# Patient Record
Sex: MALE | Race: BLACK OR AFRICAN AMERICAN | NOT HISPANIC OR LATINO | Employment: OTHER | URBAN - METROPOLITAN AREA
[De-identification: names, ages, dates, MRNs, and addresses within clinical notes are randomized per-mention and may not be internally consistent; named-entity substitution may affect disease eponyms.]

---

## 2023-03-22 LAB
ALANINE AMINOTRANSFERASE (SGPT) (U/L) IN SER/PLAS: 12 U/L (ref 10–52)
ALBUMIN (G/DL) IN SER/PLAS: 3.5 G/DL (ref 3.4–5)
ALKALINE PHOSPHATASE (U/L) IN SER/PLAS: 87 U/L (ref 33–136)
ASPARTATE AMINOTRANSFERASE (SGOT) (U/L) IN SER/PLAS: 27 U/L (ref 9–39)
BILIRUBIN DIRECT (MG/DL) IN SER/PLAS: 0.1 MG/DL (ref 0–0.3)
BILIRUBIN TOTAL (MG/DL) IN SER/PLAS: 0.4 MG/DL (ref 0–1.2)
HEPATITIS B VIRUS CORE AB (PRESENCE) IN SER/PLAS BY IMM: REACTIVE
PROTEIN TOTAL: 7.4 G/DL (ref 6.4–8.2)

## 2023-03-23 LAB
HCV PCR QUANT: NOT DETECTED IU/ML
HCV RNA, PCR LOG: NORMAL LOG10 IU/ML

## 2023-05-12 ENCOUNTER — APPOINTMENT (OUTPATIENT)
Dept: LAB | Facility: LAB | Age: 73
End: 2023-05-12
Payer: COMMERCIAL

## 2023-05-12 LAB
ALANINE AMINOTRANSFERASE (SGPT) (U/L) IN SER/PLAS: 14 U/L (ref 10–52)
ALBUMIN (G/DL) IN SER/PLAS: 3.6 G/DL (ref 3.4–5)
ALKALINE PHOSPHATASE (U/L) IN SER/PLAS: 84 U/L (ref 33–136)
ASPARTATE AMINOTRANSFERASE (SGOT) (U/L) IN SER/PLAS: 23 U/L (ref 9–39)
BILIRUBIN DIRECT (MG/DL) IN SER/PLAS: 0.1 MG/DL (ref 0–0.3)
BILIRUBIN TOTAL (MG/DL) IN SER/PLAS: 0.4 MG/DL (ref 0–1.2)
PROTEIN TOTAL: 7.5 G/DL (ref 6.4–8.2)

## 2023-05-14 LAB
HCV PCR QUANT: NOT DETECTED IU/ML
HCV RNA, PCR LOG: NORMAL LOG10 IU/ML

## 2023-07-28 ENCOUNTER — DOCUMENTATION (OUTPATIENT)
Dept: CARE COORDINATION | Facility: CLINIC | Age: 73
End: 2023-07-28
Payer: COMMERCIAL

## 2023-08-03 ENCOUNTER — PATIENT OUTREACH (OUTPATIENT)
Dept: CARE COORDINATION | Facility: CLINIC | Age: 73
End: 2023-08-03
Payer: COMMERCIAL

## 2023-08-15 ENCOUNTER — APPOINTMENT (OUTPATIENT)
Dept: LAB | Facility: LAB | Age: 73
End: 2023-08-15
Payer: COMMERCIAL

## 2023-08-15 LAB
ALANINE AMINOTRANSFERASE (SGPT) (U/L) IN SER/PLAS: 11 U/L (ref 10–52)
ALBUMIN (G/DL) IN SER/PLAS: 4.1 G/DL (ref 3.4–5)
ALKALINE PHOSPHATASE (U/L) IN SER/PLAS: 80 U/L (ref 33–136)
ALPHA-1 FETOPROTEIN (NG/ML) IN SER/PLAS: 4 NG/ML (ref 0–9)
ANION GAP IN SER/PLAS: 12 MMOL/L (ref 10–20)
ASPARTATE AMINOTRANSFERASE (SGOT) (U/L) IN SER/PLAS: 25 U/L (ref 9–39)
BILIRUBIN DIRECT (MG/DL) IN SER/PLAS: 0.2 MG/DL (ref 0–0.3)
BILIRUBIN TOTAL (MG/DL) IN SER/PLAS: 0.7 MG/DL (ref 0–1.2)
CALCIUM (MG/DL) IN SER/PLAS: 9.8 MG/DL (ref 8.6–10.6)
CARBON DIOXIDE, TOTAL (MMOL/L) IN SER/PLAS: 30 MMOL/L (ref 21–32)
CHLORIDE (MMOL/L) IN SER/PLAS: 97 MMOL/L (ref 98–107)
CREATININE (MG/DL) IN SER/PLAS: 2.02 MG/DL (ref 0.5–1.3)
ERYTHROCYTE DISTRIBUTION WIDTH (RATIO) BY AUTOMATED COUNT: 13.3 % (ref 11.5–14.5)
ERYTHROCYTE MEAN CORPUSCULAR HEMOGLOBIN CONCENTRATION (G/DL) BY AUTOMATED: 31.7 G/DL (ref 32–36)
ERYTHROCYTE MEAN CORPUSCULAR VOLUME (FL) BY AUTOMATED COUNT: 102 FL (ref 80–100)
ERYTHROCYTES (10*6/UL) IN BLOOD BY AUTOMATED COUNT: 3.73 X10E12/L (ref 4.5–5.9)
GFR MALE: 34 ML/MIN/1.73M2
GLUCOSE (MG/DL) IN SER/PLAS: 100 MG/DL (ref 74–99)
HEMATOCRIT (%) IN BLOOD BY AUTOMATED COUNT: 37.9 % (ref 41–52)
HEMOGLOBIN (G/DL) IN BLOOD: 12 G/DL (ref 13.5–17.5)
INR IN PPP BY COAGULATION ASSAY: 1 (ref 0.9–1.1)
LEUKOCYTES (10*3/UL) IN BLOOD BY AUTOMATED COUNT: 2.8 X10E9/L (ref 4.4–11.3)
NRBC (PER 100 WBCS) BY AUTOMATED COUNT: 0 /100 WBC (ref 0–0)
PLATELETS (10*3/UL) IN BLOOD AUTOMATED COUNT: 180 X10E9/L (ref 150–450)
POTASSIUM (MMOL/L) IN SER/PLAS: 4.4 MMOL/L (ref 3.5–5.3)
PROTEIN TOTAL: 8.2 G/DL (ref 6.4–8.2)
PROTHROMBIN TIME (PT) IN PPP BY COAGULATION ASSAY: 11.8 SEC (ref 9.8–12.8)
SODIUM (MMOL/L) IN SER/PLAS: 135 MMOL/L (ref 136–145)
UREA NITROGEN (MG/DL) IN SER/PLAS: 61 MG/DL (ref 6–23)

## 2023-08-16 LAB
HCV PCR QUANT: NOT DETECTED IU/ML
HCV RNA, PCR LOG: NORMAL LOG10 IU/ML

## 2023-09-22 PROBLEM — I10 HYPERTENSION: Status: ACTIVE | Noted: 2023-09-22

## 2023-09-22 PROBLEM — J44.9 COPD (CHRONIC OBSTRUCTIVE PULMONARY DISEASE) (MULTI): Status: ACTIVE | Noted: 2022-11-17

## 2023-09-22 PROBLEM — K59.09 CHRONIC CONSTIPATION: Status: ACTIVE | Noted: 2023-09-22

## 2023-09-22 PROBLEM — C32.9 LARYNGEAL CANCER (MULTI): Status: ACTIVE | Noted: 2023-09-22

## 2023-09-22 PROBLEM — K80.20 CHOLELITHIASIS: Status: ACTIVE | Noted: 2023-09-22

## 2023-09-22 PROBLEM — K94.23 PEG TUBE MALFUNCTION (MULTI): Status: ACTIVE | Noted: 2023-09-22

## 2023-09-22 PROBLEM — J90 PLEURAL EFFUSION, LEFT: Status: ACTIVE | Noted: 2023-09-22

## 2023-09-22 PROBLEM — T17.998A ASPIRATION OF LIQUID: Status: ACTIVE | Noted: 2023-09-22

## 2023-09-22 PROBLEM — E03.9 HYPOTHYROIDISM: Status: ACTIVE | Noted: 2023-09-22

## 2023-09-22 PROBLEM — K26.9 DUODENAL ULCER: Status: ACTIVE | Noted: 2023-09-22

## 2023-09-22 PROBLEM — K92.2 UPPER GI BLEED: Status: ACTIVE | Noted: 2023-09-22

## 2023-09-22 PROBLEM — L92.9: Status: ACTIVE | Noted: 2023-09-22

## 2023-09-22 PROBLEM — G89.29 CHRONIC LOW BACK PAIN: Status: ACTIVE | Noted: 2023-09-22

## 2023-09-22 PROBLEM — Z87.891 PERSONAL HISTORY OF NICOTINE DEPENDENCE: Status: ACTIVE | Noted: 2022-11-17

## 2023-09-22 PROBLEM — R13.12 DYSPHAGIA, OROPHARYNGEAL PHASE: Status: ACTIVE | Noted: 2023-09-22

## 2023-09-22 PROBLEM — R49.9 CHANGE IN VOICE: Status: ACTIVE | Noted: 2023-09-22

## 2023-09-22 PROBLEM — B19.20 HEPATITIS C VIRUS: Status: ACTIVE | Noted: 2023-09-22

## 2023-09-22 PROBLEM — J43.9 EMPHYSEMA, UNSPECIFIED (MULTI): Status: ACTIVE | Noted: 2023-09-22

## 2023-09-22 PROBLEM — R63.4 WEIGHT LOSS: Status: ACTIVE | Noted: 2023-09-22

## 2023-09-22 PROBLEM — K11.7 HYPERSALIVATION: Status: ACTIVE | Noted: 2023-09-22

## 2023-09-22 PROBLEM — I73.9 PAD (PERIPHERAL ARTERY DISEASE) (CMS-HCC): Status: ACTIVE | Noted: 2023-09-22

## 2023-09-22 PROBLEM — D64.9 ANEMIA: Status: ACTIVE | Noted: 2023-09-22

## 2023-09-22 PROBLEM — E83.52 HYPERCALCEMIA: Status: ACTIVE | Noted: 2023-09-22

## 2023-09-22 PROBLEM — M54.50 CHRONIC LOW BACK PAIN: Status: ACTIVE | Noted: 2023-09-22

## 2023-09-22 PROBLEM — K74.60 CIRRHOSIS OF LIVER (MULTI): Status: ACTIVE | Noted: 2023-09-22

## 2023-09-22 PROBLEM — N40.1 ENLARGED PROSTATE WITH LOWER URINARY TRACT SYMPTOMS (LUTS): Status: ACTIVE | Noted: 2023-09-22

## 2023-09-22 PROBLEM — R49.0 HOARSENESS OF VOICE: Status: ACTIVE | Noted: 2023-09-22

## 2023-09-22 PROBLEM — J38.7 LARYNGEAL MASS: Status: ACTIVE | Noted: 2023-09-22

## 2023-09-22 RX ORDER — ATORVASTATIN CALCIUM 10 MG/1
10 TABLET, FILM COATED ORAL NIGHTLY
COMMUNITY

## 2023-09-22 RX ORDER — ALBUTEROL SULFATE 0.83 MG/ML
3 SOLUTION RESPIRATORY (INHALATION) EVERY 6 HOURS PRN
COMMUNITY
Start: 2019-01-17

## 2023-09-22 RX ORDER — DOCUSATE SODIUM 50 MG/5ML
LIQUID ORAL
COMMUNITY
Start: 2019-10-29 | End: 2023-12-20 | Stop reason: WASHOUT

## 2023-09-22 RX ORDER — CEPHALEXIN 500 MG/1
CAPSULE ORAL
COMMUNITY
Start: 2023-06-08 | End: 2023-12-20 | Stop reason: ALTCHOICE

## 2023-09-22 RX ORDER — HYDROCHLOROTHIAZIDE 25 MG/1
25 TABLET ORAL DAILY
COMMUNITY

## 2023-09-22 RX ORDER — ESOMEPRAZOLE MAGNESIUM 40 MG/1
40 GRANULE, DELAYED RELEASE ORAL 2 TIMES DAILY
COMMUNITY
Start: 2023-08-02 | End: 2024-01-03 | Stop reason: SDUPTHER

## 2023-09-22 RX ORDER — TERAZOSIN 5 MG/1
5 CAPSULE ORAL NIGHTLY
COMMUNITY
End: 2024-04-25 | Stop reason: WASHOUT

## 2023-09-22 RX ORDER — DEXTROMETHORPHAN/PSEUDOEPHED 2.5-7.5/.8
40 DROPS ORAL 4 TIMES DAILY
Status: ON HOLD | COMMUNITY
Start: 2019-09-27 | End: 2023-12-23 | Stop reason: SDUPTHER

## 2023-09-22 RX ORDER — LEVOTHYROXINE SODIUM 50 UG/1
50 TABLET ORAL
COMMUNITY

## 2023-09-22 RX ORDER — CHLORHEXIDINE GLUCONATE ORAL RINSE 1.2 MG/ML
15 SOLUTION DENTAL
COMMUNITY
End: 2023-12-20 | Stop reason: ALTCHOICE

## 2023-09-22 RX ORDER — ONDANSETRON 4 MG/1
TABLET, ORALLY DISINTEGRATING ORAL
COMMUNITY
Start: 2023-07-30 | End: 2023-12-20 | Stop reason: WASHOUT

## 2023-09-22 RX ORDER — CHOLECALCIFEROL (VITAMIN D3) 25 MCG
1 TABLET ORAL DAILY
COMMUNITY
Start: 2019-01-02 | End: 2023-12-20 | Stop reason: ALTCHOICE

## 2023-09-22 RX ORDER — ATENOLOL 50 MG/1
50 TABLET ORAL DAILY
COMMUNITY

## 2023-09-22 RX ORDER — FLUTICASONE PROPIONATE 50 MCG
1 SPRAY, SUSPENSION (ML) NASAL DAILY PRN
COMMUNITY
Start: 2021-07-07

## 2023-09-22 RX ORDER — LOSARTAN POTASSIUM 50 MG/1
50 TABLET ORAL DAILY
COMMUNITY

## 2023-09-22 RX ORDER — ESOMEPRAZOLE MAGNESIUM 20 MG/1
20 GRANULE, DELAYED RELEASE ORAL DAILY
COMMUNITY
End: 2023-12-20 | Stop reason: WASHOUT

## 2023-09-22 RX ORDER — AMLODIPINE BESYLATE 5 MG/1
5 TABLET ORAL NIGHTLY
COMMUNITY
End: 2024-04-25 | Stop reason: WASHOUT

## 2023-09-22 RX ORDER — VELPATASVIR AND SOFOSBUVIR 100; 400 MG/1; MG/1
1 TABLET, FILM COATED ORAL DAILY
COMMUNITY
Start: 2023-03-22 | End: 2023-12-20 | Stop reason: ALTCHOICE

## 2023-09-22 RX ORDER — PROCHLORPERAZINE MALEATE 10 MG
10 TABLET ORAL DAILY PRN
COMMUNITY
End: 2023-12-20 | Stop reason: WASHOUT

## 2023-09-22 RX ORDER — ALBUTEROL SULFATE 90 UG/1
2 AEROSOL, METERED RESPIRATORY (INHALATION) EVERY 6 HOURS PRN
COMMUNITY
Start: 2023-01-09

## 2023-09-22 RX ORDER — SIMETHICONE 125 MG/1
125 CAPSULE, LIQUID FILLED ORAL 4 TIMES DAILY
COMMUNITY
Start: 2023-07-24 | End: 2023-12-20 | Stop reason: WASHOUT

## 2023-10-11 ENCOUNTER — HOSPITAL ENCOUNTER (EMERGENCY)
Facility: HOSPITAL | Age: 73
Discharge: HOME | End: 2023-10-11
Attending: EMERGENCY MEDICINE
Payer: COMMERCIAL

## 2023-10-11 ENCOUNTER — APPOINTMENT (OUTPATIENT)
Dept: RADIOLOGY | Facility: HOSPITAL | Age: 73
End: 2023-10-11
Payer: COMMERCIAL

## 2023-10-11 VITALS
BODY MASS INDEX: 21.22 KG/M2 | WEIGHT: 140 LBS | HEART RATE: 81 BPM | TEMPERATURE: 97 F | SYSTOLIC BLOOD PRESSURE: 168 MMHG | RESPIRATION RATE: 17 BRPM | HEIGHT: 68 IN | DIASTOLIC BLOOD PRESSURE: 75 MMHG | OXYGEN SATURATION: 98 %

## 2023-10-11 DIAGNOSIS — R10.9 ABDOMINAL PAIN, UNSPECIFIED ABDOMINAL LOCATION: Primary | ICD-10-CM

## 2023-10-11 LAB
ALBUMIN SERPL BCP-MCNC: 4 G/DL (ref 3.4–5)
ALP SERPL-CCNC: 85 U/L (ref 33–136)
ALT SERPL W P-5'-P-CCNC: 12 U/L (ref 10–52)
ANION GAP SERPL CALC-SCNC: 15 MMOL/L (ref 10–20)
AST SERPL W P-5'-P-CCNC: 25 U/L (ref 9–39)
BASOPHILS # BLD AUTO: 0.02 X10*3/UL (ref 0–0.1)
BASOPHILS NFR BLD AUTO: 0.6 %
BILIRUB DIRECT SERPL-MCNC: 0.2 MG/DL (ref 0–0.3)
BILIRUB SERPL-MCNC: 0.7 MG/DL (ref 0–1.2)
BUN SERPL-MCNC: 71 MG/DL (ref 6–23)
CALCIUM SERPL-MCNC: 10 MG/DL (ref 8.6–10.6)
CARDIAC TROPONIN I PNL SERPL HS: <3 NG/L (ref 0–53)
CHLORIDE SERPL-SCNC: 94 MMOL/L (ref 98–107)
CO2 SERPL-SCNC: 28 MMOL/L (ref 21–32)
CREAT SERPL-MCNC: 2.05 MG/DL (ref 0.5–1.3)
EOSINOPHIL # BLD AUTO: 0.27 X10*3/UL (ref 0–0.4)
EOSINOPHIL NFR BLD AUTO: 8.3 %
ERYTHROCYTE [DISTWIDTH] IN BLOOD BY AUTOMATED COUNT: 12.7 % (ref 11.5–14.5)
GFR SERPL CREATININE-BSD FRML MDRD: 34 ML/MIN/1.73M*2
GLUCOSE SERPL-MCNC: 104 MG/DL (ref 74–99)
HCT VFR BLD AUTO: 35.2 % (ref 41–52)
HGB BLD-MCNC: 12 G/DL (ref 13.5–17.5)
IMM GRANULOCYTES # BLD AUTO: 0.01 X10*3/UL (ref 0–0.5)
IMM GRANULOCYTES NFR BLD AUTO: 0.3 % (ref 0–0.9)
LIPASE SERPL-CCNC: 180 U/L (ref 9–82)
LYMPHOCYTES # BLD AUTO: 0.53 X10*3/UL (ref 0.8–3)
LYMPHOCYTES NFR BLD AUTO: 16.3 %
MAGNESIUM SERPL-MCNC: 2.52 MG/DL (ref 1.6–2.4)
MCH RBC QN AUTO: 31.7 PG (ref 26–34)
MCHC RBC AUTO-ENTMCNC: 34.1 G/DL (ref 32–36)
MCV RBC AUTO: 93 FL (ref 80–100)
MONOCYTES # BLD AUTO: 0.52 X10*3/UL (ref 0.05–0.8)
MONOCYTES NFR BLD AUTO: 16 %
NEUTROPHILS # BLD AUTO: 1.9 X10*3/UL (ref 1.6–5.5)
NEUTROPHILS NFR BLD AUTO: 58.5 %
NRBC BLD-RTO: 0 /100 WBCS (ref 0–0)
PLATELET # BLD AUTO: 166 X10*3/UL (ref 150–450)
PMV BLD AUTO: 10.6 FL (ref 7.5–11.5)
POTASSIUM SERPL-SCNC: 4.8 MMOL/L (ref 3.5–5.3)
PROT SERPL-MCNC: 8.7 G/DL (ref 6.4–8.2)
RBC # BLD AUTO: 3.78 X10*6/UL (ref 4.5–5.9)
SODIUM SERPL-SCNC: 132 MMOL/L (ref 136–145)
WBC # BLD AUTO: 3.3 X10*3/UL (ref 4.4–11.3)

## 2023-10-11 PROCEDURE — 74177 CT ABD & PELVIS W/CONTRAST: CPT | Performed by: RADIOLOGY

## 2023-10-11 PROCEDURE — 84484 ASSAY OF TROPONIN QUANT: CPT | Performed by: EMERGENCY MEDICINE

## 2023-10-11 PROCEDURE — 83690 ASSAY OF LIPASE: CPT | Performed by: EMERGENCY MEDICINE

## 2023-10-11 PROCEDURE — 2500000004 HC RX 250 GENERAL PHARMACY W/ HCPCS (ALT 636 FOR OP/ED): Performed by: EMERGENCY MEDICINE

## 2023-10-11 PROCEDURE — 80053 COMPREHEN METABOLIC PANEL: CPT | Performed by: EMERGENCY MEDICINE

## 2023-10-11 PROCEDURE — 2580000001 HC RX 258 IV SOLUTIONS: Performed by: EMERGENCY MEDICINE

## 2023-10-11 PROCEDURE — 99284 EMERGENCY DEPT VISIT MOD MDM: CPT | Mod: 25 | Performed by: EMERGENCY MEDICINE

## 2023-10-11 PROCEDURE — 2550000001 HC RX 255 CONTRASTS: Performed by: EMERGENCY MEDICINE

## 2023-10-11 PROCEDURE — 36415 COLL VENOUS BLD VENIPUNCTURE: CPT | Performed by: EMERGENCY MEDICINE

## 2023-10-11 PROCEDURE — G1004 CDSM NDSC: HCPCS

## 2023-10-11 PROCEDURE — 96361 HYDRATE IV INFUSION ADD-ON: CPT | Mod: XU | Performed by: EMERGENCY MEDICINE

## 2023-10-11 PROCEDURE — 83735 ASSAY OF MAGNESIUM: CPT | Performed by: EMERGENCY MEDICINE

## 2023-10-11 PROCEDURE — 85025 COMPLETE CBC W/AUTO DIFF WBC: CPT | Performed by: EMERGENCY MEDICINE

## 2023-10-11 PROCEDURE — 96374 THER/PROPH/DIAG INJ IV PUSH: CPT | Mod: XU | Performed by: EMERGENCY MEDICINE

## 2023-10-11 PROCEDURE — 99285 EMERGENCY DEPT VISIT HI MDM: CPT | Performed by: EMERGENCY MEDICINE

## 2023-10-11 RX ORDER — FAMOTIDINE 40 MG/5ML
10 POWDER, FOR SUSPENSION ORAL
Qty: 50 ML | Refills: 0 | Status: SHIPPED | OUTPATIENT
Start: 2023-10-11 | End: 2023-12-23 | Stop reason: HOSPADM

## 2023-10-11 RX ORDER — ONDANSETRON HYDROCHLORIDE 2 MG/ML
4 INJECTION, SOLUTION INTRAVENOUS ONCE
Status: COMPLETED | OUTPATIENT
Start: 2023-10-11 | End: 2023-10-11

## 2023-10-11 RX ORDER — FAMOTIDINE 40 MG/5ML
20 POWDER, FOR SUSPENSION ORAL DAILY
Qty: 50 ML | Refills: 0 | Status: SHIPPED | OUTPATIENT
Start: 2023-10-11 | End: 2023-10-11 | Stop reason: SDUPTHER

## 2023-10-11 RX ADMIN — ONDANSETRON 4 MG: 2 INJECTION INTRAMUSCULAR; INTRAVENOUS at 09:25

## 2023-10-11 RX ADMIN — SODIUM CHLORIDE, SODIUM LACTATE, POTASSIUM CHLORIDE, AND CALCIUM CHLORIDE 1000 ML: 600; 310; 30; 20 INJECTION, SOLUTION INTRAVENOUS at 11:59

## 2023-10-11 RX ADMIN — IOHEXOL 75 ML: 350 INJECTION, SOLUTION INTRAVENOUS at 13:58

## 2023-10-11 ASSESSMENT — COLUMBIA-SUICIDE SEVERITY RATING SCALE - C-SSRS
1. IN THE PAST MONTH, HAVE YOU WISHED YOU WERE DEAD OR WISHED YOU COULD GO TO SLEEP AND NOT WAKE UP?: NO
6. HAVE YOU EVER DONE ANYTHING, STARTED TO DO ANYTHING, OR PREPARED TO DO ANYTHING TO END YOUR LIFE?: NO
2. HAVE YOU ACTUALLY HAD ANY THOUGHTS OF KILLING YOURSELF?: NO

## 2023-10-11 ASSESSMENT — PAIN DESCRIPTION - PAIN TYPE: TYPE: ACUTE PAIN

## 2023-10-11 ASSESSMENT — PAIN - FUNCTIONAL ASSESSMENT: PAIN_FUNCTIONAL_ASSESSMENT: 0-10

## 2023-10-11 ASSESSMENT — PAIN DESCRIPTION - LOCATION: LOCATION: ABDOMEN

## 2023-10-11 ASSESSMENT — PAIN SCALES - GENERAL: PAINLEVEL_OUTOF10: 8

## 2023-10-11 NOTE — ED NOTES
ED Attending states that because troponin was negative, no need to complete ekg     Ruby Valadez RN  10/11/23 1600

## 2023-10-11 NOTE — DISCHARGE INSTRUCTIONS
Please follow-up with your primary care provider.  Return to the ED if you develop worsening pain, shortness of breath, are unable to tolerate your tube feeds, have intractable vomiting, vomiting blood/tarry material, have bloody stools, black stools, develop fevers, or if you have any new/worsening symptoms/concerns.

## 2023-10-23 ENCOUNTER — OFFICE VISIT (OUTPATIENT)
Dept: OTOLARYNGOLOGY | Facility: HOSPITAL | Age: 73
End: 2023-10-23
Payer: COMMERCIAL

## 2023-10-23 VITALS — WEIGHT: 139.4 LBS | TEMPERATURE: 98.1 F | BODY MASS INDEX: 21.2 KG/M2

## 2023-10-23 DIAGNOSIS — R49.9 CHANGE IN VOICE: ICD-10-CM

## 2023-10-23 DIAGNOSIS — R49.0 HOARSENESS OF VOICE: ICD-10-CM

## 2023-10-23 DIAGNOSIS — C32.9 LARYNGEAL CANCER (MULTI): Primary | ICD-10-CM

## 2023-10-23 DIAGNOSIS — E03.9 ACQUIRED HYPOTHYROIDISM: ICD-10-CM

## 2023-10-23 DIAGNOSIS — J38.7 LARYNGEAL MASS: ICD-10-CM

## 2023-10-23 PROCEDURE — 1036F TOBACCO NON-USER: CPT | Performed by: OTOLARYNGOLOGY

## 2023-10-23 PROCEDURE — 99214 OFFICE O/P EST MOD 30 MIN: CPT | Performed by: OTOLARYNGOLOGY

## 2023-10-23 PROCEDURE — 31575 DIAGNOSTIC LARYNGOSCOPY: CPT | Performed by: OTOLARYNGOLOGY

## 2023-10-23 PROCEDURE — 1160F RVW MEDS BY RX/DR IN RCRD: CPT | Performed by: OTOLARYNGOLOGY

## 2023-10-23 PROCEDURE — 1126F AMNT PAIN NOTED NONE PRSNT: CPT | Performed by: OTOLARYNGOLOGY

## 2023-10-23 PROCEDURE — 1159F MED LIST DOCD IN RCRD: CPT | Performed by: OTOLARYNGOLOGY

## 2023-10-23 ASSESSMENT — ENCOUNTER SYMPTOMS
LOSS OF SENSATION IN FEET: 0
OCCASIONAL FEELINGS OF UNSTEADINESS: 0
DEPRESSION: 0

## 2023-10-23 NOTE — PROGRESS NOTES
ASSESSMENT AND PLAN:   Mk Fleming is a 73 y.o. male with a history of T4 laryngeal cancer status post chemoradiation.  The patient reports that his weight is stable and he is using his feeding tube without any difficulties.  He did have a replacement of his feeding tube not too long ago.      Today's examination to include flexible laryngoscopy demonstrates absence of the right part of his epiglottis as well as significant secretions within the hypopharynx.  There are no concerning masses or lesions.  He has reduction of mobility and a breathy quality to his voice.    We discussed continued observation with a 6-month follow-up.      I would like to see the patient back in 6 months.       Reason For Consult  No chief complaint on file.       HISTORY OF PRESENT ILLNESS:  Mk Fleming is a 73 y.o. male presenting for a follow up visit with me for cancer surveillance.  The patient reports weight is stable.  No concerns regarding change in voice.  No hemoptysis.      Prior History:   Last seen by Dr. Barr on 07/28/2023 for Dysphagia status post treatment of laryngeal cancer     Past Medical History  He has a past medical history of Personal history of other diseases of the digestive system, Personal history of other diseases of the respiratory system, Personal history of other medical treatment, and Personal history of other specified conditions. Surgical History  He has a past surgical history that includes Other surgical history (11/19/2018); Other surgical history (11/19/2018); Other surgical history (11/19/2018); Other surgical history (10/20/2022); Other surgical history (10/20/2022); IR embolization lymph node (Bilateral, 7/3/2022); IR angiogram inferior epigastric (7/3/2022); and IR angiogram aorta abdomen (7/3/2022).   Social History  He reports that he has never smoked. He has never used smokeless tobacco. No history on file for alcohol use and drug use. Allergies  Patient has no known allergies.      Family History  Family History   Problem Relation Name Age of Onset    Pancreatic cancer Mother      Hypertension Sister      Hypertension Brother         Review of Systems  All 10 systems were reviewed and negative except for above.      Last Recorded Vitals  There were no vitals taken for this visit.    Physical Exam  ENT Physical Exam  Constitutional  Appearance: patient appears well-developed and well-nourished,  Head and Face  Appearance: head appears normal and face appears normal;  Ear  Auricles: right auricle normal; left auricle normal;  Nose  External Nose: nares patent bilaterally;  Oral Cavity/Oropharynx  Lips: normal;  Teeth: no dentures present;  Neck  Neck: radiation changes present; no scars present; neck asymmetric; no neck mass present;  Thyroid: no thyroid mass present;  Respiratory  Inspection: no retractions visible;  Cardiovascular  Inspection: no peripheral edema present;  Neurovestibular  Mental Status: alert and oriented;  Psychiatric: mood normal;  Cranial Nerves: cranial nerves intact;        Relevant Results       Patient Reported Outcome Measures         Radiology, Laboratory and Pathology  No results found.      Laryngoscopy    Date/Time: 10/23/2023 2:28 PM    Performed by: Amrik Sprague MD  Authorized by: Amrik Sprague MD    Consent:     Consent obtained:  Verbal    Consent given by:  Patient  Anesthesia (see MAR for exact dosages):     Anesthesia method:  Topical application  Procedure details:     Indications: assessment of airway and evaluation of larynx and immediate subglottis      Medication:  Afrin    Instrument: flexible fiberoptic laryngoscope      Scope location: bilateral nare    Post-procedure details:     Patient tolerance of procedure:  Tolerated well, no immediate complications  GRBAS: 1,2,1,2,2  Intelligibility is reduced Alan resonance is balanced, vocal loudness reduced breath support reduced  Subglottic edema present thick mucus present no granuloma,  ventricular obliteration is complete, erythema is complete, interarytenoid thickening is moderate, vocal fold edema is severe, diffuse laryngitis is severe  Gross arytenoids were limited abduction bilaterally, arytenoid height was normal, supraglottic tension is lateral,  Eovxp3d right epiglottis.     Time Spent  Prep time on day of patient encounter: 10 minutes  Time spent directly with patient, family or caregiver: 15 minutes  Additional Time Spent on Patient Care Activities: 5 minutes  Documentation Time: 10 minutes  Other Time Spent: 0 minutes  Total: 40 minutes

## 2023-12-13 ENCOUNTER — OFFICE VISIT (OUTPATIENT)
Dept: GASTROENTEROLOGY | Facility: HOSPITAL | Age: 73
End: 2023-12-13
Payer: COMMERCIAL

## 2023-12-13 VITALS
WEIGHT: 141 LBS | DIASTOLIC BLOOD PRESSURE: 67 MMHG | SYSTOLIC BLOOD PRESSURE: 153 MMHG | HEART RATE: 84 BPM | TEMPERATURE: 97.3 F | HEIGHT: 68 IN | BODY MASS INDEX: 21.37 KG/M2 | OXYGEN SATURATION: 98 %

## 2023-12-13 DIAGNOSIS — B19.20 HEPATITIS C VIRUS INFECTION WITHOUT HEPATIC COMA, UNSPECIFIED CHRONICITY: Primary | ICD-10-CM

## 2023-12-13 DIAGNOSIS — K82.9 GALLBLADDER ATTACK: ICD-10-CM

## 2023-12-13 DIAGNOSIS — K25.7 CHRONIC GASTRIC ULCER WITHOUT HEMORRHAGE AND WITHOUT PERFORATION: ICD-10-CM

## 2023-12-13 DIAGNOSIS — K74.00 LIVER FIBROSIS: ICD-10-CM

## 2023-12-13 PROCEDURE — 3078F DIAST BP <80 MM HG: CPT | Performed by: INTERNAL MEDICINE

## 2023-12-13 PROCEDURE — 1160F RVW MEDS BY RX/DR IN RCRD: CPT | Performed by: INTERNAL MEDICINE

## 2023-12-13 PROCEDURE — 1159F MED LIST DOCD IN RCRD: CPT | Performed by: INTERNAL MEDICINE

## 2023-12-13 PROCEDURE — 99214 OFFICE O/P EST MOD 30 MIN: CPT | Performed by: INTERNAL MEDICINE

## 2023-12-13 PROCEDURE — 1036F TOBACCO NON-USER: CPT | Performed by: INTERNAL MEDICINE

## 2023-12-13 PROCEDURE — 1125F AMNT PAIN NOTED PAIN PRSNT: CPT | Performed by: INTERNAL MEDICINE

## 2023-12-13 PROCEDURE — 3077F SYST BP >= 140 MM HG: CPT | Performed by: INTERNAL MEDICINE

## 2023-12-13 SDOH — ECONOMIC STABILITY: FOOD INSECURITY: WITHIN THE PAST 12 MONTHS, THE FOOD YOU BOUGHT JUST DIDN'T LAST AND YOU DIDN'T HAVE MONEY TO GET MORE.: NEVER TRUE

## 2023-12-13 SDOH — ECONOMIC STABILITY: FOOD INSECURITY: WITHIN THE PAST 12 MONTHS, YOU WORRIED THAT YOUR FOOD WOULD RUN OUT BEFORE YOU GOT MONEY TO BUY MORE.: NEVER TRUE

## 2023-12-13 ASSESSMENT — ENCOUNTER SYMPTOMS
DEPRESSION: 0
OCCASIONAL FEELINGS OF UNSTEADINESS: 0
LOSS OF SENSATION IN FEET: 0

## 2023-12-13 ASSESSMENT — PAIN SCALES - GENERAL: PAINLEVEL: 7

## 2023-12-13 ASSESSMENT — LIFESTYLE VARIABLES
SKIP TO QUESTIONS 9-10: 1
HOW MANY STANDARD DRINKS CONTAINING ALCOHOL DO YOU HAVE ON A TYPICAL DAY: PATIENT DOES NOT DRINK
HOW OFTEN DO YOU HAVE SIX OR MORE DRINKS ON ONE OCCASION: NEVER
HOW OFTEN DO YOU HAVE A DRINK CONTAINING ALCOHOL: NEVER
AUDIT-C TOTAL SCORE: 0

## 2023-12-13 ASSESSMENT — PATIENT HEALTH QUESTIONNAIRE - PHQ9
SUM OF ALL RESPONSES TO PHQ9 QUESTIONS 1 AND 2: 0
1. LITTLE INTEREST OR PLEASURE IN DOING THINGS: NOT AT ALL
1. LITTLE INTEREST OR PLEASURE IN DOING THINGS: NOT AT ALL
2. FEELING DOWN, DEPRESSED OR HOPELESS: NOT AT ALL
2. FEELING DOWN, DEPRESSED OR HOPELESS: NOT AT ALL
SUM OF ALL RESPONSES TO PHQ9 QUESTIONS 1 & 2: 0

## 2023-12-13 ASSESSMENT — COLUMBIA-SUICIDE SEVERITY RATING SCALE - C-SSRS
6. HAVE YOU EVER DONE ANYTHING, STARTED TO DO ANYTHING, OR PREPARED TO DO ANYTHING TO END YOUR LIFE?: NO
1. IN THE PAST MONTH, HAVE YOU WISHED YOU WERE DEAD OR WISHED YOU COULD GO TO SLEEP AND NOT WAKE UP?: NO
2. HAVE YOU ACTUALLY HAD ANY THOUGHTS OF KILLING YOURSELF?: NO

## 2023-12-13 NOTE — PROGRESS NOTES
"Hepatology Clinic Follow up Note    Reason For Visit: HCV follow up    History Of Present Illness  Mk Fleming is a 73 y.o. male with Hypertension, oropharyngeal Ca s/p surgery and XRT and HCV GT 1a, failed Ribavirin / interferon regimen in the past, complicated by advanced hepatic fibrosis, s/p Epclusa (finished course in May, 2023) presents today for follow up.    Last seen by Dr. Gallardo in May, 2023. Had been doing well. No side effects from meds. Labs 12 weeks post treatment Not detected (aug, 2023). Repeat labs from October with albumin 4.0, Tbili 0.7, alkphos 85, ALT 12, AST 25. Na 132, BUN/Cr 71/2.05. Plts 166. No AFP or INR. RUQ from August without any mass lesion. Fibroscan with F3 disease (10.4 kPa).     Today: feeling lousy. Has abdomenal pain since last year when he he was hospitalized with Gl bleeding, thought to be 2/2 gastric ulcers. Of note, he had an esophageal stricutre which was only able to be traversed with ultrathin scope. H pylori stool testing negative in 2023. Has been to the hospital 3x for this pain since then. All of feeds via PEG, nothing by mouth. Feeds 4x times per day. Pain is not worse with feeds. +nuasea every day. No vomiting. No blood in stools. No black stools. No LE edema. No confusion.      Physical Exam     Last Recorded Vitals  Blood pressure 153/67, pulse 84, temperature 36.3 °C (97.3 °F), height 1.727 m (5' 8\"), weight 64 kg (141 lb), SpO2 98 %.  Gen:  NAD  HEENT:  No scleral icterus, moist mucous membranes  Lungs:  No increased WOB, CTAB  CVS:  RRR  Abd:  Soft, non tender, nl BS, PEG site well appearing, abd distended but no dullness, no fluid shift   Ext:  No edema, full ROM  Skin:  WWP  Neuro:  No asterixis, AOx3    Relevant Results    Current Outpatient Medications:     albuterol 2.5 mg /3 mL (0.083 %) nebulizer solution, Inhale 3 mL every 6 hours if needed for wheezing. 4-6 hours as needed, Disp: , Rfl:     albuterol 90 mcg/actuation inhaler, Inhale 2 puffs every 6 " hours if needed for shortness of breath., Disp: , Rfl:     amLODIPine (Norvasc) 5 mg tablet, Take 1 tablet (5 mg) by mouth once daily at bedtime., Disp: , Rfl:     atenolol (Tenormin) 50 mg tablet, Take 1 tablet (50 mg) by mouth once daily., Disp: , Rfl:     atorvastatin (Lipitor) 10 mg tablet, Take 1 tablet (10 mg) by mouth once daily at bedtime., Disp: , Rfl:     chlorhexidine (Peridex) 0.12 % solution, Use 15 mL in the mouth or throat. PLACE 15 MILLILITERS SWISH IN MOUTH FOR 30 SECONDS THEN SPIT OUT, Disp: , Rfl:     docusate sodium (Colace) 50 mg/5 mL oral liquid, TAKE AS DIRECTED., Disp: , Rfl:     esomeprazole (NexIUM) 40 mg packet, 40 mg once daily., Disp: , Rfl:     fluticasone (Flonase) 50 mcg/actuation nasal spray, Administer 1 spray into each nostril once daily as needed., Disp: , Rfl:     Gas Relief Extra Strength 125 mg capsule, Take 1 capsule (125 mg) by mouth 4 times a day., Disp: , Rfl:     hydroCHLOROthiazide (HYDRODiuril) 25 mg tablet, Take 1 tablet (25 mg) by mouth once daily., Disp: , Rfl:     levothyroxine (Synthroid, Levoxyl) 50 mcg tablet, Take 1 tablet (50 mcg) by mouth once daily in the morning. Take before meals., Disp: , Rfl:     losartan (Cozaar) 50 mg tablet, Take 1 tablet (50 mg) by mouth once daily., Disp: , Rfl:     ondansetron ODT (Zofran-ODT) 4 mg disintegrating tablet, , Disp: , Rfl:     sofosbuvir-velpatasvir (Epclusa) 400-100 mg tablet, Take 1 tablet by mouth once daily., Disp: , Rfl:     terazosin (Hytrin) 5 mg capsule, Take 1 capsule (5 mg) by mouth once daily at bedtime., Disp: , Rfl:     cephalexin (Keflex) 500 mg capsule, , Disp: , Rfl:     cholecalciferol (Vitamin D-3) 25 MCG (1000 UT) tablet, Take 1 tablet (25 mcg) by mouth once daily., Disp: , Rfl:     esomeprazole (NexIUM) 20 mg packet, Take 20 mg by mouth once daily., Disp: , Rfl:     famotidine (Pepcid) 40 mg/5 mL (8 mg/mL) suspension, Take 1.25 mL (10 mg) by mouth every other day., Disp: 50 mL, Rfl: 0     prochlorperazine (Compazine) 10 mg tablet, 1 tablet (10 mg) once daily as needed for nausea., Disp: , Rfl:     simethicone (Mylicon) 40 mg/0.6 mL drops, Take 0.6 mL (40 mg) by mouth 4 times a day., Disp: , Rfl:      Lab Results   Component Value Date    WBC 3.3 (L) 10/11/2023    HGB 12.0 (L) 10/11/2023    HCT 35.2 (L) 10/11/2023    MCV 93 10/11/2023     10/11/2023     Lab Results   Component Value Date    GLUCOSE 104 (H) 10/11/2023    CALCIUM 10.0 10/11/2023     (L) 10/11/2023    K 4.8 10/11/2023    CO2 28 10/11/2023    CL 94 (L) 10/11/2023    BUN 71 (H) 10/11/2023    CREATININE 2.05 (H) 10/11/2023     Lab Results   Component Value Date    ALT 12 10/11/2023    AST 25 10/11/2023    ALKPHOS 85 10/11/2023    BILITOT 0.7 10/11/2023       LIVER LABS:   Lab Results   Component Value Date    HAVTO REACTIVE (A) 12/14/2022    HEPBSAB 24.6 01/25/2023    HEPBCAB REACTIVE (A) 03/22/2023    HEPBSAG NONREACTIVE 12/14/2022    INR 1.0 08/15/2023    FERRITIN 512 (H) 12/14/2022    IRON 105 12/14/2022    IRONSAT 35 12/14/2022      Lab Results   Component Value Date    HEPBSAG NONREACTIVE 12/14/2022    HEPBSAB 24.6 01/25/2023    HEPBCAB REACTIVE (A) 03/22/2023      Imaging:  === 08/22/23 ===    US ABDOMEN LIMITED LIVER    - Impression -  1. Cirrhotic appearance of the liver without sonographic evidence of  a mass lesion.  2. Right kidney simple cyst.  3. Cholelithiasis without acute cholecystitis.    I personally reviewed the images/study and I agree with the findings  as stated by radiology resident Dotty Eastman MD. This study was  interpreted at [place], [place].    === 10/11/23 ===    CT ABDOMEN PELVIS W IV CONTRAST    - Impression -  1. Marked gastric wall thickening compatible with gastritis not  changed from 07/30/2023 CT. PEG tube is noted with balloon in  appropriate position.  2. Cholelithiasis and gallbladder thickening without evidence of  acute cholecystitis, not  changed from 07/30/2023 CT. Finding may be  further evaluated with ultrasound if clinically indicated.  3. Cirrhotic morphology of the liver not changed from 07/30/2023 CT  without focal lesion or mass.  4. Scattered large bowel diverticulosis without evidence of acute  diverticulitis.    I personally reviewed the images/study and I agree with the findings  as stated. This study was interpreted at [facility], [place], [place].    Signed by: Sunil Mayorga 10/11/2023 3:00 PM  Dictation workstation:   [id number]    Procedures:  EGD:   7/1/2022:  Impression:            - Benign-appearing severe esophageal stenosis was                          found at the cricopharyngeous, most likely radiation                          induced. This was traversed with effort with the                          ultrathin scope.                         - The esophagus was otherwise unremarkable.                         - 2 cm hiatal hernia.                         - Intact gastrostomy with a patent G-tube present                          characterized by healthy appearing mucosa and no PEG                          related ulceration was found                         - The stomach was otherwise normal.                         - Three non-obstructing non-bleeding duodenal ulcers                          were found in the duodenal bulb and along with                          duodenal sweep. The largest ulcer along the sweep had                          a suspected flat pigmented spot (Julio Class IIc)                          but difficult to further characterize with the                          ultrathin scope. The other two ulcers appeared clean                          based. No active bleeding or hematin seen in the                          duodeum.                         - Normal second portion of the duodenum.                         - No specimens collected.    ASSESSMENT/PLAN:    Mk CHAIREZ  Lonnie is a 73 y.o. male with Hypertension, oropharyngeal Ca s/p surgery and XRT and HCV GT 1a, failed Ribavirin / interferon regimen in the past, complicated by advanced hepatic fibrosis, s/p Epclusa (finished course in May, 2023) presents today for follow up. Patient has no evidence of viremia on labs 12 weeks after Epclusa and thus has achieved SVR. Given F3 fibrosis, will plan to monitor every 6 months with labs and US. In terms of chronic abdominal pain, imaging from Oct with evidence of gallstone disease. Also has known gastric ulcers seen on EGD from 2022, however was very difficult to view due to esophageal stricture, only able to traverse with ultrathin scope. Will start with upper GI series through PEG to assess ulcers.     #HCV s/p SVR  - MELD labs + AFP every 6 months (due in Feb, ordered today)  - RUQ US every 6 months (due in April, ordered today)    #chronic abd pain  #cholelithiasis  #hx of gastric ulcers (H Pylori negative)  - HIDA scan   - upper GI series via PEG tube  - if above is negative, can consider repeat upper endoscopy, will need to dilate esophageal stricture    Follow up with me in 6 months.     The patient indicates understanding of these issues and agrees with the plan.    Discussed with Attending Dr. Gallardo.     Marely Balbuena MD

## 2023-12-13 NOTE — PATIENT INSTRUCTIONS
Thank you for seeing us in liver clinic today.     Good news is that you have completely cured your hepatitis C. I have ordered you for blood work to be done in February. Repeat ultrasound of your liver April.     For your stomach pain, we have ordered you for 2 tests: one called an upper GI series to look at your ulcers and the other called a HIDA scan to look at your galbladder.     We will see you in 6 months here.

## 2023-12-19 ENCOUNTER — HOSPITAL ENCOUNTER (OUTPATIENT)
Facility: HOSPITAL | Age: 73
Setting detail: OBSERVATION
Discharge: HOME | DRG: 444 | End: 2023-12-23
Attending: EMERGENCY MEDICINE | Admitting: STUDENT IN AN ORGANIZED HEALTH CARE EDUCATION/TRAINING PROGRAM
Payer: COMMERCIAL

## 2023-12-19 DIAGNOSIS — K81.1 CHRONIC CHOLECYSTITIS: ICD-10-CM

## 2023-12-19 DIAGNOSIS — Z93.1 STATUS POST INSERTION OF PERCUTANEOUS ENDOSCOPIC GASTROSTOMY (PEG) TUBE (MULTI): ICD-10-CM

## 2023-12-19 DIAGNOSIS — R10.84 ABDOMINAL PAIN, GENERALIZED: ICD-10-CM

## 2023-12-19 DIAGNOSIS — K80.20 CHOLELITHIASIS WITHOUT CHOLECYSTITIS: Primary | ICD-10-CM

## 2023-12-19 DIAGNOSIS — K59.03 DRUG-INDUCED CONSTIPATION: ICD-10-CM

## 2023-12-19 LAB
BASOPHILS # BLD AUTO: 0.03 X10*3/UL (ref 0–0.1)
BASOPHILS NFR BLD AUTO: 0.6 %
EOSINOPHIL # BLD AUTO: 0.28 X10*3/UL (ref 0–0.4)
EOSINOPHIL NFR BLD AUTO: 6 %
ERYTHROCYTE [DISTWIDTH] IN BLOOD BY AUTOMATED COUNT: 12.5 % (ref 11.5–14.5)
HCT VFR BLD AUTO: 31.4 % (ref 41–52)
HGB BLD-MCNC: 11.1 G/DL (ref 13.5–17.5)
IMM GRANULOCYTES # BLD AUTO: 0.01 X10*3/UL (ref 0–0.5)
IMM GRANULOCYTES NFR BLD AUTO: 0.2 % (ref 0–0.9)
LYMPHOCYTES # BLD AUTO: 0.69 X10*3/UL (ref 0.8–3)
LYMPHOCYTES NFR BLD AUTO: 14.8 %
MCH RBC QN AUTO: 32.1 PG (ref 26–34)
MCHC RBC AUTO-ENTMCNC: 35.4 G/DL (ref 32–36)
MCV RBC AUTO: 91 FL (ref 80–100)
MONOCYTES # BLD AUTO: 0.64 X10*3/UL (ref 0.05–0.8)
MONOCYTES NFR BLD AUTO: 13.7 %
NEUTROPHILS # BLD AUTO: 3.02 X10*3/UL (ref 1.6–5.5)
NEUTROPHILS NFR BLD AUTO: 64.7 %
NRBC BLD-RTO: 0 /100 WBCS (ref 0–0)
PLATELET # BLD AUTO: 153 X10*3/UL (ref 150–450)
RBC # BLD AUTO: 3.46 X10*6/UL (ref 4.5–5.9)
WBC # BLD AUTO: 4.7 X10*3/UL (ref 4.4–11.3)

## 2023-12-19 PROCEDURE — 99285 EMERGENCY DEPT VISIT HI MDM: CPT | Performed by: EMERGENCY MEDICINE

## 2023-12-19 PROCEDURE — 96375 TX/PRO/DX INJ NEW DRUG ADDON: CPT

## 2023-12-19 PROCEDURE — 83735 ASSAY OF MAGNESIUM: CPT | Performed by: STUDENT IN AN ORGANIZED HEALTH CARE EDUCATION/TRAINING PROGRAM

## 2023-12-19 PROCEDURE — 84100 ASSAY OF PHOSPHORUS: CPT | Performed by: STUDENT IN AN ORGANIZED HEALTH CARE EDUCATION/TRAINING PROGRAM

## 2023-12-19 PROCEDURE — 2500000001 HC RX 250 WO HCPCS SELF ADMINISTERED DRUGS (ALT 637 FOR MEDICARE OP): Performed by: STUDENT IN AN ORGANIZED HEALTH CARE EDUCATION/TRAINING PROGRAM

## 2023-12-19 PROCEDURE — 36415 COLL VENOUS BLD VENIPUNCTURE: CPT | Performed by: STUDENT IN AN ORGANIZED HEALTH CARE EDUCATION/TRAINING PROGRAM

## 2023-12-19 PROCEDURE — 85025 COMPLETE CBC W/AUTO DIFF WBC: CPT | Performed by: STUDENT IN AN ORGANIZED HEALTH CARE EDUCATION/TRAINING PROGRAM

## 2023-12-19 PROCEDURE — 96374 THER/PROPH/DIAG INJ IV PUSH: CPT

## 2023-12-19 PROCEDURE — 80053 COMPREHEN METABOLIC PANEL: CPT | Performed by: STUDENT IN AN ORGANIZED HEALTH CARE EDUCATION/TRAINING PROGRAM

## 2023-12-19 PROCEDURE — 83690 ASSAY OF LIPASE: CPT | Performed by: STUDENT IN AN ORGANIZED HEALTH CARE EDUCATION/TRAINING PROGRAM

## 2023-12-19 RX ORDER — OXYCODONE AND ACETAMINOPHEN 5; 325 MG/1; MG/1
1 TABLET ORAL ONCE
Status: COMPLETED | OUTPATIENT
Start: 2023-12-19 | End: 2023-12-19

## 2023-12-19 RX ADMIN — OXYCODONE HYDROCHLORIDE AND ACETAMINOPHEN 1 TABLET: 5; 325 TABLET ORAL at 23:00

## 2023-12-19 ASSESSMENT — COLUMBIA-SUICIDE SEVERITY RATING SCALE - C-SSRS
2. HAVE YOU ACTUALLY HAD ANY THOUGHTS OF KILLING YOURSELF?: NO
6. HAVE YOU EVER DONE ANYTHING, STARTED TO DO ANYTHING, OR PREPARED TO DO ANYTHING TO END YOUR LIFE?: NO
1. IN THE PAST MONTH, HAVE YOU WISHED YOU WERE DEAD OR WISHED YOU COULD GO TO SLEEP AND NOT WAKE UP?: NO

## 2023-12-20 ENCOUNTER — APPOINTMENT (OUTPATIENT)
Dept: RADIOLOGY | Facility: HOSPITAL | Age: 73
DRG: 444 | End: 2023-12-20
Payer: COMMERCIAL

## 2023-12-20 PROBLEM — K80.20 CHOLELITHIASIS WITHOUT CHOLECYSTITIS: Status: ACTIVE | Noted: 2023-12-20

## 2023-12-20 LAB
ALBUMIN SERPL BCP-MCNC: 4 G/DL (ref 3.4–5)
ALP SERPL-CCNC: 78 U/L (ref 33–136)
ALT SERPL W P-5'-P-CCNC: 6 U/L (ref 10–52)
ANION GAP SERPL CALC-SCNC: 15 MMOL/L (ref 10–20)
APPEARANCE UR: CLEAR
AST SERPL W P-5'-P-CCNC: 22 U/L (ref 9–39)
BILIRUB SERPL-MCNC: 0.5 MG/DL (ref 0–1.2)
BILIRUB UR STRIP.AUTO-MCNC: NEGATIVE MG/DL
BUN SERPL-MCNC: 84 MG/DL (ref 6–23)
CALCIUM SERPL-MCNC: 9.9 MG/DL (ref 8.6–10.6)
CHLORIDE SERPL-SCNC: 98 MMOL/L (ref 98–107)
CO2 SERPL-SCNC: 25 MMOL/L (ref 21–32)
COLOR UR: YELLOW
CREAT SERPL-MCNC: 2.08 MG/DL (ref 0.5–1.3)
GFR SERPL CREATININE-BSD FRML MDRD: 33 ML/MIN/1.73M*2
GLUCOSE SERPL-MCNC: 114 MG/DL (ref 74–99)
GLUCOSE UR STRIP.AUTO-MCNC: NEGATIVE MG/DL
HOLD SPECIMEN: NORMAL
KETONES UR STRIP.AUTO-MCNC: NEGATIVE MG/DL
LEUKOCYTE ESTERASE UR QL STRIP.AUTO: NEGATIVE
LIPASE SERPL-CCNC: 190 U/L (ref 9–82)
MAGNESIUM SERPL-MCNC: 2.47 MG/DL (ref 1.6–2.4)
NITRITE UR QL STRIP.AUTO: NEGATIVE
PH UR STRIP.AUTO: 7 [PH]
PHOSPHATE SERPL-MCNC: 3.1 MG/DL (ref 2.5–4.9)
POTASSIUM SERPL-SCNC: 4.1 MMOL/L (ref 3.5–5.3)
PROT SERPL-MCNC: 8.6 G/DL (ref 6.4–8.2)
PROT UR STRIP.AUTO-MCNC: NEGATIVE MG/DL
RBC # UR STRIP.AUTO: NEGATIVE /UL
SODIUM SERPL-SCNC: 134 MMOL/L (ref 136–145)
SP GR UR STRIP.AUTO: 1.01
UROBILINOGEN UR STRIP.AUTO-MCNC: <2 MG/DL

## 2023-12-20 PROCEDURE — C9113 INJ PANTOPRAZOLE SODIUM, VIA: HCPCS | Performed by: NURSE PRACTITIONER

## 2023-12-20 PROCEDURE — 96374 THER/PROPH/DIAG INJ IV PUSH: CPT

## 2023-12-20 PROCEDURE — 96375 TX/PRO/DX INJ NEW DRUG ADDON: CPT

## 2023-12-20 PROCEDURE — 76705 ECHO EXAM OF ABDOMEN: CPT | Performed by: RADIOLOGY

## 2023-12-20 PROCEDURE — G0378 HOSPITAL OBSERVATION PER HR: HCPCS

## 2023-12-20 PROCEDURE — 96376 TX/PRO/DX INJ SAME DRUG ADON: CPT

## 2023-12-20 PROCEDURE — 2500000004 HC RX 250 GENERAL PHARMACY W/ HCPCS (ALT 636 FOR OP/ED): Performed by: NURSE PRACTITIONER

## 2023-12-20 PROCEDURE — 1210000001 HC SEMI-PRIVATE ROOM DAILY

## 2023-12-20 PROCEDURE — 2500000001 HC RX 250 WO HCPCS SELF ADMINISTERED DRUGS (ALT 637 FOR MEDICARE OP): Performed by: NURSE PRACTITIONER

## 2023-12-20 PROCEDURE — 99223 1ST HOSP IP/OBS HIGH 75: CPT | Performed by: NURSE PRACTITIONER

## 2023-12-20 PROCEDURE — 2500000004 HC RX 250 GENERAL PHARMACY W/ HCPCS (ALT 636 FOR OP/ED): Performed by: STUDENT IN AN ORGANIZED HEALTH CARE EDUCATION/TRAINING PROGRAM

## 2023-12-20 PROCEDURE — 81003 URINALYSIS AUTO W/O SCOPE: CPT | Performed by: STUDENT IN AN ORGANIZED HEALTH CARE EDUCATION/TRAINING PROGRAM

## 2023-12-20 PROCEDURE — 76705 ECHO EXAM OF ABDOMEN: CPT

## 2023-12-20 RX ORDER — LEVOTHYROXINE SODIUM 50 UG/1
50 TABLET ORAL
Status: DISCONTINUED | OUTPATIENT
Start: 2023-12-20 | End: 2023-12-23 | Stop reason: HOSPADM

## 2023-12-20 RX ORDER — AMLODIPINE BESYLATE 5 MG/1
5 TABLET ORAL NIGHTLY
Status: DISCONTINUED | OUTPATIENT
Start: 2023-12-20 | End: 2023-12-23 | Stop reason: HOSPADM

## 2023-12-20 RX ORDER — SUCRALFATE 1 G/10ML
1 SUSPENSION ORAL 4 TIMES DAILY
COMMUNITY
End: 2024-04-25 | Stop reason: WASHOUT

## 2023-12-20 RX ORDER — ACETAMINOPHEN 500 MG
10 TABLET ORAL DAILY PRN
Status: DISCONTINUED | OUTPATIENT
Start: 2023-12-20 | End: 2023-12-23 | Stop reason: HOSPADM

## 2023-12-20 RX ORDER — HYDROMORPHONE HYDROCHLORIDE 1 MG/ML
0.2 INJECTION, SOLUTION INTRAMUSCULAR; INTRAVENOUS; SUBCUTANEOUS EVERY 4 HOURS PRN
Status: DISCONTINUED | OUTPATIENT
Start: 2023-12-20 | End: 2023-12-21

## 2023-12-20 RX ORDER — ATORVASTATIN CALCIUM 10 MG/1
10 TABLET, FILM COATED ORAL NIGHTLY
Status: DISCONTINUED | OUTPATIENT
Start: 2023-12-20 | End: 2023-12-23 | Stop reason: HOSPADM

## 2023-12-20 RX ORDER — ALBUTEROL SULFATE 0.83 MG/ML
2.5 SOLUTION RESPIRATORY (INHALATION) EVERY 6 HOURS PRN
Status: DISCONTINUED | OUTPATIENT
Start: 2023-12-20 | End: 2023-12-23 | Stop reason: HOSPADM

## 2023-12-20 RX ORDER — PANTOPRAZOLE SODIUM 40 MG/10ML
40 INJECTION, POWDER, LYOPHILIZED, FOR SOLUTION INTRAVENOUS DAILY
Status: DISCONTINUED | OUTPATIENT
Start: 2023-12-20 | End: 2023-12-23 | Stop reason: HOSPADM

## 2023-12-20 RX ORDER — HYDROMORPHONE HYDROCHLORIDE 1 MG/ML
0.2 INJECTION, SOLUTION INTRAMUSCULAR; INTRAVENOUS; SUBCUTANEOUS ONCE
Status: COMPLETED | OUTPATIENT
Start: 2023-12-20 | End: 2023-12-20

## 2023-12-20 RX ORDER — KETOROLAC TROMETHAMINE 15 MG/ML
15 INJECTION, SOLUTION INTRAMUSCULAR; INTRAVENOUS ONCE
Status: COMPLETED | OUTPATIENT
Start: 2023-12-20 | End: 2023-12-20

## 2023-12-20 RX ORDER — ATENOLOL 50 MG/1
50 TABLET ORAL DAILY
Status: DISCONTINUED | OUTPATIENT
Start: 2023-12-20 | End: 2023-12-23 | Stop reason: HOSPADM

## 2023-12-20 RX ORDER — POLYETHYLENE GLYCOL 3350 17 G/17G
17 POWDER, FOR SOLUTION ORAL DAILY PRN
Status: DISCONTINUED | OUTPATIENT
Start: 2023-12-20 | End: 2023-12-23 | Stop reason: HOSPADM

## 2023-12-20 RX ORDER — OXYCODONE HYDROCHLORIDE 5 MG/1
5 TABLET ORAL EVERY 6 HOURS PRN
Status: DISCONTINUED | OUTPATIENT
Start: 2023-12-20 | End: 2023-12-21

## 2023-12-20 RX ORDER — TERAZOSIN 5 MG/1
5 CAPSULE ORAL NIGHTLY
Status: DISCONTINUED | OUTPATIENT
Start: 2023-12-20 | End: 2023-12-23 | Stop reason: HOSPADM

## 2023-12-20 RX ORDER — LOSARTAN POTASSIUM 50 MG/1
50 TABLET ORAL DAILY
Status: DISCONTINUED | OUTPATIENT
Start: 2023-12-20 | End: 2023-12-23 | Stop reason: HOSPADM

## 2023-12-20 RX ORDER — HYDROCHLOROTHIAZIDE 25 MG/1
25 TABLET ORAL DAILY
Status: DISCONTINUED | OUTPATIENT
Start: 2023-12-20 | End: 2023-12-23 | Stop reason: HOSPADM

## 2023-12-20 RX ADMIN — AMLODIPINE BESYLATE 5 MG: 5 TABLET ORAL at 20:39

## 2023-12-20 RX ADMIN — KETOROLAC TROMETHAMINE 15 MG: 15 INJECTION, SOLUTION INTRAMUSCULAR; INTRAVENOUS at 01:09

## 2023-12-20 RX ADMIN — HYDROMORPHONE HYDROCHLORIDE 0.2 MG: 1 INJECTION, SOLUTION INTRAMUSCULAR; INTRAVENOUS; SUBCUTANEOUS at 13:46

## 2023-12-20 RX ADMIN — ATORVASTATIN CALCIUM 10 MG: 20 TABLET, FILM COATED ORAL at 20:39

## 2023-12-20 RX ADMIN — LOSARTAN POTASSIUM 50 MG: 50 TABLET, FILM COATED ORAL at 08:02

## 2023-12-20 RX ADMIN — PANTOPRAZOLE SODIUM 40 MG: 40 INJECTION, POWDER, FOR SOLUTION INTRAVENOUS at 08:01

## 2023-12-20 RX ADMIN — HYDROCHLOROTHIAZIDE 25 MG: 25 TABLET ORAL at 08:00

## 2023-12-20 RX ADMIN — HYDROMORPHONE HYDROCHLORIDE 0.2 MG: 1 INJECTION, SOLUTION INTRAMUSCULAR; INTRAVENOUS; SUBCUTANEOUS at 01:43

## 2023-12-20 RX ADMIN — HYDROMORPHONE HYDROCHLORIDE 0.2 MG: 1 INJECTION, SOLUTION INTRAMUSCULAR; INTRAVENOUS; SUBCUTANEOUS at 20:36

## 2023-12-20 RX ADMIN — HYDROMORPHONE HYDROCHLORIDE 0.2 MG: 1 INJECTION, SOLUTION INTRAMUSCULAR; INTRAVENOUS; SUBCUTANEOUS at 07:41

## 2023-12-20 RX ADMIN — LEVOTHYROXINE SODIUM 50 MCG: 50 TABLET ORAL at 07:41

## 2023-12-20 RX ADMIN — ATENOLOL 50 MG: 50 TABLET ORAL at 08:02

## 2023-12-20 ASSESSMENT — ENCOUNTER SYMPTOMS
NAUSEA: 0
CONSTIPATION: 0
FLANK PAIN: 0
PALPITATIONS: 0
FEVER: 0
ABDOMINAL PAIN: 1
FREQUENCY: 0
VOMITING: 0
DYSURIA: 0
SHORTNESS OF BREATH: 0
DIARRHEA: 0
HEMATURIA: 0
CHILLS: 0

## 2023-12-20 ASSESSMENT — PAIN SCALES - GENERAL
PAINLEVEL_OUTOF10: 8
PAINLEVEL_OUTOF10: 8
PAINLEVEL_OUTOF10: 0 - NO PAIN
PAINLEVEL_OUTOF10: 10 - WORST POSSIBLE PAIN

## 2023-12-20 ASSESSMENT — LIFESTYLE VARIABLES
EVER FELT BAD OR GUILTY ABOUT YOUR DRINKING: NO
HAVE YOU EVER FELT YOU SHOULD CUT DOWN ON YOUR DRINKING: NO
HAVE PEOPLE ANNOYED YOU BY CRITICIZING YOUR DRINKING: NO
REASON UNABLE TO ASSESS: NO
EVER HAD A DRINK FIRST THING IN THE MORNING TO STEADY YOUR NERVES TO GET RID OF A HANGOVER: NO

## 2023-12-20 ASSESSMENT — PAIN - FUNCTIONAL ASSESSMENT
PAIN_FUNCTIONAL_ASSESSMENT: 0-10

## 2023-12-20 ASSESSMENT — PAIN DESCRIPTION - LOCATION: LOCATION: ABDOMEN

## 2023-12-20 NOTE — ED NOTES
Pt called RN to bedside frustrated that he feels like no care is being done. RN explained POC to patient - dc once UA is final. Pts pain appears to be chronic and he is closely following with GI outpatient and has imaging scheduled for the morning. The ED MDs feel that an admission is not needed. Pt upset because he feels like his pain isnt being controlled in the ED (see mar for details). Pt insisting on speaking with MD. Resident called to bedside. Pt states that he lives alone and feels as though his pain is preventing him from completing his ADLs. Resident agreed for admission for SW and possible SNF placement.      Alicia Meadows RN  12/20/23 4527

## 2023-12-20 NOTE — H&P
History Of Present Illness  Mk Fleming is a 73 y.o. male with a past medical history of HTN, CKD3 (baseline creatinine 1-8.2.0), COPD, hypothyroidism, oropharyngeal Ca s/p surgery and XRT and HCV GT 1a, HCV (undetectable s/p Epclusa May 2023) complicated by advanced hepatic fibrosis, GIB suspected to be 2/2 ulcers, and severe esophageal stricture s/p G-tube who presented to the ED with acute on chronic abdominal pain. Notably, the patient is followed by GI and was seen in their office 12/13. and is scheduled for upper GI series 12/20/2023 and HIDA scan. He reports the pain as diffuse, primarily in the RUQ unchanged from prior. He denies any associated nausea or vomiting, changes in bowel movements, or intolerance to tube feedings. On exam in ED03H, he reports feeling somewhat improved since arrival. He does note that intermittently, his RUQ has been aggravated by tube feeding. He denies smoking, EtOH use, or illicit substance use. He feeds with boost four times a day, 1 can.  He denies any fever, chills, chest pain, shortness of breath, or urinary symptoms.      Past Medical History  Past Medical History:   Diagnosis Date    Personal history of other diseases of the digestive system     History of esophageal reflux    Personal history of other diseases of the respiratory system     History of bronchitis    Personal history of other medical treatment     History of blood transfusion    Personal history of other specified conditions     History of chest pain       Surgical History  Past Surgical History:   Procedure Laterality Date    IR ANGIOGRAM AORTA ABDOMEN  7/3/2022    IR ANGIOGRAM AORTA ABDOMEN 7/3/2022 [facility]    IR ANGIOGRAM INFERIOR EPIGASTRIC  7/3/2022    IR ANGIOGRAM INFERIOR EPIGASTRIC 7/3/2022 [facility]    IR EMBOLIZATION LYMPH NODE Bilateral 7/3/2022    IR EMBOLIZATION LYMPH NODE 7/3/2022 [facility]    OTHER SURGICAL HISTORY  11/19/2018    Hernia repair    OTHER  SURGICAL HISTORY  11/19/2018    Tooth extraction    OTHER SURGICAL HISTORY  11/19/2018    Pulmonary decortication    OTHER SURGICAL HISTORY  10/20/2022    Esophagogastroduodenoscopy    OTHER SURGICAL HISTORY  10/20/2022    Percutaneous endoscopic gastrostomy tube insertion        Social History  He reports that he has never smoked. He has never been exposed to tobacco smoke. He has never used smokeless tobacco. He reports that he does not currently use alcohol. He reports that he does not use drugs.    Family History  Family History   Problem Relation Name Age of Onset    Pancreatic cancer Mother      Hypertension Sister      Hypertension Brother          Allergies  Patient has no known allergies.    Review of Systems   Constitutional:  Negative for chills and fever.   Respiratory:  Negative for shortness of breath.    Cardiovascular:  Negative for chest pain and palpitations.   Gastrointestinal:  Positive for abdominal pain. Negative for constipation, diarrhea, nausea and vomiting.   Genitourinary:  Negative for dysuria, flank pain, frequency, hematuria and urgency.   All other systems reviewed and are negative.       Physical Exam  Vitals reviewed.   Constitutional:       Appearance: He is underweight.   HENT:      Head: Normocephalic and atraumatic.   Cardiovascular:      Rate and Rhythm: Normal rate and regular rhythm.      Heart sounds: Normal heart sounds.   Pulmonary:      Effort: Pulmonary effort is normal.      Breath sounds: Normal air entry.   Abdominal:      General: Bowel sounds are normal.      Palpations: Abdomen is soft.      Tenderness: There is generalized abdominal tenderness and tenderness in the right upper quadrant.      Comments: G tube without drainage   Musculoskeletal:         General: No deformity.   Skin:     General: Skin is warm and dry.   Neurological:      General: No focal deficit present.      Mental Status: He is alert and oriented to person, place, and time.   Psychiatric:          Mood and Affect: Mood normal.         Behavior: Behavior normal.          Last Recorded Vitals  Blood pressure 175/77, pulse 79, temperature 36.3 °C (97.4 °F), resp. rate 18, SpO2 99 %.    Relevant Results      Lab Results   Component Value Date    WBC 4.7 12/19/2023    HGB 11.1 (L) 12/19/2023    HCT 31.4 (L) 12/19/2023    MCV 91 12/19/2023     12/19/2023     Lab Results   Component Value Date    GLUCOSE 114 (H) 12/19/2023    CALCIUM 9.9 12/19/2023     (L) 12/19/2023    K 4.1 12/19/2023    CO2 25 12/19/2023    CL 98 12/19/2023    BUN 84 (H) 12/19/2023    CREATININE 2.08 (H) 12/19/2023     US abdomen limited liver  Narrative: Interpreted By:  Roxanna Tijerina and Jiang Sirui   STUDY:  US ABDOMEN LIMITED LIVER;  12/20/2023 3:09 am      INDICATION:  Signs/Symptoms:RUQ pain, history of HCV s/p treatment, chronic pain  gradually worsening.      COMPARISON:  CT AP 10/11/2023  Liver ultrasound 08/22/2023      ACCESSION NUMBER(S):  [id number]      ORDERING CLINICIAN:  MARIA D LEE      TECHNIQUE:  Grayscale and color Doppler sonographic imaging of the right upper  quadrant.      FINDINGS:  LIVER: The liver measures 13.3 cm. Nodular contour consistent with  cirrhosis. There is a 0.3 x 0.3 x 0.2 cm hyperechoic focus of the  left hepatic dome compatible with a granuloma.      BILE DUCTS: No intrahepatic or extrahepatic bile duct dilatation.  Extrahepatic bile duct = 0.3 cm      GALLBLADDER: Multiple gallstones are noted. The gallbladder wall is  not thickened. There is small amount of pericholecystic fluid. The  sonographic Taylor sign is negative.      PANCREAS: The visualized portions of the pancreas appear within  normal limits.      RIGHT KIDNEY: Normal size, no hydronephrosis. There is a 1.6 x 1.5 x  1.5 cm simple renal cyst.      PERITONEUM: No upper abdominal ascites.      Impression: 1. Cholelithiasis with mild pericholecystic fluid. No gallbladder  wall thickening. Sonographic Taylor sign is negative.  Pericholecystic  fluid could be reactive to the liver pathology. Acute cholecystitis  is considered less likely, however, if there is strong clinical  concern HIDA may be considered.  2. Cirrhotic morphology of the liver similar to the prior examination  dated 08/22/2023.      I personally reviewed the image(s) / study and I agree with the  findings as stated by Aide Mott MD. This study was interpreted at  [facility], [place], [place].      MACRO:  None.      Signed by: Roxanna Tijerina 12/20/2023 3:58 AM  Dictation workstation:   [id number]    ED Medication Administration from 12/19/2023 2215 to 12/20/2023 0456         Date/Time Order Dose Route Action Action by     12/19/2023 2300 EST oxyCODONE-acetaminophen (Percocet) 5-325 mg per tablet 1 tablet 1 tablet oral Given DARYN Meadows     12/20/2023 0109 EST ketorolac (Toradol) injection 15 mg 15 mg intravenous Given DARYN Meadows     12/20/2023 0143 EST HYDROmorphone (Dilaudid) injection 0.2 mg 0.2 mg intravenous Given DARYN Meadows               Assessment/Plan   Principal Problem:    Cholelithiasis without cholecystitis      #Acute on chronic abdominal pain  #Cholelithiasis  -Lipase 190  -CT demonstrates chlelithiasis with mild pericholecystic fluid  -Consult GI in AM, input appreciated  -Oxycodone, Dilaudid for pain control  -Diet as tolerated    #Hypertensive urgency  -No evidence of end organ damange  -Suspect 2/2 uncontrolled pain  -Resume home medications    #CKD  -Creatinine appears stable  -Renal dosing where indicated  -Avoid nephrotoxic agents where possible    #COPD  -No acute issues  -Albuterol PRN    #GERD  #History of GIB  -Will hold DVT chemoppx for now, SCDs  -Protonix 40mg IV daily    #Hypothyroidism  -Continue Synthroid    #Hepatic fibrosis  #History of HCV  #Cirrhosis  -LFTs unremarkable    #Esophageal stricture  #s/p G-tube  -Continue tube feedings QID         Abilio Murray, APRN-CNP

## 2023-12-20 NOTE — PROGRESS NOTES
Pharmacy Medication History Review    Mk Fleming is a 73 y.o. male admitted for Cholelithiasis without cholecystitis. Pharmacy reviewed the patient's [id number] medications and allergies for accuracy.    The list below reflects the updated PTA list. Comments regarding how patient may be taking medications differently can be found in the Admit Orders Activity  Prior to Admission Medications   Prescriptions Last Dose Informant Patient Reported? Taking?   albuterol 2.5 mg /3 mL (0.083 %) nebulizer solution Unknown at PRN Self Yes PRN   Sig: Inhale 3 mL every 6 hours if needed for wheezing. 4-6 hours as needed   albuterol 90 mcg/actuation inhaler Unknown at PRN Self Yes PRN   Sig: Inhale 2 puffs every 6 hours if needed for shortness of breath.   amLODIPine (Norvasc) 5 mg tablet 2023 Self Yes Yes   Si tablet (5 mg) by g-tube route once daily at bedtime.   atenolol (Tenormin) 50 mg tablet 2023 Self Yes Yes   Si tablet (50 mg) by g-tube route once daily.   atorvastatin (Lipitor) 10 mg tablet 2023 Self Yes Yes   Si tablet (10 mg) by g-tube route once daily at bedtime.   esomeprazole (NexIUM) 40 mg packet 2023 Self Yes Yes   Si mg 2 times a day. Mix and take per PEG tube twice daily for gastritis   famotidine (Pepcid) 40 mg/5 mL (8 mg/mL) suspension   No No   Sig: Take 1.25 mL (10 mg) by mouth every other day.   fluticasone (Flonase) 50 mcg/actuation nasal spray  Self Yes PRN   Sig: Administer 1 spray into each nostril once daily as needed.   hydroCHLOROthiazide (HYDRODiuril) 25 mg tablet 2023 Self Yes Yes   Si tablet (25 mg) by g-tube route once daily.   levothyroxine (Synthroid, Levoxyl) 50 mcg tablet 2023 Self Yes Yes   Si tablet (50 mcg) by g-tube route once daily in the morning. Take before meals.   losartan (Cozaar) 50 mg tablet 2023 Self Yes Yes   Si tablet (50 mg) by g-tube route once daily.   simethicone (Mylicon) 40 mg/0.6 mL drops   "Self Yes Yes   Si.6 mL (40 mg) by g-tube route 4 times a day.   sucralfate (Carafate) 100 mg/mL suspension 2023 Self Yes Yes   Sig: 10 mL (1 g) by g-tube route 4 times a day.   terazosin (Hytrin) 5 mg capsule 2023 Self Yes Yes   Si capsule (5 mg) by g-tube route once daily at bedtime.      Facility-Administered Medications: None        The list below reflects the updated allergy list. Please review each documented allergy for additional clarification and justification.  Allergies  Reviewed by Gay Enriquez PharmD on 2023   No Known Allergies         Patient declines M2B at discharge. Patient is being admitted for placement to SNF.    Sources used to complete the med history include   OARRS, Surescripts fill history, Care everywhere  Patient interview  Patient reports running out of the Simethicone drops  Patient has albuterol inhaler and nebulizer, however has not had to use recently    Below are additional concerns with the patient's PTA list.  None      Gay Enriquez, Cristhian, [facility] PGY1 Pharmacy Resident   Meds Ambulatory and Retail Services  Please reach out via [employer] Secure Chat for questions, or if no response call [phone] or [employer] \"MedRec\"    "

## 2023-12-20 NOTE — ED PROVIDER NOTES
CC: Abdominal Pain     HPI:  73-year-old male with history of hypertension, oropharyngeal cancer s/p surgery and XRT, hepatitis C infection s/p failed ribavirin/interferon regimen c/b advanced hepatic fibrosis, s/p Epclusa (with undetectable viral load), severe esophageal stricture and gastric ulcers, chronically G-tube dependent, presents to the emergency department with concern for worsening chronic abdominal pain today.    Patient has had generalized abdominal pain since hospitalization last year with GI bleed thought to be due to gastric ulcers.  Of note had severe esophageal stricture which could only be traversed with ultrathin scope.  Negative H. pylori testing, all of feeds via PEG, nothing by mouth.  Has been hospitalized 3 times for abdominal pain since then.    Describes diffuse pain, primarily in the right upper quadrant, unchanged from prior, without associated nausea or vomiting.  No changes in bowel movements, constipation, or diarrhea.  Has been tolerating feeds without difficulty, most recently at 4 PM today.    Patient states his pain was worse today, prompting presentation to the ED.    Of note, was seen by GI 6 days ago, is scheduled for upper GI series and HIDA scan tomorrow.    Records Reviewed:  Recent available ED and inpatient notes reviewed in EMR.    PMHx/PSHx:  Per HPI.   - has a past medical history of Personal history of other diseases of the digestive system, Personal history of other diseases of the respiratory system, Personal history of other medical treatment, and Personal history of other specified conditions.  - has a past surgical history that includes Other surgical history (11/19/2018); Other surgical history (11/19/2018); Other surgical history (11/19/2018); Other surgical history (10/20/2022); Other surgical history (10/20/2022); IR embolization lymph node (Bilateral, 7/3/2022); IR angiogram inferior epigastric (7/3/2022); and IR angiogram aorta abdomen (7/3/2022).  - has  Hypertension; Hypothyroidism; Laryngeal cancer (CMS/HCC); Personal history of nicotine dependence; Abnormal granulation tissue of abdomen; Aspiration of liquid; Change in voice; Cholelithiasis; Chronic low back pain; Chronic constipation; Dysphagia, oropharyngeal phase; COPD (chronic obstructive pulmonary disease) (CMS/HCC); Emphysema, unspecified (CMS/HCC); Enlarged prostate with lower urinary tract symptoms (LUTS); Hepatitis C virus; Hoarseness of voice; Hypercalcemia; Hypersalivation; Laryngeal mass; PAD (peripheral artery disease) (CMS/HCC); PEG tube malfunction (CMS/HCC); Pleural effusion, left; Weight loss; Anemia; BMI 23.0-23.9, adult; Cirrhosis of liver (CMS/HCC); Duodenal ulcer; and Upper GI bleed on their problem list.    Medications:  Reviewed in EMR. See EMR for complete list of medications and doses.    Allergies:  Patient has no known allergies.    Social History:  - Tobacco:  reports that he has never smoked. He has never been exposed to tobacco smoke. He has never used smokeless tobacco.   - Alcohol:  reports that he does not currently use alcohol.   - Illicit Drugs:  reports no history of drug use.     ROS:  Per HPI.       ???????????????????????????????????????????????????????????????  Triage Vitals:  T 36.3 °C (97.4 °F)  HR 88  BP (!) 197/92  RR 16  O2 99 %      Physical Exam  Vitals and nursing note reviewed.   Constitutional:       Appearance: Normal appearance.   HENT:      Head: Normocephalic and atraumatic.      Mouth/Throat:      Mouth: Mucous membranes are moist.   Eyes:      Conjunctiva/sclera: Conjunctivae normal.   Cardiovascular:      Rate and Rhythm: Normal rate and regular rhythm.      Heart sounds: Normal heart sounds.   Pulmonary:      Effort: Pulmonary effort is normal.      Breath sounds: Normal breath sounds. No wheezing or rales.   Abdominal:      Palpations: Abdomen is soft.      Tenderness: There is abdominal tenderness (diffuse, most prominent in RUQ, without guarding or  rebound).      Comments: Palpable hepatomegaly.   Musculoskeletal:      Right lower leg: No edema.      Left lower leg: No edema.   Skin:     General: Skin is warm and dry.   Neurological:      General: No focal deficit present.      Mental Status: He is alert.   Psychiatric:         Mood and Affect: Mood normal.         Behavior: Behavior normal.       ???????????????????????????????????????????????????????????????  Assessment and Plan:  73-year-old male presents to the emergency department with concern for ongoing chronic abdominal pain.  Patient states his current pain is no different than his pain in the past, is not worse.  Has no fevers or other infectious symptoms, is tolerating G-tube feeds without difficulty, still passing gas and having bowel movements.  As such I have very low suspicion for obstruction or other acute intra-abdominal process.  Does have some tenderness on exam which she says is chronic, has been documented in the past.  Is scheduled for outpatient upper GI series and HIDA scan tomorrow.    Will plan to treat pain here with Percocet through G-tube, obtain basic labs in 6 including electrolytes to assess for dehydration, along with a lipase.    ED Course:  Lab work largely unremarkable.  Patient continues to have significant pain despite treatment.  Right upper quadrant ultrasound was performed which demonstrates cholelithiasis and some pericholecystic fluid but no gallbladder wall thickening or other evidence for cholecystitis.    Patient reports is having significant difficulties at home with ADLs due to pain, states has been nonambulatory over the last few weeks because of it.  Is concerned that he can no longer take care of himself at home as no one is able to check on him, thinks he may need SNF placement.    As such, I discussed with admissions coordinator, will be admitted to medicine for further management of chronic abdominal pain and PT/OT and possible placement.    Of note,  patient would benefit from having his upper GI series and HIDA scan while in hospital.    Social Determinants Limiting Care:  None identified    Disposition:  Admit to medicine    --  Becka Hu MD  Emergency Medicine, PGY-3      Procedures ? Smart[employer] last updated 12/19/2023 10:45 PM        Becka Hu MD  Resident  12/20/23 0514

## 2023-12-21 ENCOUNTER — APPOINTMENT (OUTPATIENT)
Dept: RADIOLOGY | Facility: HOSPITAL | Age: 73
DRG: 444 | End: 2023-12-21
Payer: COMMERCIAL

## 2023-12-21 PROCEDURE — 96361 HYDRATE IV INFUSION ADD-ON: CPT

## 2023-12-21 PROCEDURE — 2500000001 HC RX 250 WO HCPCS SELF ADMINISTERED DRUGS (ALT 637 FOR MEDICARE OP): Performed by: STUDENT IN AN ORGANIZED HEALTH CARE EDUCATION/TRAINING PROGRAM

## 2023-12-21 PROCEDURE — 2500000004 HC RX 250 GENERAL PHARMACY W/ HCPCS (ALT 636 FOR OP/ED): Performed by: STUDENT IN AN ORGANIZED HEALTH CARE EDUCATION/TRAINING PROGRAM

## 2023-12-21 PROCEDURE — 74248 X-RAY SM INT F-THRU STD: CPT | Performed by: RADIOLOGY

## 2023-12-21 PROCEDURE — 74248 X-RAY SM INT F-THRU STD: CPT

## 2023-12-21 PROCEDURE — G0378 HOSPITAL OBSERVATION PER HR: HCPCS

## 2023-12-21 PROCEDURE — 2500000001 HC RX 250 WO HCPCS SELF ADMINISTERED DRUGS (ALT 637 FOR MEDICARE OP): Performed by: NURSE PRACTITIONER

## 2023-12-21 PROCEDURE — 2550000001 HC RX 255 CONTRASTS: Performed by: STUDENT IN AN ORGANIZED HEALTH CARE EDUCATION/TRAINING PROGRAM

## 2023-12-21 PROCEDURE — 1100000001 HC PRIVATE ROOM DAILY

## 2023-12-21 PROCEDURE — 96376 TX/PRO/DX INJ SAME DRUG ADON: CPT

## 2023-12-21 PROCEDURE — 99233 SBSQ HOSP IP/OBS HIGH 50: CPT | Performed by: STUDENT IN AN ORGANIZED HEALTH CARE EDUCATION/TRAINING PROGRAM

## 2023-12-21 PROCEDURE — 78227 HEPATOBIL SYST IMAGE W/DRUG: CPT

## 2023-12-21 PROCEDURE — C9113 INJ PANTOPRAZOLE SODIUM, VIA: HCPCS | Performed by: NURSE PRACTITIONER

## 2023-12-21 PROCEDURE — A9537 TC99M MEBROFENIN: HCPCS | Performed by: STUDENT IN AN ORGANIZED HEALTH CARE EDUCATION/TRAINING PROGRAM

## 2023-12-21 PROCEDURE — 74240 X-RAY XM UPR GI TRC 1CNTRST: CPT | Performed by: RADIOLOGY

## 2023-12-21 PROCEDURE — 99222 1ST HOSP IP/OBS MODERATE 55: CPT | Performed by: STUDENT IN AN ORGANIZED HEALTH CARE EDUCATION/TRAINING PROGRAM

## 2023-12-21 PROCEDURE — 96375 TX/PRO/DX INJ NEW DRUG ADDON: CPT

## 2023-12-21 PROCEDURE — 3430000001 HC RX 343 DIAGNOSTIC RADIOPHARMACEUTICALS: Performed by: STUDENT IN AN ORGANIZED HEALTH CARE EDUCATION/TRAINING PROGRAM

## 2023-12-21 PROCEDURE — 2500000004 HC RX 250 GENERAL PHARMACY W/ HCPCS (ALT 636 FOR OP/ED): Performed by: NURSE PRACTITIONER

## 2023-12-21 PROCEDURE — 78227 HEPATOBIL SYST IMAGE W/DRUG: CPT | Performed by: STUDENT IN AN ORGANIZED HEALTH CARE EDUCATION/TRAINING PROGRAM

## 2023-12-21 RX ORDER — KIT FOR THE PREPARATION OF TECHNETIUM TC 99M MEBROFENIN 45 MG/10ML
5.46 INJECTION, POWDER, LYOPHILIZED, FOR SOLUTION INTRAVENOUS
Status: COMPLETED | OUTPATIENT
Start: 2023-12-21 | End: 2023-12-21

## 2023-12-21 RX ORDER — OXYCODONE HYDROCHLORIDE 5 MG/1
5 TABLET ORAL EVERY 6 HOURS PRN
Status: DISCONTINUED | OUTPATIENT
Start: 2023-12-21 | End: 2023-12-23 | Stop reason: HOSPADM

## 2023-12-21 RX ORDER — HYDROMORPHONE HYDROCHLORIDE 1 MG/ML
0.2 INJECTION, SOLUTION INTRAMUSCULAR; INTRAVENOUS; SUBCUTANEOUS EVERY 4 HOURS PRN
Status: DISCONTINUED | OUTPATIENT
Start: 2023-12-21 | End: 2023-12-23 | Stop reason: HOSPADM

## 2023-12-21 RX ORDER — NALOXONE HYDROCHLORIDE 0.4 MG/ML
0.4 INJECTION, SOLUTION INTRAMUSCULAR; INTRAVENOUS; SUBCUTANEOUS
Status: DISCONTINUED | OUTPATIENT
Start: 2023-12-21 | End: 2023-12-23 | Stop reason: HOSPADM

## 2023-12-21 RX ORDER — ONDANSETRON HYDROCHLORIDE 2 MG/ML
4 INJECTION, SOLUTION INTRAVENOUS EVERY 4 HOURS PRN
Status: DISCONTINUED | OUTPATIENT
Start: 2023-12-21 | End: 2023-12-23 | Stop reason: HOSPADM

## 2023-12-21 RX ORDER — OXYCODONE HYDROCHLORIDE 5 MG/1
5 TABLET ORAL EVERY 6 HOURS PRN
Status: DISCONTINUED | OUTPATIENT
Start: 2023-12-21 | End: 2023-12-21

## 2023-12-21 RX ADMIN — ATENOLOL 50 MG: 50 TABLET ORAL at 08:51

## 2023-12-21 RX ADMIN — PANTOPRAZOLE SODIUM 40 MG: 40 INJECTION, POWDER, FOR SOLUTION INTRAVENOUS at 08:51

## 2023-12-21 RX ADMIN — KIT FOR THE PREPARATION OF TECHNETIUM TC 99M MEBROFENIN 5.46 MILLICURIE: 45 INJECTION, POWDER, LYOPHILIZED, FOR SOLUTION INTRAVENOUS at 12:15

## 2023-12-21 RX ADMIN — OXYCODONE HYDROCHLORIDE 5 MG: 5 TABLET ORAL at 15:24

## 2023-12-21 RX ADMIN — DIATRIZOATE MEGLUMINE AND DIATRIZOATE SODIUM 300 ML: 660; 100 LIQUID ORAL; RECTAL at 11:00

## 2023-12-21 RX ADMIN — ONDANSETRON 4 MG: 2 INJECTION INTRAMUSCULAR; INTRAVENOUS at 11:20

## 2023-12-21 RX ADMIN — HYDROCHLOROTHIAZIDE 25 MG: 25 TABLET ORAL at 08:51

## 2023-12-21 RX ADMIN — SODIUM CHLORIDE 1000 ML: 9 INJECTION, SOLUTION INTRAVENOUS at 21:48

## 2023-12-21 RX ADMIN — OXYCODONE HYDROCHLORIDE 5 MG: 5 TABLET ORAL at 21:22

## 2023-12-21 RX ADMIN — LOSARTAN POTASSIUM 50 MG: 50 TABLET, FILM COATED ORAL at 08:51

## 2023-12-21 SDOH — SOCIAL STABILITY: SOCIAL INSECURITY
WITHIN THE LAST YEAR, HAVE TO BEEN RAPED OR FORCED TO HAVE ANY KIND OF SEXUAL ACTIVITY BY YOUR PARTNER OR EX-PARTNER?: NO

## 2023-12-21 SDOH — ECONOMIC STABILITY: FOOD INSECURITY: WITHIN THE PAST 12 MONTHS, THE FOOD YOU BOUGHT JUST DIDN'T LAST AND YOU DIDN'T HAVE MONEY TO GET MORE.: NEVER TRUE

## 2023-12-21 SDOH — SOCIAL STABILITY: SOCIAL INSECURITY
WITHIN THE LAST YEAR, HAVE YOU BEEN KICKED, HIT, SLAPPED, OR OTHERWISE PHYSICALLY HURT BY YOUR PARTNER OR EX-PARTNER?: NO

## 2023-12-21 SDOH — SOCIAL STABILITY: SOCIAL INSECURITY: WITHIN THE LAST YEAR, HAVE YOU BEEN AFRAID OF YOUR PARTNER OR EX-PARTNER?: NO

## 2023-12-21 SDOH — SOCIAL STABILITY: SOCIAL NETWORK: HOW OFTEN DO YOU GET TOGETHER WITH FRIENDS OR RELATIVES?: PATIENT DECLINED

## 2023-12-21 SDOH — ECONOMIC STABILITY: INCOME INSECURITY: IN THE LAST 12 MONTHS, WAS THERE A TIME WHEN YOU WERE NOT ABLE TO PAY THE MORTGAGE OR RENT ON TIME?: NO

## 2023-12-21 SDOH — ECONOMIC STABILITY: TRANSPORTATION INSECURITY
IN THE PAST 12 MONTHS, HAS LACK OF TRANSPORTATION KEPT YOU FROM MEETINGS, WORK, OR FROM GETTING THINGS NEEDED FOR DAILY LIVING?: NO

## 2023-12-21 SDOH — SOCIAL STABILITY: SOCIAL NETWORK: HOW OFTEN DO YOU ATTENT MEETINGS OF THE CLUB OR ORGANIZATION YOU BELONG TO?: PATIENT DECLINED

## 2023-12-21 SDOH — ECONOMIC STABILITY: FOOD INSECURITY: WITHIN THE PAST 12 MONTHS, YOU WORRIED THAT YOUR FOOD WOULD RUN OUT BEFORE YOU GOT MONEY TO BUY MORE.: NEVER TRUE

## 2023-12-21 SDOH — ECONOMIC STABILITY: INCOME INSECURITY: IN THE PAST 12 MONTHS, HAS THE ELECTRIC, GAS, OIL, OR WATER COMPANY THREATENED TO SHUT OFF SERVICE IN YOUR HOME?: NO

## 2023-12-21 SDOH — SOCIAL STABILITY: SOCIAL NETWORK: IN A TYPICAL WEEK, HOW MANY TIMES DO YOU TALK ON THE PHONE WITH FAMILY, FRIENDS, OR NEIGHBORS?: PATIENT DECLINED

## 2023-12-21 SDOH — ECONOMIC STABILITY: HOUSING INSECURITY
IN THE LAST 12 MONTHS, WAS THERE A TIME WHEN YOU DID NOT HAVE A STEADY PLACE TO SLEEP OR SLEPT IN A SHELTER (INCLUDING NOW)?: NO

## 2023-12-21 SDOH — HEALTH STABILITY: PHYSICAL HEALTH: ON AVERAGE, HOW MANY MINUTES DO YOU ENGAGE IN EXERCISE AT THIS LEVEL?: PATIENT DECLINED

## 2023-12-21 SDOH — HEALTH STABILITY: MENTAL HEALTH: HOW OFTEN DO YOU HAVE 6 OR MORE DRINKS ON ONE OCCASION?: NEVER

## 2023-12-21 SDOH — SOCIAL STABILITY: SOCIAL NETWORK: ARE YOU MARRIED, WIDOWED, DIVORCED, SEPARATED, NEVER MARRIED, OR LIVING WITH A PARTNER?: PATIENT DECLINED

## 2023-12-21 SDOH — SOCIAL STABILITY: SOCIAL INSECURITY: WITHIN THE LAST YEAR, HAVE YOU BEEN HUMILIATED OR EMOTIONALLY ABUSED IN OTHER WAYS BY YOUR PARTNER OR EX-PARTNER?: NO

## 2023-12-21 SDOH — HEALTH STABILITY: MENTAL HEALTH: HOW MANY STANDARD DRINKS CONTAINING ALCOHOL DO YOU HAVE ON A TYPICAL DAY?: PATIENT DOES NOT DRINK

## 2023-12-21 SDOH — ECONOMIC STABILITY: INCOME INSECURITY: HOW HARD IS IT FOR YOU TO PAY FOR THE VERY BASICS LIKE FOOD, HOUSING, MEDICAL CARE, AND HEATING?: NOT HARD AT ALL

## 2023-12-21 SDOH — HEALTH STABILITY: MENTAL HEALTH
STRESS IS WHEN SOMEONE FEELS TENSE, NERVOUS, ANXIOUS, OR CAN'T SLEEP AT NIGHT BECAUSE THEIR MIND IS TROUBLED. HOW STRESSED ARE YOU?: NOT AT ALL

## 2023-12-21 SDOH — HEALTH STABILITY: MENTAL HEALTH: HOW OFTEN DO YOU HAVE A DRINK CONTAINING ALCOHOL?: NEVER

## 2023-12-21 SDOH — ECONOMIC STABILITY: HOUSING INSECURITY: IN THE LAST 12 MONTHS, HOW MANY PLACES HAVE YOU LIVED?: 1

## 2023-12-21 SDOH — HEALTH STABILITY: PHYSICAL HEALTH
ON AVERAGE, HOW MANY DAYS PER WEEK DO YOU ENGAGE IN MODERATE TO STRENUOUS EXERCISE (LIKE A BRISK WALK)?: PATIENT DECLINED

## 2023-12-21 SDOH — SOCIAL STABILITY: SOCIAL INSECURITY: ABUSE: ADULT

## 2023-12-21 SDOH — SOCIAL STABILITY: SOCIAL NETWORK: HOW OFTEN DO YOU ATTEND CHURCH OR RELIGIOUS SERVICES?: PATIENT DECLINED

## 2023-12-21 SDOH — SOCIAL STABILITY: SOCIAL NETWORK
DO YOU BELONG TO ANY CLUBS OR ORGANIZATIONS SUCH AS CHURCH GROUPS UNIONS, FRATERNAL OR ATHLETIC GROUPS, OR SCHOOL GROUPS?: PATIENT DECLINED

## 2023-12-21 SDOH — SOCIAL STABILITY: SOCIAL INSECURITY: WERE YOU ABLE TO COMPLETE ALL THE BEHAVIORAL HEALTH SCREENINGS?: YES

## 2023-12-21 SDOH — ECONOMIC STABILITY: TRANSPORTATION INSECURITY
IN THE PAST 12 MONTHS, HAS THE LACK OF TRANSPORTATION KEPT YOU FROM MEDICAL APPOINTMENTS OR FROM GETTING MEDICATIONS?: NO

## 2023-12-21 SDOH — SOCIAL STABILITY: SOCIAL INSECURITY: HAVE YOU HAD THOUGHTS OF HARMING ANYONE ELSE?: NO

## 2023-12-21 ASSESSMENT — PAIN SCALES - GENERAL
PAINLEVEL_OUTOF10: 5 - MODERATE PAIN
PAINLEVEL_OUTOF10: 8
PAINLEVEL_OUTOF10: 9

## 2023-12-21 ASSESSMENT — COGNITIVE AND FUNCTIONAL STATUS - GENERAL
DAILY ACTIVITIY SCORE: 24
PATIENT BASELINE BEDBOUND: NO
MOBILITY SCORE: 22
CLIMB 3 TO 5 STEPS WITH RAILING: A LITTLE
MOBILITY SCORE: 22
WALKING IN HOSPITAL ROOM: A LITTLE
CLIMB 3 TO 5 STEPS WITH RAILING: A LITTLE
DAILY ACTIVITIY SCORE: 24
WALKING IN HOSPITAL ROOM: A LITTLE

## 2023-12-21 ASSESSMENT — PAIN DESCRIPTION - LOCATION
LOCATION: ABDOMEN
LOCATION: ABDOMEN

## 2023-12-21 ASSESSMENT — ACTIVITIES OF DAILY LIVING (ADL)
HEARING - RIGHT EAR: FUNCTIONAL
TOILETING: INDEPENDENT
ADEQUATE_TO_COMPLETE_ADL: YES
HEARING - LEFT EAR: FUNCTIONAL
PATIENT'S MEMORY ADEQUATE TO SAFELY COMPLETE DAILY ACTIVITIES?: YES
FEEDING YOURSELF: INDEPENDENT
GROOMING: INDEPENDENT
DRESSING YOURSELF: INDEPENDENT
JUDGMENT_ADEQUATE_SAFELY_COMPLETE_DAILY_ACTIVITIES: YES
WALKS IN HOME: INDEPENDENT
BATHING: INDEPENDENT

## 2023-12-21 ASSESSMENT — PATIENT HEALTH QUESTIONNAIRE - PHQ9
2. FEELING DOWN, DEPRESSED OR HOPELESS: NOT AT ALL
SUM OF ALL RESPONSES TO PHQ9 QUESTIONS 1 & 2: 0
1. LITTLE INTEREST OR PLEASURE IN DOING THINGS: NOT AT ALL

## 2023-12-21 ASSESSMENT — LIFESTYLE VARIABLES
SKIP TO QUESTIONS 9-10: 1
SKIP TO QUESTIONS 9-10: 1
HOW OFTEN DO YOU HAVE 6 OR MORE DRINKS ON ONE OCCASION: NEVER
AUDIT-C TOTAL SCORE: 0
AUDIT-C TOTAL SCORE: 0
HOW MANY STANDARD DRINKS CONTAINING ALCOHOL DO YOU HAVE ON A TYPICAL DAY: PATIENT DOES NOT DRINK
AUDIT-C TOTAL SCORE: 0
HOW OFTEN DO YOU HAVE A DRINK CONTAINING ALCOHOL: NEVER

## 2023-12-21 ASSESSMENT — PAIN - FUNCTIONAL ASSESSMENT
PAIN_FUNCTIONAL_ASSESSMENT: 0-10

## 2023-12-21 ASSESSMENT — PAIN DESCRIPTION - ORIENTATION: ORIENTATION: RIGHT

## 2023-12-21 NOTE — PROGRESS NOTES
I met with Mk at the bedside regarding discharge planning and home going needs. Patient lives at home alone and is independent with ADL's without assistive devices. Patient will need a lyft ride home I don't anticipate that he will have any other discharge needs. Patient not medically cleared for discharge. I will continue to follow with a safe discharge plan.

## 2023-12-21 NOTE — CARE PLAN
The patient's goals for the shift include      The clinical goals for the shift include control pain    Over the shift, the patient did not make progress toward the following goals. Barriers to progression include . Recommendations to address these barriers include .

## 2023-12-21 NOTE — CARE PLAN
Problem: Nutrition  Goal: Less than 5 days NPO/clear liquids  Outcome: Progressing  Goal: Oral intake greater than 50%  Outcome: Progressing  Goal: Oral intake greater 75%  Outcome: Progressing  Goal: Consume prescribed supplement  Outcome: Progressing  Goal: Adequate PO fluid intake  Outcome: Progressing  Goal: Nutrition support goals are met within 48 hrs  Outcome: Progressing  Goal: Nutrition support is meeting 75% of nutrient needs  Outcome: Progressing  Goal: Tube feed tolerance  Outcome: Progressing  Goal: BG  mg/dL  Outcome: Progressing  Goal: Lab values WNL  Outcome: Progressing  Goal: Electrolytes WNL  Outcome: Progressing  Goal: Promote healing  Outcome: Progressing  Goal: Maintain stable weight  Outcome: Progressing  Goal: Reduce weight from edema/fluid  Outcome: Progressing  Goal: Gradual weight gain  Outcome: Progressing  Goal: Improve ostomy output  Outcome: Progressing     Problem: Pain  Goal: My pain/discomfort is manageable  Outcome: Progressing     Problem: Safety  Goal: Patient will be injury free during hospitalization  Outcome: Met  Goal: I will remain free of falls  Outcome: Met     Problem: Daily Care  Goal: Daily care needs are met  Outcome: Met     Problem: Psychosocial Needs  Goal: Demonstrates ability to cope with hospitalization/illness  Outcome: Met  Goal: Collaborate with me, my family, and caregiver to identify my specific goals  Outcome: Met     Problem: Discharge Barriers  Goal: My discharge needs are met  Outcome: Progressing   The patient's goals for the shift include      The clinical goals for the shift include Mk will remain neurologically intact throughout the shift.

## 2023-12-21 NOTE — PROGRESS NOTES
Assessment/Plan    .    Mk Fleming is a 73 y.o. male with a past medical history of HTN, CKD3 (baseline creatinine 1-8.2.0), COPD, hypothyroidism, oropharyngeal Ca s/p surgery and XRT and HCV GT 1a, HCV (undetectable s/p Epclusa May 2023) complicated by advanced hepatic fibrosis, GIB suspected to be 2/2 ulcers, and severe esophageal stricture s/p G-tube who presented to the ED with acute on chronic abdominal pain     Abdominal pain likely secondary to chronic cholecystitis  Esophageal stricture  History of GI bleed, history of ulcers  - At this time symptoms are more concerning for his gallbladder, he is still tolerating PEG tube feed.  He appears comfortable in no acute distress.  --CT demonstrates chlelithiasis with mild pericholecystic fluid   -Ordered pending outpatient tests, HIDA and small bowel follow-through.  Patient also has history of esophageal stricture and ulcerations.  He is concerned about his ulcerations and how they will be followed up.  He states he was post to follow-up about the ulcerations as well on the 27th.  - the small bowel bowel follow-through did not show abnormalities.  The HIDA scan was concerning for obstruction or cholecystitis.  GI was consulted and they recommended consulting ACS.  ACS consulted and pending recommendations.  -Will keep n.p.o. until ACS recommendations.  At this time this is chronic likely, patient is he medically stable, symptoms are manageable.  Will hold antibiotics until further discussion and recommendations from ACS.  -Patient will need to follow-up outpatient discuss management of her stricture and ulcerations.  -Continue multimodal analgesia  -Continue PPI      #Hypertensive urgency  -No evidence of end organ damange  -Suspect 2/2 uncontrolled pain  -Resume home medications     #CKD  -Creatinine appears stable  -Renal dosing where indicated  -Avoid nephrotoxic agents where possible     #COPD  -No acute issues  -Albuterol PRN     #GERD  #History of  "GIB  -Will hold DVT chemoppx for now, SCDs  -Protonix 40mg IV daily     #Hypothyroidism  -Continue Synthroid     #Hepatic fibrosis  #History of HCV  #Cirrhosis  -LFTs stable  -Follow-up on pathology     #Esophageal stricture  #s/p G-tube  -Continue tube feedings QID with Ensure Plus or VHC.  Will obtain nutrition evaluation after ACS recommendation.             Scheduled outpatient appointments in system:   Future Appointments   Date Time Provider Department Center   12/27/2023  1:00 PM [facility] 10 [facility]   12/29/2023  1:00 PM [facility] 3 [facility]   1/17/2024  3:45 PM Eula Benavidez MD [id number]   4/22/2024  1:00 PM Amrik Sprague MD [id number]     ---------------------------------------------------------------------------------------------------  Subjective   Patient heavily concerned about his pain control overnight.  I had a discussion last night with them about the HIDA scan and need to hold for short period time.  We started it with a stop date and time, after which patient was still concerned about getting his pain medicine but is amendable.  Overall appears comfortable still.  After the procedure stating he needs more pain medicine.  Denies nausea or vomiting.  We discussed the findings on the studies.  Waiting ACS recommendations.     ---------------------------------------------------------------------------------------------------  Objective   Last Recorded Vitals  Blood pressure 125/74, pulse 98, temperature 36.5 °C (97.7 °F), resp. rate 16, height 1.727 m (5' 8\"), weight 64 kg (141 lb), SpO2 96 %.  Intake/Output last 3 Shifts:  I/O last 3 completed shifts:  In: - (0 mL/kg)   Out: 400 (6.3 mL/kg) [Urine:400 (0.2 mL/kg/hr)]  Weight: 64 kg     Physical Exam  Vitals and nursing note reviewed.   Constitutional:       General: He is not in acute distress.     Appearance: He is ill-appearing. He is not toxic-appearing.      Comments: Underweight, hoarse voice " quality   HENT:      Head: Normocephalic and atraumatic.      Mouth/Throat:      Mouth: Mucous membranes are moist.   Eyes:      General: No scleral icterus.     Extraocular Movements: Extraocular movements intact.      Conjunctiva/sclera: Conjunctivae normal.   Cardiovascular:      Rate and Rhythm: Normal rate and regular rhythm.      Heart sounds: S1 normal and S2 normal. No murmur heard.  Pulmonary:      Effort: Pulmonary effort is normal. No respiratory distress.      Breath sounds: No wheezing, rhonchi or rales.   Abdominal:      General: Bowel sounds are normal. There is no distension.      Palpations: Abdomen is soft.      Tenderness: There is abdominal tenderness. There is no guarding or rebound.      Comments: PEG in place   Musculoskeletal:         General: No swelling or deformity.      Cervical back: Neck supple.   Skin:     General: Skin is warm and dry.      Findings: No rash.   Neurological:      General: No focal deficit present.      Mental Status: He is alert. Mental status is at baseline.   Psychiatric:         Mood and Affect: Mood normal.         Relevant Results  Lab Results   Component Value Date    WBC 4.7 12/19/2023    HGB 11.1 (L) 12/19/2023    HCT 31.4 (L) 12/19/2023    MCV 91 12/19/2023     12/19/2023      Lab Results   Component Value Date    GLUCOSE 114 (H) 12/19/2023    CALCIUM 9.9 12/19/2023     (L) 12/19/2023    K 4.1 12/19/2023    CO2 25 12/19/2023    CL 98 12/19/2023    BUN 84 (H) 12/19/2023    CREATININE 2.08 (H) 12/19/2023     Scheduled medications  amLODIPine, 5 mg, g-tube, Nightly  atenolol, 50 mg, g-tube, Daily  atorvastatin, 10 mg, g-tube, Nightly  hydroCHLOROthiazide, 25 mg, g-tube, Daily  levothyroxine, 50 mcg, g-tube, Daily before breakfast  losartan, 50 mg, g-tube, Daily  pantoprazole, 40 mg, intravenous, Daily  terazosin, 5 mg, g-tube, Nightly      Continuous medications     PRN medications  PRN medications: albuterol, HYDROmorphone, melatonin, ondansetron,  oxyCODONE, polyethylene glycol    Raymond Rubio MD

## 2023-12-21 NOTE — CONSULTS
[facility]  ACUTE CARE SURGERY - CONSULT    Patient Name: Mk Fleming  MRN: [id number]  Admit Date: 1219  : 1950  AGE: 73 y.o.   GENDER: male  ==============================================================================  TODAY'S ASSESSMENT AND PLAN OF CARE:  Mk Fleming is a 74 yo male with a complex PMH admitted for abdominal pain and found to have cholelithiasis with mild pericholecystic fluid, no gallbladder wall thickening.     Pt in no acute distress, complaining of RUQ abdominal pain today, endorses mild nausea but denies vomiting.      Recommendations  - No plans for acute surgical intervention given lack of LFT derangements, absence of leukocytosis, and ability to tolerate tube feeds.   - Will need medical optimization with hepatology given setting of cirrhosis and can follow up with outpatient general surgery for further discussion for elective cholecystectomy given high risk of complications  -  ACS will sign off at this time      Pt seen and discussed with Dr. Hurst.    ==============================================================================  CHIEF COMPLAINT/REASON FOR CONSULT:  Mk Fleming is a 74 yo male with a complex PMH including  CKD3 (baseline creatinine ~2.0), COPD, hypothyroidism, oropharyngeal Ca s/p surgery and XRT and HCV (undetectable s/p Epclusa May 2023) complicated by advanced hepatic fibrosis (MELD 16), GIB suspected to be 2/2 ulcers, and severe esophageal stricture s/p PEG-tube. Pt admitted for abdominal pain which he states he has been having for over a year now but acutely worsened over the past few weeks. Endorses some nausea, but denies any vomiting or issues with tube feeds. RUQ US with cholelithiasis with mild pericholecystic fluid, no gallbladder wall thickening. HIDA Nonvisualization of the gallbladder by 2 hours post radiotracer injection, patent CBD. ACS consulted for c/f acute cholecystitis in the setting of  cholelithiasis.       PAST MEDICAL HISTORY:   PMH: as noted above  Past Medical History:   Diagnosis Date    Personal history of other diseases of the digestive system     History of esophageal reflux    Personal history of other diseases of the respiratory system     History of bronchitis    Personal history of other medical treatment     History of blood transfusion    Personal history of other specified conditions     History of chest pain       PSH:   Past Surgical History:   Procedure Laterality Date    IR ANGIOGRAM AORTA ABDOMEN  7/3/2022    IR ANGIOGRAM AORTA ABDOMEN 7/3/2022 [facility]    IR ANGIOGRAM INFERIOR EPIGASTRIC  7/3/2022    IR ANGIOGRAM INFERIOR EPIGASTRIC 7/3/2022 [facility] CL[facility]ICAL [facility]    IR EMBOLIZATION LYMPH NODE Bilateral 7/3/2022    IR EMBOLIZATION LYMPH NODE 7/3/2022 [facility] CLINICAL [facility]    OTHER SURGICAL HISTORY  11/19/2018    Hernia repair    OTHER SURGICAL HISTORY  11/19/2018    Tooth extraction    OTHER SURGICAL HISTORY  11/19/2018    Pulmonary decortication    OTHER SURGICAL HISTORY  10/20/2022    Esophagogastroduodenoscopy    OTHER SURGICAL HISTORY  10/20/2022    Percutaneous endoscopic gastrostomy tube insertion     FH:   Family History   Problem Relation Name Age of Onset    Pancreatic cancer Mother      Hypertension Sister      Hypertension Brother       SOCIAL HISTORY:    Smoking: Former smoker, quit at age 39   Social History     Tobacco Use   Smoking Status Never    Passive exposure: Never   Smokeless Tobacco Never       Alcohol: Former drinker, no alcohol in 40 years   Social History     Substance and Sexual Activity   Alcohol Use Not Currently       Drug use: Former IV drug user, no drug use since age 39    MEDICATIONS:   Prior to Admission medications    Medication Sig Start Date End Date Taking? Authorizing Provider   albuterol 2.5 mg /3 mL (0.083 %) nebulizer solution Inhale 3 mL every 6 hours if needed for wheezing. 4-6 hours as needed 1/17/19   Historical Provider,  MD   albuterol 90 mcg/actuation inhaler Inhale 2 puffs every 6 hours if needed for shortness of breath. 1/9/23   Historical Provider, MD   amLODIPine (Norvasc) 5 mg tablet 1 tablet (5 mg) by g-tube route once daily at bedtime.    Historical Provider, MD   atenolol (Tenormin) 50 mg tablet 1 tablet (50 mg) by g-tube route once daily.    Historical Provider, MD   atorvastatin (Lipitor) 10 mg tablet 1 tablet (10 mg) by g-tube route once daily at bedtime.    Historical Provider, MD   esomeprazole (NexIUM) 40 mg packet 40 mg 2 times a day. Mix and take per PEG tube twice daily for gastritis 8/2/23   Historical Provider, MD   famotidine (Pepcid) 40 mg/5 mL (8 mg/mL) suspension Take 1.25 mL (10 mg) by mouth every other day. 10/11/23 11/10/23  Tony Ibrahim MD   fluticasone (Flonase) 50 mcg/actuation nasal spray Administer 1 spray into each nostril once daily as needed. 7/7/21   Historical Provider, MD   hydroCHLOROthiazide (HYDRODiuril) 25 mg tablet 1 tablet (25 mg) by g-tube route once daily.    Historical Provider, MD   levothyroxine (Synthroid, Levoxyl) 50 mcg tablet 1 tablet (50 mcg) by g-tube route once daily in the morning. Take before meals.    Historical Provider, MD   losartan (Cozaar) 50 mg tablet 1 tablet (50 mg) by g-tube route once daily.    Historical Provider, MD   simethicone (Mylicon) 40 mg/0.6 mL drops 0.6 mL (40 mg) by g-tube route 4 times a day. 9/27/19   Historical Provider, MD   sucralfate (Carafate) 100 mg/mL suspension 10 mL (1 g) by g-tube route 4 times a day.    Historical Provider, MD   terazosin (Hytrin) 5 mg capsule 1 capsule (5 mg) by g-tube route once daily at bedtime.    Historical Provider, MD   cephalexin (Keflex) 500 mg capsule  6/8/23 12/20/23  Historical Provider, MD   chlorhexidine (Peridex) 0.12 % solution Use 15 mL in the mouth or throat. PLACE 15 MILLILITERS SWISH IN MOUTH FOR 30 SECONDS THEN SPIT OUT  12/20/23  Historical Provider, MD   cholecalciferol (Vitamin D-3) 25 MCG (1000 UT)  tablet Take 1 tablet (25 mcg) by mouth once daily. 1/2/19 12/20/23  Historical Provider, MD   docusate sodium (Colace) 50 mg/5 mL oral liquid TAKE AS DIRECTED. 10/29/19 12/20/23  Historical Provider, MD   esomeprazole (NexIUM) 20 mg packet Take 20 mg by mouth once daily.  12/20/23  Historical Provider, MD   Gas Relief Extra Strength 125 mg capsule Take 1 capsule (125 mg) by mouth 4 times a day. 7/24/23 12/20/23  Historical Provider, MD   ondansetron ODT (Zofran-ODT) 4 mg disintegrating tablet  7/30/23 12/20/23  Historical Provider, MD   prochlorperazine (Compazine) 10 mg tablet 1 tablet (10 mg) once daily as needed for nausea.  12/20/23  Historical Provider, MD   sofosbuvir-velpatasvir (Epclusa) 400-100 mg tablet Take 1 tablet by mouth once daily. 3/22/23 12/20/23  Historical Provider, MD     ALLERGIES:   No Known Allergies      PHYSICAL EXAM:  Physical Exam  Constitutional:       General: He is not in acute distress.     Appearance: Normal appearance. He is not ill-appearing.   Cardiovascular:      Rate and Rhythm: Normal rate.   Pulmonary:      Effort: Pulmonary effort is normal.   Abdominal:      General: There is no distension.      Palpations: Abdomen is soft.      Tenderness: There is abdominal tenderness (Tender to palpation of RUQ). There is no guarding.      Comments: PEG tube in place, no drainage or discharge noted   Skin:     General: Skin is warm and dry.   Neurological:      General: No focal deficit present.      Mental Status: He is alert.   Psychiatric:         Mood and Affect: Mood normal.         Behavior: Behavior normal.       LABS & IMAGING SUMMARY:   CBC and Auto Differential        Component Value Flag Ref Range Units Status    WBC 4.7      4.4 - 11.3 x10*3/uL Final    nRBC 0.0      0.0 - 0.0 /100 WBCs Final    RBC 3.46      4.50 - 5.90 x10*6/uL Final    Hemoglobin 11.1      13.5 - 17.5 g/dL Final    Hematocrit 31.4      41.0 - 52.0 % Final    MCV 91      80 - 100 fL Final    MCH 32.1       26.0 - 34.0 pg Final    MCHC 35.4      32.0 - 36.0 g/dL Final    RDW 12.5      11.5 - 14.5 % Final    Platelets 153      150 - 450 x10*3/uL Final    Neutrophils % 64.7      40.0 - 80.0 % Final    Immature Granulocytes %, Automated 0.2      0.0 - 0.9 % Final    Comment:    Immature Granulocyte Count (IG) includes promyelocytes, myelocytes and metamyelocytes but does not include bands. Percent differential counts (%) should be interpreted in the context of the absolute cell counts (cells/UL).    Lymphocytes % 14.8      13.0 - 44.0 % Final    Monocytes % 13.7      2.0 - 10.0 % Final    Eosinophils % 6.0      0.0 - 6.0 % Final    Basophils % 0.6      0.0 - 2.0 % Final    Neutrophils Absolute 3.02      1.60 - 5.50 x10*3/uL Final    Comment:    Percent differential counts (%) should be interpreted in the context of the absolute cell counts (cells/uL).    Immature Granulocytes Absolute, Automated 0.01      0.00 - 0.50 x10*3/uL Final    Lymphocytes Absolute 0.69      0.80 - 3.00 x10*3/uL Final    Monocytes Absolute 0.64      0.05 - 0.80 x10*3/uL Final    Eosinophils Absolute 0.28      0.00 - 0.40 x10*3/uL Final    Basophils Absolute 0.03      0.00 - 0.10 x10*3/uL Final                  Comprehensive metabolic panel        Component Value Flag Ref Range Units Status    Glucose 114      74 - 99 mg/dL Final    Sodium 134      136 - 145 mmol/L Final    Potassium 4.1      3.5 - 5.3 mmol/L Final    Chloride 98      98 - 107 mmol/L Final    Bicarbonate 25      21 - 32 mmol/L Final    Anion Gap 15      10 - 20 mmol/L Final    Urea Nitrogen 84      6 - 23 mg/dL Final    Creatinine 2.08      0.50 - 1.30 mg/dL Final    eGFR 33      >60 mL/min/1.73m*2 Final    Comment:    Calculations of estimated GFR are performed using the 2021 CKD-EPI Study Refit equation without the race variable for the IDMS-Traceable creatinine methods.  https://jasn.asnjournals.org/content/early/2021/09/22/ASN.6093888969    Calcium 9.9      8.6 - 10.6 mg/dL  Final    Albumin 4.0      3.4 - 5.0 g/dL Final    Alkaline Phosphatase 78      33 - 136 U/L Final    Total Protein 8.6      6.4 - 8.2 g/dL Final    AST 22      9 - 39 U/L Final    Bilirubin, Total 0.5      0.0 - 1.2 mg/dL Final    ALT 6      10 - 52 U/L Final    Comment:    Patients treated with Sulfasalazine may generate falsely decreased results for ALT.                  Lipase        Component Value Flag Ref Range Units Status    Lipase 190      9 - 82 U/L Final                  Magnesium        Component Value Flag Ref Range Units Status    Magnesium 2.47      1.60 - 2.40 mg/dL Final                  Phosphorus        Component Value Flag Ref Range Units Status    Phosphorus 3.1      2.5 - 4.9 mg/dL Final    Comment:    The performance characteristics of phosphorus testing in heparinized plasma have been validated by the individual  laboratory site where testing is performed. Testing on heparinized plasma is not approved by the FDA; however, such approval is not necessary.                  US abdomen limited liver          Interpreted By:  Roxanna Tijerina and Jiang Sirui   STUDY:  US ABDOMEN LIMITED LIVER;  12/20/2023 3:09 am      INDICATION:  Signs/Symptoms:RUQ pain, history of HCV s/p treatment, chronic pain  gradually worsening.      COMPARISON:  CT AP 10/11/2023  Liver ultrasound 08/22/2023      ACCESSION NUMBER(S):  [id number]      ORDERING CLINICIAN:  MARIA D LEE      TECHNIQUE:  Grayscale and color Doppler sonographic imaging of the right upper  quadrant.      FINDINGS:  LIVER: The liver measures 13.3 cm. Nodular contour consistent with  cirrhosis. There is a 0.3 x 0.3 x 0.2 cm hyperechoic focus of the  left hepatic dome compatible with a granuloma.      BILE DUCTS: No intrahepatic or extrahepatic bile duct dilatation.  Extrahepatic bile duct = 0.3 cm      GALLBLADDER: Multiple gallstones are noted. The gallbladder wall is  not thickened. There is small amount of pericholecystic fluid.  The  sonographic Taylor sign is negative.      PANCREAS: The visualized portions of the pancreas appear within  normal limits.      RIGHT KIDNEY: Normal size, no hydronephrosis. There is a 1.6 x 1.5 x  1.5 cm simple renal cyst.      PERITONEUM: No upper abdominal ascites.      IMPRESSION:  1. Cholelithiasis with mild pericholecystic fluid. No gallbladder  wall thickening. Sonographic Taylor sign is negative. Pericholecystic  fluid could be reactive to the liver pathology. Acute cholecystitis  is considered less likely, however, if there is strong clinical  concern HIDA may be considered.  2. Cirrhotic morphology of the liver similar to the prior examination  dated 08/22/2023.      I personally reviewed the image(s) / study and I agree with the  findings as stated by Aide Mott MD. This study was interpreted at  [place], [place].      MACRO:  None.      Signed by: Roxanna Tijerina 12/20/2023 3:58 AM  Dictation workstation:   [id number]     Linked Images                              Urinalysis with Reflex Culture and Microscopic        Component Value Flag Ref Range Units Status    Color, Urine Yellow      Straw, Yellow  Final    Appearance, Urine Clear      Clear  Final    Specific Gravity, Urine 1.014      1.005 - 1.035  Final    pH, Urine 7.0      5.0, 5.5, 6.0, 6.5, 7.0, 7.5, 8.0  Final    Protein, Urine NEGATIVE      NEGATIVE mg/dL Final    Glucose, Urine NEGATIVE      NEGATIVE mg/dL Final    Blood, Urine NEGATIVE      NEGATIVE  Final    Ketones, Urine NEGATIVE      NEGATIVE mg/dL Final    Bilirubin, Urine NEGATIVE      NEGATIVE  Final    Urobilinogen, Urine <2.0      <2.0 mg/dL Final    Nitrite, Urine NEGATIVE      NEGATIVE  Final    Leukocyte Esterase, Urine NEGATIVE      NEGATIVE  Final                  Extra Urine Gray Tube        Component    Extra Tube    Hold for add-ons.      Comment:    Auto resulted.                  FL upper GI single contrast w small bowel follow through           Interpreted By:  Norberto Malave,  and Jyothi Roche   STUDY:  FL UPPER GI SINGLE CONTRAST W SMALL BOWEL FOLLOW THROUGH;  12/21/2023  10:56 am      INDICATION:  Signs/Symptoms:Abd pain, n/v, following with GI, scheduled op for  today but now admitted for these sx.      COMPARISON:  None.      ACCESSION NUMBER(S):  [id number]      ORDERING CLINICIAN:  JOSE CRUZ LUIS      TECHNIQUE:  An initial radiograph of the abdomen and pelvis was obtained.  This  was followed by  injection through the PEG tube approximately   300  mL of contrast  Gastrografin. Multiple dynamic and static  fluoroscopic images of the esophagus, stomach and duodenum were  obtained. Delayed static images of the abdomen in the posteroanterior  projection were obtained at 15, 30, and 45 minute intervals until  contrast was observed to reach the cecum. Total fluoroscopy time was  0.2 minutes.      FINDINGS:  Initial  image demonstrates  a nonspecific nonobstructive bowel  gas pattern. Radiopaque coiling material over the right hemiabdomen  and PEG tube overlying the gastric body.      There is a trace amount of gastroesophageal reflux following contrast  administration into the stomach.      The stomach was well visualized and unremarkable in appearance. The  Gastrografin passed readily through the pylorus into a normal  duodenal bulb and C-loop. The duodenal-jejunal junction appears  normally positioned and grossly unremarkable.      The small bowel is of normal caliber with no mucosal thickening  appreciated.  A normal feathery appearance is observed to the  jejunum.  The normal smooth appearance of the ileum is observed.  There is no evidence for annular constricting lesions, large  intraluminal mass lesion, or mucosal ulceration.  Small bowel loops  are observed to separate normally without evidence of mass clumping.      The terminal ileum and cecum are well visualized and within normal  limits. Transit time of contrast  to the cecum was identified by  45  minutes.      IMPRESSION:  1.  Unremarkable small bowel follow-through.  2. Contrast observed to travel the small bowel and reach the cecum  within 45 minutes.      I personally reviewed the image(s)/study and resident interpretation.  I agree with the findings as stated by resident Melchor Roe.  Data analyzed and images interpreted at [place], [place].      MACRO:  None      Signed by: Norberto Malave 12/21/2023 12:53 PM  Dictation workstation:   [id number]         NM hepatobiliary w cholecystokinin          Interpreted By:  Benji Rodriguez and Osman Sena   STUDY:  NM HEPATOBILIARY W CHOLECYSTOKININ;  12/21/2023 2:07 pm      INDICATION:  Signs/Symptoms: 73-year-old male with Abd pain, nausea and vomiting.      COMPARISON:  CT of abdomen and pelvis on 10/11/2023      ACCESSION NUMBER(S):  [id number]      ORDERING CLINICIAN:  JOSE CRUZ LUIS      TECHNIQUE:  DIVISION OF NUCLEAR MEDICINE  HEPATOBILIARY SCAN (HIDA), QUANTITATIVE      The patient received an intravenous dose of  5.5 mCi of Tc-99m  mebrofenin (Choletec).  Sequential dynamic images of the upper  abdomen were then acquired over the next 60 minutes. Additional  static images were obtained of the upper anterior and lateral abdomen  for additional 60 minutes.      FINDINGS:  There is prompt accumulation of activity within the liver and normal  subsequent excretion via the biliary ductal system into the small  bowel. Gallbladder was not visualized by 2 hours post radiotracer  injection.      IMPRESSION:  1. Nonvisualization of the gallbladder by 2 hours post radiotracer  injection, findings suggestive of cystic duct obstruction and acute  cholecystitis.  2. Patency of common bile duct.      I personally reviewed the images/study and I agree with the findings  as stated.  This study was interpreted at [facility], [place], [place].       MACRO:  Critical Finding:  See findings. Dr. Raymond Rubio MD was  informed about result of the study on 12/21/2023 at 2:22 pm by  Mone Brar.  (**-OCF-**) Instructions:      Signed by: Benji Rodriguez 12/21/2023 2:37 PM  Dictation workstation:   [id number]     Linked Images                                    I have reviewed all laboratory and imaging results ordered/pertinent for this encounter.     Zahcary Obrien MD  Family Medicine - PGY 1

## 2023-12-21 NOTE — CONSULTS
Gastroenterology Consult Service INITIAL CONSULT Note  Department of Gastroenterology & Hepatology  [facility]    [facility]  December 21, 2023   Patient: Mk Fleming    Medical Record: [id number]    Reason for Consult: chronic abd pain/ nausea   Requesting Service: Hospital medicine    Subjective     Mk Fleming is a 73 y.o. male with HTN, oropharyngeal ca s/p surgery and XRT, s/p PEG, HCV GT 1a failed ribavirin/ interfron, s/p eclusa (completed May, 2023) with SVR, advanced liver fibrosis currently admitted for chronic abd pain and nausea.  Gastroenterology is consulted for the same.     Pt was recently seen in hepatology clinic on 12/13. Had been doing well from hepatology standpoint. However, pt was complaining of abd pain since prior year when he was hospitalized for GIB. At the time, had GIB, EGD with esophageal striture which was only traversable with ultrathin scope, found to have dudoenal ulcers (H pylori stool Ag negative); pt had persistnat bleeding and thus underwent IR guided GDA embolization. He states that he has feeds via PEG and does not find that his pain is related to his 4x per day bolus feeds. Pain is primarily RUQ, however occasionally will be RLQ. Does not typically radiate. Has chronic, daily nausea. No emesis. NO bloody stools, no melena. Pt was ordered for upper GI series and HIDA scan.     Upon arrival to ED, was HDS. Labs without leukocytosis. LFTs wnls. RUQ US with cholelithiasis with mild pericholecystic fluid, no gallbladder wall thickening. Upper GI series without anatomical abnormality, no large ulceration appreciated. HIDA scan with cystic duct obstruction and acute cholecystitis.    12 point ROS otherwise negative, unless indicated above.     Past Medical History:  Past Medical History:   Diagnosis Date    Personal history of other diseases of the digestive system     History of esophageal reflux    Personal history of other  diseases of the respiratory system     History of bronchitis    Personal history of other medical treatment     History of blood transfusion    Personal history of other specified conditions     History of chest pain       Home Medications  Medications Prior to Admission   Medication Sig Dispense Refill Last Dose    albuterol 2.5 mg /3 mL (0.083 %) nebulizer solution Inhale 3 mL every 6 hours if needed for wheezing. 4-6 hours as needed   Unknown at PRN    albuterol 90 mcg/actuation inhaler Inhale 2 puffs every 6 hours if needed for shortness of breath.   Unknown at PRN    amLODIPine (Norvasc) 5 mg tablet 1 tablet (5 mg) by g-tube route once daily at bedtime.   12/19/2023    atenolol (Tenormin) 50 mg tablet 1 tablet (50 mg) by g-tube route once daily.   12/19/2023    atorvastatin (Lipitor) 10 mg tablet 1 tablet (10 mg) by g-tube route once daily at bedtime.   12/19/2023    esomeprazole (NexIUM) 40 mg packet 40 mg 2 times a day. Mix and take per PEG tube twice daily for gastritis       famotidine (Pepcid) 40 mg/5 mL (8 mg/mL) suspension Take 1.25 mL (10 mg) by mouth every other day. 50 mL 0     fluticasone (Flonase) 50 mcg/actuation nasal spray Administer 1 spray into each nostril once daily as needed.       hydroCHLOROthiazide (HYDRODiuril) 25 mg tablet 1 tablet (25 mg) by g-tube route once daily.   12/19/2023    levothyroxine (Synthroid, Levoxyl) 50 mcg tablet 1 tablet (50 mcg) by g-tube route once daily in the morning. Take before meals.   12/19/2023    losartan (Cozaar) 50 mg tablet 1 tablet (50 mg) by g-tube route once daily.   12/19/2023    simethicone (Mylicon) 40 mg/0.6 mL drops 0.6 mL (40 mg) by g-tube route 4 times a day.       sucralfate (Carafate) 100 mg/mL suspension 10 mL (1 g) by g-tube route 4 times a day.   12/19/2023    terazosin (Hytrin) 5 mg capsule 1 capsule (5 mg) by g-tube route once daily at bedtime.   12/19/2023       Surgical History:  Past Surgical History:   Procedure Laterality Date    IR  ANGIOGRAM AORTA ABDOMEN  7/3/2022    IR ANGIOGRAM AORTA ABDOMEN 7/3/2022 [facility] CLINICAL LEGACY    IR ANGIOGRAM INFERIOR EPIGASTRIC  7/3/2022    IR ANGIOGRAM INFERIOR EPIGASTRIC 7/3/2022 [facility] CLINICAL LEGACY    IR EMBOLIZATION LYMPH NODE Bilateral 7/3/2022    IR EMBOLIZATION LYMPH NODE 7/3/2022 [facility] CLINICAL LEGACY    OTHER SURGICAL HISTORY  11/19/2018    Hernia repair    OTHER SURGICAL HISTORY  11/19/2018    Tooth extraction    OTHER SURGICAL HISTORY  11/19/2018    Pulmonary decortication    OTHER SURGICAL HISTORY  10/20/2022    Esophagogastroduodenoscopy    OTHER SURGICAL HISTORY  10/20/2022    Percutaneous endoscopic gastrostomy tube insertion       Allergies:  No Known Allergies    Social History:  No smoking, no EtOH use, no IVDU.     Family History:  No family history of GI or liver disease.     Objective     Vitals:    Vitals:    12/20/23 2040 12/21/23 0049 12/21/23 0530 12/21/23 0729   BP: 169/80 150/67 146/71 121/66   BP Location:   Left arm    Patient Position:       Pulse: 88 99 79 92   Resp: 16 16 16 16   Temp:   36.4 °C (97.5 °F) 36.7 °C (98.1 °F)   TempSrc:   Temporal    SpO2: 96% 95% 95% 97%   Weight:       Height:             Intake/Output Summary (Last 24 hours) at 12/21/2023 1009  Last data filed at 12/21/2023 0534  Gross per 24 hour   Intake --   Output 400 ml   Net -400 ml       Physical Exam  Gen:  NAD  HEENT:  No scleral icterus, moist mucous membranes  Lungs:  No increased WOB, CTAB  CVS:  RRR  Abd:  Soft, non tender, nl BS, PEG site well appearing, abd distended but no dullness, no fluid shift  Ext:  No edema, full ROM  Skin:  WWP  Neuro:  No asterixis, AOx3    Labs:   Lab Results   Component Value Date    WBC 4.7 12/19/2023    HGB 11.1 (L) 12/19/2023    HCT 31.4 (L) 12/19/2023    MCV 91 12/19/2023     12/19/2023     Lab Results   Component Value Date    GLUCOSE 114 (H) 12/19/2023    CALCIUM 9.9 12/19/2023     (L) 12/19/2023    K 4.1 12/19/2023    CO2 25 12/19/2023    CL 98  12/19/2023    BUN 84 (H) 12/19/2023    CREATININE 2.08 (H) 12/19/2023     Lab Results   Component Value Date    ALT 6 (L) 12/19/2023    AST 22 12/19/2023    ALKPHOS 78 12/19/2023    BILITOT 0.5 12/19/2023     Lab Results   Component Value Date    INR 1.0 08/15/2023    INR 1.3 (H) 07/03/2022    INR 1.1 07/01/2022    PROTIME 11.8 08/15/2023    PROTIME 14.7 (H) 07/03/2022    PROTIME 12.2 07/01/2022     Imaging:   === 12/21/23 ===  1. Nonvisualization of the gallbladder by 2 hours post radiotracer  injection, findings suggestive of cystic duct obstruction and acute  cholecystitis.  2. Patency of common bile duct.    === 12/21/23 ===  FL upper GI single contrast w small bowel follow through  1.  Unremarkable small bowel follow-through.  2. Contrast observed to travel the small bowel and reach the cecum  within 45 minutes.    === 12/19/23 ===    US ABDOMEN LIMITED LIVER    - Impression -  1. Cholelithiasis with mild pericholecystic fluid. No gallbladder  wall thickening. Sonographic Taylor sign is negative. Pericholecystic  fluid could be reactive to the liver pathology. Acute cholecystitis  is considered less likely, however, if there is strong clinical  concern HIDA may be considered.  2. Cirrhotic morphology of the liver similar to the prior examination  dated 08/22/2023.    === 10/11/23 ===    CT ABDOMEN PELVIS W IV CONTRAST    - Impression -  1. Marked gastric wall thickening compatible with gastritis not  changed from 07/30/2023 CT. PEG tube is noted with balloon in  appropriate position.  2. Cholelithiasis and gallbladder thickening without evidence of  acute cholecystitis, not changed from 07/30/2023 CT. Finding may be  further evaluated with ultrasound if clinically indicated.  3. Cirrhotic morphology of the liver not changed from 07/30/2023 CT  without focal lesion or mass.  4. Scattered large bowel diverticulosis without evidence of acute  diverticulitis.    GI procedures:  EGD 7/1/2022:  Indications:            Acute post hemorrhagic anemia, Hematemesis, Recent                          gastrointestinal bleeding, Concern for bleeding from                          PEG tube.    Impression:            - Benign-appearing severe esophageal stenosis was                          found at the cricopharyngeous, most likely radiation                          induced. This was traversed with effort with the                          ultrathin scope.                         - The esophagus was otherwise unremarkable.                         - 2 cm hiatal hernia.                         - Intact gastrostomy with a patent G-tube present                          characterized by healthy appearing mucosa and no PEG                          related ulceration was found                         - The stomach was otherwise normal.                         - Three non-obstructing non-bleeding duodenal ulcers                          were found in the duodenal bulb and along with                          duodenal sweep. The largest ulcer along the sweep had                          a suspected flat pigmented spot (Julio Class IIc)                          but difficult to further characterize with the                          ultrathin scope. The other two ulcers appeared clean                          based. No active bleeding or hematin seen in the                          duodeum.                         - Normal second portion of the duodenum.                         - No specimens collected.    Assessment & Plan   Mk Fleming is a 73 y.o. male with HTN, oropharyngeal ca s/p surgery and XRT, s/p PEG, HCV GT 1a failed ribavirin/ interfron, s/p epclusa (completed May, 2023) with SVR, advanced liver fibrosis currently admitted for chronic abd pain and nausea.  Gastroenterology is consulted for the same.       HIDA scan with evidence of acute cholecystitis. Pt has now been seen in the ED 3x, and now is admitted on the 4th time with symptoms.  Recommend surgical involvement for cholecystectomy.     #acute darinel:  - appreciate surgery involvement; of note pt does not have cirrhosis  - if no plans for surgery while inpatient, please ensure that patient has close follow up with surgery upon discharge for surgical planning     Patient seen and discussed with attending, Dr. Tang.     Marely Balbuena, GI fellow    Thank you for the consultation.  The consulting team will sign off now.  Please do not hesitate to contact us again on by paging the consultation team again between the weekday hours of 7 AM - 5 PM.  If there is an urgent concern during the weekend, after-hours, or holidays; then please page the on-call GI fellow at [phone]. Thank you.      SIGNATURE: Marely Balbuena MD PATIENT NAME: Mk Fleming   DATE: December 21, 2023 MRN: [id number]

## 2023-12-22 PROCEDURE — 2500000001 HC RX 250 WO HCPCS SELF ADMINISTERED DRUGS (ALT 637 FOR MEDICARE OP): Performed by: STUDENT IN AN ORGANIZED HEALTH CARE EDUCATION/TRAINING PROGRAM

## 2023-12-22 PROCEDURE — 96376 TX/PRO/DX INJ SAME DRUG ADON: CPT

## 2023-12-22 PROCEDURE — G0378 HOSPITAL OBSERVATION PER HR: HCPCS

## 2023-12-22 PROCEDURE — 2500000001 HC RX 250 WO HCPCS SELF ADMINISTERED DRUGS (ALT 637 FOR MEDICARE OP): Performed by: NURSE PRACTITIONER

## 2023-12-22 PROCEDURE — 1100000001 HC PRIVATE ROOM DAILY

## 2023-12-22 PROCEDURE — 2500000004 HC RX 250 GENERAL PHARMACY W/ HCPCS (ALT 636 FOR OP/ED): Performed by: NURSE PRACTITIONER

## 2023-12-22 PROCEDURE — 99233 SBSQ HOSP IP/OBS HIGH 50: CPT | Performed by: STUDENT IN AN ORGANIZED HEALTH CARE EDUCATION/TRAINING PROGRAM

## 2023-12-22 PROCEDURE — C9113 INJ PANTOPRAZOLE SODIUM, VIA: HCPCS | Performed by: NURSE PRACTITIONER

## 2023-12-22 RX ADMIN — LEVOTHYROXINE SODIUM 50 MCG: 50 TABLET ORAL at 06:20

## 2023-12-22 RX ADMIN — PANTOPRAZOLE SODIUM 40 MG: 40 INJECTION, POWDER, FOR SOLUTION INTRAVENOUS at 08:19

## 2023-12-22 RX ADMIN — OXYCODONE HYDROCHLORIDE 5 MG: 5 TABLET ORAL at 20:42

## 2023-12-22 RX ADMIN — LOSARTAN POTASSIUM 50 MG: 50 TABLET, FILM COATED ORAL at 08:19

## 2023-12-22 RX ADMIN — OXYCODONE HYDROCHLORIDE 5 MG: 5 TABLET ORAL at 12:54

## 2023-12-22 RX ADMIN — HYDROCHLOROTHIAZIDE 25 MG: 25 TABLET ORAL at 08:19

## 2023-12-22 RX ADMIN — ATORVASTATIN CALCIUM 10 MG: 20 TABLET, FILM COATED ORAL at 20:43

## 2023-12-22 RX ADMIN — ATENOLOL 50 MG: 50 TABLET ORAL at 08:19

## 2023-12-22 ASSESSMENT — PAIN SCALES - WONG BAKER: WONGBAKER_NUMERICALRESPONSE: HURTS EVEN MORE

## 2023-12-22 ASSESSMENT — COGNITIVE AND FUNCTIONAL STATUS - GENERAL
DAILY ACTIVITIY SCORE: 24
MOBILITY SCORE: 22
CLIMB 3 TO 5 STEPS WITH RAILING: A LITTLE
WALKING IN HOSPITAL ROOM: A LITTLE

## 2023-12-22 ASSESSMENT — PAIN - FUNCTIONAL ASSESSMENT: PAIN_FUNCTIONAL_ASSESSMENT: 0-10

## 2023-12-22 ASSESSMENT — PAIN DESCRIPTION - LOCATION: LOCATION: ABDOMEN

## 2023-12-22 ASSESSMENT — PAIN SCALES - GENERAL
PAINLEVEL_OUTOF10: 8
PAINLEVEL_OUTOF10: 7
PAINLEVEL_OUTOF10: 4

## 2023-12-22 NOTE — CONSULTS
Nutrition Initial Assessment:   Nutrition Assessment    Reason for Assessment: Provider consult order    Patient is a 73 y.o. male presenting with: abdominal pain    Pt with PMH of HTN, CKD3, COPD, hypothyroidism, oropharyngeal Ca s/p surgery, GIB suspected to be 2/2 ulcers and severe esophageal stricture s/p G tube.   Pt is PEG tube dependent.     Past Medical History:   Diagnosis Date    Personal history of other diseases of the digestive system     History of esophageal reflux    Personal history of other diseases of the respiratory system     History of bronchitis    Personal history of other medical treatment     History of blood transfusion    Personal history of other specified conditions     History of chest pain     Past Surgical History:   Procedure Laterality Date    IR ANGIOGRAM AORTA ABDOMEN  7/3/2022    IR ANGIOGRAM AORTA ABDOMEN 7/3/2022 [facility] CLINICAL LEGACY    IR ANGIOGRAM INFERIOR EPIGASTRIC  7/3/2022    IR ANGIOGRAM INFERIOR EPIGASTRIC 7/3/2022 [facility] CLINICAL LEGACY    IR EMBOLIZATION LYMPH NODE Bilateral 7/3/2022    IR EMBOLIZATION LYMPH NODE 7/3/2022 [facility] CLINICAL LEGACY    OTHER SURGICAL HISTORY  11/19/2018    Hernia repair    OTHER SURGICAL HISTORY  11/19/2018    Tooth extraction    OTHER SURGICAL HISTORY  11/19/2018    Pulmonary decortication    OTHER SURGICAL HISTORY  10/20/2022    Esophagogastroduodenoscopy    OTHER SURGICAL HISTORY  10/20/2022    Percutaneous endoscopic gastrostomy tube insertion           Nutrition History:  Energy Intake: Good > 75 % (Boost VHC QID = 2120kcal,88g PRO;)  Food and Nutrient History: Met with patient this morning. Pt reports that he has been feeding through his PEG QID one boost VHC. Per pt report the bottle is different (suspect the boost  PTA was not the Boost VHC but a regular boost). Pt was doing 60ml water flushes before and after the boost and providing supplemental water as needed. Pt denies nausea, vomiting, diarrhea, and constipation.  Food  "Allergies/Intolerances:  None  GI Symptoms: Abdominal pain  Oral Problems:  no teeth; nothing by mouth       Anthropometrics:  Height: 172.7 cm (5' 8\")   Weight: 64 kg (141 lb)   BMI (Calculated): 21.44  IBW/kg (Dietitian Calculated): 70 kg  Percent of IBW: 91 %       Weight History:   Wt Readings from Last 9 Encounters:   12/20/23 64 kg (141 lb)   12/13/23 64 kg (141 lb)   10/23/23 63.2 kg (139 lb 6.4 oz)   10/11/23 63.5 kg (140 lb)   07/28/23 62.3 kg (137 lb 6.4 oz)   05/24/23 64.4 kg (142 lb)   04/24/23 65 kg (143 lb 5 oz)   01/25/23 63.5 kg (140 lb)   12/15/22 66.2 kg (146 lb) (3% wt loss x ~ 1 year)       Weight Change %:  Weight History / % Weight Change: Pt reports that he is usually 140# (63.6kg); and has lost weight over the last week; per chart review pt has been around between 64-66kg  Significant Weight Loss: No    Nutrition Focused Physical Exam Findings:  defer: pt request  Edema:  Edema: none  Physical Findings:  Skin: Negative    Nutrition Significant Labs:  CBC Trend:   Results from last 7 days   Lab Units 12/19/23  2256   WBC AUTO x10*3/uL 4.7   RBC AUTO x10*6/uL 3.46*   HEMOGLOBIN g/dL 11.1*   HEMATOCRIT % 31.4*   MCV fL 91   PLATELETS AUTO x10*3/uL 153    , BMP Trend:   Results from last 7 days   Lab Units 12/19/23  2256   GLUCOSE mg/dL 114*   CALCIUM mg/dL 9.9   SODIUM mmol/L 134*   POTASSIUM mmol/L 4.1   CO2 mmol/L 25   CHLORIDE mmol/L 98   BUN mg/dL 84*   CREATININE mg/dL 2.08*   , Liver Function Trend:   Results from last 7 days   Lab Units 12/19/23  2256   ALK PHOS U/L 78   AST U/L 22   ALT U/L 6*   BILIRUBIN TOTAL mg/dL 0.5    , Renal Lab Trend:   Results from last 7 days   Lab Units 12/19/23  2256   POTASSIUM mmol/L 4.1   PHOSPHORUS mg/dL 3.1   SODIUM mmol/L 134*   MAGNESIUM mg/dL 2.47*   EGFR mL/min/1.73m*2 33*   BUN mg/dL 84*   CREATININE mg/dL 2.08*          Nutrition Specific Medications:  Scheduled medications  amLODIPine, 5 mg, g-tube, Nightly  atenolol, 50 mg, g-tube, " Daily  atorvastatin, 10 mg, g-tube, Nightly  hydroCHLOROthiazide, 25 mg, g-tube, Daily  levothyroxine, 50 mcg, g-tube, Daily before breakfast  losartan, 50 mg, g-tube, Daily  pantoprazole, 40 mg, intravenous, Daily  terazosin, 5 mg, g-tube, Nightly      PRN medications  PRN medications: albuterol, HYDROmorphone, melatonin, naloxone, ondansetron, oxyCODONE, polyethylene glycol      I/O:   Last BM Date: 12/22/23;          Dietary Orders (From admission, onward)       Start     Ordered    12/21/23 1820  Oral nutritional supplements  Until discontinued        Comments: 2 can TiD via peg   Question Answer Comment   Deliver with All meals    Select supplement: Boost VHC        12/21/23 1820 12/20/23 1941  Oral nutritional supplements  Until discontinued        Comments: Please admin 2 cans via peg for dinner now on 12/20    Pt will then be npo after midnight   Question Answer Comment   Deliver with Dinner    Select supplement: Boost VHC        12/20/23 1943                     Estimated Needs:   Total Energy Estimated Needs (kCal):  (8825-4827)  Method for Estimating Needs: 28-35 x ABW  Total Protein Estimated Needs (g):  (83+)  Method for Estimating Needs: ABW x 1.3+  Total Fluid Estimated Needs (mL):  (per team)           Nutrition Diagnosis        Nutrition Diagnosis  Patient has Nutrition Diagnosis: Yes  Diagnosis Status (1): New  Nutrition Diagnosis 1: Swallowing difficulity  Related to (1): severe esophageal stricutre  As Evidenced by (1): PEG tube dependent       Nutrition Interventions/Recommendations         Nutrition Prescription:  Recommend checking vitamin D and supplementing as needed   Would recommend continuing Boost VHC QID with water flushes as needed per team  Provides: 2120 kcal, 88g PRO  Please make sure Boost is not provided 1 hour before and after Synthroid.   Please check daily weights             Nutrition Monitoring and Evaluation   Food/Nutrient Related History Monitoring  Monitoring and  Evaluation Plan: Enteral and parenteral nutrition intake    Body Composition/Growth/Weight History  Monitoring and Evaluation Plan: Weight    Biochemical Data, Medical Tests and Procedures  Monitoring and Evaluation Plan: Vitamin profile, Glucose/endocrine profile, Electrolyte/renal panel            Time Spent/Follow-up Reminder:   Time Spent (min): 90 minutes  Last Date of Nutrition Visit: 12/22/23  Nutrition Follow-Up Needed?: Dietitian to reassess per policy  Follow up Comment: TF via PEG

## 2023-12-22 NOTE — CARE PLAN
The patient's goals for the shift include      The clinical goals for the shift include PATIENT WILL REMAIN FREE FROM ABDOMINAL UPSET, NAUSEA, AND VOMITING DURING SHIFT

## 2023-12-22 NOTE — PROGRESS NOTES
Assessment/Plan    .    Mk Fleming is a 73 y.o. male with a past medical history of HTN, CKD3 (baseline creatinine 1-8.2.0), COPD, hypothyroidism, oropharyngeal Ca s/p surgery and XRT and HCV GT 1a, HCV (undetectable s/p Epclusa May 2023) complicated by advanced hepatic fibrosis, GIB suspected to be 2/2 ulcers, and severe esophageal stricture s/p G-tube who presented to the ED with acute on chronic abdominal pain     Abdominal pain likely secondary to chronic cholecystitis  Esophageal stricture  History of GI bleed, history of ulcers  - At this time symptoms are more concerning for his gallbladder, he is still tolerating PEG tube feed.  He appears comfortable in no acute distress.  --CT demonstrates chlelithiasis with mild pericholecystic fluid   -Ordered pending outpatient tests, HIDA and small bowel follow-through.  Patient also has history of esophageal stricture and ulcerations.  He is concerned about his ulcerations and how they will be followed up.  He states he was post to follow-up about the ulcerations as well on the 27th.  - the small bowel bowel follow-through did not show abnormalities.  The HIDA scan was concerning for obstruction or cholecystitis.  GI was consulted and they recommended consulting ACS.  ACS consulted and pending recommendations.  -Will keep n.p.o. until ACS recommendations.  At this time this is chronic likely, patient is he medically stable, symptoms are manageable.  Will hold antibiotics until further discussion and recommendations from ACS.  -Patient will need to follow-up outpatient discuss management of her stricture and ulcerations.  -Continue multimodal analgesia  -Continue PPI  -After input from GI and ACS, no intervention at this time, no utility of antibiotics.  Will touch base with service to discuss symptom management and meantime while he awaits optimization for outpatient elective intervention.  Will discuss if he would benefit from transplant surgery or hepatology  "eval here.      #Hypertensive urgency  -No evidence of end organ damange  -Suspect 2/2 uncontrolled pain  -Resume home medications   -Resolved    #CKD3, at baseline  -Creatinine appears stable  -Renal dosing where indicated  -Avoid nephrotoxic agents where possible     #COPD  -No acute issues  -Albuterol PRN     #GERD  #History of GIB  -Will hold DVT chemoppx for now, SCDs  -Protonix 40mg IV daily     #Hypothyroidism  -Continue Synthroid     #Hepatic fibrosis  #History of HCV  #Cirrhosis  -LFTs stable  -Follow-up on pathology     #Esophageal stricture  #s/p G-tube  -Continue tube feedings QID with Ensure Plus or VHC.  Will obtain nutrition evaluation after ACS recommendation.  -Patient usually uses 60 cc free water flush before and after feeds and then uses water as needed.  Nutrition is evaluating and will update orders after implant.         Scheduled outpatient appointments in system:   Future Appointments   Date Time Provider Department Center   12/27/2023  1:00 PM [facility] 10 [facility]   12/29/2023  1:00 PM [facility] NM 3 [facility]   1/17/2024  3:45 PM Eula Benavidez MD [id number]   4/22/2024  1:00 PM Amrik Sprague MD [id number]     ---------------------------------------------------------------------------------------------------  Subjective   Pain is better controlled, no acute intervention per surgical services.  He is still having some pain but collet tolerating diet, will see how he tolerates throughout the day today.Will discuss on need for optimization and input on which setting.  ---------------------------------------------------------------------------------------------------  Objective   Last Recorded Vitals  Blood pressure 150/70, pulse 86, temperature 37.2 °C (99 °F), resp. rate 16, height 1.727 m (5' 8\"), weight 64 kg (141 lb), SpO2 96 %.  Intake/Output last 3 Shifts:  I/O last 3 completed shifts:  In: 1210.2 (18.9 mL/kg) [P.O.:210; IV Piggyback:1000.2]  Out: " 600 (9.4 mL/kg) [Urine:600 (0.3 mL/kg/hr)]  Weight: 64 kg     Physical Exam  Vitals and nursing note reviewed.   Constitutional:       General: He is not in acute distress.     Appearance: He is ill-appearing. He is not toxic-appearing.      Comments: Underweight, hoarse voice quality   HENT:      Head: Normocephalic and atraumatic.      Mouth/Throat:      Mouth: Mucous membranes are moist.   Eyes:      General: No scleral icterus.     Extraocular Movements: Extraocular movements intact.      Conjunctiva/sclera: Conjunctivae normal.   Cardiovascular:      Rate and Rhythm: Normal rate and regular rhythm.      Heart sounds: S1 normal and S2 normal. No murmur heard.  Pulmonary:      Effort: Pulmonary effort is normal. No respiratory distress.      Breath sounds: No wheezing, rhonchi or rales.   Abdominal:      General: Bowel sounds are normal. There is no distension.      Palpations: Abdomen is soft.      Tenderness: There is abdominal tenderness. There is no guarding or rebound.      Comments: PEG in place   Musculoskeletal:         General: No swelling or deformity.      Cervical back: Neck supple.   Skin:     General: Skin is warm and dry.      Findings: No rash.   Neurological:      General: No focal deficit present.      Mental Status: He is alert. Mental status is at baseline.   Psychiatric:         Mood and Affect: Mood normal.         Relevant Results  Lab Results   Component Value Date    WBC 4.7 12/19/2023    HGB 11.1 (L) 12/19/2023    HCT 31.4 (L) 12/19/2023    MCV 91 12/19/2023     12/19/2023      Lab Results   Component Value Date    GLUCOSE 114 (H) 12/19/2023    CALCIUM 9.9 12/19/2023     (L) 12/19/2023    K 4.1 12/19/2023    CO2 25 12/19/2023    CL 98 12/19/2023    BUN 84 (H) 12/19/2023    CREATININE 2.08 (H) 12/19/2023     Scheduled medications  amLODIPine, 5 mg, g-tube, Nightly  atenolol, 50 mg, g-tube, Daily  atorvastatin, 10 mg, g-tube, Nightly  hydroCHLOROthiazide, 25 mg, g-tube,  Daily  levothyroxine, 50 mcg, g-tube, Daily before breakfast  losartan, 50 mg, g-tube, Daily  pantoprazole, 40 mg, intravenous, Daily  terazosin, 5 mg, g-tube, Nightly      Continuous medications     PRN medications  PRN medications: albuterol, HYDROmorphone, melatonin, naloxone, ondansetron, oxyCODONE, polyethylene glycol    Raymond Rubio MD

## 2023-12-23 ENCOUNTER — PHARMACY VISIT (OUTPATIENT)
Dept: PHARMACY | Facility: CLINIC | Age: 73
End: 2023-12-23
Payer: COMMERCIAL

## 2023-12-23 VITALS
DIASTOLIC BLOOD PRESSURE: 74 MMHG | BODY MASS INDEX: 21.37 KG/M2 | OXYGEN SATURATION: 97 % | HEIGHT: 68 IN | HEART RATE: 83 BPM | TEMPERATURE: 96.6 F | RESPIRATION RATE: 16 BRPM | SYSTOLIC BLOOD PRESSURE: 135 MMHG | WEIGHT: 141 LBS

## 2023-12-23 DIAGNOSIS — B19.20 UNSPECIFIED VIRAL HEPATITIS C WITHOUT HEPATIC COMA: ICD-10-CM

## 2023-12-23 PROBLEM — K81.1 CHRONIC CHOLECYSTITIS: Status: ACTIVE | Noted: 2023-12-23

## 2023-12-23 PROBLEM — K80.20 CHOLELITHIASIS WITHOUT CHOLECYSTITIS: Status: RESOLVED | Noted: 2023-12-20 | Resolved: 2023-12-23

## 2023-12-23 PROCEDURE — 99239 HOSP IP/OBS DSCHRG MGMT >30: CPT | Performed by: STUDENT IN AN ORGANIZED HEALTH CARE EDUCATION/TRAINING PROGRAM

## 2023-12-23 PROCEDURE — 94760 N-INVAS EAR/PLS OXIMETRY 1: CPT

## 2023-12-23 PROCEDURE — 2500000001 HC RX 250 WO HCPCS SELF ADMINISTERED DRUGS (ALT 637 FOR MEDICARE OP): Performed by: STUDENT IN AN ORGANIZED HEALTH CARE EDUCATION/TRAINING PROGRAM

## 2023-12-23 PROCEDURE — RXMED WILLOW AMBULATORY MEDICATION CHARGE

## 2023-12-23 PROCEDURE — 2500000004 HC RX 250 GENERAL PHARMACY W/ HCPCS (ALT 636 FOR OP/ED): Performed by: NURSE PRACTITIONER

## 2023-12-23 PROCEDURE — G0378 HOSPITAL OBSERVATION PER HR: HCPCS

## 2023-12-23 PROCEDURE — 2500000001 HC RX 250 WO HCPCS SELF ADMINISTERED DRUGS (ALT 637 FOR MEDICARE OP): Performed by: NURSE PRACTITIONER

## 2023-12-23 PROCEDURE — 96376 TX/PRO/DX INJ SAME DRUG ADON: CPT

## 2023-12-23 PROCEDURE — C9113 INJ PANTOPRAZOLE SODIUM, VIA: HCPCS | Performed by: NURSE PRACTITIONER

## 2023-12-23 RX ORDER — NALOXONE HYDROCHLORIDE 4 MG/.1ML
4 SPRAY NASAL AS NEEDED
Qty: 2 EACH | Refills: 0 | Status: SHIPPED | OUTPATIENT
Start: 2023-12-23

## 2023-12-23 RX ORDER — AMOXICILLIN 250 MG
2 CAPSULE ORAL NIGHTLY
Qty: 60 TABLET | Refills: 0 | Status: SHIPPED | OUTPATIENT
Start: 2023-12-23 | End: 2024-01-22

## 2023-12-23 RX ORDER — OXYCODONE HYDROCHLORIDE 5 MG/1
10 TABLET ORAL EVERY 6 HOURS PRN
Qty: 30 TABLET | Refills: 0 | Status: SHIPPED | OUTPATIENT
Start: 2023-12-23 | End: 2024-01-02

## 2023-12-23 RX ORDER — ONDANSETRON 4 MG/1
4 TABLET, FILM COATED ORAL EVERY 8 HOURS PRN
Qty: 90 TABLET | Refills: 0 | Status: SHIPPED | OUTPATIENT
Start: 2023-12-23 | End: 2024-01-22

## 2023-12-23 RX ORDER — DEXTROMETHORPHAN/PSEUDOEPHED 2.5-7.5/.8
40 DROPS ORAL 4 TIMES DAILY
Qty: 30 ML | Refills: 0 | Status: SHIPPED | OUTPATIENT
Start: 2023-12-23 | End: 2024-01-22

## 2023-12-23 RX ADMIN — PANTOPRAZOLE SODIUM 40 MG: 40 INJECTION, POWDER, FOR SOLUTION INTRAVENOUS at 09:20

## 2023-12-23 RX ADMIN — ATENOLOL 50 MG: 50 TABLET ORAL at 09:20

## 2023-12-23 RX ADMIN — OXYCODONE HYDROCHLORIDE 5 MG: 5 TABLET ORAL at 09:24

## 2023-12-23 RX ADMIN — HYDROCHLOROTHIAZIDE 25 MG: 25 TABLET ORAL at 09:20

## 2023-12-23 RX ADMIN — LEVOTHYROXINE SODIUM 50 MCG: 50 TABLET ORAL at 06:07

## 2023-12-23 RX ADMIN — LOSARTAN POTASSIUM 50 MG: 50 TABLET, FILM COATED ORAL at 09:20

## 2023-12-23 ASSESSMENT — PAIN DESCRIPTION - LOCATION: LOCATION: ABDOMEN

## 2023-12-23 ASSESSMENT — PAIN - FUNCTIONAL ASSESSMENT: PAIN_FUNCTIONAL_ASSESSMENT: 0-10

## 2023-12-23 ASSESSMENT — PAIN SCALES - GENERAL: PAINLEVEL_OUTOF10: 7

## 2023-12-23 NOTE — DISCHARGE SUMMARY
Date of Admission: 12/19/2023    Date of Discharge: 12/23/2023    Discharge Diagnosis  Chronic cholecystitis  Moderate malnutrition  Ulcers in the GI tract  Esophageal stricture    Issues Requiring Follow-Up  Symptom control in outpatient setting and medical optimization for upcoming elective cholecystectomy.  Patient will benefit from nutrition and therapy prior to surgery as well as follow-up with GI to optimize liver health, has hepatic fibrosis but not cirrhosis.  Still will be elevated risk.  If patient is not getting adequate control of symptoms, he should be considered if benefits are greater than risk to proceed with more urgent cholecystectomy.  Patient will need to follow-up with ACS to confirm/schedule his surgery.  Patient will need to verify scheduling of his GI and ACS appointments.  Patient will need to call himself to make his primary care appointment.  Discussed care needs and patient verbalized understanding.    Discharge Meds     Your medication list        START taking these medications        Instructions Last Dose Given Next Dose Due   naloxone 4 mg/0.1 mL nasal spray  Commonly known as: Narcan      Administer 1 spray (4 mg) into affected nostril(s) if needed for opioid reversal or respiratory depression. May repeat every 2-3 minutes if needed, alternating nostrils, until medical assistance becomes available.       ondansetron 4 mg tablet  Commonly known as: Zofran      Take 1 tablet (4 mg) by mouth every 8 hours if needed for nausea or vomiting.       oxyCODONE 5 mg immediate release tablet  Commonly known as: Roxicodone      Take 2 tablets (10 mg) by mouth every 6 hours if needed for severe pain (7 - 10) for up to 10 days. OR take 1 tab (5mg) by mouth every 6 hours if needed for moderate pain (4-6) for up to 10 days       sennosides-docusate sodium 8.6-50 mg tablet  Commonly known as: Tayler-Colace      Take 2 tablets by gastronomy tube route once daily at bedtime.              CONTINUE taking  these medications        Instructions Last Dose Given Next Dose Due   albuterol 2.5 mg /3 mL (0.083 %) nebulizer solution           albuterol 90 mcg/actuation inhaler           amLODIPine 5 mg tablet  Commonly known as: Norvasc           atenolol 50 mg tablet  Commonly known as: Tenormin           atorvastatin 10 mg tablet  Commonly known as: Lipitor           esomeprazole 40 mg packet  Commonly known as: NexIUM           fluticasone 50 mcg/actuation nasal spray  Commonly known as: Flonase           hydroCHLOROthiazide 25 mg tablet  Commonly known as: HYDRODiuril           levothyroxine 50 mcg tablet  Commonly known as: Synthroid, Levoxyl           losartan 50 mg tablet  Commonly known as: Cozaar           simethicone 40 mg/0.6 mL drops  Commonly known as: Mylicon      0.6 mL (40 mg) by g-tube route 4 times a day.       sucralfate 100 mg/mL suspension  Commonly known as: Carafate           terazosin 5 mg capsule  Commonly known as: Hytrin                  STOP taking these medications      famotidine 40 mg/5 mL (8 mg/mL) suspension  Commonly known as: Pepcid                  Where to Get Your Medications        These medications were sent to [facility]  [address], [address], [place] [address]      Hours: 8AM to 6PM Mon-Fri, 8AM to 4PM Sat, 9AM to 1PM Sun Phone: [phone]   naloxone 4 mg/0.1 mL nasal spray  ondansetron 4 mg tablet  oxyCODONE 5 mg immediate release tablet  sennosides-docusate sodium 8.6-50 mg tablet  simethicone 40 mg/0.6 mL drops         Test Results Pending At Discharge  Pending Labs       No current pending labs.            Hospital Course  Mk Fleming is a 73 y.o. male with a past medical history of HTN, CKD3 (baseline creatinine 1-8.2.0), COPD, hypothyroidism, oropharyngeal Ca s/p surgery and XRT and HCV GT 1a, HCV (undetectable s/p Epclusa May 2023) complicated by advanced hepatic fibrosis, GIB suspected to be 2/2 ulcers, and severe esophageal stricture s/p  G-tube who presented to the ED with acute on chronic abdominal pain.  He was actively being worked up by GI with concern for gallbladder pathology pending HIDA and small bowel bowel follow-through.  He was to have further appointment regarding his ulcers in the 27th for evaluation.  Patient had small bowel follow-through with good visualization of the stomach with no abnormalities.  Per GI, significant stricture and will unlikely be able to fit the scope to evaluate, will need to follow-up outpatient for further management.  HIDA concerning for cholecystitis, ACS evaluated and deemed chronic.  Low utility for any antibiotics per GI, per ACS as patient is tolerating tube feeds, not having significant nausea and pain is controlled, would recommend further optimization as outpatient and follow-up to schedule elective cholecystectomy.  At this time plan for supportive measures, referral to primary, GI and ACS to follow-up and plan care.  Will need to be optimized for nutrition and patient has hepatic fibrosis, ACS recommending optimization with respect to his fibrosis given he will be at elevated risk.  Given holiday and prolonged.  Upcoming, will need to provide longer  (but short) of analgesics, after which we will need further evaluation of his pain control by outpatient providers.  Provided a prescription for nausea.  Prescribed bowel regimen while patient is on opioids.  Medication education provided.  OARRS reviewed, patient does not have any prescriptions.  Patient is followed with a home health aide, discussed with patient and care coordination, requested PT OT nutrition to be added to his services, and patient will follow-up with primary care if any issues getting the services, otherwise primary care can be ordered.  Additionally requested hospital at home, to address any barriers to his care and follow-up plan as he is elevated risk to be readmitted.  Patient informed and acknowledges need to call his primary  care at [facility] right after the holiday to establish follow-up visit for post hospitalization transition of care.   Discharge plan of care discussed, education and counseling provided involving active problem care plan, warning signs,  risks/benefits of new medications or medication changes, follow-up care and testing.  All questions answered patient advised to follow-up with her primary care within 1 week of discharge or the next available for posthospitalization transition of care.  Patient will need to follow-up and verify scheduling of his gastroenterology and acute care surgery appointments.  [facility] home received consult and will contact him after discharge.  Home health care orders were updated to include nutrition and PT OT and patient will follow-up with primary care if any issues getting services who can resubmit, which is acceptable given current structure of his care discussion with care coordination.  There was verbalized understanding and agreement with discharge care plan.    Pertinent Physical Exam At Time of Discharge  On the day of discharge, the patient reported feeling well and pain was controlled. Vitals and labs were stable. On exam: Yumiko, no acute distress, interactive, no increased work of breathing, regular rate and rhythm, abdomen soft/nontender, PEG tube in place without surrounding erythema or drainage no edema.    Outpatient Follow-Up  Future Appointments   Date Time Provider Department Center   12/27/2023  1:00 PM [facility]O 10 [facility]   12/29/2023  1:00 PM [facility] NM 3 [facility]   1/17/2024  3:45 PM Eula Benavidez MD [id number]   4/22/2024  1:00 PM Amrik Sprague MD [id number]       Pt instructed to take all medications as prescribed.  Keep all follow-up appointments.  Contact their primary care physician with any questions or concerns that arise.  Come to the emergency department with worsening of your symptoms or any other  medical emergency. Instructed that any outpatient tests that are ordered for outpatient follow up are meant to expedite outpatient work up and management, and that the results are not followed or managed by the inpatient ordering team and MUST be followed up with the outpatient primary care or outpatient specialist.  Verbal understanding and agreement obtained to order tests for outpatient follow up purposes.       Time spent >30 minutes on discharge management.    Raymond Rubio MD

## 2023-12-23 NOTE — HOSPITAL COURSE
Mk Fleming is a 73 y.o. male with a past medical history of HTN, CKD3 (baseline creatinine 1-8.2.0), COPD, hypothyroidism, oropharyngeal Ca s/p surgery and XRT and HCV GT 1a, HCV (undetectable s/p Epclusa May 2023) complicated by advanced hepatic fibrosis, GIB suspected to be 2/2 ulcers, and severe esophageal stricture s/p G-tube who presented to the ED with acute on chronic abdominal pain.  He was actively being worked up by GI with concern for gallbladder pathology pending HIDA and small bowel bowel follow-through.  He was to have further appointment regarding his ulcers in the 27th for evaluation.  Patient had small bowel follow-through with good visualization of the stomach with no abnormalities.  Per GI, significant stricture and will unlikely be able to fit the scope to evaluate, will need to follow-up outpatient for further management.  HIDA concerning for cholecystitis, ACS evaluated and deemed chronic.  Low utility for any antibiotics per GI, per ACS as patient is tolerating tube feeds, not having significant nausea and pain is controlled, would recommend further optimization as outpatient and follow-up to schedule elective cholecystectomy.  At this time plan for supportive measures, referral to primary, GI and ACS to follow-up and plan care.  Will need to be optimized for nutrition and patient has hepatic fibrosis, ACS recommending optimization with respect to his fibrosis given he will be at elevated risk.  Given holiday and prolonged.  Upcoming, will need to provide longer  (but short) of analgesics, after which we will need further evaluation of his pain control by outpatient providers.  Provided a prescription for nausea.  Prescribed bowel regimen while patient is on opioids.  Medication education provided.  OARRS reviewed, patient does not have any prescriptions.  Patient is followed with a home health aide, discussed with patient and care coordination, requested PT OT nutrition to be added to  his services, and patient will follow-up with primary care if any issues getting the services, otherwise primary care can be ordered.  Additionally requested hospital at home, to address any barriers to his care and follow-up plan as he is elevated risk to be readmitted.  Patient informed and acknowledges need to call his primary care at [facility] right after the holiday to establish follow-up visit for post hospitalization transition of care.

## 2023-12-23 NOTE — PROGRESS NOTES
"Mk Fleming is a 73 y.o. male on day 3 of admission presenting with Cholelithiasis without cholecystitis.  Patient scheduled for discharge home today with [facility] for PT/OT.  Patient will require a Lyft home.  Home care orders to be submitted once completed.        Physical Exam    Last Recorded Vitals  Blood pressure 135/74, pulse 83, temperature 35.9 °C (96.6 °F), temperature source Temporal, resp. rate 16, height 1.727 m (5' 8\"), weight 64 kg (141 lb), SpO2 97 %.  Intake/Output last 3 Shifts:  I/O last 3 completed shifts:  In: 1000.2 (15.6 mL/kg) [IV Piggyback:1000.2]  Out: 200 (3.1 mL/kg) [Urine:200 (0.1 mL/kg/hr)]  Weight: 64 kg         Assessment/Plan   Principal Problem:    Cholelithiasis without cholecystitis              Stacia Garcia RN      "

## 2023-12-23 NOTE — CARE PLAN
The patient's goals for the shift include      The clinical goals for the shift include patient will have managed pain during shify

## 2023-12-23 NOTE — DISCHARGE INSTRUCTIONS
You were admitted for worsening of your chronic abdominal pain and associated nausea and vomiting.  You have been worked up by gastroenterology as outpatient and currently was concern for gallbladder abnormalities.  You had imaging that showed inflammation of the gallbladder, and after evaluation by gastroenterology and acute care surgery is determined that you have chronic cholecystitis, which is chronic inflammation of the gallbladder.  It is recommended that you continue symptomatic management and follow-up outpatient to optimize your nutrition, liver, and other medical problems with your primary care and gastroenterology and follow-up with acute care surgery to plan an outpatient elective cholecystectomy (removal of your gallbladder).    If you symptoms worsen, unable to tolerate your tube feeds, pain is uncontrolled, you start having nausea or other concerning symptoms, please return for evaluation to see if sooner intervention is needed.    Follow-up with primary care within 7 to 10 days or next available for post-hospitalization follow-up and transition of care.  Follow up is needed after discharge to review the hospital recommendations and make further recommendations for your transition of care.      Please take all medications as prescribed and keep all follow-up appointments.  Please contact your primary care physician with any questions or concerns that arise.  You may contact your outpatient specialists office for any questions regarding specifics relating to their recommendations. Please monitor your symptoms and come to the emergency department with worsening of your symptoms, severe chest pain, shortness of breath, or any other medical emergency or concerns for your health.    You were prescribed opioids for pain control, these medications should be used only as needed for persistent pain not controlled by other medications/means and should be targeted to optimize your function.  It is important to  know that the goal of opioids is not to take away pain, but make pain more tolerable for your functional recovery.  Please note that opioids are associated with several side effects including but not limited to confusion and altered mental status, increased risk of falls, constipation and stool impaction, nausea and vomiting, dizziness, and manage can depress your heart and respiratory function and in overdose lead to death.  Please monitor yourself for side effects, and if you experience any side effects please stop the medication and talk to your outpatient care provider or seek medical attention. You should always discuss any side effects experience with your outpatient providers or seek medical attention for concerning side effects. Please also note that the serious adverse effects of opioids (change in cognition, respiratory depression and cardiovascular cardiovascular depression, fall risk, dizziness) may be compounded by other sedating medications, alcohol, drugs, muscle relaxants, sleeping pills among others and can lead to comorbidities and death.   If you have any concerns, please stop the medication and speak to a medical provider or seek medical attention.  While you are on opioids, please do not drive, operate machinery, or undertake any high risk activity where a transient change in your awareness could lead to significant injury or death of your self or others.

## 2023-12-23 NOTE — CARE PLAN
Problem: Nutrition  Goal: Less than 5 days NPO/clear liquids  Outcome: Met  Goal: Oral intake greater than 50%  Outcome: Met  Goal: Oral intake greater 75%  Outcome: Met  Goal: Consume prescribed supplement  Outcome: Met  Goal: Adequate PO fluid intake  Outcome: Met  Goal: Nutrition support goals are met within 48 hrs  Outcome: Met  Goal: Nutrition support is meeting 75% of nutrient needs  Outcome: Met  Goal: Tube feed tolerance  Outcome: Met  Goal: Lab values WNL  Outcome: Met  Goal: Electrolytes WNL  Outcome: Met  Goal: Promote healing  Outcome: Met  Goal: Maintain stable weight  Outcome: Met  Goal: Reduce weight from edema/fluid  Outcome: Met  Goal: Gradual weight gain  Outcome: Met  Goal: Improve ostomy output  Outcome: Met     Problem: Pain  Goal: My pain/discomfort is manageable  Outcome: Met     Problem: Discharge Barriers  Goal: My discharge needs are met  Outcome: Met   The patient's goals for the shift include      The clinical goals for the shift include Mk will remain neurologically intact throughout the shift.

## 2023-12-24 NOTE — NURSING NOTE
Mk was d.c. He was educated on d.c. papers and meds. He was given meds to beds including the oxycodone. He also was given a copy of the d.c. papers with the last med times. Per Dr. Rubio he does not need the nuclear medicine or KUB appointments and can cancel those since he obtained both here. He is able to take his nexium.    Zaira García RN

## 2023-12-26 ENCOUNTER — PATIENT OUTREACH (OUTPATIENT)
Dept: HOME HEALTH SERVICES | Age: 73
End: 2023-12-26
Payer: COMMERCIAL

## 2023-12-27 ENCOUNTER — PATIENT OUTREACH (OUTPATIENT)
Dept: HOME HEALTH SERVICES | Age: 73
End: 2023-12-27

## 2023-12-27 ENCOUNTER — APPOINTMENT (OUTPATIENT)
Dept: RADIOLOGY | Facility: HOSPITAL | Age: 73
End: 2023-12-27
Payer: COMMERCIAL

## 2023-12-28 ENCOUNTER — PATIENT OUTREACH (OUTPATIENT)
Dept: HOME HEALTH SERVICES | Age: 73
End: 2023-12-28
Payer: COMMERCIAL

## 2023-12-28 NOTE — PROGRESS NOTES
Daily Call Note: Weekly call complete, w this RN, [place] [facility] D, Dr Alcaraz.  Pt reports he still has mild abdominal pain, states pain medication is helping some what.  Did request refill of Oxycodone, advise pt to follow up w PCP/ GI.  Pt verbalized understanding.  Pt states he is tolerating tube feeds well.   Verified upcoming MD appts.  All questions/ concerns answered.      Pt Education:   Barriers:   Topics for Daily Review:   Pt demonstrates clear understanding: Yes    Daily Weight:  There were no vitals filed for this visit.   Last 3 Weights:  Wt Readings from Last 7 Encounters:   12/20/23 64 kg (141 lb)   12/13/23 64 kg (141 lb)   10/23/23 63.2 kg (139 lb 6.4 oz)   10/11/23 63.5 kg (140 lb)   07/28/23 62.3 kg (137 lb 6.4 oz)   05/24/23 64.4 kg (142 lb)   04/24/23 65 kg (143 lb 5 oz)       Masimo Device: No   Masimo Clinical Impression:     Virtual Visits--Scheduled (Most Recent Date at Top)  Follow up Appointments  Recent Visits  No visits were found meeting these conditions.  Showing recent visits within past 30 days and meeting all other requirements  Future Appointments  No visits were found meeting these conditions.  Showing future appointments within next 90 days and meeting all other requirements       Frequency of RN Calls & Virtual Visits per Team Agreement: [facility] Frequency: Daily    Medication issues Addressed (what was done):   Follow up appointments scheduled by [facility] Staff:   Referrals made by [facility] staff:       Daily Call Note:     Pt Education:   Barriers:   Topics for Daily Review:   Pt demonstrates clear understanding: No    Daily Weight:  There were no vitals filed for this visit.   Last 3 Weights:  Wt Readings from Last 7 Encounters:   12/20/23 64 kg (141 lb)   12/13/23 64 kg (141 lb)   10/23/23 63.2 kg (139 lb 6.4 oz)   10/11/23 63.5 kg (140 lb)   07/28/23 62.3 kg (137 lb 6.4 oz)   05/24/23 64.4 kg (142 lb)   04/24/23 65 kg (143 lb 5 oz)       Masimo Device: No   Masimo Clinical  Impression:     Virtual Visits--Scheduled (Most Recent Date at Top)  Follow up Appointments  Recent Visits  No visits were found meeting these conditions.  Showing recent visits within past 30 days and meeting all other requirements  Future Appointments  No visits were found meeting these conditions.  Showing future appointments within next 90 days and meeting all other requirements       Frequency of RN Calls & Virtual Visits per Team Agreement: [facility] Frequency: Daily    Medication issues Addressed (what was done):     Follow up appointments scheduled by [facility] Staff:   Referrals made by [facility] staff:       [facility] Care Transitions Assessment    Patient's Address:   [address] [place] [address] [address]  [facility] [address]  **  If this is not the address patient will receive services - alert team and address in EMR**       Patient Contacts:  Extended Emergency Contact Information  Primary Emergency Contact: Luci Lagunas  Home Phone: [phone]  Relation: Sibling                                Patient's Preferred Phone: [phone]  Patient's E-mail: No e-mail address on record

## 2023-12-28 NOTE — PROGRESS NOTES
Daily call completed patient is doing ok still having some pain tolerating his TF asking for more pain medication explained that he would have to reach out to his PCP he states his new PCP is Desiree Ceballos will try to reach out to the office per patient request states he has an appointment with her next Tuesday no other medication needs [facility] scheduled for 11/3/24 @ 1630 questions and concerns addressed     Patient's Address:   [address] [place] [address] [address]  [place] [id number]  **  If this is not the address patient will receive services - alert team and address in EMR**       Patient Contacts:  Extended Emergency Contact Information  Primary Emergency Contact: Luci Lagunas  Home Phone: [phone]  Relation: Sibling                                Patient's Preferred Phone: [phone]  Patient's E-mail: No e-mail address on record

## 2023-12-29 ENCOUNTER — PATIENT OUTREACH (OUTPATIENT)
Dept: CARE COORDINATION | Facility: CLINIC | Age: 73
End: 2023-12-29
Payer: COMMERCIAL

## 2023-12-29 ENCOUNTER — PATIENT OUTREACH (OUTPATIENT)
Dept: CARE COORDINATION | Age: 73
End: 2023-12-29
Payer: COMMERCIAL

## 2023-12-29 NOTE — PROGRESS NOTES
Patient currently enrolled with [facility] program. CM to follow once complete.     CHERRI GoyalN, RN

## 2023-12-29 NOTE — PROGRESS NOTES
Daily Call Note:   Daily call complete. Pt states he is doing fine today. Pain is managed and denies any swelling or sob. Informed pt he will receive call from [facility] today when prescription refill is ready for Oxy, obtained thru PCP office. He states his sister can  for him. Information given to patient for transportation services thru [facility] insurance: [facility] [employer] [phone]. Pt to schedule as needed.     Pt Education: Med refill done and transportation information given to patient.   Barriers: na  Topics for Daily Review: na  Pt demonstrates clear understanding: Yes    Daily Weight:  There were no vitals filed for this visit.   Last 3 Weights:  Wt Readings from Last 7 Encounters:   12/20/23 64 kg (141 lb)   12/13/23 64 kg (141 lb)   10/23/23 63.2 kg (139 lb 6.4 oz)   10/11/23 63.5 kg (140 lb)   07/28/23 62.3 kg (137 lb 6.4 oz)   05/24/23 64.4 kg (142 lb)   04/24/23 65 kg (143 lb 5 oz)       Masimo Device: No   Masimo Clinical Impression: na    Virtual Visits--Scheduled (Most Recent Date at Top)  Follow up Appointments  Recent Visits  No visits were found meeting these conditions.  Showing recent visits within past 30 days and meeting all other requirements  Future Appointments  Date Type Provider Dept   01/03/24 Appointment [facility]   Showing future appointments within next 90 days and meeting all other requirements       Frequency of RN Calls & Virtual Visits per Team Agreement: [facility] Frequency: Daily    Medication issues Addressed (what was done): oxy refill complete.     Follow up appointments scheduled by [facility] Staff: na  Referrals made by [facility] staff: na      [facility] Care Transitions Assessment    Patient's Address:   [address]  [facility] [address]  **  If this is not the address patient will receive services - alert team and address in EMR**       Patient Contacts:  Extended Emergency Contact Information  Primary Emergency Contact:  Luci Lagunas  Home Phone: [phone]  Relation: Sibling                                Patient's Preferred Phone: [phone]  Patient's E-mail: No e-mail address on record

## 2023-12-30 ENCOUNTER — PATIENT OUTREACH (OUTPATIENT)
Dept: HOME HEALTH SERVICES | Age: 73
End: 2023-12-30
Payer: COMMERCIAL

## 2023-12-30 NOTE — PROGRESS NOTES
Daily Call Note:  Daily call complete.  Pt reports he is feeling well.  Reports pain is tolerable. Tolerating tube feeds well.  Verified upcoming [facility] appt.  All questions/ concerns answered.      Pt Education:   Barriers:   Topics for Daily Review:   Pt demonstrates clear understanding: Yes    Daily Weight:  There were no vitals filed for this visit.   Last 3 Weights:  Wt Readings from Last 7 Encounters:   12/20/23 64 kg (141 lb)   12/13/23 64 kg (141 lb)   10/23/23 63.2 kg (139 lb 6.4 oz)   10/11/23 63.5 kg (140 lb)   07/28/23 62.3 kg (137 lb 6.4 oz)   05/24/23 64.4 kg (142 lb)   04/24/23 65 kg (143 lb 5 oz)       Masimo Device: No   Masimo Clinical Impression:     Virtual Visits--Scheduled (Most Recent Date at Top)  Follow up Appointments  Recent Visits  No visits were found meeting these conditions.  Showing recent visits within past 30 days and meeting all other requirements  Future Appointments  No visits were found meeting these conditions.  Showing future appointments within next 90 days and meeting all other requirements       Frequency of RN Calls & Virtual Visits per Team Agreement:     Medication issues Addressed (what was done):     Follow up appointments scheduled by [facility] Staff:   Referrals made by [facility] staff:       [facility] Care Transitions Assessment    Patient's Address:   [address] [place] [address] [address]  [place] [id number]  **  If this is not the address patient will receive services - alert team and address in EMR**       Patient Contacts:  Extended Emergency Contact Information  Primary Emergency Contact: Luci Lagunas  Home Phone: [phone]  Relation: Sibling                                Patient's Preferred Phone: [phone]  Patient's E-mail: No e-mail address on record

## 2024-01-02 ENCOUNTER — PATIENT OUTREACH (OUTPATIENT)
Dept: HOME HEALTH SERVICES | Age: 74
End: 2024-01-02
Payer: COMMERCIAL

## 2024-01-03 RX ORDER — ESOMEPRAZOLE MAGNESIUM 20 MG/1
20 GRANULE, DELAYED RELEASE ORAL
Qty: 90 EACH | Refills: 3 | Status: SHIPPED | OUTPATIENT
Start: 2024-01-03 | End: 2024-12-28

## 2024-01-03 NOTE — PROGRESS NOTES
Daily Call Note: Daily call complete. Patient reports doing ok, tolerating tube feedings well. States pain is tolerable at #4, taking pain med, oxycodone, about every 6 hours. Reviewed upcoming appointments and next [facility] 1/3/4/ at 1630, verbalized understanding.    Pt Education:   Barriers: none  Topics for Daily Review: pain  Pt demonstrates clear understanding: Yes    Daily Weight:  There were no vitals filed for this visit.   Last 3 Weights:  Wt Readings from Last 7 Encounters:   12/20/23 64 kg (141 lb)   12/13/23 64 kg (141 lb)   10/23/23 63.2 kg (139 lb 6.4 oz)   10/11/23 63.5 kg (140 lb)   07/28/23 62.3 kg (137 lb 6.4 oz)   05/24/23 64.4 kg (142 lb)   04/24/23 65 kg (143 lb 5 oz)       Masimo Device: No   Masimo Clinical Impression: N/A    Virtual Visits--Scheduled (Most Recent Date at Top)  Follow up Appointments  Recent Visits  No visits were found meeting these conditions.  Showing recent visits within past 30 days and meeting all other requirements  Future Appointments  No visits were found meeting these conditions.  Showing future appointments within next 90 days and meeting all other requirements       Frequency of RN Calls & Virtual Visits per Team Agreement: Healthy at Home Frequency: Daily    Medication issues Addressed (what was done): none    Follow up appointments scheduled by [facility] Staff: none  Referrals made by [facility] staff: none      [facility] Care Transitions Assessment    Patient's Address:   [address] [place] [address] [address]  [place] [address]  **  If this is not the address patient will receive services - alert team and address in EMR**       Patient Contacts:  Extended Emergency Contact Information  Primary Emergency Contact: Luci Lagunas  Home Phone: [phone]  Relation: Sibling                                Patient's Preferred Phone: [phone]  Patient's E-mail: No e-mail address on record

## 2024-01-03 NOTE — DOCUMENTATION CLARIFICATION NOTE
"    PATIENT:               MK FLEMING  ACCT #:                  [id number]  MRN:                       [id number]  :                       1950  ADMIT DATE:       2023 10:15 PM  DISCH DATE:        2023 3:39 PM  RESPONDING PROVIDER #:        [id number]          PROVIDER RESPONSE TEXT:    Clinical significance for GI bleed related to ulcers for this admission    CDI QUERY TEXT:    [facility]_Etiology Linkage        Instruction:    Based on your assessment of the patient and the clinical information, please provide the requested documentation by clicking on the appropriate radio button and enter any additional information if prompted.    Question: Please clarify if diagnosis of GI bleed related to ulcers is clinical significance for this admission:    When answering this query, please exercise your independent professional judgment. The fact that a question is being asked, does not imply that any particular answer is desired or expected.    The patient's clinical indicators include:  Clinical Information:  H/P 23 by Abilio COLE    \"Mk Fleming is a 73 y.o. male with a past medical history of HTN, CKD3 -baseline creatinine 1-8.2.0, COPD, hypothyroidism, oropharyngeal Ca s/p surgery and XRT and HCV GT 1a, HCV undetectable s/p Epclusa May 2023 complicated by advanced hepatic fibrosis, GIB suspected to be 2/2 ulcers, and severe esophageal stricture s/p G-tube who presented to the ED with acute on chronic abdominal pain.\"    Clinical Indicators: H/P 23 by Abilio COLE    \"GIB suspected to be 2/2 ulcers, and severe esophageal stricture s/p G-tube who presented to the ED with acute on chronic abdominal pain.\"    Treatment:  Protonix 40mg IV daily, GI Consult, upper GI series    Risk Factors: Ulcers  Options provided:  -- Clinical significance for GI bleed related to ulcers for this admission  -- No clinical significance for GI bleed related to ulcers for this admission  -- " Other - I will add my own diagnosis  -- Refer to Clinical Documentation Reviewer    Query created by: Desiree Christian on 1/2/2024 9:33 AM      Electronically signed by:  ABRAHAM COLE 1/2/2024 8:38 PM

## 2024-01-03 NOTE — DOCUMENTATION CLARIFICATION NOTE
"    PATIENT:               ADILIA BHANDARI  ACCT #:                  [id number]  MRN:                       [id number]  :                       1950  ADMIT DATE:       2023 10:15 PM  DISCH DATE:        2023 3:39 PM  RESPONDING PROVIDER #:        [id number]          PROVIDER RESPONSE TEXT:    Mild Protein Calorie Malnutrition    CDI QUERY TEXT:    [facility]_Nutrition Diagnosis        Instruction:    Based on your assessment of the patient and the clinical information, please provide the requested documentation by clicking on the appropriate radio button and enter any additional information if prompted.    Question: Please further clarify this patient nutritional status as    When answering this query, please exercise your independent professional judgment. The fact that a question is being asked, does not imply that any particular answer is desired or expected.    The patient's clinical indicators include:  Clinical Information:  Patient is a 73 y.o. male presenting with: abdominal pain    Pt with PMH of HTN, CKD3, COPD, hypothyroidism, oropharyngeal Ca s/p surgery, GIB suspected to be 2/2 ulcers and severe esophageal stricture s/p G tube. Pt is PEG tube dependent.    Clinical Indicators:    H/P 23 by Abilio Murray APRN- CNP:    Discharge Summary 23 by Dr. Randal Rubio:    Discharge Diagnosis:  \"Chronic cholecystitis  Moderate malnutrition  Ulcers in the GI tract  Esophageal stricture\"    Constitutional:  \"Appearance: He is underweight.\"    Consult RDN 23 by Meliza Hernandez RDN, LD:    \"Pt reports that he is usually 140 lbs. -63.6kg; and has lost weight over the last week; per chart review pt has been around between 64-66kg Significant Weight Loss: No\"      Treatment:  tube feedings QID, Consult for RDN    Risk Factors: Esophageal stricture, Ulcers in the GI tract, Chronic cholecystitis, PEG tube dependent  Options provided:  -- Mild Protein Calorie Malnutrition  -- Moderate Protein Calorie " Malnutrition  -- Severe Protein Calorie Malnutrition  -- Protein Calorie Malnutrition, Please specify severity  -- Other - I will add my own diagnosis  -- Refer to Clinical Documentation Reviewer    Query created by: Desiree Christian on 1/2/2024 9:33 AM      Electronically signed by:  ABRAHAM SILVA-CNP 1/2/2024 8:38 PM

## 2024-01-04 ENCOUNTER — PATIENT OUTREACH (OUTPATIENT)
Dept: HOME HEALTH SERVICES | Age: 74
End: 2024-01-04
Payer: COMMERCIAL

## 2024-01-04 VITALS — WEIGHT: 141 LBS | BODY MASS INDEX: 21.44 KG/M2

## 2024-01-04 NOTE — PROGRESS NOTES
Daily Call Note: Daily call completed. Patient denies pain and SOB. States tube feeding are going ok, but states that he needs feeding tube changed. He does have an appointment on 1/18 with Dr. CAREY Louise (GI), but he states that [facility] should change it. He doesn't have doctor's name but will get it to us tomorrow so we can try to schedule an appointment. He did see his PCP, Desiree Daugherty on 1/2/24 and states no changes. Reviewed all upcoming appointments and scheduled next [facility] on 1/6/24 at 1930, verbalized understanding. No other questions or concerns at this time.    Pt Education:   Barriers: none  Topics for Daily Review:   Pt demonstrates clear understanding: Yes    Daily Weight:  There were no vitals filed for this visit.   Last 3 Weights:  Wt Readings from Last 7 Encounters:   12/20/23 64 kg (141 lb)   12/13/23 64 kg (141 lb)   10/23/23 63.2 kg (139 lb 6.4 oz)   10/11/23 63.5 kg (140 lb)   07/28/23 62.3 kg (137 lb 6.4 oz)   05/24/23 64.4 kg (142 lb)   04/24/23 65 kg (143 lb 5 oz)       Masimo Device: No   Masimo Clinical Impression: N/A    Virtual Visits--Scheduled (Most Recent Date at Top)  Follow up Appointments  Recent Visits  No visits were found meeting these conditions.  Showing recent visits within past 30 days and meeting all other requirements  Future Appointments  No visits were found meeting these conditions.  Showing future appointments within next 90 days and meeting all other requirements       Frequency of RN Calls & Virtual Visits per Team Agreement: Healthy at Home Frequency: daily    Medication issues Addressed (what was done):     Follow up appointments scheduled by [facility] Staff: none  Referrals made by [facility] staff: none      Acute Hospital At Home Care Transitions Assessment    Patient's Address:   [address]  [facility] [address]  **  If this is not the address patient will receive services - alert team and address in EMR**       Patient Contacts:  Extended Emergency  Contact Information  Primary Emergency Contact: Luci Lagunas  Home Phone: [phone]  Relation: Sibling                                Patient's Preferred Phone: [phone]  Patient's E-mail: No e-mail address on record

## 2024-01-05 ENCOUNTER — PATIENT OUTREACH (OUTPATIENT)
Dept: HOME HEALTH SERVICES | Age: 74
End: 2024-01-05
Payer: COMMERCIAL

## 2024-01-06 ENCOUNTER — PATIENT OUTREACH (OUTPATIENT)
Dept: HOME HEALTH SERVICES | Age: 74
End: 2024-01-06

## 2024-01-06 NOTE — PROGRESS NOTES
Daily Call Note: Daily call complete. Patient states doing ok. Tolerating tube feedings well. He gave name of doctor at [facility] that he needs appointment with, Dr. Carmona. Notified of next [facility] call 1/6/24 at 1930, verbalized understanding. No other questions or concerns at this time.    Pt Education:   Barriers: none  Topics for Daily Review:   Pt demonstrates clear understanding: Yes    Daily Weight:  There were no vitals filed for this visit.   Last 3 Weights:  Wt Readings from Last 7 Encounters:   01/04/24 64 kg (141 lb)   12/20/23 64 kg (141 lb)   12/13/23 64 kg (141 lb)   10/23/23 63.2 kg (139 lb 6.4 oz)   10/11/23 63.5 kg (140 lb)   07/28/23 62.3 kg (137 lb 6.4 oz)   05/24/23 64.4 kg (142 lb)       Masimo Device: No   Masimo Clinical Impression: N/A    Virtual Visits--Scheduled (Most Recent Date at Top)  Follow up Appointments  Recent Visits  No visits were found meeting these conditions.  Showing recent visits within past 30 days and meeting all other requirements  Future Appointments  No visits were found meeting these conditions.  Showing future appointments within next 90 days and meeting all other requirements       Frequency of RN Calls & Virtual Visits per Team Agreement: Healthy at Home Frequency: Daily    Medication issues Addressed (what was done): none    Follow up appointments scheduled by [facility] Staff: none  Referrals made by [facility] staff: none      Acute Hospital At Home Care Transitions Assessment    Patient's Address:   [address] [place] [address] [address]  [place] [id number]  **  If this is not the address patient will receive services - alert team and address in EMR**       Patient Contacts:  Extended Emergency Contact Information  Primary Emergency Contact: BeboLuci  Home Phone: [phone]  Relation: Sibling                                Patient's Preferred Phone: [phone]  Patient's E-mail: No e-mail address on record

## 2024-01-07 NOTE — PROGRESS NOTES
[facility] Weekly Appointment: Attendees: Dr. Alcaraz, Jersey Hopkins (Pharmacist), Argenis Jimenez RN, Patient.  Verified patients full name and date of birth.  Patient alert and oriented and engaged in his care/pleasant affect.  During the physician assessment patient noted:   Feeling pretty good.  He is experiencing no pain at this time, he administered Oxycodone at 1600 for a pain rating of 5/10.  He denied symptoms of any kind.  He denied needing any prescription refills.   No questions for pharmacist.   He noted he feels he needs the feeding tube changed and Dr. Mahamed Barr (Otolaryngology) is the provider at [facility].  RN assessment and actions:   PT/OT/Nutritional Services referrals were ordered per Dr. Alcaraz/patient was asking for these services through  [facility] Care [facility].  Gastroenterology appointment is scheduled on 2024.  PCP Amandeep Velasquez and Desiree Hogue are not affiliated with [facility], will advise day shift to follow up to ensure that follow up visit post-discharge is or will be scheduled.  Noted his weight today was 141 pounds/63.9 kg.  RN educated patient regarding non-pharmacological pain and stress management: to support his pharmacological pain management:  Assessed what he practices to relax and relieve stress: walking around the house, and distraction: reading books and watching TV.  Educated patient in the practice of mindful breathin-2-6 and he noted understanding.   Patient noted preference to daily calls after 1200.  His weekly [facility] appointment is scheduled on 2024 at 1830.  He had no further concerns or questions.   Next Daily Call by RN: 2024    Patient's Address:   [address] [place] [address] [address]  [facility] [address]  **  If this is not the address patient will receive services - alert team and address in EMR**       Patient Contacts:  Extended Emergency Contact Information  Primary Emergency Contact: Luci Lagunas  Home Phone:  [phone]  Relation: Sibling                                Patient's Preferred Phone: [phone]  Patient's E-mail: No e-mail address on record

## 2024-01-08 ENCOUNTER — PATIENT OUTREACH (OUTPATIENT)
Dept: HOME HEALTH SERVICES | Age: 74
End: 2024-01-08
Payer: COMMERCIAL

## 2024-01-08 DIAGNOSIS — K59.00 CONSTIPATION, UNSPECIFIED CONSTIPATION TYPE: Primary | ICD-10-CM

## 2024-01-08 RX ORDER — POLYETHYLENE GLYCOL 3350 17 G/17G
17 POWDER, FOR SOLUTION ORAL DAILY
Status: DISCONTINUED | OUTPATIENT
Start: 2024-01-08 | End: 2024-01-13

## 2024-01-08 NOTE — PROGRESS NOTES
Daily Call Note:   Daily call complete. Pt states his pain is controlled but he is constipated. He had BM today, first time in 3 days and it was hard. Discussed options and provider to be notified. Discussed upcoming appointments and verified Dr Nia arce with patient. Verified next [facility].   Provider ordered miralax, pt made aware and states sister will .  Pt Education: Meds for constipation  Barriers: na  Topics for Daily Review: pain  Pt demonstrates clear understanding: Yes    Daily Weight:  There were no vitals filed for this visit.   Last 3 Weights:  Wt Readings from Last 7 Encounters:   01/04/24 64 kg (141 lb)   12/20/23 64 kg (141 lb)   12/13/23 64 kg (141 lb)   10/23/23 63.2 kg (139 lb 6.4 oz)   10/11/23 63.5 kg (140 lb)   07/28/23 62.3 kg (137 lb 6.4 oz)   05/24/23 64.4 kg (142 lb)       Masimo Device: No   Masimo Clinical Impression: na    Virtual Visits--Scheduled (Most Recent Date at Top)  Follow up Appointments  Recent Visits  No visits were found meeting these conditions.  Showing recent visits within past 30 days and meeting all other requirements  Future Appointments  No visits were found meeting these conditions.  Showing future appointments within next 90 days and meeting all other requirements       Frequency of RN Calls & Virtual Visits per Team Agreement: Healthy at Home Frequency: Daily    Medication issues Addressed (what was done): hard stools, discussed options    Follow up appointments scheduled by [facility] Staff: na  Referrals made by [facility] staff: na      Acute Hospital At Home Care Transitions Assessment    Patient's Address:   [address] [place] [address] [address] [place] [address]  **  If this is not the address patient will receive services - alert team and address in EMR**       Patient Contacts:  Extended Emergency Contact Information  Primary Emergency Contact: Tea Lagunase  Home Phone: [phone]  Relation: Sibling                                Patient's Preferred Phone:  [phone]  Patient's E-mail: No e-mail address on record

## 2024-01-08 NOTE — PROGRESS NOTES
Patient complaining of constipation.  Taking pericolace nightly.  Will add miralax as needed.    01/08/24 at 4:07 PM - Vahid Correia MD

## 2024-01-09 ENCOUNTER — PATIENT OUTREACH (OUTPATIENT)
Dept: HOME HEALTH SERVICES | Age: 74
End: 2024-01-09
Payer: COMMERCIAL

## 2024-01-09 NOTE — PROGRESS NOTES
[facility]ute [facility] Care Transitions Assessment  Daily Call Note:     Daily call complete. Patient reports purchased miralax OTC with positive results, Reports no pain. Reviewed upcoming appointments including GI. Patient knows to call to schedule transport 3 days prior to appointment. No concerns at this time    Pt Education: bowel movements  Barriers: none  Topics for Daily Review: abd pain  Pt demonstrates clear understanding: Yes    Daily Weight:  There were no vitals filed for this visit.   Last 3 Weights:  Wt Readings from Last 7 Encounters:   01/04/24 64 kg (141 lb)   12/20/23 64 kg (141 lb)   12/13/23 64 kg (141 lb)   10/23/23 63.2 kg (139 lb 6.4 oz)   10/11/23 63.5 kg (140 lb)   07/28/23 62.3 kg (137 lb 6.4 oz)   05/24/23 64.4 kg (142 lb)       Masimo Device: No   Masimo Clinical Impression: none    Virtual Visits--Scheduled (Most Recent Date at Top)  Follow up Appointments  Recent Visits  No visits were found meeting these conditions.  Showing recent visits within past 30 days and meeting all other requirements  Future Appointments  No visits were found meeting these conditions.  Showing future appointments within next 90 days and meeting all other requirements       Frequency of RN Calls & Virtual Visits per Team Agreement: Healthy at Home Frequency: Daily    Medication issues Addressed (what was done):     Follow up appointments scheduled by [facility] Staff:   Referrals made by [facility] staff:         Patient's Address:   [address] [place] [address] [address]  [facility] [address]  **  If this is not the address patient will receive services - alert team and address in EMR**       Patient Contacts:  Extended Emergency Contact Information  Primary Emergency Contact: Luci Lagunas  Home Phone: [phone]  Relation: Sibling                                Patient's Preferred Phone: [phone]  Patient's E-mail: No e-mail address on record

## 2024-01-10 ENCOUNTER — PATIENT OUTREACH (OUTPATIENT)
Dept: HOME HEALTH SERVICES | Age: 74
End: 2024-01-10
Payer: COMMERCIAL

## 2024-01-10 NOTE — PROGRESS NOTES
[facility]ute [facility] Care Transitions Assessment  Daily call completed. Patient states feeling pretty good today. States continues to take Miralax and states BM's today and yesterday that were normal in size and color. Patient denies abdominal pain, N/V/D. Upcoming [facility] appointment reviewed with patient.   Patient's Address:   [address]  [facility] [address]  **  If this is not the address patient will receive services - alert team and address in EMR**       Patient Contacts:  Extended Emergency Contact Information  Primary Emergency Contact: Luci Lagunas  Home Phone: [phone]  Relation: Sibling                                Patient's Preferred Phone: [phone]  Patient's E-mail: No e-mail address on record

## 2024-01-11 ENCOUNTER — PATIENT OUTREACH (OUTPATIENT)
Dept: HOME HEALTH SERVICES | Age: 74
End: 2024-01-11
Payer: COMMERCIAL

## 2024-01-11 NOTE — PROGRESS NOTES
Daily Call Note:   Mr. Fleming still is having loose stool.  Denies abdominal pain or any other discomfort.  Mr. Fleming has no concerns or needs today.    Pt Education:   Barriers:   Topics for Daily Review:   Pt demonstrates clear understanding:     Daily Weight:  There were no vitals filed for this visit.   Last 3 Weights:  Wt Readings from Last 7 Encounters:   01/04/24 64 kg (141 lb)   12/20/23 64 kg (141 lb)   12/13/23 64 kg (141 lb)   10/23/23 63.2 kg (139 lb 6.4 oz)   10/11/23 63.5 kg (140 lb)   07/28/23 62.3 kg (137 lb 6.4 oz)   05/24/23 64.4 kg (142 lb)       Masimo Device:    Masimo Clinical Impression:     Virtual Visits--Scheduled (Most Recent Date at Top)  Follow up Appointments  Recent Visits  No visits were found meeting these conditions.  Showing recent visits within past 30 days and meeting all other requirements  Future Appointments  No visits were found meeting these conditions.  Showing future appointments within next 90 days and meeting all other requirements       Frequency of RN Calls & Virtual Visits per Team Agreement:     Medication issues Addressed (what was done):     Follow up appointments scheduled by [facility] Staff:   Referrals made by [facility] staff:       Acute Hospital At Home Care Transitions Assessment    Patient's Address:   [address] [place] [address] [address] [place] [address]  **  If this is not the address patient will receive services - alert team and address in EMR**       Patient Contacts:  Extended Emergency Contact Information  Primary Emergency Contact: Luci Lagunas  Home Phone: [phone]  Relation: Sibling                                Patient's Preferred Phone: [phone]  Patient's E-mail: No e-mail address on record

## 2024-01-12 ENCOUNTER — PATIENT OUTREACH (OUTPATIENT)
Dept: HOME HEALTH SERVICES | Age: 74
End: 2024-01-12
Payer: COMMERCIAL

## 2024-01-12 NOTE — PROGRESS NOTES
Daily Call Note:   Daily call complete. Pt states he is doing good and has no pain, mitul he took his pills. States last BM was yesterday. Denies any questions or concerns, verbalizes understanding of next [facility] tomorrow.   Pt Education: na  Barriers: na  Topics for Daily Review: pain, bm's  Pt demonstrates clear understanding: Yes    Daily Weight:  There were no vitals filed for this visit.   Last 3 Weights:  Wt Readings from Last 7 Encounters:   01/04/24 64 kg (141 lb)   12/20/23 64 kg (141 lb)   12/13/23 64 kg (141 lb)   10/23/23 63.2 kg (139 lb 6.4 oz)   10/11/23 63.5 kg (140 lb)   07/28/23 62.3 kg (137 lb 6.4 oz)   05/24/23 64.4 kg (142 lb)       Masimo Device: No   Masimo Clinical Impression: na    Virtual Visits--Scheduled (Most Recent Date at Top)  Follow up Appointments  Recent Visits  No visits were found meeting these conditions.  Showing recent visits within past 30 days and meeting all other requirements  Future Appointments  No visits were found meeting these conditions.  Showing future appointments within next 90 days and meeting all other requirements       Frequency of RN Calls & Virtual Visits per Team Agreement: Healthy at Home Frequency: Daily    Medication issues Addressed (what was done): na    Follow up appointments scheduled by [facility] Staff: na  Referrals made by [facility] staff: na      [facility] Hospital At Home Care Transitions Assessment    Patient's Address:   [address] [place] [address] [address]  [place] [id number]  **  If this is not the address patient will receive services - alert team and address in EMR**       Patient Contacts:  Extended Emergency Contact Information  Primary Emergency Contact: Luci Lagunas  Home Phone: [phone]  Relation: Sibling                                Patient's Preferred Phone: [phone]  Patient's E-mail: No e-mail address on record

## 2024-01-13 ENCOUNTER — PATIENT OUTREACH (OUTPATIENT)
Dept: HOME HEALTH SERVICES | Age: 74
End: 2024-01-13

## 2024-01-13 NOTE — PROGRESS NOTES
Daily Call Note: Weekly [facility] completed with Ana COLE, [place], PharmD and this RN. Patient states he does not have any pain, he took pain med and feels pretty good. States he takes pain med every 6 hours, denies nausea or constipation, taking Miralax as needed. Denies any problems with tube feedings. Reviewed upcoming appointments with Dr. Louise and Dr. Barr and next [facility] scheduled for 1/19/24 at 1300, which graduation from [facility] at Home program was discussed,  verbalized understanding. No other questions or concerns at this time.    Pt Education:   Barriers: none  Topics for Daily Review: GI status  Pt demonstrates clear understanding: Yes    Daily Weight:  There were no vitals filed for this visit.   Last 3 Weights:  Wt Readings from Last 7 Encounters:   01/04/24 64 kg (141 lb)   12/20/23 64 kg (141 lb)   12/13/23 64 kg (141 lb)   10/23/23 63.2 kg (139 lb 6.4 oz)   10/11/23 63.5 kg (140 lb)   07/28/23 62.3 kg (137 lb 6.4 oz)   05/24/23 64.4 kg (142 lb)       Masimo Device: No   Masimo Clinical Impression: N/A    Virtual Visits--Scheduled (Most Recent Date at Top)  Follow up Appointments  Recent Visits  No visits were found meeting these conditions.  Showing recent visits within past 30 days and meeting all other requirements  Future Appointments  No visits were found meeting these conditions.  Showing future appointments within next 90 days and meeting all other requirements       Frequency of RN Calls & Virtual Visits per Team Agreement: [facility] Frequency: M/W/F    Medication issues Addressed (what was done): none    Follow up appointments scheduled by [facility] Staff: none  Referrals made by [facility] staff: none      [facility] Transitions Assessment    Patient's Address:   [address]  [place] [address]  **  If this is not the address patient will receive services - alert team and address in EMR**       Patient Contacts:  Extended Emergency Contact  Information  Primary Emergency Contact: BeboLuci  Home Phone: [phone]  Relation: Sibling                                Patient's Preferred Phone: [phone]  Patient's E-mail: No e-mail address on record

## 2024-01-15 ENCOUNTER — CLINICAL SUPPORT (OUTPATIENT)
Dept: HOME HEALTH SERVICES | Facility: HOME HEALTH | Age: 74
End: 2024-01-15
Payer: COMMERCIAL

## 2024-01-15 ENCOUNTER — HOME HEALTH ADMISSION (OUTPATIENT)
Dept: HOME HEALTH SERVICES | Facility: HOME HEALTH | Age: 74
End: 2024-01-15
Payer: COMMERCIAL

## 2024-01-15 ENCOUNTER — PATIENT OUTREACH (OUTPATIENT)
Dept: HOME HEALTH SERVICES | Age: 74
End: 2024-01-15
Payer: COMMERCIAL

## 2024-01-15 ENCOUNTER — DOCUMENTATION (OUTPATIENT)
Dept: HOME HEALTH SERVICES | Facility: HOME HEALTH | Age: 74
End: 2024-01-15
Payer: COMMERCIAL

## 2024-01-15 DIAGNOSIS — K81.1 CHRONIC CHOLECYSTITIS: Primary | ICD-10-CM

## 2024-01-15 NOTE — PROGRESS NOTES
Daily Call Note: Bi-weekly call complete. Patient denies pain. States tolerating tube feeding well. Denies nausea and constipation. Reviewed upcoming appointments and reminded of appointment with Dr. Barr on 2/6/24. Notified of next [facility] on 1/19/24 at 1300, verbalized understanding. Notified that Home Care PT/OT and nutrition services will be contacting him later this week. No other questions or concerns at this time.     Pt Education:   Barriers: none  Topics for Daily Review: pain, tube feeding  Pt demonstrates clear understanding: Yes    Daily Weight:  There were no vitals filed for this visit.   Last 3 Weights:  Wt Readings from Last 7 Encounters:   01/04/24 64 kg (141 lb)   12/20/23 64 kg (141 lb)   12/13/23 64 kg (141 lb)   10/23/23 63.2 kg (139 lb 6.4 oz)   10/11/23 63.5 kg (140 lb)   07/28/23 62.3 kg (137 lb 6.4 oz)   05/24/23 64.4 kg (142 lb)       Masimo Device: No   Masimo Clinical Impression: N/A    Virtual Visits--Scheduled (Most Recent Date at Top)  Follow up Appointments  Recent Visits  No visits were found meeting these conditions.  Showing recent visits within past 30 days and meeting all other requirements  Future Appointments  No visits were found meeting these conditions.  Showing future appointments within next 90 days and meeting all other requirements       Frequency of RN Calls & Virtual Visits per Team Agreement: [facility] Frequency: M/W/F    Medication issues Addressed (what was done): none    Follow up appointments scheduled by [facility] Staff: none  Referrals made by [facility] staff: none

## 2024-01-15 NOTE — HH CARE COORDINATION
Home Care received a Referral for Physical Therapy, Occupational Therapy, Home Health Aide, and Registered Dietician. We have processed the referral for a Start of Care on 1/19/2024.     If you have any questions or concerns, please feel free to contact us at [phone]. Follow the prompts, enter your five digit zip code, and you will be directed to your care team on [facility] 3.

## 2024-01-15 NOTE — TELEPHONE ENCOUNTER
[facility] received your referral for this pt. Due to current staffing constraints, we are anticipating a delayed Start-of-Care of 1/18/2024. If the patient requires care sooner than that, we recommend referring to another agency. We will proceed with the delayed Start-of-Care unless you indicate otherwise. Please feel free to reach out to our office with any questions or concerns.  Thank you, [facility]

## 2024-01-17 ENCOUNTER — PATIENT OUTREACH (OUTPATIENT)
Dept: HOME HEALTH SERVICES | Age: 74
End: 2024-01-17

## 2024-01-17 NOTE — PROGRESS NOTES
Daily Call Note: Call complete. Patient reports doing well. Denies pain. Reports tolerating tube feedings well. Denies nausea, vomiting and diarrhea. Reviewed upcoming appointments and next [facility] on 1/19/24 @ 1300 and probable graduation from the Healthy at Home program, verbalized understanding. No other questions or concerns at this time.    Pt Education:   Barriers: none  Topics for Daily Review:   Pt demonstrates clear understanding: Yes    Daily Weight:  There were no vitals filed for this visit.   Last 3 Weights:  Wt Readings from Last 7 Encounters:   01/04/24 64 kg (141 lb)   12/20/23 64 kg (141 lb)   12/13/23 64 kg (141 lb)   10/23/23 63.2 kg (139 lb 6.4 oz)   10/11/23 63.5 kg (140 lb)   07/28/23 62.3 kg (137 lb 6.4 oz)   05/24/23 64.4 kg (142 lb)       Masimo Device: No   Masimo Clinical Impression: N/A    Virtual Visits--Scheduled (Most Recent Date at Top)  Follow up Appointments  Recent Visits  No visits were found meeting these conditions.  Showing recent visits within past 30 days and meeting all other requirements  Future Appointments  No visits were found meeting these conditions.  Showing future appointments within next 90 days and meeting all other requirements       Frequency of RN Calls & Virtual Visits per Team Agreement: [facility] Frequency: M/W/F    Medication issues Addressed (what was done): none    Follow up appointments scheduled by [facility] Staff: none  Referrals made by [facility] staff: none      [facility] Care Transitions Assessment    Patient's Address:   [id number] [place] [address] [address]  [place] [address]  **  If this is not the address patient will receive services - alert team and address in EMR**       Patient Contacts:  Extended Emergency Contact Information  Primary Emergency Contact: BeboLuci  Home Phone: [phone]  Relation: Sibling                                Patient's Preferred Phone: [phone]  Patient's E-mail: No e-mail address on record

## 2024-01-18 ENCOUNTER — OFFICE VISIT (OUTPATIENT)
Dept: GASTROENTEROLOGY | Facility: HOSPITAL | Age: 74
End: 2024-01-18
Payer: COMMERCIAL

## 2024-01-18 VITALS
WEIGHT: 137 LBS | SYSTOLIC BLOOD PRESSURE: 161 MMHG | HEART RATE: 87 BPM | DIASTOLIC BLOOD PRESSURE: 68 MMHG | RESPIRATION RATE: 18 BRPM | TEMPERATURE: 96.5 F | OXYGEN SATURATION: 97 % | HEIGHT: 68 IN | BODY MASS INDEX: 20.76 KG/M2

## 2024-01-18 DIAGNOSIS — K81.1 CHRONIC CHOLECYSTITIS: ICD-10-CM

## 2024-01-18 PROCEDURE — 99214 OFFICE O/P EST MOD 30 MIN: CPT | Performed by: STUDENT IN AN ORGANIZED HEALTH CARE EDUCATION/TRAINING PROGRAM

## 2024-01-18 PROCEDURE — 3078F DIAST BP <80 MM HG: CPT | Performed by: STUDENT IN AN ORGANIZED HEALTH CARE EDUCATION/TRAINING PROGRAM

## 2024-01-18 PROCEDURE — 3077F SYST BP >= 140 MM HG: CPT | Performed by: STUDENT IN AN ORGANIZED HEALTH CARE EDUCATION/TRAINING PROGRAM

## 2024-01-18 PROCEDURE — 1159F MED LIST DOCD IN RCRD: CPT | Performed by: STUDENT IN AN ORGANIZED HEALTH CARE EDUCATION/TRAINING PROGRAM

## 2024-01-18 PROCEDURE — 1160F RVW MEDS BY RX/DR IN RCRD: CPT | Performed by: STUDENT IN AN ORGANIZED HEALTH CARE EDUCATION/TRAINING PROGRAM

## 2024-01-18 PROCEDURE — 1111F DSCHRG MED/CURRENT MED MERGE: CPT | Performed by: STUDENT IN AN ORGANIZED HEALTH CARE EDUCATION/TRAINING PROGRAM

## 2024-01-18 PROCEDURE — 1126F AMNT PAIN NOTED NONE PRSNT: CPT | Performed by: STUDENT IN AN ORGANIZED HEALTH CARE EDUCATION/TRAINING PROGRAM

## 2024-01-18 PROCEDURE — 1036F TOBACCO NON-USER: CPT | Performed by: STUDENT IN AN ORGANIZED HEALTH CARE EDUCATION/TRAINING PROGRAM

## 2024-01-18 RX ORDER — ACETAMINOPHEN 500 MG
1000 TABLET ORAL EVERY 8 HOURS PRN
Qty: 30 TABLET | Refills: 1 | Status: SHIPPED | OUTPATIENT
Start: 2024-01-18 | End: 2024-01-28

## 2024-01-18 ASSESSMENT — PAIN SCALES - GENERAL: PAINLEVEL: 0-NO PAIN

## 2024-01-18 ASSESSMENT — ENCOUNTER SYMPTOMS
LOSS OF SENSATION IN FEET: 0
OCCASIONAL FEELINGS OF UNSTEADINESS: 0
DEPRESSION: 0

## 2024-01-18 ASSESSMENT — PATIENT HEALTH QUESTIONNAIRE - PHQ9
SUM OF ALL RESPONSES TO PHQ9 QUESTIONS 1 AND 2: 0
1. LITTLE INTEREST OR PLEASURE IN DOING THINGS: NOT AT ALL
2. FEELING DOWN, DEPRESSED OR HOPELESS: NOT AT ALL

## 2024-01-18 NOTE — PATIENT INSTRUCTIONS
#Abdominal Pain   1) Will get you scheduled with surgery next available   2) Will get you scheduled with hepatology in 6 months   3) Take tylenol, 1000mg every 8 hours as needed for abdominal pain

## 2024-01-19 ENCOUNTER — PATIENT OUTREACH (OUTPATIENT)
Dept: HOME HEALTH SERVICES | Age: 74
End: 2024-01-19

## 2024-01-19 ENCOUNTER — PATIENT OUTREACH (OUTPATIENT)
Dept: CARE COORDINATION | Facility: CLINIC | Age: 74
End: 2024-01-19

## 2024-01-19 ENCOUNTER — HOME CARE VISIT (OUTPATIENT)
Dept: HOME HEALTH SERVICES | Facility: HOME HEALTH | Age: 74
End: 2024-01-19
Payer: COMMERCIAL

## 2024-01-19 PROCEDURE — G0151 HHCP-SERV OF PT,EA 15 MIN: HCPCS

## 2024-01-19 PROCEDURE — 0023 HH SOC

## 2024-01-19 NOTE — PROGRESS NOTES
Outpatient GI Progress Note     Name: Mk Fleming  : 1950  MRN: [id number]  Date: 2023    History of Present Illness:  Patient is a 72 yo M with a PMH significant for HTN, CKD3, COPD, hypothyroidism, oropharyngeal carcinoma s/p surgery and XRT, HCV infection (undetectable s/p Epclusa) that is complicated by hepatic fibrosis, upper GI bleeding secondary to ulcers, and severe esophageal stricture s/p G tube who presents to the  GI clinic for post-hospital admission follow up.  Patient was admitted to hospital 2023-2023 for abdominal pain.  While admitted he underwent HIDA scan to assess for gallbladder pathology which was suggestive of cystic duct obstruction and acute cholecystitis that was later deem chronic cholecystitis by general surgery.  During his admission patient remained stable with adequate pain control with oral tolerance.  The patient was then discharged for further outpatient nutrition optimization prior to elective surgery given elevated surgical risk from history of fibrosis.      Since discharge, patient states continued abdominal pain adequately relieve with pain and nausea regimen - Odansetron 4mg q 8 hours PRN, Oxycodone 10mg q 6 hours PRN. He continues to tolerate tube feeds and denies nausea, emesis, fevers, chills, night sweats, headache, chest pain, SOB, or bowel movement changes.      Review of Systems:  As per HPI.     Past Medical History:  Past Medical History:   Diagnosis Date    Personal history of other diseases of the digestive system     History of esophageal reflux    Personal history of other diseases of the respiratory system     History of bronchitis    Personal history of other medical treatment     History of blood transfusion    Personal history of other specified conditions     History of chest pain       Past Surgical History:  Past Surgical History:   Procedure Laterality Date    IR ANGIOGRAM AORTA ABDOMEN  7/3/2022    IR ANGIOGRAM AORTA ABDOMEN  7/3/2022 [facility]ICAL [facility]    IR ANGIOGRAM INFERIOR EPIGASTRIC  7/3/2022    IR ANGIOGRAM INFERIOR EPIGASTRIC 7/3/2022 [facility] CLINICAL LEGACY    IR EMBOLIZATION LYMPH NODE Bilateral 7/3/2022    IR EMBOLIZATION LYMPH NODE 7/3/2022 [facility] CLINICAL LEGACY    OTHER SURGICAL HISTORY  11/19/2018    Hernia repair    OTHER SURGICAL HISTORY  11/19/2018    Tooth extraction    OTHER SURGICAL HISTORY  11/19/2018    Pulmonary decortication    OTHER SURGICAL HISTORY  10/20/2022    Esophagogastroduodenoscopy    OTHER SURGICAL HISTORY  10/20/2022    Percutaneous endoscopic gastrostomy tube insertion       Family History:  Family History   Problem Relation Name Age of Onset    Pancreatic cancer Mother      Hypertension Sister      Hypertension Brother          Medications:  Current Outpatient Medications   Medication Instructions    acetaminophen (TYLENOL) 1,000 mg, oral, Every 8 hours PRN    albuterol 2.5 mg /3 mL (0.083 %) nebulizer solution 3 mL, inhalation, Every 6 hours PRN, 4-6 hours as needed    albuterol 90 mcg/actuation inhaler 2 puffs, inhalation, Every 6 hours PRN    amLODIPine (NORVASC) 5 mg, g-tube, Nightly    atenolol (TENORMIN) 50 mg, g-tube, Daily    atorvastatin (LIPITOR) 10 mg, g-tube, Nightly    esomeprazole (NEXIUM) 20 mg, oral, Daily before breakfast    fluticasone (Flonase) 50 mcg/actuation nasal spray 1 spray, Each Nostril, Daily PRN    hydroCHLOROthiazide (HYDRODIURIL) 25 mg, g-tube, Daily    Infants Simethicone 40 mg, g-tube, 4 times daily    levothyroxine (SYNTHROID, LEVOXYL) 50 mcg, g-tube, Daily before breakfast    losartan (COZAAR) 50 mg, g-tube, Daily    naloxone (NARCAN) 4 mg, nasal, As needed, May repeat every 2-3 minutes if needed, alternating nostrils, until medical assistance becomes available.    ondansetron (ZOFRAN) 4 mg, oral, Every 8 hours PRN    polyethylene glycol (MIRALAX) 17 g, oral, Daily PRN    sennosides-docusate sodium (Tyaler-Colace) 8.6-50 mg tablet Take 2 tablets by gastronomy tube route  once daily at bedtime.    sucralfate (CARAFATE) 1 g, g-tube, 4 times daily    terazosin (HYTRIN) 5 mg, g-tube, Nightly        Allergies:  No Known Allergies      Physical Exam:  Physical Exam  Constitutional:       General: He is not in acute distress.     Appearance: Normal appearance. He is normal weight.      Comments: frail     HENT:      Head: Normocephalic and atraumatic.      Right Ear: External ear normal.      Left Ear: External ear normal.      Nose: Nose normal. No congestion or rhinorrhea.      Mouth/Throat:      Mouth: Mucous membranes are moist.      Pharynx: Oropharynx is clear.   Eyes:      General: No scleral icterus.     Extraocular Movements: Extraocular movements intact.      Conjunctiva/sclera: Conjunctivae normal.      Pupils: Pupils are equal, round, and reactive to light.   Cardiovascular:      Rate and Rhythm: Normal rate and regular rhythm.      Pulses: Normal pulses.      Heart sounds: Normal heart sounds.   Pulmonary:      Effort: Pulmonary effort is normal. No respiratory distress.      Breath sounds: Normal breath sounds. No stridor. No wheezing, rhonchi or rales.   Chest:      Chest wall: No tenderness.   Abdominal:      General: Abdomen is flat. Bowel sounds are normal.      Palpations: Abdomen is soft.      Tenderness: There is generalized abdominal tenderness and tenderness in the right upper quadrant. There is no guarding or rebound. Positive signs include Taylor's sign.      Comments: G tube without surrounding erythema, tenderness, or exudate.     Musculoskeletal:         General: Normal range of motion.      Cervical back: Normal range of motion and neck supple. No rigidity or tenderness.   Lymphadenopathy:      Cervical: No cervical adenopathy.   Skin:     General: Skin is warm and dry.   Neurological:      General: No focal deficit present.      Mental Status: He is alert and oriented to person, place, and time.   Psychiatric:         Mood and Affect: Mood normal.          Behavior: Behavior normal.         Thought Content: Thought content normal.       Vitals:  Vitals:    01/18/24 1459   BP: 161/68   Pulse: 87   Resp:    Temp:    SpO2:       Labs:  Lab Results   Component Value Date    WBC 4.7 12/19/2023    HGB 11.1 (L) 12/19/2023    HCT 31.4 (L) 12/19/2023    MCV 91 12/19/2023     12/19/2023     Lab Results   Component Value Date    GLUCOSE 114 (H) 12/19/2023    CALCIUM 9.9 12/19/2023     (L) 12/19/2023    K 4.1 12/19/2023    CO2 25 12/19/2023    CL 98 12/19/2023    BUN 84 (H) 12/19/2023    CREATININE 2.08 (H) 12/19/2023     Lab Results   Component Value Date    ALT 6 (L) 12/19/2023    AST 22 12/19/2023    ALKPHOS 78 12/19/2023    BILITOT 0.5 12/19/2023     Imaging:  Narrative & Impression   Interpreted By:  Benji Rodriguez and Osman Sena   STUDY:  NM HEPATOBILIARY W CHOLECYSTOKININ;  12/21/2023 2:07 pm      INDICATION:  Signs/Symptoms: 73-year-old male with Abd pain, nausea and vomiting.      COMPARISON:  CT of abdomen and pelvis on 10/11/2023      ACCESSION NUMBER(S):  [id number]      ORDERING CLINICIAN:  JOSE CRUZ LUIS      TECHNIQUE:  DIVISION OF NUCLEAR MEDICINE  HEPATOBILIARY SCAN (HIDA), QUANTITATIVE      The patient received an intravenous dose of  5.5 mCi of Tc-99m  mebrofenin (Choletec).  Sequential dynamic images of the upper  abdomen were then acquired over the next 60 minutes. Additional  static images were obtained of the upper anterior and lateral abdomen  for additional 60 minutes.      FINDINGS:  There is prompt accumulation of activity within the liver and normal  subsequent excretion via the biliary ductal system into the small  bowel. Gallbladder was not visualized by 2 hours post radiotracer  injection.      IMPRESSION:  1. Nonvisualization of the gallbladder by 2 hours post radiotracer  injection, findings suggestive of cystic duct obstruction and acute  cholecystitis.  2. Patency of common bile duct.      I personally reviewed the images/study  and I agree with the findings  as stated.  This study was interpreted at [facility], [place], [place].      MACRO:  Critical Finding:  See findings. Dr. Jose Cruz Rubio MD was  informed about result of the study on 12/21/2023 at 2:22 pm by  Mone Brar.  (**-OCF-**) Instructions:      Signed by: Benji Rodriguez 12/21/2023 2:37 PM  Dictation workstation:   [id number]       Narrative & Impression   Interpreted By:  Norberto Malave,  and Jyothi Roche   STUDY:  FL UPPER GI SINGLE CONTRAST W SMALL BOWEL FOLLOW THROUGH;  12/21/2023  10:56 am      INDICATION:  Signs/Symptoms:Abd pain, n/v, following with GI, scheduled op for  today but now admitted for these sx.      COMPARISON:  None.      ACCESSION NUMBER(S):  [id number]      ORDERING CLINICIAN:  JOSE CRUZ RUBIO      TECHNIQUE:  An initial radiograph of the abdomen and pelvis was obtained.  This  was followed by  injection through the PEG tube approximately   300  mL of contrast  Gastrografin. Multiple dynamic and static  fluoroscopic images of the esophagus, stomach and duodenum were  obtained. Delayed static images of the abdomen in the posteroanterior  projection were obtained at 15, 30, and 45 minute intervals until  contrast was observed to reach the cecum. Total fluoroscopy time was  0.2 minutes.      FINDINGS:  Initial  image demonstrates  a nonspecific nonobstructive bowel  gas pattern. Radiopaque coiling material over the right hemiabdomen  and PEG tube overlying the gastric body.      There is a trace amount of gastroesophageal reflux following contrast  administration into the stomach.      The stomach was well visualized and unremarkable in appearance. The  Gastrografin passed readily through the pylorus into a normal  duodenal bulb and C-loop. The duodenal-jejunal junction appears  normally positioned and grossly unremarkable.      The small bowel is of normal caliber with no mucosal  thickening  appreciated.  A normal feathery appearance is observed to the  jejunum.  The normal smooth appearance of the ileum is observed.  There is no evidence for annular constricting lesions, large  intraluminal mass lesion, or mucosal ulceration.  Small bowel loops  are observed to separate normally without evidence of mass clumping.      The terminal ileum and cecum are well visualized and within normal  limits. Transit time of contrast to the cecum was identified by  45  minutes.      IMPRESSION:  1.  Unremarkable small bowel follow-through.  2. Contrast observed to travel the small bowel and reach the cecum  within 45 minutes.      I personally reviewed the image(s)/study and resident interpretation.  I agree with the findings as stated by resident Melchor Roe.  Data analyzed and images interpreted at [facility], [place], [place].      MACRO:  None      Signed by: Norberto Malave 12/21/2023 12:53 PM  Dictation workstation:   [id number]       Narrative & Impression   Interpreted By:  Roxanna Tijerina and Jiang Sirui   STUDY:  US ABDOMEN LIMITED LIVER;  12/20/2023 3:09 am      INDICATION:  Signs/Symptoms:RUQ pain, history of HCV s/p treatment, chronic pain  gradually worsening.      COMPARISON:  CT AP 10/11/2023  Liver ultrasound 08/22/2023      ACCESSION NUMBER(S):  [id number]      ORDERING CLINICIAN:  MARIA D LEE      TECHNIQUE:  Grayscale and color Doppler sonographic imaging of the right upper  quadrant.      FINDINGS:  LIVER: The liver measures 13.3 cm. Nodular contour consistent with  cirrhosis. There is a 0.3 x 0.3 x 0.2 cm hyperechoic focus of the  left hepatic dome compatible with a granuloma.      BILE DUCTS: No intrahepatic or extrahepatic bile duct dilatation.  Extrahepatic bile duct = 0.3 cm      GALLBLADDER: Multiple gallstones are noted. The gallbladder wall is  not thickened. There is small amount of pericholecystic fluid. The  sonographic Taylor  sign is negative.      PANCREAS: The visualized portions of the pancreas appear within  normal limits.      RIGHT KIDNEY: Normal size, no hydronephrosis. There is a 1.6 x 1.5 x  1.5 cm simple renal cyst.      PERITONEUM: No upper abdominal ascites.      IMPRESSION:  1. Cholelithiasis with mild pericholecystic fluid. No gallbladder  wall thickening. Sonographic Taylor sign is negative. Pericholecystic  fluid could be reactive to the liver pathology. Acute cholecystitis  is considered less likely, however, if there is strong clinical  concern HIDA may be considered.  2. Cirrhotic morphology of the liver similar to the prior examination  dated 08/22/2023.      I personally reviewed the image(s) / study and I agree with the  findings as stated by Aide Mott MD. This study was interpreted at  [facility], [place], [place].      MACRO:  None.      Signed by: Roxanna Tijerina 12/20/2023 3:58 AM  Dictation workstation:   [id number]     Assessment and Plan:  #Abdominal Pain   #Chronic Cholecystitis   #Hepatic Fibrosis   > Patient with chronic abdominal pain acutely worsened requiring inpatient admission   > Patient has a prior history of GIB bleeding secondary to duodenal ulcers  > HIDA scan revealing cystic duct obstruction  with ultrasound showing multiple gallstones with small amount of perciholecystic fluid  > During inpatient admission, pain tolerated with oral pain management and patient continued to tolerate tube feeds, therefore patient discharged with outpatient nutritional and fibrosis optimization prior to elective cholecystectomy  - patient to establish care with outpatient general surgery   - patient states adequate pain management with regimen of oxycodone 10mg q6 hours PRN and Tylenol 1000mg q 8 hours PRN   - patient to continue Zofran as needed for nausea   - patient to follow up with primary GI-Hepatology     Case discussed with attending Wili Wisdom MD, PhD    Jhonatan Louise MD,  PhD  Gastroenterology Fellow, PGY5

## 2024-01-19 NOTE — PROGRESS NOTES
Weekly [facility] call completed with Dr Ivey from pharmacy patient states he is doing well he saw GI yesterday they are going to get him on the schedule for surgery since he is improving patient is still taking oxycodone for pain  no new medications from appointment yesterday patient has home care set up and nutrition who are supposed to be out to see him today patient is unable to check his bp so a machine will be sent out to him discussed graduating from the program but the patient feels like he would like one more week scheduled next [facility] 1/26/24 @ 1430 no medication needs questions and concerns addressed     Patient's Address:   [address] [place] [address] [address]  [facility] [address]  **  If this is not the address patient will receive services - alert team and address in EMR**       Patient Contacts:  Extended Emergency Contact Information  Primary Emergency Contact: Luci Lagunas  Home Phone: [phone]  Relation: Sibling                                Patient's Preferred Phone: [phone]  Patient's E-mail: No e-mail address on record

## 2024-01-20 VITALS
TEMPERATURE: 98.6 F | SYSTOLIC BLOOD PRESSURE: 112 MMHG | HEART RATE: 74 BPM | WEIGHT: 137 LBS | DIASTOLIC BLOOD PRESSURE: 60 MMHG | BODY MASS INDEX: 20.76 KG/M2 | HEIGHT: 68 IN | OXYGEN SATURATION: 98 % | RESPIRATION RATE: 18 BRPM

## 2024-01-20 SDOH — HEALTH STABILITY: PHYSICAL HEALTH: EXERCISE TYPE: INITIATED

## 2024-01-20 ASSESSMENT — PAIN SCALES - PAIN ASSESSMENT IN ADVANCED DEMENTIA (PAINAD)
NEGVOCALIZATION: 0 - NONE.
CONSOLABILITY: 0 - NO NEED TO CONSOLE.
BODYLANGUAGE: 0
FACIALEXPRESSION: 0 - SMILING OR INEXPRESSIVE.
TOTALSCORE: 0
BODYLANGUAGE: 0 - RELAXED.
CONSOLABILITY: 0
FACIALEXPRESSION: 0
NEGVOCALIZATION: 0
BREATHING: 0

## 2024-01-20 ASSESSMENT — ENCOUNTER SYMPTOMS
SUBJECTIVE PAIN PROGRESSION: RESOLVED
DEPRESSION: 0
DENIES PAIN: 1
PERSON REPORTING PAIN: PATIENT
LOSS OF SENSATION IN FEET: 0
OCCASIONAL FEELINGS OF UNSTEADINESS: 0
FATIGUES EASILY: 1

## 2024-01-20 ASSESSMENT — ACTIVITIES OF DAILY LIVING (ADL)
AMBULATION ASSISTANCE ON FLAT SURFACES: 1
OASIS_M1830: 01
ENTERING_EXITING_HOME: SUPERVISION

## 2024-01-22 ENCOUNTER — PATIENT OUTREACH (OUTPATIENT)
Dept: HOME HEALTH SERVICES | Age: 74
End: 2024-01-22
Payer: COMMERCIAL

## 2024-01-22 VITALS — DIASTOLIC BLOOD PRESSURE: 80 MMHG | HEART RATE: 80 BPM | SYSTOLIC BLOOD PRESSURE: 118 MMHG

## 2024-01-22 NOTE — PROGRESS NOTES
[facility] Care Transitions Assessment    Patient's Address:   [address]  [place] [id number]  **  If this is not the address patient will receive services - alert team and address in EMR**       Patient Contacts:  Extended Emergency Contact Information  Primary Emergency Contact: Luci Lagunas  Home Phone: [phone]  Relation: Sibling                                Patient's Preferred Phone: [phone]  Patient's E-mail: No e-mail address on record

## 2024-01-22 NOTE — PROGRESS NOTES
"Daily Call Note:     Pt. stated he was feeling good and has a follow up tomorrow with his general surgeon. He voiced no concerns or complaints today. BP and HR were done yesterday as stated below. Pt. had gotten a new BP cuff sent to him yesterday but need a family member to help him operate. Pt is still taking Oxycodone for pain. Pain level before the medication he stated was \"2\" and after \"0\". Reviewed pt's appts. and reminded pt. of [facility] appt. 1/26 @1430. Verbalized understanding, no questions.    Pt Education:   Barriers:   Topics for Daily Review: Follow up appts. And pain medication. Also BP, HR.  Pt demonstrates clear understanding: Yes    Daily Weight:  There were no vitals filed for this visit.   Last 3 Weights:  Wt Readings from Last 7 Encounters:   01/19/24 62.1 kg (137 lb)   01/18/24 62.1 kg (137 lb)   01/04/24 64 kg (141 lb)   12/20/23 64 kg (141 lb)   12/13/23 64 kg (141 lb)   10/23/23 63.2 kg (139 lb 6.4 oz)   10/11/23 63.5 kg (140 lb)     /80   Pulse 80       Masimo Device: No   Masimo Clinical Impression: N/A    Virtual Visits--Scheduled (Most Recent Date at Top)  Follow up Appointments  Recent Visits  No visits were found meeting these conditions.  Showing recent visits within past 30 days and meeting all other requirements  Future Appointments  No visits were found meeting these conditions.  Showing future appointments within next 90 days and meeting all other requirements       Frequency of RN Calls & Virtual Visits per Team Agreement: Healthy at Home Frequency: Bi-Weekly    Medication issues Addressed (what was done): Oxycodone 1 tab by mouth every 6 hrs. PRN severe pain.    Follow up appointments scheduled by [facility] Staff: 01/26/2024  Referrals made by [facility] staff: PT, OT, Nutrition.        "

## 2024-01-23 ENCOUNTER — APPOINTMENT (OUTPATIENT)
Dept: SURGERY | Facility: CLINIC | Age: 74
End: 2024-01-23
Payer: COMMERCIAL

## 2024-01-24 ENCOUNTER — HOME CARE VISIT (OUTPATIENT)
Dept: HOME HEALTH SERVICES | Facility: HOME HEALTH | Age: 74
End: 2024-01-24
Payer: COMMERCIAL

## 2024-01-24 ENCOUNTER — PATIENT OUTREACH (OUTPATIENT)
Dept: HOME HEALTH SERVICES | Age: 74
End: 2024-01-24
Payer: COMMERCIAL

## 2024-01-24 VITALS
RESPIRATION RATE: 16 BRPM | OXYGEN SATURATION: 97 % | DIASTOLIC BLOOD PRESSURE: 65 MMHG | SYSTOLIC BLOOD PRESSURE: 131 MMHG | HEART RATE: 77 BPM | TEMPERATURE: 98.6 F

## 2024-01-24 VITALS — BODY MASS INDEX: 21.29 KG/M2 | WEIGHT: 140 LBS

## 2024-01-24 PROCEDURE — G0151 HHCP-SERV OF PT,EA 15 MIN: HCPCS

## 2024-01-24 PROCEDURE — G0270 MNT SUBS TX FOR CHANGE DX: HCPCS

## 2024-01-24 SDOH — HEALTH STABILITY: PHYSICAL HEALTH: EXERCISE TYPE: INITIATED HOME WALKING PROGRAM IN LONG APARTMENT HALLWAYS

## 2024-01-24 SDOH — HEALTH STABILITY: MENTAL HEALTH: NUTRITION HISTORY: PT HAS BEEN ON VHC TF 237MLX 4 OVER LAST 5 YEARS

## 2024-01-24 ASSESSMENT — ENCOUNTER SYMPTOMS
PERSON REPORTING PAIN: PATIENT
DENIES PAIN: 1
OCCASIONAL FEELINGS OF UNSTEADINESS: 0

## 2024-01-24 ASSESSMENT — ACTIVITIES OF DAILY LIVING (ADL): AMBULATION ASSISTANCE ON FLAT SURFACES: 1

## 2024-01-24 NOTE — PROGRESS NOTES
"Daily Call Note:   Spoke with pt. today and her stated he was feeling \"good.\" Pt saw his PCP yesterday and still remains on the OxyCodone pain medication. He stated his pain this morning was a level \"6\" before taking and after he stated it was \"0'. Pt stated his Home Health nurse will be at his home later today to check his BP with new cuff, and he also had a visit at his home from his dietician. No other questions or concerns today, follow up 1/12 [facility] @1830 changed today from 1430, and confirmed with a phone call back to the pt. by the [facility] RN.    Topics for Daily Review: Follow up [facility] appt. changed from 1430 to 1830 1/26.  Pt demonstrates clear understanding: Yes    Daily Weight:  Wt 63.5 kg (140 lb)   BMI 21.29 kg/m²     Vitals:    01/24/24 1236   Weight: 63.5 kg (140 lb)      Last 3 Weights:  Wt Readings from Last 7 Encounters:   01/24/24 63.5 kg (140 lb)   01/19/24 62.1 kg (137 lb)   01/18/24 62.1 kg (137 lb)   01/04/24 64 kg (141 lb)   12/20/23 64 kg (141 lb)   12/13/23 64 kg (141 lb)   10/23/23 63.2 kg (139 lb 6.4 oz)       Masimo Device: No   Masimo Clinical Impression: N/A    Virtual Visits--Scheduled (Most Recent Date at Top)  Follow up Appointments  Recent Visits  No visits were found meeting these conditions.  Showing recent visits within past 30 days and meeting all other requirements  Future Appointments  No visits were found meeting these conditions.  Showing future appointments within next 90 days and meeting all other requirements       Frequency of RN Calls & Virtual Visits per Team Agreement: Healthy at Home Frequency: Bi-Weekly    Medication issues addressed (what was done): OxyCodone discussed.    Follow up appointments scheduled by [facility] Staff: 1/26/2024 2 1830 [facility] appt.          "

## 2024-01-26 ENCOUNTER — APPOINTMENT (OUTPATIENT)
Dept: CARE COORDINATION | Age: 74
End: 2024-01-26
Payer: COMMERCIAL

## 2024-01-26 ENCOUNTER — PATIENT OUTREACH (OUTPATIENT)
Dept: HOME HEALTH SERVICES | Age: 74
End: 2024-01-26

## 2024-01-26 NOTE — PROGRESS NOTES
Weekly [facility] call completed with Dr Freitas and [place] from pharmacy patient is still having some pain he will reach out to his PCP for medication on Monday he has enough for the weekend He is going to reach out to have a new surgery referral placed patient has good follow up scheduled no medication needs questions and concerns addressed patient will graduate today encouraged to call if he has any needs     Patient's Address:   [id number] [place] [address] [address]  [facility] [address]  **  If this is not the address patient will receive services - alert team and address in EMR**       Patient Contacts:  Extended Emergency Contact Information  Primary Emergency Contact: Luci Lagunas  Home Phone: [phone]  Relation: Sibling                                Patient's Preferred Phone: [phone]  Patient's E-mail: No e-mail address on record

## 2024-01-28 NOTE — PROGRESS NOTES
I saw and evaluated the patient. I personally obtained the key and critical portions of the history and physical exam or was physically present for key and critical portions performed by the trainee. I agree with the trainee's medical decision making as described in the trainee's note.    MD Wili Richards MD PhD

## 2024-01-29 ENCOUNTER — PATIENT OUTREACH (OUTPATIENT)
Dept: CARE COORDINATION | Facility: CLINIC | Age: 74
End: 2024-01-29
Payer: COMMERCIAL

## 2024-01-29 ENCOUNTER — HOME CARE VISIT (OUTPATIENT)
Dept: HOME HEALTH SERVICES | Facility: HOME HEALTH | Age: 74
End: 2024-01-29
Payer: COMMERCIAL

## 2024-01-29 PROCEDURE — G0152 HHCP-SERV OF OT,EA 15 MIN: HCPCS

## 2024-01-29 ASSESSMENT — ACTIVITIES OF DAILY LIVING (ADL)
DRESSING_LB_CURRENT_FUNCTION: INDEPENDENT
FEEDING ASSESSED: 1
DRESSING_UB_CURRENT_FUNCTION: INDEPENDENT
FEEDING: INDEPENDENT
BATHING_CURRENT_FUNCTION: INDEPENDENT
BATHING ASSESSED: 1

## 2024-01-29 ASSESSMENT — ENCOUNTER SYMPTOMS
PAIN LOCATION - PAIN QUALITY: CRAMPING
PAIN SEVERITY GOAL: 0/10
SUBJECTIVE PAIN PROGRESSION: WAXING AND WANING
PAIN LOCATION - PAIN SEVERITY: 0/10
PERSON REPORTING PAIN: PATIENT
PAIN: 1
HIGHEST PAIN SEVERITY IN PAST 24 HOURS: 5/10
PAIN LOCATION: ABDOMEN
LOWEST PAIN SEVERITY IN PAST 24 HOURS: 0/10

## 2024-01-29 NOTE — PROGRESS NOTES
Patient is currently involved with the healthy at home program which patient has an appointment today.  CM will continue to review case until program ends.     CHERRI GoyalN, RN

## 2024-01-30 DIAGNOSIS — K80.10 CALCULUS OF GALLBLADDER WITH CHRONIC CHOLECYSTITIS WITHOUT OBSTRUCTION: Primary | ICD-10-CM

## 2024-01-30 NOTE — PROGRESS NOTES
Discussed findings of HIDA with patient.    Will refer to transplant surgery to consider option for cholecystectomy.

## 2024-02-02 ENCOUNTER — HOME CARE VISIT (OUTPATIENT)
Dept: HOME HEALTH SERVICES | Facility: HOME HEALTH | Age: 74
End: 2024-02-02
Payer: COMMERCIAL

## 2024-02-02 PROCEDURE — G0151 HHCP-SERV OF PT,EA 15 MIN: HCPCS

## 2024-02-02 SDOH — HEALTH STABILITY: PHYSICAL HEALTH: EXERCISE TYPE: GENERAL STRENGTHENING

## 2024-02-02 ASSESSMENT — ENCOUNTER SYMPTOMS
LOSS OF SENSATION IN FEET: 0
OCCASIONAL FEELINGS OF UNSTEADINESS: 0
DEPRESSION: 0
PERSON REPORTING PAIN: PATIENT
DENIES PAIN: 1

## 2024-02-02 ASSESSMENT — ACTIVITIES OF DAILY LIVING (ADL)
HOME_HEALTH_OASIS: 00
OASIS_M1830: 00

## 2024-02-05 NOTE — PROGRESS NOTES
Provider Impressions     Dysphagia status post treatment of laryngeal cancer. The patient is peg dependent. The tube was changed today. A prescription will be sent to the provider to have the connectors available to the patient.    Impacted cerumen of the left ear which was addressed with small instruments.     I will see him on a when necessary basis.      Chief Complaint     Follow-up regarding some issues with the PEG tube      History of Present Illness    This patient was treated with a combination chemotherapy and radiation therapy for the management of a T4 laryngeal cancer. He does have some issues with dysphagia. He has been peg dependent. He has been having some issues with his PEG tube which prompted this appointment.    He also has issues with cerumen impaction.    Physical Exam    Examination of the PEG site and the tube shows the tube to be very deteriorated. After verbal consent the old tube was removed and was replaced with a preloaded replacement gastrostomy tube. This was well-tolerated.    The ear examination shows some impacted cerumen on the left side which is fairly significant.  This was addressed with a variety of small instruments and suction.

## 2024-02-06 ENCOUNTER — OFFICE VISIT (OUTPATIENT)
Dept: OTOLARYNGOLOGY | Facility: HOSPITAL | Age: 74
End: 2024-02-06
Payer: COMMERCIAL

## 2024-02-06 VITALS — WEIGHT: 137.1 LBS | BODY MASS INDEX: 20.78 KG/M2 | HEIGHT: 68 IN

## 2024-02-06 DIAGNOSIS — H61.22 IMPACTED CERUMEN OF LEFT EAR: ICD-10-CM

## 2024-02-06 DIAGNOSIS — R13.12 DYSPHAGIA, OROPHARYNGEAL PHASE: Primary | ICD-10-CM

## 2024-02-06 ASSESSMENT — PATIENT HEALTH QUESTIONNAIRE - PHQ9
SUM OF ALL RESPONSES TO PHQ9 QUESTIONS 1 AND 2: 0
2. FEELING DOWN, DEPRESSED OR HOPELESS: NOT AT ALL
1. LITTLE INTEREST OR PLEASURE IN DOING THINGS: NOT AT ALL

## 2024-02-07 ENCOUNTER — OFFICE VISIT (OUTPATIENT)
Dept: SURGERY | Facility: CLINIC | Age: 74
End: 2024-02-07
Payer: COMMERCIAL

## 2024-02-07 VITALS
WEIGHT: 137 LBS | HEIGHT: 68 IN | DIASTOLIC BLOOD PRESSURE: 72 MMHG | SYSTOLIC BLOOD PRESSURE: 169 MMHG | BODY MASS INDEX: 20.76 KG/M2 | HEART RATE: 89 BPM | RESPIRATION RATE: 16 BRPM

## 2024-02-07 DIAGNOSIS — K80.20 CALCULUS OF GALLBLADDER WITHOUT CHOLECYSTITIS WITHOUT OBSTRUCTION: Primary | ICD-10-CM

## 2024-02-07 DIAGNOSIS — K81.1 CHRONIC CHOLECYSTITIS: ICD-10-CM

## 2024-02-07 PROCEDURE — 1159F MED LIST DOCD IN RCRD: CPT | Performed by: TRANSPLANT SURGERY

## 2024-02-07 PROCEDURE — 99205 OFFICE O/P NEW HI 60 MIN: CPT | Performed by: TRANSPLANT SURGERY

## 2024-02-07 PROCEDURE — 1126F AMNT PAIN NOTED NONE PRSNT: CPT | Performed by: TRANSPLANT SURGERY

## 2024-02-07 PROCEDURE — 1036F TOBACCO NON-USER: CPT | Performed by: TRANSPLANT SURGERY

## 2024-02-07 PROCEDURE — 1160F RVW MEDS BY RX/DR IN RCRD: CPT | Performed by: TRANSPLANT SURGERY

## 2024-02-07 PROCEDURE — 3077F SYST BP >= 140 MM HG: CPT | Performed by: TRANSPLANT SURGERY

## 2024-02-07 PROCEDURE — 3078F DIAST BP <80 MM HG: CPT | Performed by: TRANSPLANT SURGERY

## 2024-02-07 NOTE — PROGRESS NOTES
"Subjective   Patient ID: Mk Fleming is a 73 y.o. male presenting for consult for cholecystitis. 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     Recent admission 12/20-12/23. He was diagnosed with cholecystitis, ultrasound with gallstones and HIDA positive. He was not sure if he was given antibiotics but was told he was not a surgical candidate and \"would die\" if he had surgery.    Currently, he is without pain or symptoms    He follows with Dr. Gallardo for cirrhosis, his HCV was treated with SVR. There is not reported decompensation. He denies GI bleeding, jaundice, HE    Review of Systems   Constitutional:  Negative for chills and fever.   HENT: Negative.  Negative for congestion.    Eyes: Negative.    Respiratory:  Negative for cough and shortness of breath.    Cardiovascular:  Negative for chest pain.   Gastrointestinal:  Negative for abdominal distention, abdominal pain, constipation and diarrhea.   Endocrine: Negative for cold intolerance and heat intolerance.   Genitourinary:  Negative for dysuria, frequency, hematuria and urgency.   Musculoskeletal:  Negative for arthralgias.   Skin:  Negative for color change.   Allergic/Immunologic: Negative for environmental allergies.   Neurological:  Negative for dizziness, weakness and light-headedness.   Hematological:  Negative for adenopathy.   Psychiatric/Behavioral:  Negative for agitation, confusion and hallucinations.        Objective   Vitals:    02/07/24 1354   BP: 169/72   Pulse: 89   Resp: 16       Physical Exam  Constitutional:       Appearance: Normal appearance.   HENT:      Head: Normocephalic and atraumatic.      Nose: Nose normal.   Eyes:      Pupils: Pupils are equal, round, and reactive to light.   Cardiovascular:      Rate and Rhythm: Normal rate.   Pulmonary:      Effort: Pulmonary effort is normal. No respiratory distress.   Abdominal:      General: There is no distension.      " Palpations: Abdomen is soft. There is no mass.      Comments: G tube in place  (-)Taylor's sign   Musculoskeletal:         General: No swelling. Normal range of motion.      Cervical back: Normal range of motion.   Skin:     General: Skin is warm and dry.   Neurological:      General: No focal deficit present.      Mental Status: He is alert and oriented to person, place, and time.   Psychiatric:         Mood and Affect: Mood normal.         Behavior: Behavior normal.       Lab Results   Component Value Date    WBC 4.7 12/19/2023    HGB 11.1 (L) 12/19/2023    HCT 31.4 (L) 12/19/2023    MCV 91 12/19/2023     12/19/2023     Lab Results   Component Value Date    GLUCOSE 114 (H) 12/19/2023    CALCIUM 9.9 12/19/2023     (L) 12/19/2023    K 4.1 12/19/2023    CO2 25 12/19/2023    CL 98 12/19/2023    BUN 84 (H) 12/19/2023    CREATININE 2.08 (H) 12/19/2023     Lab Results   Component Value Date    ALT 6 (L) 12/19/2023    AST 22 12/19/2023    ALKPHOS 78 12/19/2023    BILITOT 0.5 12/19/2023     Ultrasound 12/20/2023  IMPRESSION:  1. Cholelithiasis with mild pericholecystic fluid. No gallbladder  wall thickening. Sonographic Taylor sign is negative. Pericholecystic  fluid could be reactive to the liver pathology. Acute cholecystitis  is considered less likely, however, if there is strong clinical  concern HIDA may be considered.  2. Cirrhotic morphology of the liver similar to the prior examination  dated 08/22/2023.    HIDA 12/21/2023  IMPRESSION:  1. Nonvisualization of the gallbladder by 2 hours post radiotracer  injection, findings suggestive of cystic duct obstruction and acute  cholecystitis.  2. Patency of common bile duct.        Assessment/Plan     Cirrhosis or advanced fibrosis due to HCV. HCV was treated by Dr. Gallardo and he should have SVR. No reported varices on EGD based on note. No decompensation. Normal platelet count and coagulation screen. Intact liver synthetic function.  I think patient should be  "safe to proceed with cholecystectomy from liver standpoint. This was discussed in detail. Patient was hesitant due to prior conversation with other physicians and \"do not want to die from surgery\".     I will plan to have him see Dr. Gallardo (which he already has appointment) for April and further discuss benefits and risks in the setting of his liver diseases. In the mean time, I don't think there is a need to rush as he is currently symptom free from gallstones. Will plan to touch base again with patient after he sees Dr. Gallardo and plan for surgery around this time.    Of note, patient with Laryngeal Ca s/p surgery and XRT. PEG dependent      "

## 2024-02-09 ASSESSMENT — ENCOUNTER SYMPTOMS
FREQUENCY: 0
CHILLS: 0
DYSURIA: 0
ARTHRALGIAS: 0
HALLUCINATIONS: 0
ABDOMINAL PAIN: 0
AGITATION: 0
SHORTNESS OF BREATH: 0
FEVER: 0
COUGH: 0
ABDOMINAL DISTENTION: 0
DIZZINESS: 0
CONSTIPATION: 0
HEMATURIA: 0
WEAKNESS: 0
DIARRHEA: 0
COLOR CHANGE: 0
CONFUSION: 0
LIGHT-HEADEDNESS: 0
EYES NEGATIVE: 1
ADENOPATHY: 0

## 2024-04-16 NOTE — PATIENT INSTRUCTIONS
Dear  Lonnie,    It was a pleasure seeing you in clinic. I am sorry that you are in pain.    We discussed and documented your risk of getting your gallbladder surgically removed. Please see the surgeon (Dr. Salas) in clinic for this.    Patients with advanced fibrosis or cirrhosis are at increased risk for liver cancer.  Successful treatment of liver cancer depends on early detection.  You should have regular interval screening for liver cancer every 6 months.  A blood test called Alpha-fetoprotein (AFP) and ultrasound of the liver is an important part of liver cancer screening to detect tumors.  You should also expect to have a follow up appointment with your liver doctor every 6 months.     [phone]    (Please see below for department name when scheduling)    Please have the following done before your next appointment:    [x]   LABS Due : Today and Oct   [x]   IMAGING Type : Ultrasound Due : every 6 Month  (Department Radiology)  [x]   FOLLOW UP APPOINTMENT with Dr. Gallardo Due : Oct  (Department Gastro)    *Please have tests done before your next appointment    If you have any questions or need assistance, please don't hesitate to contact us.   Dr. Gallardo: Office [phone] Fax: [phone]  Hepatology Nurse Coordinator: Aleshia SPANGLER [phone]

## 2024-04-16 NOTE — PROGRESS NOTES
PCP: Amandeep Velasquez MD   Referred:     Mk Fleming is a 73 y.o. male with PMH of HCV s/p SVR c/b compensated cirrhosis, GIB 2/2 PUD, HTN, COPD, CKD 3, hypothyroidism, and oropharyngeal carcinoma s/p surgery and XRT s/p PEG tube placement, presenting with abdominal pain.    History Of Present Illness:  Of note, he was last seen in our clinic in 5/2023. At that time plan was for repeat EGD in 2 years for EV screening. Plan was for RTC in 6 months w/ MELD labs and RUQ US at that time.    He says he is having diffuse abdominal pain, radiating to his back and chest. He says he has been having this pain since 12/2023. He say a surgeon (Dr. Salas) to be assessed for a cholecystectomy. He was told that he will need to see his hepatologist first.    + nausea, weight loss  - fever, chills    Takes Miralax to be able to have a bowel movement.    12-point ROS was reviewed and is otherwise negative.    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 This was traversed with effort with the                          ultrathin scope.                         - The esophagus was otherwise unremarkable.                         - 2 cm hiatal hernia.                         - Intact gastrostomy with a patent G-tube present                          characterized by healthy appearing mucosa and no PEG                          related ulceration was found                         - The stomach was otherwise normal.                         - Three non-obstructing non-bleeding duodenal ulcers                          were found in the duodenal bulb and along with                          duodenal sweep. The largest ulcer along the sweep had                          a suspected flat pigmented spot (Julio Class IIc)                          but difficult to further  characterize with the                          ultrathin scope. The other two ulcers appeared clean                          based. No active bleeding or hematin seen in the                          duodeum.                         - Normal second portion of the duodenum.                         - No specimens collected.    Pertinent GI Imagin2023 RUQ US:  IMPRESSION:  1. Cholelithiasis with mild pericholecystic fluid. No gallbladder  wall thickening. Sonographic Taylor sign is negative. Pericholecystic  fluid could be reactive to the liver pathology. Acute cholecystitis  is considered less likely, however, if there is strong clinical  concern HIDA may be considered.  2. Cirrhotic morphology of the liver similar to the prior examination  dated 2023.    Social History:  -Lives by himself.  -EtOH: denied  -Recreational drugs: denied  -Tobacco: denied    Past Medical History:  He has a past medical history of Personal history of other diseases of the digestive system, Personal history of other diseases of the respiratory system, Personal history of other medical treatment, and Personal history of other specified conditions.    Home Medications:  Current Outpatient Medications   Medication Instructions    albuterol 2.5 mg /3 mL (0.083 %) nebulizer solution 3 mL, inhalation, Every 6 hours PRN, 4-6 hours as needed    albuterol 90 mcg/actuation inhaler 2 puffs, inhalation, Every 6 hours PRN    amLODIPine (NORVASC) 5 mg, g-tube, Nightly    atenolol (TENORMIN) 50 mg, g-tube, Daily    atorvastatin (LIPITOR) 10 mg, g-tube, Nightly    esomeprazole (NEXIUM) 20 mg, oral, Daily before breakfast    fluticasone (Flonase) 50 mcg/actuation nasal spray 1 spray, Each Nostril, Daily PRN    hydroCHLOROthiazide (HYDRODIURIL) 25 mg, g-tube, Daily    levothyroxine (SYNTHROID, LEVOXYL) 50 mcg, g-tube, Daily before breakfast    losartan (COZAAR) 50 mg, g-tube, Daily    naloxone (NARCAN) 4 mg, nasal, As needed, May repeat every 2-3  "minutes if needed, alternating nostrils, until medical assistance becomes available.    polyethylene glycol (MIRALAX) 17 g, g-tube, Daily PRN    sucralfate (CARAFATE) 1 g, g-tube, 4 times daily    terazosin (HYTRIN) 5 mg, g-tube, Nightly        Surgical History:  He has a past surgical history that includes Other surgical history (11/19/2018); Other surgical history (11/19/2018); Other surgical history (11/19/2018); Other surgical history (10/20/2022); Other surgical history (10/20/2022); IR embolization lymph node (Bilateral, 7/3/2022); IR angiogram inferior epigastric (7/3/2022); and IR angiogram aorta abdomen (7/3/2022).     Social History:  He reports that he quit smoking about 30 years ago. His smoking use included cigarettes. He has never been exposed to tobacco smoke. He has never used smokeless tobacco. He reports that he does not currently use alcohol. He reports that he does not use drugs.    Family History:  Family History   Problem Relation Name Age of Onset    Pancreatic cancer Mother      Hypertension Sister      Hypertension Brother       Allergies:  Patient has no known allergies.    OBJECTIVES:  Vital Signs:  Blood pressure 122/77, pulse 81, temperature 36.1 °C (96.9 °F), height 1.702 m (5' 7\"), weight 60.8 kg (134 lb), SpO2 95%.    Physical Exam:   General: Patient is in NAD.  Neuro: A and O x 3, CN 1-12 grossly intact, no asterixis.  HEENT: PERRLA, MMM, sclera anicteric.  CV: RRR, no m/r/g.  Pulm: CTA BL, no crackles, or wheezes.  Abd: Soft, diffuse tenderness to palpation, rebound and guarding present, no hepatosplenomegaly. PEG tube in place w/o redness or discharge.  Ext: Warm and well-perfused.  Psych: Appropriate mood and behavior.    Labs:  12/2023 LFTs wnl; CBC unremarkable  8/2023 HCV RNA undetectable, INR wnl    Assessment/Plan   Mk Fleming is a 73 y.o. male with PMH of HCV s/p SVR c/b compensated cirrhosis, GIB 2/2 PUD, HTN, COPD, CKD 3, hypothyroidism, and oropharyngeal carcinoma " s/p surgery and XRT s/p PEG tube placement, presenting with abdominal pain.    #compensated cirrhosis  - US of the liver and AFP q6months (he will be due in 2 months)  - MELD labs (he will be due in 2 months)  - EV screening - he will be due for EGD in 5/2025  - RTC in 6 months    #abdominal pain - possibly in the setting cholelithiasis  #pre-op risk assessment  Predicted Postoperative Outcomes by the VOCAL-Cristhian Score for laparoscopic abdominal surgery:     30-day mortality: 0.1%      90-day mortality: 0.3%      180-day mortality: 1.0%      90-day decompensation: 3.1%    We will acquire a lipase.     We discussed with the patients the risks and benefits from undergoing cholecystectomy. All of his questions were answered.    Luli Patterson MD

## 2024-04-17 ENCOUNTER — OFFICE VISIT (OUTPATIENT)
Dept: GASTROENTEROLOGY | Facility: HOSPITAL | Age: 74
End: 2024-04-17
Payer: COMMERCIAL

## 2024-04-17 ENCOUNTER — PREP FOR PROCEDURE (OUTPATIENT)
Dept: TRANSPLANT | Facility: HOSPITAL | Age: 74
End: 2024-04-17
Payer: COMMERCIAL

## 2024-04-17 VITALS
HEIGHT: 67 IN | TEMPERATURE: 96.9 F | OXYGEN SATURATION: 95 % | WEIGHT: 134 LBS | SYSTOLIC BLOOD PRESSURE: 122 MMHG | DIASTOLIC BLOOD PRESSURE: 77 MMHG | HEART RATE: 81 BPM | BODY MASS INDEX: 21.03 KG/M2

## 2024-04-17 DIAGNOSIS — K74.69 OTHER CIRRHOSIS OF LIVER (MULTI): Primary | ICD-10-CM

## 2024-04-17 DIAGNOSIS — K74.5 BILIARY CIRRHOSIS (MULTI): ICD-10-CM

## 2024-04-17 DIAGNOSIS — R10.84 GENERALIZED ABDOMINAL PAIN: ICD-10-CM

## 2024-04-17 DIAGNOSIS — K80.20 CALCULUS OF GALLBLADDER WITHOUT CHOLECYSTITIS WITHOUT OBSTRUCTION: ICD-10-CM

## 2024-04-17 DIAGNOSIS — K80.20 GALLSTONES: Primary | ICD-10-CM

## 2024-04-17 PROCEDURE — 99214 OFFICE O/P EST MOD 30 MIN: CPT | Performed by: INTERNAL MEDICINE

## 2024-04-17 PROCEDURE — 1125F AMNT PAIN NOTED PAIN PRSNT: CPT | Performed by: INTERNAL MEDICINE

## 2024-04-17 PROCEDURE — 1036F TOBACCO NON-USER: CPT | Performed by: INTERNAL MEDICINE

## 2024-04-17 PROCEDURE — 1160F RVW MEDS BY RX/DR IN RCRD: CPT | Performed by: INTERNAL MEDICINE

## 2024-04-17 PROCEDURE — 3074F SYST BP LT 130 MM HG: CPT | Performed by: INTERNAL MEDICINE

## 2024-04-17 PROCEDURE — 1159F MED LIST DOCD IN RCRD: CPT | Performed by: INTERNAL MEDICINE

## 2024-04-17 PROCEDURE — 3078F DIAST BP <80 MM HG: CPT | Performed by: INTERNAL MEDICINE

## 2024-04-17 ASSESSMENT — PAIN SCALES - GENERAL: PAINLEVEL: 10-WORST PAIN EVER

## 2024-04-18 ENCOUNTER — PREP FOR PROCEDURE (OUTPATIENT)
Dept: TRANSPLANT | Facility: HOSPITAL | Age: 74
End: 2024-04-18
Payer: COMMERCIAL

## 2024-04-18 DIAGNOSIS — K74.5 BILIARY CIRRHOSIS (MULTI): Primary | ICD-10-CM

## 2024-04-18 PROBLEM — K80.20 GALLSTONES: Status: ACTIVE | Noted: 2024-04-17

## 2024-04-22 ENCOUNTER — OFFICE VISIT (OUTPATIENT)
Dept: OTOLARYNGOLOGY | Facility: HOSPITAL | Age: 74
End: 2024-04-22
Payer: COMMERCIAL

## 2024-04-22 VITALS — BODY MASS INDEX: 21.51 KG/M2 | HEIGHT: 67 IN | TEMPERATURE: 97.9 F | WEIGHT: 137.03 LBS

## 2024-04-22 DIAGNOSIS — J38.4 LARYNX EDEMA: ICD-10-CM

## 2024-04-22 DIAGNOSIS — Z08 ENCOUNTER FOR FOLLOW-UP SURVEILLANCE OF LARYNGEAL CANCER: ICD-10-CM

## 2024-04-22 DIAGNOSIS — J38.01 PARESIS OF RIGHT VOCAL FOLD: ICD-10-CM

## 2024-04-22 DIAGNOSIS — J38.01 PARESIS OF LEFT VOCAL CORD: ICD-10-CM

## 2024-04-22 DIAGNOSIS — Z85.21 HX OF LARYNGEAL CANCER: Primary | ICD-10-CM

## 2024-04-22 DIAGNOSIS — Z85.21 ENCOUNTER FOR FOLLOW-UP SURVEILLANCE OF LARYNGEAL CANCER: ICD-10-CM

## 2024-04-22 DIAGNOSIS — R49.0 VOICE HOARSENESS: ICD-10-CM

## 2024-04-22 DIAGNOSIS — C32.9 LARYNGEAL CANCER (MULTI): ICD-10-CM

## 2024-04-22 DIAGNOSIS — R09.A2 GLOBUS PHARYNGEUS: ICD-10-CM

## 2024-04-22 DIAGNOSIS — R49.0 MUSCLE TENSION DYSPHONIA: ICD-10-CM

## 2024-04-22 DIAGNOSIS — R49.9 CHANGE IN VOICE: ICD-10-CM

## 2024-04-22 PROCEDURE — 31579 LARYNGOSCOPY TELESCOPIC: CPT | Performed by: OTOLARYNGOLOGY

## 2024-04-22 PROCEDURE — 99214 OFFICE O/P EST MOD 30 MIN: CPT | Performed by: OTOLARYNGOLOGY

## 2024-04-22 PROCEDURE — 1159F MED LIST DOCD IN RCRD: CPT | Performed by: OTOLARYNGOLOGY

## 2024-04-22 PROCEDURE — 1160F RVW MEDS BY RX/DR IN RCRD: CPT | Performed by: OTOLARYNGOLOGY

## 2024-04-22 RX ORDER — OXYCODONE HYDROCHLORIDE 5 MG/1
TABLET ORAL
Status: ON HOLD | COMMUNITY
Start: 2024-04-18 | End: 2024-05-04

## 2024-04-22 ASSESSMENT — PATIENT HEALTH QUESTIONNAIRE - PHQ9
1. LITTLE INTEREST OR PLEASURE IN DOING THINGS: NOT AT ALL
2. FEELING DOWN, DEPRESSED OR HOPELESS: NOT AT ALL
SUM OF ALL RESPONSES TO PHQ9 QUESTIONS 1 & 2: 0

## 2024-04-22 NOTE — PROGRESS NOTES
ASSESSMENT AND PLAN:   Mk Fleming is a 73 y.o. male with a history of XRT in 2019 for a T4 laryngeal cancer. He has had sig dysphagia and is PEG dependent. His weight is stable at 130 bs.       Today's examination to include flexible laryngoscopy demonstrates no masses or lesions in the airway. He has mucosal changes in the anterior glottic inlet that are stable with a partial epiglottectomy on the right     We discussed findings. He is MARICARMEN.     I would like to see the patient back in  6 months. We will obtain TSH and CXR. HE will be undergoing a procedure with Dr. Reza Salas and is scheduled for preop testing.        Reason For Consult  Chief Complaint   Patient presents with    Follow-up        HISTORY OF PRESENT ILLNESS:  Mk Fleming is a 73 y.o. male presenting for a follow up visit with me for S/p XRT with poor Larynx function and complete PEG dependency.  Follows with PL for PEG care.   + hypothyroidism. 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 Initially swallowed better with this and therapy but worsened over time.  Cancer surveillance has been MARICARMEN x 5 yrs.      The patient reports that he is doing well. No new pain.   Weight is stable.  Voice is serviceable.       Prior History:   Last seen on 10/23/2023 with a history of T4 laryngeal cancer status post chemoradiation.  The patient reports that his weight is stable and he is using his feeding tube without any difficulties.  He did have a replacement of his feeding tube not too long ago.       Today's examination to include flexible laryngoscopy demonstrates absence of the right part of his epiglottis as well as significant secretions within the hypopharynx.  There are no concerning masses or lesions.  He has reduction of mobility and a breathy quality to his voice.     We discussed continued observation with a 6-month follow-up.        Past Medical History  He has a past medical history of  "Cholecystitis, Cholelithiasis, Cirrhosis (Multi), CKD (chronic kidney disease), COPD (chronic obstructive pulmonary disease) (Multi), Dysphagia, GERD (gastroesophageal reflux disease), HCV (hepatitis C virus), History of blood transfusion, Hypertension, Hypothyroidism, Laryngeal cancer (Multi), Personal history of other diseases of the respiratory system, and Personal history of other specified conditions. Surgical History  He has a past surgical history that includes Other surgical history (11/19/2018); Other surgical history (11/19/2018); Other surgical history (10/20/2022); Other surgical history (10/20/2022); IR embolization lymph node (Bilateral, 07/03/2022); IR angiogram inferior epigastric (07/03/2022); IR angiogram aorta abdomen (07/03/2022); and Hernia repair.   Social History  He reports that he quit smoking about 30 years ago. His smoking use included cigarettes. He has never been exposed to tobacco smoke. He has never used smokeless tobacco. He reports that he does not currently use alcohol. He reports that he does not use drugs. Allergies  Patient has no known allergies.     Family History  Family History   Problem Relation Name Age of Onset    Pancreatic cancer Mother      Hypertension Sister      Hypertension Brother         Review of Systems  All 10 systems were reviewed and negative except for above.      Last Recorded Vitals  Temperature 36.6 °C (97.9 °F), height 1.702 m (5' 7\"), weight 62.2 kg (137 lb 0.5 oz).    Physical Exam  ENT Physical Exam  Constitutional  Appearance: patient appears well-developed and well-nourished,  Head and Face  Appearance: head appears normal and face appears normal;  Ear  Auricles: right auricle normal; left auricle normal;  Nose  External Nose: nares patent bilaterally;  Oral Cavity/Oropharynx  Lips: normal;  Neck  Neck: neck normal; neck palpation normal;  Respiratory  Inspection: no retractions visible;  Cardiovascular  Inspection: no peripheral edema " present;  Neurovestibular  Mental Status: alert and oriented;  Psychiatric: mood normal;  Cranial Nerves: cranial nerves intact;          Procedures     Flexible Laryngoscopy w/ Videostroboscopy    VOICE AND SPEECH CHARACTERISTICS:  Normal spoken speech, (+) moderate dysphonia, (+) moderate roughness, (+) mild breathiness, (+) moderate asthenia, (+) moderate strain.    Intelligibility: reduced.   Resonance: balanced.   Vocal Loudness: reduced.   Breath Support: decreased.    PROCEDURE:    Indications: difficulty swallowing  Procedure Note    Post-operative Diagnosis: same    Anesthesia: Lidocaine 2% and Familia-Synephrine 1/2%    Endoscopy Type:  Flexible Laryngoscopy    Procedure Details:    The patient was placed in the sitting position.  After topical anesthesia and decongestion, the 4 mm laryngoscope was passed.  The nasal cavities, nasopharynx, oropharynx, hypopharynx, and larynx were all examined.  Vocal cords were examined during respiration and phonation.  The following findings were noted:    Condition:  Stable.  Patient tolerated procedure well.    Complications:  None  Patient is seated in the exam chair. After adequate topical anesthesia, I advance the flexible endoscope. The examination included evaluation of the weston, vallecula, base of tongue, pyriforms, post-cricoid area, larynx and immediate subglottis.  Findings : assessment of the nasopharynx, base of tongue/vallecula, pyriform sinuses, post-cricoid area and pharyngeal walls was without lesion or mass, pharyngeal wall contraction is normal and symmetric, and no pooling of secretions  diffuse laryngitis: 2 (moderate)  Gross Arytenoid Movement: limited abduction bilateral.  Arytenoid Height: normal.   Supraglottic Tension: lateral.  Additional Findings: No masses or lesions in the airway. Patent airway down to the alejandro.       Time Spent  Prep time on day of patient encounter: 10 minutes  Time spent directly with patient, family or caregiver: 15  minutes  Additional Time Spent on Patient Care Activities/Discussion with SLP re care plan: 5 minutes  Documentation Time: 10 minutes  Other Time Spent: 0 minutes  Total: 40 minutes       Scribe Attestation  By signing my name below, I, Celi Jovel , [employer] attest that this documentation has been prepared under the direction and in the presence of Amrik Sprague MD.

## 2024-04-25 ENCOUNTER — PRE-ADMISSION TESTING (OUTPATIENT)
Dept: PREADMISSION TESTING | Facility: HOSPITAL | Age: 74
End: 2024-04-25
Payer: COMMERCIAL

## 2024-04-25 ENCOUNTER — HOSPITAL ENCOUNTER (OUTPATIENT)
Dept: CARDIOLOGY | Facility: HOSPITAL | Age: 74
Discharge: HOME | End: 2024-04-25
Payer: COMMERCIAL

## 2024-04-25 ENCOUNTER — HOSPITAL ENCOUNTER (OUTPATIENT)
Dept: RADIOLOGY | Facility: HOSPITAL | Age: 74
Discharge: HOME | End: 2024-04-25
Payer: COMMERCIAL

## 2024-04-25 VITALS
HEART RATE: 79 BPM | HEIGHT: 66 IN | DIASTOLIC BLOOD PRESSURE: 70 MMHG | OXYGEN SATURATION: 97 % | TEMPERATURE: 96.7 F | BODY MASS INDEX: 21.98 KG/M2 | SYSTOLIC BLOOD PRESSURE: 164 MMHG | WEIGHT: 136.8 LBS

## 2024-04-25 DIAGNOSIS — K80.20 GALLSTONES: ICD-10-CM

## 2024-04-25 DIAGNOSIS — E03.9 HYPOTHYROIDISM, UNSPECIFIED TYPE: ICD-10-CM

## 2024-04-25 DIAGNOSIS — Z01.818 PREOPERATIVE EXAMINATION: Primary | ICD-10-CM

## 2024-04-25 DIAGNOSIS — C32.9 LARYNGEAL CANCER (MULTI): ICD-10-CM

## 2024-04-25 DIAGNOSIS — K74.5 BILIARY CIRRHOSIS (MULTI): ICD-10-CM

## 2024-04-25 LAB
ABO GROUP (TYPE) IN BLOOD: NORMAL
ALBUMIN SERPL BCP-MCNC: 3.9 G/DL (ref 3.4–5)
ALP SERPL-CCNC: 88 U/L (ref 33–136)
ALT SERPL W P-5'-P-CCNC: 11 U/L (ref 10–52)
ANION GAP SERPL CALC-SCNC: 16 MMOL/L (ref 10–20)
ANTIBODY SCREEN: NORMAL
APTT PPP: 36 SECONDS (ref 27–38)
AST SERPL W P-5'-P-CCNC: 23 U/L (ref 9–39)
BILIRUB SERPL-MCNC: 0.7 MG/DL (ref 0–1.2)
BUN SERPL-MCNC: 80 MG/DL (ref 6–23)
CALCIUM SERPL-MCNC: 9.8 MG/DL (ref 8.6–10.6)
CHLORIDE SERPL-SCNC: 93 MMOL/L (ref 98–107)
CO2 SERPL-SCNC: 30 MMOL/L (ref 21–32)
CREAT SERPL-MCNC: 2.37 MG/DL (ref 0.5–1.3)
EGFRCR SERPLBLD CKD-EPI 2021: 28 ML/MIN/1.73M*2
ERYTHROCYTE [DISTWIDTH] IN BLOOD BY AUTOMATED COUNT: 13.1 % (ref 11.5–14.5)
GLUCOSE SERPL-MCNC: 120 MG/DL (ref 74–99)
HCT VFR BLD AUTO: 31.8 % (ref 41–52)
HGB BLD-MCNC: 10.5 G/DL (ref 13.5–17.5)
INR PPP: 1.1 (ref 0.9–1.1)
MCH RBC QN AUTO: 31.1 PG (ref 26–34)
MCHC RBC AUTO-ENTMCNC: 33 G/DL (ref 32–36)
MCV RBC AUTO: 94 FL (ref 80–100)
NRBC BLD-RTO: 0 /100 WBCS (ref 0–0)
PLATELET # BLD AUTO: 189 X10*3/UL (ref 150–450)
POTASSIUM SERPL-SCNC: 4.8 MMOL/L (ref 3.5–5.3)
PROT SERPL-MCNC: 8.4 G/DL (ref 6.4–8.2)
PROTHROMBIN TIME: 12.4 SECONDS (ref 9.8–12.8)
RBC # BLD AUTO: 3.38 X10*6/UL (ref 4.5–5.9)
RH FACTOR (ANTIGEN D): NORMAL
SODIUM SERPL-SCNC: 134 MMOL/L (ref 136–145)
T4 FREE SERPL-MCNC: 1.25 NG/DL (ref 0.78–1.48)
TSH SERPL-ACNC: 4.84 MIU/L (ref 0.44–3.98)
WBC # BLD AUTO: 3.7 X10*3/UL (ref 4.4–11.3)

## 2024-04-25 PROCEDURE — 85027 COMPLETE CBC AUTOMATED: CPT

## 2024-04-25 PROCEDURE — 71046 X-RAY EXAM CHEST 2 VIEWS: CPT | Performed by: RADIOLOGY

## 2024-04-25 PROCEDURE — 93010 ELECTROCARDIOGRAM REPORT: CPT | Performed by: INTERNAL MEDICINE

## 2024-04-25 PROCEDURE — 84439 ASSAY OF FREE THYROXINE: CPT

## 2024-04-25 PROCEDURE — 85610 PROTHROMBIN TIME: CPT

## 2024-04-25 PROCEDURE — 86901 BLOOD TYPING SEROLOGIC RH(D): CPT

## 2024-04-25 PROCEDURE — 36415 COLL VENOUS BLD VENIPUNCTURE: CPT

## 2024-04-25 PROCEDURE — 71046 X-RAY EXAM CHEST 2 VIEWS: CPT

## 2024-04-25 PROCEDURE — 84443 ASSAY THYROID STIM HORMONE: CPT

## 2024-04-25 PROCEDURE — 99205 OFFICE O/P NEW HI 60 MIN: CPT | Performed by: NURSE PRACTITIONER

## 2024-04-25 PROCEDURE — 93005 ELECTROCARDIOGRAM TRACING: CPT

## 2024-04-25 PROCEDURE — 84075 ASSAY ALKALINE PHOSPHATASE: CPT

## 2024-04-25 ASSESSMENT — ENCOUNTER SYMPTOMS
GASTROINTESTINAL NEGATIVE: 1
MUSCULOSKELETAL NEGATIVE: 1
NEUROLOGICAL NEGATIVE: 1
RESPIRATORY NEGATIVE: 1
ENDOCRINE NEGATIVE: 1
CARDIOVASCULAR NEGATIVE: 1
TROUBLE SWALLOWING: 1
EYES NEGATIVE: 1
CONSTITUTIONAL NEGATIVE: 1
NECK NEGATIVE: 1

## 2024-04-25 ASSESSMENT — DUKE ACTIVITY SCORE INDEX (DASI)
CAN YOU HAVE SEXUAL RELATIONS: YES
CAN YOU WALK INDOORS, SUCH AS AROUND YOUR HOUSE: YES
CAN YOU DO LIGHT WORK AROUND THE HOUSE LIKE DUSTING OR WASHING DISHES: YES
TOTAL_SCORE: 37.45
CAN YOU WALK A BLOCK OR TWO ON LEVEL GROUND: NO
CAN YOU RUN A SHORT DISTANCE: YES
CAN YOU DO YARD WORK LIKE RAKING LEAVES, WEEDING OR PUSHING A MOWER: NO
CAN YOU CLIMB A FLIGHT OF STAIRS OR WALK UP A HILL: YES
CAN YOU PARTICIPATE IN STRENOUS SPORTS LIKE SWIMMING, SINGLES TENNIS, FOOTBALL, BASKETBALL, OR SKIING: NO
CAN YOU PARTICIPATE IN MODERATE RECREATIONAL ACTIVITIES LIKE GOLF, BOWLING, DANCING, DOUBLES TENNIS OR THROWING A BASEBALL OR FOOTBALL: NO
DASI METS SCORE: 7.3
CAN YOU DO HEAVY WORK AROUND THE HOUSE LIKE SCRUBBING FLOORS OR LIFTING AND MOVING HEAVY FURNITURE: YES
CAN YOU DO MODERATE WORK AROUND THE HOUSE LIKE VACUUMING, SWEEPING FLOORS OR CARRYING GROCERIES: YES
CAN YOU TAKE CARE OF YOURSELF (EAT, DRESS, BATHE, OR USE TOILET): YES

## 2024-04-25 ASSESSMENT — LIFESTYLE VARIABLES: SMOKING_STATUS: NONSMOKER

## 2024-04-25 NOTE — H&P (VIEW-ONLY)
CPM/PAT Evaluation       Name: Mk Fleming (Mk Fleming)  /Age: 1950/73 y.o.     Visit Type:   In-Person       Chief Complaint: Cholelithiasis scheduled for surgery    HPI: Patient is 73-year-old male scheduled for laparoscopic possible open cholecystectomy on May 1, 2024 for treatment of cholelithiasis and biliary cirrhosis.  The patient is referred by Dr. Reza Salas preoperative evaluation of anemia, cholelithiasis, cirrhosis, CKD stage III, COPD that is well-controlled with no recent exacerbations, GERD, hepatitis C status post retroviral therapy, hypertension, hyperlipidemia, hypothyroidism, laryngeal cancer status post chemo and radiation with significant dysphagia and PEG dependency, PAD.    Past Medical History:   Diagnosis Date    Anemia     Cholecystitis     Cholelithiasis     Cirrhosis (Multi)     CKD (chronic kidney disease)     stage 3    COPD (chronic obstructive pulmonary disease) (Multi)     Dysphagia     peg dependent    GERD (gastroesophageal reflux disease)     HCV (hepatitis C virus)     s/p SVR    Hoarseness of voice     Hyperlipidemia     Hypertension     Hypothyroidism     Laryngeal cancer (Multi)     s/p chemo and radiation therapy    PAD (peripheral artery disease) (CMS-HCC)     Personal history of other diseases of the respiratory system     History of bronchitis    Personal history of other specified conditions     History of chest pain    Pulmonary emphysema (Multi)        Past Surgical History:   Procedure Laterality Date    ESOPHAGOGASTRODUODENOSCOPY      HERNIA REPAIR      IR ANGIOGRAM AORTA ABDOMEN  2022    IR ANGIOGRAM AORTA ABDOMEN 7/3/2022 [facility]    IR ANGIOGRAM INFERIOR EPIGASTRIC  2022    IR ANGIOGRAM INFERIOR EPIGASTRIC 7/3/2022 [facility]    IR EMBOLIZATION LYMPH NODE Bilateral 2022    IR EMBOLIZATION LYMPH NODE 7/3/2022 [facility]    OTHER SURGICAL HISTORY  2018    Tooth extraction    OTHER SURGICAL HISTORY   11/19/2018    Pulmonary decortication    OTHER SURGICAL HISTORY  10/20/2022    Percutaneous endoscopic gastrostomy tube insertion       Patient  reports that he is not currently sexually active.    Family History   Problem Relation Name Age of Onset    Pancreatic cancer Mother      Hypertension Sister      Hypertension Brother         No Known Allergies    Prior to Admission medications    Medication Sig Start Date End Date Taking? Authorizing Provider   atenolol (Tenormin) 50 mg tablet 1 tablet (50 mg) by g-tube route once daily.   Yes Historical Provider, MD   atorvastatin (Lipitor) 10 mg tablet 1 tablet (10 mg) by g-tube route once daily at bedtime.   Yes Historical Provider, MD   esomeprazole (NexIUM) 20 mg packet Take 20 mg by mouth once daily in the morning. Take before meals.  Patient taking differently: 20 mg by g-tube route once daily in the morning. Take before meals. 1/3/24 12/28/24 Yes Jaime Flores MD   hydroCHLOROthiazide (HYDRODiuril) 25 mg tablet 1 tablet (25 mg) by g-tube route once daily.   Yes Historical Provider, MD   levothyroxine (Synthroid, Levoxyl) 50 mcg tablet 1 tablet (50 mcg) by g-tube route once daily in the morning. Take before meals.   Yes Historical Provider, MD   losartan (Cozaar) 50 mg tablet 1 tablet (50 mg) by g-tube route once daily.   Yes Historical Provider, MD   albuterol 2.5 mg /3 mL (0.083 %) nebulizer solution Inhale 3 mL every 6 hours if needed for wheezing. 4-6 hours as needed 1/17/19   Historical Provider, MD   albuterol 90 mcg/actuation inhaler Inhale 2 puffs every 6 hours if needed for shortness of breath. 1/9/23   Historical Provider, MD   fluticasone (Flonase) 50 mcg/actuation nasal spray Administer 1 spray into each nostril once daily as needed. 7/7/21   Historical Provider, MD   naloxone (Narcan) 4 mg/0.1 mL nasal spray Administer 1 spray (4 mg) into affected nostril(s) if needed for opioid reversal or respiratory depression. May repeat every 2-3 minutes if  needed, alternating nostrils, until medical assistance becomes available. 12/23/23   Raymond Rubio MD   oxyCODONE (Roxicodone) 5 mg immediate release tablet PLEASE SEE ATTACHED FOR DETAILED DIRECTIONS 4/18/24   Historical Provider, MD   polyethylene glycol (Miralax) 17 gram/dose powder 17 g by g-tube route once daily as needed (constipation).    Historical Provider, MD   amLODIPine (Norvasc) 5 mg tablet 1 tablet (5 mg) by g-tube route once daily at bedtime.  4/25/24  Historical Provider, MD   sucralfate (Carafate) 100 mg/mL suspension 10 mL (1 g) by g-tube route 4 times a day.  4/25/24  Historical Provider, MD   terazosin (Hytrin) 5 mg capsule 1 capsule (5 mg) by g-tube route once daily at bedtime.  4/25/24  Historical Provider, MD BELL ROS:   Constitutional:   neg    Neuro/Psych:   neg    Eyes:   neg     use of corrective lenses (reading)  Ears:   Nose:   neg    Mouth:   neg    Throat:    dysphagia  Neck:    Significant scarring of the neck from radiation therapy.  neg    Cardio:   neg    Respiratory:   neg    Endocrine:   neg    GI:   neg    :   neg    Musculoskeletal:   neg    Hematologic:   neg    Skin:  neg        Physical Exam  Vitals reviewed.   Constitutional:       Appearance: Normal appearance.      Comments: mustache   HENT:      Head: Normocephalic.      Nose: Nose normal.      Mouth/Throat:      Mouth: Mucous membranes are moist.   Eyes:      Conjunctiva/sclera: Conjunctivae normal.   Neck:      Vascular: No carotid bruit.      Comments: Significant scarring of the neck from radiation therapy.  Cardiovascular:      Rate and Rhythm: Normal rate and regular rhythm.      Pulses: Normal pulses.      Heart sounds: Normal heart sounds.   Pulmonary:      Effort: Pulmonary effort is normal.      Breath sounds: Normal breath sounds.   Abdominal:      Palpations: Abdomen is soft.      Tenderness: There is no abdominal tenderness.       Musculoskeletal:         General: Normal range of motion.       Cervical back: Normal range of motion.      Right lower leg: No edema.      Left lower leg: No edema.   Lymphadenopathy:      Cervical: No cervical adenopathy.   Skin:     General: Skin is warm and dry.      Capillary Refill: Capillary refill takes less than 2 seconds.   Neurological:      General: No focal deficit present.      Mental Status: He is alert and oriented to person, place, and time.   Psychiatric:         Mood and Affect: Mood normal.         Behavior: Behavior normal. Behavior is cooperative.         Thought Content: Thought content normal.         Judgment: Judgment normal.          PAT AIRWAY:   Airway:     Mallampati::  II    Neck ROM::  Limited   edentulous      Visit Vitals  /70   Pulse 79   Temp 35.9 °C (96.7 °F)       DASI Risk Score      Flowsheet Row Most Recent Value   DASI SCORE 37.45   METS Score (Will be calculated only when all the questions are answered) 7.3          Caprini DVT Assessment      Flowsheet Row Most Recent Value   DVT Score 10   Current Status COPD, Major surgery planned, including arthroscopic and laproscopic (1-2 hours)   History Prior major surgery, COPD, Previous malignancy   Age 60-75 years   BMI 30 or less          Modified Frailty Index      Flowsheet Row Most Recent Value   Modified Frailty Index Calculator .1818          CHADS2 Stroke Risk  Current as of 16 minutes ago        N/A 3 to 100%: High Risk   2 to < 3%: Medium Risk   0 to < 2%: Low Risk     Last Change: N/A          This score determines the patient's risk of having a stroke if the patient has atrial fibrillation.        This score is not applicable to this patient. Components are not calculated.          Revised Cardiac Risk Index      Flowsheet Row Most Recent Value   Revised Cardiac Risk Calculator 1          Apfel Simplified Score      Flowsheet Row Most Recent Value   Apfel Simplified Score Calculator 2          Risk Analysis Index Results This Encounter    No data found in the last 1  encounters.       Stop Bang Score      Flowsheet Row Most Recent Value   Do you snore loudly? 0   Do you often feel tired or fatigued after your sleep? 0   Has anyone ever observed you stop breathing in your sleep? 0   Do you have or are you being treated for high blood pressure? 1   Recent BMI (Calculated) 21.5   Is BMI greater than 35 kg/m2? 0=No   Age older than 50 years old? 1=Yes   Is your neck circumference greater than 17 inches (Male) or 16 inches (Female)? 0   Gender - Male 1=Yes   STOP-BANG Total Score 3            PAD.    Assessment and Plan:   Neuro:  No neurologic diagnoses, however, the patient is at an increased risk for post operative delirium secondary to age >/= 65.  Preoperative brain exercise educational handout provided to patient.    The patient is at an increased risk for perioperative stroke secondary to chronic renal failure , increased age, HTN, HLD, and general anesthesia.     HEENT/Airway:  laryngeal cancer in 2019 status post chemo and radiation with significant dysphagia 2/2 esophageal stricture- PEG dependency. Following with Dr. Amrik Sprague, last visit 04/22/24.    Cardiovascular: Hypertension managed on losartan (hold 24 hours), hydrochlorothiazide (continue) and atenolol (continue).  Hyperlipidemia managed on atorvastatin (continue).  EKG today in office normal sinus rhythm.  No additional preoperative testing is currently indicated.    METS are 7.3    RCRI  1 which is 6% 30 day risk of MACE (risk for cardiac death, nonfatal myocardial infarction, and nonfactal cardiac arrest    TIKA score which indicates a 0.6% risk of intraoperative or 30-day postoperative MACE      Pulmonary:  COPD that is well-controlled with no recent exacerbations. Patient states he only needs to use his rescue inhaler and nebulizer a few times a month.  Preoperative deep breathing educational handout provided to patient.    ARISCAT:    18  points which is a low (1.6%) risk of in-hospital post-op pulmonary  "complications     PRODIGY:  20  points which is a high risk of post op opioid induced respiratory depression episodes    STOP BANG:  3   points which is a intermediate risk for moderate to severe PRINCESS. Patient to discuss risk factors with pcp post op.     Renal: CKD stage III-IV - baseline creatinine around 2. Stable disease with electrolytes acceptable for surgery. Patient does not follow with nephrology and seemed overwhelmed at the idea of another specialist when discussed referral.  Discussed with Dr. Vo and I agree most likely no changes will be put in place prior to needed cholecystectomy.  Asked patient to discuss referral with PCP post operatively when he is less overwhelmed.     The patient is at increased risk of perioperative renal complications secondary to age>/= 56, male sex, HTN, intraperitoneal surgery, and renal insuff (preop creat >1.2 mg/dl). Preventative measures include preoperative BP control and hydration.    Endocrine: Hypothyroidism managed on levothyroxine.  Patient asymptomatic and TSH collected in office today was 4.84 with Free T4 1.25 (WNL)- asymptomatic.    Hematologic:   anemia- chronic and stable at 10.5/31.8.  T/S obtained in CPM.  Preoperative DVT educational handout provided to patient.    Caprini Score:  10  points which is a highest risk of perioperative VTE    Gastrointestinal:   cholelithiasis scheduled for surgery.  GIB bleeding secondary to duodenal ulcers and GERD managed on esomeprazole (continue).   Cirrhosis following with GI and hepatology.  Last visit with Dr. Thomason Post 05/24/23 and then with Dr. Luli Patterson on 04/17/24 see impression below.   \"compensated cirrhosis  - US of the liver and AFP q6months (he will be due in 2 months)  - MELD labs (he will be due in 2 months)  - EV screening - he will be due for EGD in 5/2025  - RTC in 6 months    #abdominal pain - possibly in the setting cholelithiasis  #pre-op risk assessment  Predicted Postoperative Outcomes " "by the VOCAL-[place] Score for laparoscopic abdominal surgery:     30-day mortality: 0.1%      90-day mortality: 0.3%      180-day mortality: 1.0%      90-day decompensation: 3.1%    We will acquire a lipase.     We discussed with the patients the risks and benefits from undergoing cholecystectomy. All of his questions were answered.     Luli Patterson MD\"    EAT-10 score of   40 - self-perceived oropharyngeal dysphagia scale (0-40). Patient is PEG dependent- see HEENT section.    Apfel: 2 points 39%% risk for post operative N/V    Infectious disease:  Hepatitis C status post retroviral therapy (Epclusa) now undetectable.      Musculoskeletal:  negative or type diagnoses        Labs ordered  Results for orders placed or performed in visit on 04/25/24 (from the past 96 hour(s))   Type And Screen   Result Value Ref Range    ABO TYPE A     Rh TYPE POS     ANTIBODY SCREEN NEG    CBC   Result Value Ref Range    WBC 3.7 (L) 4.4 - 11.3 x10*3/uL    nRBC 0.0 0.0 - 0.0 /100 WBCs    RBC 3.38 (L) 4.50 - 5.90 x10*6/uL    Hemoglobin 10.5 (L) 13.5 - 17.5 g/dL    Hematocrit 31.8 (L) 41.0 - 52.0 %    MCV 94 80 - 100 fL    MCH 31.1 26.0 - 34.0 pg    MCHC 33.0 32.0 - 36.0 g/dL    RDW 13.1 11.5 - 14.5 %    Platelets 189 150 - 450 x10*3/uL   Comprehensive Metabolic Panel   Result Value Ref Range    Glucose 120 (H) 74 - 99 mg/dL    Sodium 134 (L) 136 - 145 mmol/L    Potassium 4.8 3.5 - 5.3 mmol/L    Chloride 93 (L) 98 - 107 mmol/L    Bicarbonate 30 21 - 32 mmol/L    Anion Gap 16 10 - 20 mmol/L    Urea Nitrogen 80 (H) 6 - 23 mg/dL    Creatinine 2.37 (H) 0.50 - 1.30 mg/dL    eGFR 28 (L) >60 mL/min/1.73m*2    Calcium 9.8 8.6 - 10.6 mg/dL    Albumin 3.9 3.4 - 5.0 g/dL    Alkaline Phosphatase 88 33 - 136 U/L    Total Protein 8.4 (H) 6.4 - 8.2 g/dL    AST 23 9 - 39 U/L    Bilirubin, Total 0.7 0.0 - 1.2 mg/dL    ALT 11 10 - 52 U/L   Coagulation Screen   Result Value Ref Range    Protime 12.4 9.8 - 12.8 seconds    INR 1.1 0.9 - 1.1    aPTT 36 27 " - 38 seconds   TSH with reflex to Free T4 if abnormal   Result Value Ref Range    Thyroid Stimulating Hormone 4.84 (H) 0.44 - 3.98 mIU/L   Thyroxine, Free   Result Value Ref Range    Thyroxine, Free 1.25 0.78 - 1.48 ng/dL     === 04/25/24 ===    XR CHEST 2 VIEWS    - Impression -  1. Hyperinflated lungs as can be seen in COPD/emphysema.  2. No definite focal filtrate, sizeable pleural effusion, edema or  pneumothorax.        Signed by: Leia Fonseca 4/25/2024 11:27 AM  Dictation workstation:   [id number]

## 2024-04-25 NOTE — PREPROCEDURE INSTRUCTIONS
Thank you for visiting [facility] ([facility]) today for your pre-procedure evaluation, you were seen by     Samantha Meeson, MSN, NP-C  Adult-Gerontology Nurse Practitioner II  Department of Anesthesiology and Perioperative Medicine  Main phone [phone]  Direct phone [phone]  Fax [phone]     This summary includes instructions and information to aid you during your perioperative period.  Please read carefully. If you have any questions about your visit today, please call the number listed above.  If you become ill or have any changes to your health before your surgery, please contact your primary care provider and alert your surgeon.    Preparing for your Surgery       Exercises  Preoperative Deep Breathing Exercises  Why it is important to do deep breathing exercises before my surgery?  Deep breathing exercises strengthen your breathing muscles.  This helps you to recover after your surgery and decreases the chance of breathing complications.  How are the deep breathing exercises done?  Sit straight with your back supported.  Breathe in deeply and slowly through your nose. Your lower rib cage should expand and your abdomen may move forward.  Hold that breath for 3 to 5 seconds.  Breathe out through pursed lips, slowly and completely.  Rest and repeat 10 times every hour while awake.  Rest longer if you become dizzy or lightheaded.       Incentive Spirometer   You were provided with an incentive spirometer in [facility], please follow the below instructions.   You were not provided an incentive spirometer in [facility], please disregard the incentive spirometer instructions  What is an incentive spirometer?  An incentive spirometer is a device used before and after surgery to “exercise” your lungs.  It helps you to take deeper breaths to expand your lungs.  Below is an example of a basic incentive spirometer.  The device you receive may differ slightly but they all function the  same.    Why do I need to use an incentive spirometer?  Using your incentive spirometer prepares your lungs for surgery and helps prevent lung problems after surgery.  How do I use my incentive spirometer?  When you're using your incentive spirometer, make sure to breathe through your mouth. If you breathe through your nose, the incentive spirometer won't work properly. You can hold your nose if you have trouble.  If you feel dizzy at any time, stop and rest. Try again at a later time.  Follow the steps below:  Set up your incentive spirometer, expand the flexible tubing and connect to the outlet.  Sit upright in a chair or bed. Hold the incentive spirometer at eye level.   Put the mouthpiece in your mouth and close your lips tightly around it. Slowly breathe out (exhale) completely.  Breathe in (inhale) slowly through your mouth as deeply as you can. As you take a breath, you will see the piston rise inside the large column. While the piston rises, the indicator should move upwards. It should stay in between the 2 arrows (see Figure).  Try to get the piston as high as you can, while keeping the indicator between the arrows.   If the indicator doesn't stay between the arrows, you're breathing either too fast or too slow.  When you get it as high as you can, hold your breath for 10 seconds, or as long as possible. While you're holding your breath, the piston will slowly fall to the base of the spirometer.  Once the piston reaches the bottom of the spirometer, breathe out slowly through your mouth. Rest for a few seconds.  Repeat 10 times. Try to get the piston to the same level with each breath.  Repeat every hour while awake  You can carefully clean the outside of the mouthpiece with an alcohol wipe or soap and water.      Preoperative Brain Exercises    What are brain exercises?  A brain exercise is any activity that engages your thinking (cognitive) skills.    What types of activities are considered brain  exercises?  Jigsaw puzzles, crossword puzzles, word jumble, memory games, word search, and many more.  Many can be found free online or on your phone via a mobile jessica.    Why should I do brain exercises before my surgery?  More recent research has shown brain exercise before surgery can lower the risk of postoperative delirium (confusion) which can be especially important for older adults.  Patients who did brain exercises for 5 to 10 hours the days before surgery, cut their risk of postoperative delirium in half up to 1 week after surgery.    Sit-to-Stand Exercise    What is the sit-to-stand exercise?  The sit-to-stand exercise strengthens the muscles of your lower body and muscles in the center of your body (core muscles for stability) helping to maintain and improve your strength and mobility.  How do I do the sit-to-stand exercise?  The goal is to do this exercise without using your arms or hands.  If this is too difficult, use your arms and hands or a chair with armrests to help slowly push yourself to the standing position and lower yourself back to the sitting position. As the movement becomes easier use your arms and hands less.    Steps to the sit-to-stand exercise  Sit up tall in a sturdy chair, knees bent, feet flat on the floor shoulder-width apart.  Shift your hips/pelvis forward in the chair to correctly position yourself for the next movement.  Lean forward at your hips.  Stand up straight putting equal weight on both feet.  Check to be sure you are properly aligned with the chair, in a slow controlled movement sit back down.  Repeat this exercise 10-15 times.  If needed you can do it fewer times until your strength improves.  Rest for 1 minute.  Do another 10-15 sit-to-stand exercises.  Try to do this in the morning and evening.        Instructions    Preoperative Fasting Guidelines    Why must I stop eating and drinking near surgery time?  With sedation, food or liquid in your stomach can enter your  lungs causing serious complications  Food can increase nausea and vomiting  When do I need to stop eating and drinking before my surgery?      Do not eat any food or drink any liquids after midnight the night before your surgery/procedure.  You may have sips of water to take medications.            Simple things you can do to help prevent blood clots     Blood clots are blockages that can form in the body's veins. When a blood clot forms in your deep veins, it may be called a deep vein thrombosis, or DVT for short. Blood clots can happen in any part of the body where blood flows, but they are most common in the arms and legs. If a piece of a blood clot breaks free and travels to the lungs, it is called a pulmonary embolus (PE). A PE can be a very serious problem.         Being in the hospital or having surgery can raise your chances of getting a blood clot because you may not be well enough to move around as much as you normally do.         Ways you can help prevent blood clots in the hospital       Wearing SCDs  SCDs stands for Sequential Compression Devices.   SCDs are special sleeves that wrap around your legs. They attach to a pump that fills them with air to gently squeeze your legs every few minutes.  This helps return the blood in your legs to your heart.   SCDs should only be taken off when walking or bathing. SCDs may not be comfortable, but they can help save your life.              Pump SCD leg sleeves  Wearing compression stockings - if your doctor orders them. These special snug-fitting stockings gently squeeze your legs to help blood flow.       Walking. Walking helps move the blood in your legs.   If your doctor says it is ok, try walking the halls at least   5 times a day. Ask us to help you get up, so you don't fall.      Taking any blood-thinning medicines your doctor orders.              Ways you can help prevent blood clots at home         Wearing compression stockings - if your doctor orders  them.   Walking - to help move the blood in your legs.    Taking any blood-thinning medicines your doctor orders.      Signs of a blood clot or PE    Tell your doctor or nurse right away if you have any of the problems listed below.         If you are at home, seek medical care right away. Call 911 for chest pain or problems breathing.            Signs of a blood clot (DVT) - such as pain, swelling, redness, or warmth in your arm or legs.  Signs of a pulmonary embolism (PE) - such as chest pain or feeling short of breath      Tobacco and Alcohol;  Do not drink alcohol or smoke within 24 hours of surgery.  It is best to quit smoking for as long as possible before any surgery or procedure.        The Week before Surgery        Seven days before Surgery  Check your [facility] medication instructions  Do the exercises provided to you by [facility]   Arrange for a responsible, adult licensed  to take you home after surgery and stay with you for 24 hours.  You will not be permitted to drive yourself home if you have received any anesthetic/sedation  Six days before surgery  Check your [facility] medication instructions  Do the exercises provided to you by [facility]   Start using Chlorhexidene (CHG) body wash if prescribed  Five days before surgery  Check your [facility] medication instructions  Do the exercises provided to you by [facility]   Continue to use CHG body wash if prescribed  Three days before surgery  Check your [facility] medication instructions  Do the exercises provided to you by [facility]   Continue to use CHG body wash if prescribed  Two days before surgery  Check your [facility] medication instructions  Do the exercises provided to you by [facility]   Continue to use CHG body wash if prescribed    The Day before Surgery       Check your [facility] medication and all other [facility] instructions including when to stop eating and drinking  You will be called with your arrival time for surgery in the late afternoon.  If you do not receive a call please reach out to your surgeon's  office.  Do not smoke or drink 24 hours before surgery  Prepare items to bring with you to the hospital  Shower with your chlorhexidine wash if prescribed  Brush your teeth and use your chlorhexidine dental rinse if prescribed    The Day of Surgery       Check your [facility] medication instructions  Ensure you follow the instructions for when to stop eating and drinking  Shower, if prescribed use CHG.  Do not apply any lotions, creams, moisturizers, perfume or deodorant  Brush your teeth and use your CHG dental rinse if prescribed  Wear loose comfortable clothing  Avoid make-up  Remove  jewelry and piercings, consider professional piercing removal with a plastic spacer if needed  Bring photo ID and Insurance card  Bring an accurate medication list that includes medication dose, frequency and allergies  Bring a copy of your advanced directives (will, health care power of )  Bring any devices and controllers as well as medical devices you have been provided with for surgery (CPAP, slings, braces, etc.)  Dentures, eyeglasses, and contacts will be removed before surgery, please bring cases for contacts or glasses

## 2024-04-25 NOTE — CPM/PAT H&P
CPM/PAT Evaluation       Name: Mk Fleming (Mk Fleming)  /Age: 1950/73 y.o.     Visit Type:   In-Person       Chief Complaint: Cholelithiasis scheduled for surgery    HPI: Patient is 73-year-old male scheduled for laparoscopic possible open cholecystectomy on May 1, 2024 for treatment of cholelithiasis and biliary cirrhosis.  The patient is referred by Dr. Reza Salas preoperative evaluation of anemia, cholelithiasis, cirrhosis, CKD stage III, COPD that is well-controlled with no recent exacerbations, GERD, hepatitis C status post retroviral therapy, hypertension, hyperlipidemia, hypothyroidism, laryngeal cancer status post chemo and radiation with significant dysphagia and PEG dependency, PAD.    Past Medical History:   Diagnosis Date    Anemia     Cholecystitis     Cholelithiasis     Cirrhosis (Multi)     CKD (chronic kidney disease)     stage 3    COPD (chronic obstructive pulmonary disease) (Multi)     Dysphagia     peg dependent    GERD (gastroesophageal reflux disease)     HCV (hepatitis C virus)     s/p SVR    Hoarseness of voice     Hyperlipidemia     Hypertension     Hypothyroidism     Laryngeal cancer (Multi)     s/p chemo and radiation therapy    PAD (peripheral artery disease) (CMS-HCC)     Personal history of other diseases of the respiratory system     History of bronchitis    Personal history of other specified conditions     History of chest pain    Pulmonary emphysema (Multi)        Past Surgical History:   Procedure Laterality Date    ESOPHAGOGASTRODUODENOSCOPY      HERNIA REPAIR      IR ANGIOGRAM AORTA ABDOMEN  2022    IR ANGIOGRAM AORTA ABDOMEN 7/3/2022 [facility]ICAL [facility]GAC[facility]    IR ANGIOGRAM INFERIOR EPIGASTRIC  2022    IR ANGIOGRAM INFERIOR EPIGASTRIC 7/3/2022 [facility] CLINICAL LEGACY    IR EMBOLIZATION LYMPH NODE Bilateral 2022    IR EMBOLIZATION LYMPH NODE 7/3/2022 [facility] CLINICAL LEGACY    OTHER SURGICAL HISTORY  2018    Tooth extraction    OTHER SURGICAL HISTORY   11/19/2018    Pulmonary decortication    OTHER SURGICAL HISTORY  10/20/2022    Percutaneous endoscopic gastrostomy tube insertion       Patient  reports that he is not currently sexually active.    Family History   Problem Relation Name Age of Onset    Pancreatic cancer Mother      Hypertension Sister      Hypertension Brother         No Known Allergies    Prior to Admission medications    Medication Sig Start Date End Date Taking? Authorizing Provider   atenolol (Tenormin) 50 mg tablet 1 tablet (50 mg) by g-tube route once daily.   Yes Historical Provider, MD   atorvastatin (Lipitor) 10 mg tablet 1 tablet (10 mg) by g-tube route once daily at bedtime.   Yes Historical Provider, MD   esomeprazole (NexIUM) 20 mg packet Take 20 mg by mouth once daily in the morning. Take before meals.  Patient taking differently: 20 mg by g-tube route once daily in the morning. Take before meals. 1/3/24 12/28/24 Yes Jaime Flores MD   hydroCHLOROthiazide (HYDRODiuril) 25 mg tablet 1 tablet (25 mg) by g-tube route once daily.   Yes Historical Provider, MD   levothyroxine (Synthroid, Levoxyl) 50 mcg tablet 1 tablet (50 mcg) by g-tube route once daily in the morning. Take before meals.   Yes Historical Provider, MD   losartan (Cozaar) 50 mg tablet 1 tablet (50 mg) by g-tube route once daily.   Yes Historical Provider, MD   albuterol 2.5 mg /3 mL (0.083 %) nebulizer solution Inhale 3 mL every 6 hours if needed for wheezing. 4-6 hours as needed 1/17/19   Historical Provider, MD   albuterol 90 mcg/actuation inhaler Inhale 2 puffs every 6 hours if needed for shortness of breath. 1/9/23   Historical Provider, MD   fluticasone (Flonase) 50 mcg/actuation nasal spray Administer 1 spray into each nostril once daily as needed. 7/7/21   Historical Provider, MD   naloxone (Narcan) 4 mg/0.1 mL nasal spray Administer 1 spray (4 mg) into affected nostril(s) if needed for opioid reversal or respiratory depression. May repeat every 2-3 minutes if  needed, alternating nostrils, until medical assistance becomes available. 12/23/23   Raymond Rubio MD   oxyCODONE (Roxicodone) 5 mg immediate release tablet PLEASE SEE ATTACHED FOR DETAILED DIRECTIONS 4/18/24   Historical Provider, MD   polyethylene glycol (Miralax) 17 gram/dose powder 17 g by g-tube route once daily as needed (constipation).    Historical Provider, MD   amLODIPine (Norvasc) 5 mg tablet 1 tablet (5 mg) by g-tube route once daily at bedtime.  4/25/24  Historical Provider, MD   sucralfate (Carafate) 100 mg/mL suspension 10 mL (1 g) by g-tube route 4 times a day.  4/25/24  Historical Provider, MD   terazosin (Hytrin) 5 mg capsule 1 capsule (5 mg) by g-tube route once daily at bedtime.  4/25/24  Historical Provider, MD BELL ROS:   Constitutional:   neg    Neuro/Psych:   neg    Eyes:   neg     use of corrective lenses (reading)  Ears:   Nose:   neg    Mouth:   neg    Throat:    dysphagia  Neck:    Significant scarring of the neck from radiation therapy.  neg    Cardio:   neg    Respiratory:   neg    Endocrine:   neg    GI:   neg    :   neg    Musculoskeletal:   neg    Hematologic:   neg    Skin:  neg        Physical Exam  Vitals reviewed.   Constitutional:       Appearance: Normal appearance.      Comments: mustache   HENT:      Head: Normocephalic.      Nose: Nose normal.      Mouth/Throat:      Mouth: Mucous membranes are moist.   Eyes:      Conjunctiva/sclera: Conjunctivae normal.   Neck:      Vascular: No carotid bruit.      Comments: Significant scarring of the neck from radiation therapy.  Cardiovascular:      Rate and Rhythm: Normal rate and regular rhythm.      Pulses: Normal pulses.      Heart sounds: Normal heart sounds.   Pulmonary:      Effort: Pulmonary effort is normal.      Breath sounds: Normal breath sounds.   Abdominal:      Palpations: Abdomen is soft.      Tenderness: There is no abdominal tenderness.       Musculoskeletal:         General: Normal range of motion.       Cervical back: Normal range of motion.      Right lower leg: No edema.      Left lower leg: No edema.   Lymphadenopathy:      Cervical: No cervical adenopathy.   Skin:     General: Skin is warm and dry.      Capillary Refill: Capillary refill takes less than 2 seconds.   Neurological:      General: No focal deficit present.      Mental Status: He is alert and oriented to person, place, and time.   Psychiatric:         Mood and Affect: Mood normal.         Behavior: Behavior normal. Behavior is cooperative.         Thought Content: Thought content normal.         Judgment: Judgment normal.          PAT AIRWAY:   Airway:     Mallampati::  II    Neck ROM::  Limited   edentulous      Visit Vitals  /70   Pulse 79   Temp 35.9 °C (96.7 °F)       DASI Risk Score      Flowsheet Row Most Recent Value   DASI SCORE 37.45   METS Score (Will be calculated only when all the questions are answered) 7.3          Caprini DVT Assessment      Flowsheet Row Most Recent Value   DVT Score 10   Current Status COPD, Major surgery planned, including arthroscopic and laproscopic (1-2 hours)   History Prior major surgery, COPD, Previous malignancy   Age 60-75 years   BMI 30 or less          Modified Frailty Index      Flowsheet Row Most Recent Value   Modified Frailty Index Calculator .1818          CHADS2 Stroke Risk  Current as of 16 minutes ago        N/A 3 to 100%: High Risk   2 to < 3%: Medium Risk   0 to < 2%: Low Risk     Last Change: N/A          This score determines the patient's risk of having a stroke if the patient has atrial fibrillation.        This score is not applicable to this patient. Components are not calculated.          Revised Cardiac Risk Index      Flowsheet Row Most Recent Value   Revised Cardiac Risk Calculator 1          Apfel Simplified Score      Flowsheet Row Most Recent Value   Apfel Simplified Score Calculator 2          Risk Analysis Index Results This Encounter    No data found in the last 1  encounters.       Stop Bang Score      Flowsheet Row Most Recent Value   Do you snore loudly? 0   Do you often feel tired or fatigued after your sleep? 0   Has anyone ever observed you stop breathing in your sleep? 0   Do you have or are you being treated for high blood pressure? 1   Recent BMI (Calculated) 21.5   Is BMI greater than 35 kg/m2? 0=No   Age older than 50 years old? 1=Yes   Is your neck circumference greater than 17 inches (Male) or 16 inches (Female)? 0   Gender - Male 1=Yes   STOP-BANG Total Score 3            PAD.    Assessment and Plan:   Neuro:  No neurologic diagnoses, however, the patient is at an increased risk for post operative delirium secondary to age >/= 65.  Preoperative brain exercise educational handout provided to patient.    The patient is at an increased risk for perioperative stroke secondary to chronic renal failure , increased age, HTN, HLD, and general anesthesia.     HEENT/Airway:  laryngeal cancer in 2019 status post chemo and radiation with significant dysphagia 2/2 esophageal stricture- PEG dependency. Following with Dr. Amrik Sprague, last visit 04/22/24.    Cardiovascular: Hypertension managed on losartan (hold 24 hours), hydrochlorothiazide (continue) and atenolol (continue).  Hyperlipidemia managed on atorvastatin (continue).  EKG today in office normal sinus rhythm.  No additional preoperative testing is currently indicated.    METS are 7.3    RCRI  1 which is 6% 30 day risk of MACE (risk for cardiac death, nonfatal myocardial infarction, and nonfactal cardiac arrest    TIKA score which indicates a 0.6% risk of intraoperative or 30-day postoperative MACE      Pulmonary:  COPD that is well-controlled with no recent exacerbations. Patient states he only needs to use his rescue inhaler and nebulizer a few times a month.  Preoperative deep breathing educational handout provided to patient.    ARISCAT:    18  points which is a low (1.6%) risk of in-hospital post-op pulmonary  "complications     PRODIGY:  20  points which is a high risk of post op opioid induced respiratory depression episodes    STOP BANG:  3   points which is a intermediate risk for moderate to severe PRINCESS. Patient to discuss risk factors with pcp post op.     Renal: CKD stage III-IV - baseline creatinine around 2. Stable disease with electrolytes acceptable for surgery. Patient does not follow with nephrology and seemed overwhelmed at the idea of another specialist when discussed referral.  Discussed with Dr. Vo and I agree most likely no changes will be put in place prior to needed cholecystectomy.  Asked patient to discuss referral with PCP post operatively when he is less overwhelmed.     The patient is at increased risk of perioperative renal complications secondary to age>/= 56, male sex, HTN, intraperitoneal surgery, and renal insuff (preop creat >1.2 mg/dl). Preventative measures include preoperative BP control and hydration.    Endocrine: Hypothyroidism managed on levothyroxine.  Patient asymptomatic and TSH collected in office today was 4.84 with Free T4 1.25 (WNL)- asymptomatic.    Hematologic:   anemia- chronic and stable at 10.5/31.8.  T/S obtained in CPM.  Preoperative DVT educational handout provided to patient.    Caprini Score:  10  points which is a highest risk of perioperative VTE    Gastrointestinal:   cholelithiasis scheduled for surgery.  GIB bleeding secondary to duodenal ulcers and GERD managed on esomeprazole (continue).   Cirrhosis following with GI and hepatology.  Last visit with Dr. Thomason Post 05/24/23 and then with Dr. Luli Patterson on 04/17/24 see impression below.   \"compensated cirrhosis  - US of the liver and AFP q6months (he will be due in 2 months)  - MELD labs (he will be due in 2 months)  - EV screening - he will be due for EGD in 5/2025  - RTC in 6 months    #abdominal pain - possibly in the setting cholelithiasis  #pre-op risk assessment  Predicted Postoperative Outcomes " "by the VOCAL-[place] Score for laparoscopic abdominal surgery:     30-day mortality: 0.1%      90-day mortality: 0.3%      180-day mortality: 1.0%      90-day decompensation: 3.1%    We will acquire a lipase.     We discussed with the patients the risks and benefits from undergoing cholecystectomy. All of his questions were answered.     Luli Patterson MD\"    EAT-10 score of   40 - self-perceived oropharyngeal dysphagia scale (0-40). Patient is PEG dependent- see HEENT section.    Apfel: 2 points 39%% risk for post operative N/V    Infectious disease:  Hepatitis C status post retroviral therapy (Epclusa) now undetectable.      Musculoskeletal:  negative or type diagnoses        Labs ordered  Results for orders placed or performed in visit on 04/25/24 (from the past 96 hour(s))   Type And Screen   Result Value Ref Range    ABO TYPE A     Rh TYPE POS     ANTIBODY SCREEN NEG    CBC   Result Value Ref Range    WBC 3.7 (L) 4.4 - 11.3 x10*3/uL    nRBC 0.0 0.0 - 0.0 /100 WBCs    RBC 3.38 (L) 4.50 - 5.90 x10*6/uL    Hemoglobin 10.5 (L) 13.5 - 17.5 g/dL    Hematocrit 31.8 (L) 41.0 - 52.0 %    MCV 94 80 - 100 fL    MCH 31.1 26.0 - 34.0 pg    MCHC 33.0 32.0 - 36.0 g/dL    RDW 13.1 11.5 - 14.5 %    Platelets 189 150 - 450 x10*3/uL   Comprehensive Metabolic Panel   Result Value Ref Range    Glucose 120 (H) 74 - 99 mg/dL    Sodium 134 (L) 136 - 145 mmol/L    Potassium 4.8 3.5 - 5.3 mmol/L    Chloride 93 (L) 98 - 107 mmol/L    Bicarbonate 30 21 - 32 mmol/L    Anion Gap 16 10 - 20 mmol/L    Urea Nitrogen 80 (H) 6 - 23 mg/dL    Creatinine 2.37 (H) 0.50 - 1.30 mg/dL    eGFR 28 (L) >60 mL/min/1.73m*2    Calcium 9.8 8.6 - 10.6 mg/dL    Albumin 3.9 3.4 - 5.0 g/dL    Alkaline Phosphatase 88 33 - 136 U/L    Total Protein 8.4 (H) 6.4 - 8.2 g/dL    AST 23 9 - 39 U/L    Bilirubin, Total 0.7 0.0 - 1.2 mg/dL    ALT 11 10 - 52 U/L   Coagulation Screen   Result Value Ref Range    Protime 12.4 9.8 - 12.8 seconds    INR 1.1 0.9 - 1.1    aPTT 36 27 " - 38 seconds   TSH with reflex to Free T4 if abnormal   Result Value Ref Range    Thyroid Stimulating Hormone 4.84 (H) 0.44 - 3.98 mIU/L   Thyroxine, Free   Result Value Ref Range    Thyroxine, Free 1.25 0.78 - 1.48 ng/dL     === 04/25/24 ===    XR CHEST 2 VIEWS    - Impression -  1. Hyperinflated lungs as can be seen in COPD/emphysema.  2. No definite focal filtrate, sizeable pleural effusion, edema or  pneumothorax.        Signed by: Leia Fonseca 4/25/2024 11:27 AM  Dictation workstation:   [id number]

## 2024-04-27 LAB
ATRIAL RATE: 75 BPM
P AXIS: 91 DEGREES
P OFFSET: 190 MS
P ONSET: 139 MS
PR INTERVAL: 168 MS
Q ONSET: 223 MS
QRS COUNT: 12 BEATS
QRS DURATION: 68 MS
QT INTERVAL: 382 MS
QTC CALCULATION(BAZETT): 426 MS
QTC FREDERICIA: 411 MS
R AXIS: 59 DEGREES
T AXIS: 71 DEGREES
T OFFSET: 414 MS
VENTRICULAR RATE: 75 BPM

## 2024-04-30 ENCOUNTER — ANESTHESIA EVENT (OUTPATIENT)
Dept: OPERATING ROOM | Facility: HOSPITAL | Age: 74
DRG: 418 | End: 2024-04-30
Payer: COMMERCIAL

## 2024-05-01 ENCOUNTER — HOSPITAL ENCOUNTER (INPATIENT)
Facility: HOSPITAL | Age: 74
LOS: 3 days | Discharge: HOME | DRG: 418 | End: 2024-05-04
Attending: TRANSPLANT SURGERY | Admitting: TRANSPLANT SURGERY
Payer: COMMERCIAL

## 2024-05-01 ENCOUNTER — ANESTHESIA (OUTPATIENT)
Dept: OPERATING ROOM | Facility: HOSPITAL | Age: 74
DRG: 418 | End: 2024-05-01
Payer: COMMERCIAL

## 2024-05-01 DIAGNOSIS — K74.5 BILIARY CIRRHOSIS (MULTI): ICD-10-CM

## 2024-05-01 DIAGNOSIS — K80.20 GALLSTONES: Primary | ICD-10-CM

## 2024-05-01 DIAGNOSIS — B19.20 UNSPECIFIED VIRAL HEPATITIS C WITHOUT HEPATIC COMA: ICD-10-CM

## 2024-05-01 DIAGNOSIS — G89.18 POST-OP PAIN: ICD-10-CM

## 2024-05-01 DIAGNOSIS — K81.1 CHRONIC CHOLECYSTITIS: ICD-10-CM

## 2024-05-01 LAB
ALBUMIN SERPL BCP-MCNC: 4 G/DL (ref 3.4–5)
ALP SERPL-CCNC: 71 U/L (ref 33–136)
ALT SERPL W P-5'-P-CCNC: 15 U/L (ref 10–52)
ANION GAP SERPL CALC-SCNC: 20 MMOL/L (ref 10–20)
APTT PPP: 22 SECONDS (ref 27–38)
AST SERPL W P-5'-P-CCNC: 33 U/L (ref 9–39)
BILIRUB SERPL-MCNC: 0.9 MG/DL (ref 0–1.2)
BUN SERPL-MCNC: 77 MG/DL (ref 6–23)
CALCIUM SERPL-MCNC: 8.9 MG/DL (ref 8.6–10.6)
CHLORIDE SERPL-SCNC: 96 MMOL/L (ref 98–107)
CO2 SERPL-SCNC: 27 MMOL/L (ref 21–32)
CREAT SERPL-MCNC: 2.23 MG/DL (ref 0.5–1.3)
EGFRCR SERPLBLD CKD-EPI 2021: 30 ML/MIN/1.73M*2
ERYTHROCYTE [DISTWIDTH] IN BLOOD BY AUTOMATED COUNT: 13.4 % (ref 11.5–14.5)
GLUCOSE SERPL-MCNC: 130 MG/DL (ref 74–99)
HCT VFR BLD AUTO: 29.1 % (ref 41–52)
HGB BLD-MCNC: 9.8 G/DL (ref 13.5–17.5)
INR PPP: 1.1 (ref 0.9–1.1)
MCH RBC QN AUTO: 31.6 PG (ref 26–34)
MCHC RBC AUTO-ENTMCNC: 33.7 G/DL (ref 32–36)
MCV RBC AUTO: 94 FL (ref 80–100)
NRBC BLD-RTO: 0 /100 WBCS (ref 0–0)
PLATELET # BLD AUTO: 157 X10*3/UL (ref 150–450)
POTASSIUM SERPL-SCNC: 4.9 MMOL/L (ref 3.5–5.3)
PROT SERPL-MCNC: 7.9 G/DL (ref 6.4–8.2)
PROTHROMBIN TIME: 12.9 SECONDS (ref 9.8–12.8)
RBC # BLD AUTO: 3.1 X10*6/UL (ref 4.5–5.9)
SODIUM SERPL-SCNC: 138 MMOL/L (ref 136–145)
WBC # BLD AUTO: 6.9 X10*3/UL (ref 4.4–11.3)

## 2024-05-01 PROCEDURE — 80053 COMPREHEN METABOLIC PANEL: CPT | Performed by: SURGERY

## 2024-05-01 PROCEDURE — 2500000004 HC RX 250 GENERAL PHARMACY W/ HCPCS (ALT 636 FOR OP/ED): Performed by: ANESTHESIOLOGY

## 2024-05-01 PROCEDURE — 2720000007 HC OR 272 NO HCPCS: Performed by: TRANSPLANT SURGERY

## 2024-05-01 PROCEDURE — 2780000003 HC OR 278 NO HCPCS: Performed by: TRANSPLANT SURGERY

## 2024-05-01 PROCEDURE — 36415 COLL VENOUS BLD VENIPUNCTURE: CPT | Performed by: SURGERY

## 2024-05-01 PROCEDURE — 3600000003 HC OR TIME - INITIAL BASE CHARGE - PROCEDURE LEVEL THREE: Performed by: TRANSPLANT SURGERY

## 2024-05-01 PROCEDURE — 2500000001 HC RX 250 WO HCPCS SELF ADMINISTERED DRUGS (ALT 637 FOR MEDICARE OP): Performed by: SURGERY

## 2024-05-01 PROCEDURE — 3700000001 HC GENERAL ANESTHESIA TIME - INITIAL BASE CHARGE: Performed by: TRANSPLANT SURGERY

## 2024-05-01 PROCEDURE — 47562 LAPAROSCOPIC CHOLECYSTECTOMY: CPT | Performed by: TRANSPLANT SURGERY

## 2024-05-01 PROCEDURE — 2500000004 HC RX 250 GENERAL PHARMACY W/ HCPCS (ALT 636 FOR OP/ED): Mod: JZ,JG | Performed by: TRANSPLANT SURGERY

## 2024-05-01 PROCEDURE — 2500000006 HC RX 250 W HCPCS SELF ADMINISTERED DRUGS (ALT 637 FOR ALL PAYERS): Performed by: SURGERY

## 2024-05-01 PROCEDURE — 85610 PROTHROMBIN TIME: CPT | Performed by: SURGERY

## 2024-05-01 PROCEDURE — 88304 TISSUE EXAM BY PATHOLOGIST: CPT | Performed by: PATHOLOGY

## 2024-05-01 PROCEDURE — 3600000008 HC OR TIME - EACH INCREMENTAL 1 MINUTE - PROCEDURE LEVEL THREE: Performed by: TRANSPLANT SURGERY

## 2024-05-01 PROCEDURE — C1894 INTRO/SHEATH, NON-LASER: HCPCS | Performed by: TRANSPLANT SURGERY

## 2024-05-01 PROCEDURE — 2500000005 HC RX 250 GENERAL PHARMACY W/O HCPCS: Performed by: ANESTHESIOLOGY

## 2024-05-01 PROCEDURE — 1100000001 HC PRIVATE ROOM DAILY

## 2024-05-01 PROCEDURE — 3700000002 HC GENERAL ANESTHESIA TIME - EACH INCREMENTAL 1 MINUTE: Performed by: TRANSPLANT SURGERY

## 2024-05-01 PROCEDURE — 99222 1ST HOSP IP/OBS MODERATE 55: CPT | Performed by: TRANSPLANT SURGERY

## 2024-05-01 PROCEDURE — 0FT44ZZ RESECTION OF GALLBLADDER, PERCUTANEOUS ENDOSCOPIC APPROACH: ICD-10-PCS | Performed by: TRANSPLANT SURGERY

## 2024-05-01 PROCEDURE — A47562 PR LAP,CHOLECYSTECTOMY: Performed by: ANESTHESIOLOGY

## 2024-05-01 PROCEDURE — 2500000004 HC RX 250 GENERAL PHARMACY W/ HCPCS (ALT 636 FOR OP/ED): Performed by: ANESTHESIOLOGIST ASSISTANT

## 2024-05-01 PROCEDURE — 2500000005 HC RX 250 GENERAL PHARMACY W/O HCPCS: Performed by: ANESTHESIOLOGIST ASSISTANT

## 2024-05-01 PROCEDURE — 7100000002 HC RECOVERY ROOM TIME - EACH INCREMENTAL 1 MINUTE: Performed by: TRANSPLANT SURGERY

## 2024-05-01 PROCEDURE — 7100000001 HC RECOVERY ROOM TIME - INITIAL BASE CHARGE: Performed by: TRANSPLANT SURGERY

## 2024-05-01 PROCEDURE — 99100 ANES PT EXTEME AGE<1 YR&>70: CPT | Performed by: ANESTHESIOLOGY

## 2024-05-01 PROCEDURE — 85027 COMPLETE CBC AUTOMATED: CPT | Performed by: SURGERY

## 2024-05-01 PROCEDURE — 2500000004 HC RX 250 GENERAL PHARMACY W/ HCPCS (ALT 636 FOR OP/ED): Performed by: SURGERY

## 2024-05-01 PROCEDURE — A47562 PR LAP,CHOLECYSTECTOMY: Performed by: ANESTHESIOLOGIST ASSISTANT

## 2024-05-01 PROCEDURE — P9045 ALBUMIN (HUMAN), 5%, 250 ML: HCPCS | Mod: JZ,JG | Performed by: ANESTHESIOLOGIST ASSISTANT

## 2024-05-01 PROCEDURE — 88304 TISSUE EXAM BY PATHOLOGIST: CPT | Mod: TC,SUR | Performed by: TRANSPLANT SURGERY

## 2024-05-01 RX ORDER — ESOMEPRAZOLE MAGNESIUM 20 MG/1
20 GRANULE, DELAYED RELEASE ORAL
Status: CANCELLED | OUTPATIENT
Start: 2024-05-02

## 2024-05-01 RX ORDER — HYDROXYZINE PAMOATE 25 MG/1
25 CAPSULE ORAL EVERY 6 HOURS PRN
Status: DISCONTINUED | OUTPATIENT
Start: 2024-05-01 | End: 2024-05-04 | Stop reason: HOSPADM

## 2024-05-01 RX ORDER — LEVOTHYROXINE SODIUM 50 UG/1
50 TABLET ORAL
Status: DISCONTINUED | OUTPATIENT
Start: 2024-05-02 | End: 2024-05-04 | Stop reason: HOSPADM

## 2024-05-01 RX ORDER — ACETAMINOPHEN 160 MG/5ML
650 SOLUTION ORAL EVERY 6 HOURS
Status: DISCONTINUED | OUTPATIENT
Start: 2024-05-01 | End: 2024-05-04 | Stop reason: HOSPADM

## 2024-05-01 RX ORDER — ONDANSETRON 4 MG/1
4 TABLET, ORALLY DISINTEGRATING ORAL EVERY 8 HOURS PRN
Status: DISCONTINUED | OUTPATIENT
Start: 2024-05-01 | End: 2024-05-04 | Stop reason: HOSPADM

## 2024-05-01 RX ORDER — ACETAMINOPHEN 650 MG/1
650 SUPPOSITORY RECTAL EVERY 6 HOURS
Status: DISCONTINUED | OUTPATIENT
Start: 2024-05-01 | End: 2024-05-04 | Stop reason: HOSPADM

## 2024-05-01 RX ORDER — LABETALOL HYDROCHLORIDE 5 MG/ML
5 INJECTION, SOLUTION INTRAVENOUS ONCE AS NEEDED
Status: COMPLETED | OUTPATIENT
Start: 2024-05-01 | End: 2024-05-01

## 2024-05-01 RX ORDER — MIDAZOLAM HYDROCHLORIDE 1 MG/ML
1 INJECTION INTRAMUSCULAR; INTRAVENOUS ONCE AS NEEDED
Status: DISCONTINUED | OUTPATIENT
Start: 2024-05-01 | End: 2024-05-01 | Stop reason: HOSPADM

## 2024-05-01 RX ORDER — SODIUM CHLORIDE, SODIUM LACTATE, POTASSIUM CHLORIDE, CALCIUM CHLORIDE 600; 310; 30; 20 MG/100ML; MG/100ML; MG/100ML; MG/100ML
100 INJECTION, SOLUTION INTRAVENOUS CONTINUOUS
Status: DISCONTINUED | OUTPATIENT
Start: 2024-05-01 | End: 2024-05-01 | Stop reason: HOSPADM

## 2024-05-01 RX ORDER — GLYCOPYRROLATE 0.2 MG/ML
INJECTION INTRAMUSCULAR; INTRAVENOUS AS NEEDED
Status: DISCONTINUED | OUTPATIENT
Start: 2024-05-01 | End: 2024-05-01

## 2024-05-01 RX ORDER — POLYETHYLENE GLYCOL 3350 17 G/17G
17 POWDER, FOR SOLUTION ORAL DAILY PRN
Status: CANCELLED | OUTPATIENT
Start: 2024-05-01

## 2024-05-01 RX ORDER — SODIUM CHLORIDE, SODIUM LACTATE, POTASSIUM CHLORIDE, CALCIUM CHLORIDE 600; 310; 30; 20 MG/100ML; MG/100ML; MG/100ML; MG/100ML
50 INJECTION, SOLUTION INTRAVENOUS CONTINUOUS
Status: DISCONTINUED | OUTPATIENT
Start: 2024-05-01 | End: 2024-05-02

## 2024-05-01 RX ORDER — PHENYLEPHRINE 10 MG/250 ML(40 MCG/ML)IN 0.9 % SOD.CHLORIDE INTRAVENOUS
CONTINUOUS PRN
Status: DISCONTINUED | OUTPATIENT
Start: 2024-05-01 | End: 2024-05-01

## 2024-05-01 RX ORDER — OXYCODONE HYDROCHLORIDE 5 MG/1
10 TABLET ORAL EVERY 4 HOURS PRN
Status: DISCONTINUED | OUTPATIENT
Start: 2024-05-01 | End: 2024-05-02

## 2024-05-01 RX ORDER — NALOXONE HYDROCHLORIDE 0.4 MG/ML
0.2 INJECTION, SOLUTION INTRAMUSCULAR; INTRAVENOUS; SUBCUTANEOUS EVERY 5 MIN PRN
Status: DISCONTINUED | OUTPATIENT
Start: 2024-05-01 | End: 2024-05-04 | Stop reason: HOSPADM

## 2024-05-01 RX ORDER — HYDROMORPHONE HYDROCHLORIDE 1 MG/ML
0.5 INJECTION, SOLUTION INTRAMUSCULAR; INTRAVENOUS; SUBCUTANEOUS EVERY 5 MIN PRN
Status: DISCONTINUED | OUTPATIENT
Start: 2024-05-01 | End: 2024-05-01 | Stop reason: HOSPADM

## 2024-05-01 RX ORDER — ALBUTEROL SULFATE 0.83 MG/ML
3 SOLUTION RESPIRATORY (INHALATION) EVERY 6 HOURS PRN
Status: DISCONTINUED | OUTPATIENT
Start: 2024-05-01 | End: 2024-05-04 | Stop reason: HOSPADM

## 2024-05-01 RX ORDER — ACETAMINOPHEN 325 MG/1
650 TABLET ORAL EVERY 6 HOURS
Status: DISCONTINUED | OUTPATIENT
Start: 2024-05-01 | End: 2024-05-04 | Stop reason: HOSPADM

## 2024-05-01 RX ORDER — ONDANSETRON HYDROCHLORIDE 2 MG/ML
4 INJECTION, SOLUTION INTRAVENOUS ONCE AS NEEDED
Status: DISCONTINUED | OUTPATIENT
Start: 2024-05-01 | End: 2024-05-01 | Stop reason: HOSPADM

## 2024-05-01 RX ORDER — OXYCODONE HYDROCHLORIDE 5 MG/1
10 TABLET ORAL EVERY 4 HOURS PRN
Status: CANCELLED | OUTPATIENT
Start: 2024-05-01

## 2024-05-01 RX ORDER — ONDANSETRON HYDROCHLORIDE 2 MG/ML
4 INJECTION, SOLUTION INTRAVENOUS EVERY 8 HOURS PRN
Status: DISCONTINUED | OUTPATIENT
Start: 2024-05-01 | End: 2024-05-04 | Stop reason: HOSPADM

## 2024-05-01 RX ORDER — LIDOCAINE HYDROCHLORIDE 10 MG/ML
0.1 INJECTION INFILTRATION; PERINEURAL ONCE
Status: DISCONTINUED | OUTPATIENT
Start: 2024-05-01 | End: 2024-05-01 | Stop reason: HOSPADM

## 2024-05-01 RX ORDER — PROPOFOL 10 MG/ML
INJECTION, EMULSION INTRAVENOUS AS NEEDED
Status: DISCONTINUED | OUTPATIENT
Start: 2024-05-01 | End: 2024-05-01

## 2024-05-01 RX ORDER — BUPIVACAINE HYDROCHLORIDE 2.5 MG/ML
INJECTION, SOLUTION EPIDURAL; INFILTRATION; INTRACAUDAL AS NEEDED
Status: DISCONTINUED | OUTPATIENT
Start: 2024-05-01 | End: 2024-05-01 | Stop reason: HOSPADM

## 2024-05-01 RX ORDER — LABETALOL HYDROCHLORIDE 5 MG/ML
10 INJECTION, SOLUTION INTRAVENOUS EVERY 6 HOURS PRN
Status: DISCONTINUED | OUTPATIENT
Start: 2024-05-01 | End: 2024-05-04 | Stop reason: HOSPADM

## 2024-05-01 RX ORDER — ESOMEPRAZOLE MAGNESIUM 40 MG/1
40 GRANULE, DELAYED RELEASE ORAL
Status: DISCONTINUED | OUTPATIENT
Start: 2024-05-02 | End: 2024-05-04 | Stop reason: HOSPADM

## 2024-05-01 RX ORDER — MIDAZOLAM HYDROCHLORIDE 5 MG/ML
INJECTION, SOLUTION INTRAMUSCULAR; INTRAVENOUS AS NEEDED
Status: DISCONTINUED | OUTPATIENT
Start: 2024-05-01 | End: 2024-05-01

## 2024-05-01 RX ORDER — ATORVASTATIN CALCIUM 10 MG/1
10 TABLET, FILM COATED ORAL NIGHTLY
Status: CANCELLED | OUTPATIENT
Start: 2024-05-01

## 2024-05-01 RX ORDER — FLUTICASONE PROPIONATE 50 MCG
1 SPRAY, SUSPENSION (ML) NASAL DAILY PRN
Status: CANCELLED | OUTPATIENT
Start: 2024-05-01

## 2024-05-01 RX ORDER — CEFAZOLIN 1 G/1
INJECTION, POWDER, FOR SOLUTION INTRAVENOUS AS NEEDED
Status: DISCONTINUED | OUTPATIENT
Start: 2024-05-01 | End: 2024-05-01

## 2024-05-01 RX ORDER — PHENYLEPHRINE HYDROCHLORIDE 10 MG/ML
INJECTION INTRAVENOUS AS NEEDED
Status: DISCONTINUED | OUTPATIENT
Start: 2024-05-01 | End: 2024-05-01

## 2024-05-01 RX ORDER — ATORVASTATIN CALCIUM 10 MG/1
10 TABLET, FILM COATED ORAL NIGHTLY
Status: DISCONTINUED | OUTPATIENT
Start: 2024-05-01 | End: 2024-05-04 | Stop reason: HOSPADM

## 2024-05-01 RX ORDER — ATENOLOL 50 MG/1
50 TABLET ORAL DAILY
Status: CANCELLED | OUTPATIENT
Start: 2024-05-01

## 2024-05-01 RX ORDER — LEVOTHYROXINE SODIUM 50 UG/1
50 TABLET ORAL
Status: CANCELLED | OUTPATIENT
Start: 2024-05-02

## 2024-05-01 RX ORDER — OXYCODONE HYDROCHLORIDE 5 MG/1
5 TABLET ORAL EVERY 6 HOURS PRN
Status: CANCELLED | OUTPATIENT
Start: 2024-05-01

## 2024-05-01 RX ORDER — HYDROMORPHONE HYDROCHLORIDE 1 MG/ML
0.4 INJECTION, SOLUTION INTRAMUSCULAR; INTRAVENOUS; SUBCUTANEOUS EVERY 2 HOUR PRN
Status: DISCONTINUED | OUTPATIENT
Start: 2024-05-01 | End: 2024-05-04 | Stop reason: HOSPADM

## 2024-05-01 RX ORDER — ALBUTEROL SULFATE 0.83 MG/ML
2.5 SOLUTION RESPIRATORY (INHALATION) ONCE AS NEEDED
Status: DISCONTINUED | OUTPATIENT
Start: 2024-05-01 | End: 2024-05-01 | Stop reason: HOSPADM

## 2024-05-01 RX ORDER — OXYCODONE HYDROCHLORIDE 5 MG/1
5 TABLET ORAL EVERY 4 HOURS PRN
Status: DISCONTINUED | OUTPATIENT
Start: 2024-05-01 | End: 2024-05-02

## 2024-05-01 RX ORDER — MEPERIDINE HYDROCHLORIDE 25 MG/ML
12.5 INJECTION INTRAMUSCULAR; INTRAVENOUS; SUBCUTANEOUS EVERY 10 MIN PRN
Status: DISCONTINUED | OUTPATIENT
Start: 2024-05-01 | End: 2024-05-01 | Stop reason: HOSPADM

## 2024-05-01 RX ORDER — SODIUM CHLORIDE, SODIUM LACTATE, POTASSIUM CHLORIDE, CALCIUM CHLORIDE 600; 310; 30; 20 MG/100ML; MG/100ML; MG/100ML; MG/100ML
50 INJECTION, SOLUTION INTRAVENOUS CONTINUOUS
Status: CANCELLED | OUTPATIENT
Start: 2024-05-01

## 2024-05-01 RX ORDER — FENTANYL CITRATE 50 UG/ML
INJECTION, SOLUTION INTRAMUSCULAR; INTRAVENOUS AS NEEDED
Status: DISCONTINUED | OUTPATIENT
Start: 2024-05-01 | End: 2024-05-01

## 2024-05-01 RX ORDER — HYDROMORPHONE HYDROCHLORIDE 1 MG/ML
INJECTION, SOLUTION INTRAMUSCULAR; INTRAVENOUS; SUBCUTANEOUS AS NEEDED
Status: DISCONTINUED | OUTPATIENT
Start: 2024-05-01 | End: 2024-05-01

## 2024-05-01 RX ORDER — ALBUMIN HUMAN 50 G/1000ML
SOLUTION INTRAVENOUS AS NEEDED
Status: DISCONTINUED | OUTPATIENT
Start: 2024-05-01 | End: 2024-05-01

## 2024-05-01 RX ORDER — CEFAZOLIN SODIUM 2 G/100ML
2 INJECTION, SOLUTION INTRAVENOUS EVERY 8 HOURS
Status: DISPENSED | OUTPATIENT
Start: 2024-05-01 | End: 2024-05-02

## 2024-05-01 RX ORDER — HYDROMORPHONE HYDROCHLORIDE 1 MG/ML
0.2 INJECTION, SOLUTION INTRAMUSCULAR; INTRAVENOUS; SUBCUTANEOUS EVERY 5 MIN PRN
Status: DISCONTINUED | OUTPATIENT
Start: 2024-05-01 | End: 2024-05-01 | Stop reason: HOSPADM

## 2024-05-01 RX ORDER — METOPROLOL TARTRATE 1 MG/ML
5 INJECTION, SOLUTION INTRAVENOUS ONCE
Status: CANCELLED | OUTPATIENT
Start: 2024-05-01

## 2024-05-01 RX ORDER — OXYCODONE HYDROCHLORIDE 5 MG/1
5 TABLET ORAL EVERY 4 HOURS PRN
Status: DISCONTINUED | OUTPATIENT
Start: 2024-05-01 | End: 2024-05-01 | Stop reason: HOSPADM

## 2024-05-01 RX ORDER — LABETALOL HYDROCHLORIDE 5 MG/ML
20 INJECTION, SOLUTION INTRAVENOUS ONCE
Status: COMPLETED | OUTPATIENT
Start: 2024-05-01 | End: 2024-05-01

## 2024-05-01 RX ORDER — NALOXONE HYDROCHLORIDE 0.4 MG/ML
0.2 INJECTION, SOLUTION INTRAMUSCULAR; INTRAVENOUS; SUBCUTANEOUS EVERY 5 MIN PRN
Status: CANCELLED | OUTPATIENT
Start: 2024-05-01

## 2024-05-01 RX ORDER — INDOCYANINE GREEN AND WATER 25 MG
KIT INJECTION AS NEEDED
Status: DISCONTINUED | OUTPATIENT
Start: 2024-05-01 | End: 2024-05-01

## 2024-05-01 RX ORDER — POLYETHYLENE GLYCOL 3350 17 G/17G
17 POWDER, FOR SOLUTION ORAL DAILY PRN
Status: DISCONTINUED | OUTPATIENT
Start: 2024-05-01 | End: 2024-05-04 | Stop reason: HOSPADM

## 2024-05-01 RX ORDER — ROCURONIUM BROMIDE 10 MG/ML
INJECTION, SOLUTION INTRAVENOUS AS NEEDED
Status: DISCONTINUED | OUTPATIENT
Start: 2024-05-01 | End: 2024-05-01

## 2024-05-01 RX ORDER — ACETAMINOPHEN 325 MG/1
650 TABLET ORAL ONCE
Status: DISCONTINUED | OUTPATIENT
Start: 2024-05-01 | End: 2024-05-01 | Stop reason: HOSPADM

## 2024-05-01 RX ORDER — ALBUTEROL SULFATE 0.83 MG/ML
3 SOLUTION RESPIRATORY (INHALATION) EVERY 6 HOURS PRN
Status: CANCELLED | OUTPATIENT
Start: 2024-05-01

## 2024-05-01 RX ORDER — ONDANSETRON HYDROCHLORIDE 2 MG/ML
INJECTION, SOLUTION INTRAVENOUS AS NEEDED
Status: DISCONTINUED | OUTPATIENT
Start: 2024-05-01 | End: 2024-05-01

## 2024-05-01 RX ORDER — ATENOLOL 50 MG/1
50 TABLET ORAL DAILY
Status: DISCONTINUED | OUTPATIENT
Start: 2024-05-01 | End: 2024-05-04 | Stop reason: HOSPADM

## 2024-05-01 RX ORDER — HYDROMORPHONE HYDROCHLORIDE 1 MG/ML
0.2 INJECTION, SOLUTION INTRAMUSCULAR; INTRAVENOUS; SUBCUTANEOUS EVERY 2 HOUR PRN
Status: DISCONTINUED | OUTPATIENT
Start: 2024-05-01 | End: 2024-05-04 | Stop reason: HOSPADM

## 2024-05-01 RX ORDER — HYDRALAZINE HYDROCHLORIDE 20 MG/ML
10 INJECTION INTRAMUSCULAR; INTRAVENOUS EVERY 6 HOURS PRN
Status: DISCONTINUED | OUTPATIENT
Start: 2024-05-01 | End: 2024-05-04 | Stop reason: HOSPADM

## 2024-05-01 RX ADMIN — CEFAZOLIN 2 G: 1 INJECTION, POWDER, FOR SOLUTION INTRAMUSCULAR; INTRAVENOUS at 09:17

## 2024-05-01 RX ADMIN — SODIUM CHLORIDE, SODIUM LACTATE, POTASSIUM CHLORIDE, AND CALCIUM CHLORIDE: 600; 310; 30; 20 INJECTION, SOLUTION INTRAVENOUS at 11:12

## 2024-05-01 RX ADMIN — HYDROMORPHONE HYDROCHLORIDE 0.5 MG: 1 INJECTION, SOLUTION INTRAMUSCULAR; INTRAVENOUS; SUBCUTANEOUS at 14:26

## 2024-05-01 RX ADMIN — PHENYLEPHRINE HYDROCHLORIDE 80 MCG: 10 INJECTION INTRAVENOUS at 09:46

## 2024-05-01 RX ADMIN — ROCURONIUM BROMIDE 40 MG: 10 INJECTION INTRAVENOUS at 09:07

## 2024-05-01 RX ADMIN — FENTANYL CITRATE 25 MCG: 50 INJECTION, SOLUTION INTRAMUSCULAR; INTRAVENOUS at 09:03

## 2024-05-01 RX ADMIN — HYDROMORPHONE HYDROCHLORIDE 0.5 MG: 1 INJECTION, SOLUTION INTRAMUSCULAR; INTRAVENOUS; SUBCUTANEOUS at 14:39

## 2024-05-01 RX ADMIN — HYDROMORPHONE HYDROCHLORIDE 0.2 MG: 1 INJECTION, SOLUTION INTRAMUSCULAR; INTRAVENOUS; SUBCUTANEOUS at 12:15

## 2024-05-01 RX ADMIN — Medication 6 L/MIN: at 12:24

## 2024-05-01 RX ADMIN — PHENYLEPHRINE HYDROCHLORIDE 240 MCG: 10 INJECTION INTRAVENOUS at 09:13

## 2024-05-01 RX ADMIN — HYDROMORPHONE HYDROCHLORIDE 0.5 MG: 1 INJECTION, SOLUTION INTRAMUSCULAR; INTRAVENOUS; SUBCUTANEOUS at 13:33

## 2024-05-01 RX ADMIN — HYDROMORPHONE HYDROCHLORIDE 0.5 MG: 1 INJECTION, SOLUTION INTRAMUSCULAR; INTRAVENOUS; SUBCUTANEOUS at 13:11

## 2024-05-01 RX ADMIN — HYDROMORPHONE HYDROCHLORIDE 0.5 MG: 1 INJECTION, SOLUTION INTRAMUSCULAR; INTRAVENOUS; SUBCUTANEOUS at 13:52

## 2024-05-01 RX ADMIN — HYDROXYZINE PAMOATE 25 MG: 25 CAPSULE ORAL at 21:32

## 2024-05-01 RX ADMIN — SUGAMMADEX 300 MG: 100 INJECTION, SOLUTION INTRAVENOUS at 12:16

## 2024-05-01 RX ADMIN — Medication 0.5 MCG/KG/MIN: at 09:28

## 2024-05-01 RX ADMIN — ROCURONIUM BROMIDE 10 MG: 10 INJECTION INTRAVENOUS at 10:30

## 2024-05-01 RX ADMIN — SODIUM CHLORIDE, POTASSIUM CHLORIDE, SODIUM LACTATE AND CALCIUM CHLORIDE 50 ML/HR: 600; 310; 30; 20 INJECTION, SOLUTION INTRAVENOUS at 19:02

## 2024-05-01 RX ADMIN — ALBUMIN HUMAN 250 ML: 0.05 INJECTION, SOLUTION INTRAVENOUS at 09:24

## 2024-05-01 RX ADMIN — HYDRALAZINE HYDROCHLORIDE 10 MG: 20 INJECTION INTRAMUSCULAR; INTRAVENOUS at 15:20

## 2024-05-01 RX ADMIN — HYDROMORPHONE HYDROCHLORIDE 0.5 MG: 1 INJECTION, SOLUTION INTRAMUSCULAR; INTRAVENOUS; SUBCUTANEOUS at 16:21

## 2024-05-01 RX ADMIN — ONDANSETRON 4 MG: 2 INJECTION INTRAMUSCULAR; INTRAVENOUS at 12:03

## 2024-05-01 RX ADMIN — HYDROMORPHONE HYDROCHLORIDE 0.4 MG: 1 INJECTION, SOLUTION INTRAMUSCULAR; INTRAVENOUS; SUBCUTANEOUS at 19:01

## 2024-05-01 RX ADMIN — LABETALOL HYDROCHLORIDE 5 MG: 5 INJECTION, SOLUTION INTRAVENOUS at 14:49

## 2024-05-01 RX ADMIN — PROPOFOL 60 MG: 10 INJECTION, EMULSION INTRAVENOUS at 09:06

## 2024-05-01 RX ADMIN — ACETAMINOPHEN 650 MG: 650 SOLUTION ORAL at 21:32

## 2024-05-01 RX ADMIN — PHENYLEPHRINE HYDROCHLORIDE 240 MCG: 10 INJECTION INTRAVENOUS at 09:30

## 2024-05-01 RX ADMIN — LABETALOL HYDROCHLORIDE 5 MG: 5 INJECTION, SOLUTION INTRAVENOUS at 14:18

## 2024-05-01 RX ADMIN — HYDROMORPHONE HYDROCHLORIDE 0.4 MG: 1 INJECTION, SOLUTION INTRAMUSCULAR; INTRAVENOUS; SUBCUTANEOUS at 12:17

## 2024-05-01 RX ADMIN — PHENYLEPHRINE HYDROCHLORIDE 240 MCG: 10 INJECTION INTRAVENOUS at 09:19

## 2024-05-01 RX ADMIN — ATENOLOL 50 MG: 50 TABLET ORAL at 21:32

## 2024-05-01 RX ADMIN — FENTANYL CITRATE 25 MCG: 50 INJECTION, SOLUTION INTRAMUSCULAR; INTRAVENOUS at 08:57

## 2024-05-01 RX ADMIN — OXYCODONE HYDROCHLORIDE 10 MG: 5 TABLET ORAL at 23:54

## 2024-05-01 RX ADMIN — PHENYLEPHRINE HYDROCHLORIDE 160 MCG: 10 INJECTION INTRAVENOUS at 09:25

## 2024-05-01 RX ADMIN — SUGAMMADEX 100 MG: 100 INJECTION, SOLUTION INTRAVENOUS at 12:12

## 2024-05-01 RX ADMIN — SODIUM CHLORIDE, SODIUM LACTATE, POTASSIUM CHLORIDE, AND CALCIUM CHLORIDE: 600; 310; 30; 20 INJECTION, SOLUTION INTRAVENOUS at 08:31

## 2024-05-01 RX ADMIN — ALBUMIN HUMAN 250 ML: 0.05 INJECTION, SOLUTION INTRAVENOUS at 09:12

## 2024-05-01 RX ADMIN — HYDROMORPHONE HYDROCHLORIDE 0.5 MG: 1 INJECTION, SOLUTION INTRAMUSCULAR; INTRAVENOUS; SUBCUTANEOUS at 12:43

## 2024-05-01 RX ADMIN — LABETALOL HYDROCHLORIDE 20 MG: 5 INJECTION, SOLUTION INTRAVENOUS at 16:21

## 2024-05-01 RX ADMIN — HYDROMORPHONE HYDROCHLORIDE 0.2 MG: 1 INJECTION, SOLUTION INTRAMUSCULAR; INTRAVENOUS; SUBCUTANEOUS at 12:29

## 2024-05-01 RX ADMIN — ROCURONIUM BROMIDE 10 MG: 10 INJECTION INTRAVENOUS at 11:23

## 2024-05-01 RX ADMIN — MIDAZOLAM HYDROCHLORIDE 1 MG: 5 INJECTION, SOLUTION INTRAMUSCULAR; INTRAVENOUS at 08:57

## 2024-05-01 RX ADMIN — CEFAZOLIN SODIUM 2 G: 2 INJECTION, SOLUTION INTRAVENOUS at 21:30

## 2024-05-01 RX ADMIN — PHENYLEPHRINE HYDROCHLORIDE 160 MCG: 10 INJECTION INTRAVENOUS at 09:11

## 2024-05-01 RX ADMIN — INDOCYANINE GREEN 1.25 MG: KIT INTRAVENOUS at 10:16

## 2024-05-01 RX ADMIN — ATORVASTATIN CALCIUM 10 MG: 10 TABLET, FILM COATED ORAL at 21:33

## 2024-05-01 RX ADMIN — GLYCOPYRROLATE 0.3 MG: 0.2 INJECTION, SOLUTION INTRAMUSCULAR; INTRAVENOUS at 08:33

## 2024-05-01 RX ADMIN — MIDAZOLAM HYDROCHLORIDE 1 MG: 5 INJECTION, SOLUTION INTRAMUSCULAR; INTRAVENOUS at 08:49

## 2024-05-01 SDOH — HEALTH STABILITY: PHYSICAL HEALTH: ON AVERAGE, HOW MANY DAYS PER WEEK DO YOU ENGAGE IN MODERATE TO STRENUOUS EXERCISE (LIKE A BRISK WALK)?: 1 DAY

## 2024-05-01 SDOH — SOCIAL STABILITY: SOCIAL NETWORK: ARE YOU MARRIED, WIDOWED, DIVORCED, SEPARATED, NEVER MARRIED, OR LIVING WITH A PARTNER?: PATIENT DECLINED

## 2024-05-01 SDOH — HEALTH STABILITY: MENTAL HEALTH
HOW OFTEN DO YOU NEED TO HAVE SOMEONE HELP YOU WHEN YOU READ INSTRUCTIONS, PAMPHLETS, OR OTHER WRITTEN MATERIAL FROM YOUR DOCTOR OR PHARMACY?: RARELY

## 2024-05-01 SDOH — HEALTH STABILITY: PHYSICAL HEALTH: ON AVERAGE, HOW MANY MINUTES DO YOU ENGAGE IN EXERCISE AT THIS LEVEL?: 20 MIN

## 2024-05-01 SDOH — SOCIAL STABILITY: SOCIAL NETWORK: HOW OFTEN DO YOU ATTEND CHURCH OR RELIGIOUS SERVICES?: MORE THAN 4 TIMES PER YEAR

## 2024-05-01 SDOH — HEALTH STABILITY: MENTAL HEALTH: HOW OFTEN DO YOU HAVE A DRINK CONTAINING ALCOHOL?: NEVER

## 2024-05-01 SDOH — SOCIAL STABILITY: SOCIAL NETWORK: IN A TYPICAL WEEK, HOW MANY TIMES DO YOU TALK ON THE PHONE WITH FAMILY, FRIENDS, OR NEIGHBORS?: TWICE A WEEK

## 2024-05-01 SDOH — SOCIAL STABILITY: SOCIAL NETWORK: HOW OFTEN DO YOU ATTENT MEETINGS OF THE CLUB OR ORGANIZATION YOU BELONG TO?: NEVER

## 2024-05-01 SDOH — SOCIAL STABILITY: SOCIAL INSECURITY: WITHIN THE LAST YEAR, HAVE YOU BEEN HUMILIATED OR EMOTIONALLY ABUSED IN OTHER WAYS BY YOUR PARTNER OR EX-PARTNER?: NO

## 2024-05-01 SDOH — SOCIAL STABILITY: SOCIAL NETWORK
DO YOU BELONG TO ANY CLUBS OR ORGANIZATIONS SUCH AS CHURCH GROUPS UNIONS, FRATERNAL OR ATHLETIC GROUPS, OR SCHOOL GROUPS?: NO

## 2024-05-01 SDOH — SOCIAL STABILITY: SOCIAL INSECURITY: ABUSE: ADULT

## 2024-05-01 SDOH — SOCIAL STABILITY: SOCIAL INSECURITY: ARE THERE ANY APPARENT SIGNS OF INJURIES/BEHAVIORS THAT COULD BE RELATED TO ABUSE/NEGLECT?: NO

## 2024-05-01 SDOH — SOCIAL STABILITY: SOCIAL INSECURITY: WITHIN THE LAST YEAR, HAVE YOU BEEN AFRAID OF YOUR PARTNER OR EX-PARTNER?: NO

## 2024-05-01 SDOH — SOCIAL STABILITY: SOCIAL INSECURITY: DOES ANYONE TRY TO KEEP YOU FROM HAVING/CONTACTING OTHER FRIENDS OR DOING THINGS OUTSIDE YOUR HOME?: NO

## 2024-05-01 SDOH — HEALTH STABILITY: MENTAL HEALTH: HOW MANY STANDARD DRINKS CONTAINING ALCOHOL DO YOU HAVE ON A TYPICAL DAY?: PATIENT DOES NOT DRINK

## 2024-05-01 SDOH — SOCIAL STABILITY: SOCIAL INSECURITY: HAVE YOU HAD THOUGHTS OF HARMING ANYONE ELSE?: NO

## 2024-05-01 SDOH — SOCIAL STABILITY: SOCIAL INSECURITY: WERE YOU ABLE TO COMPLETE ALL THE BEHAVIORAL HEALTH SCREENINGS?: YES

## 2024-05-01 SDOH — HEALTH STABILITY: MENTAL HEALTH: CURRENT SMOKER: 0

## 2024-05-01 SDOH — SOCIAL STABILITY: SOCIAL INSECURITY: HAS ANYONE EVER THREATENED TO HURT YOUR FAMILY OR YOUR PETS?: NO

## 2024-05-01 SDOH — SOCIAL STABILITY: SOCIAL INSECURITY: HAVE YOU HAD ANY THOUGHTS OF HARMING ANYONE ELSE?: NO

## 2024-05-01 SDOH — SOCIAL STABILITY: SOCIAL NETWORK: HOW OFTEN DO YOU GET TOGETHER WITH FRIENDS OR RELATIVES?: ONCE A WEEK

## 2024-05-01 SDOH — SOCIAL STABILITY: SOCIAL INSECURITY: DO YOU FEEL ANYONE HAS EXPLOITED OR TAKEN ADVANTAGE OF YOU FINANCIALLY OR OF YOUR PERSONAL PROPERTY?: NO

## 2024-05-01 SDOH — ECONOMIC STABILITY: INCOME INSECURITY: IN THE PAST 12 MONTHS, HAS THE ELECTRIC, GAS, OIL, OR WATER COMPANY THREATENED TO SHUT OFF SERVICE IN YOUR HOME?: NO

## 2024-05-01 SDOH — HEALTH STABILITY: MENTAL HEALTH: HOW OFTEN DO YOU HAVE 6 OR MORE DRINKS ON ONE OCCASION?: NEVER

## 2024-05-01 SDOH — SOCIAL STABILITY: SOCIAL INSECURITY: DO YOU FEEL UNSAFE GOING BACK TO THE PLACE WHERE YOU ARE LIVING?: NO

## 2024-05-01 SDOH — SOCIAL STABILITY: SOCIAL INSECURITY: ARE YOU OR HAVE YOU BEEN THREATENED OR ABUSED PHYSICALLY, EMOTIONALLY, OR SEXUALLY BY ANYONE?: NO

## 2024-05-01 ASSESSMENT — COGNITIVE AND FUNCTIONAL STATUS - GENERAL
MOBILITY SCORE: 24
DAILY ACTIVITIY SCORE: 24
DAILY ACTIVITIY SCORE: 24
MOBILITY SCORE: 24
PATIENT BASELINE BEDBOUND: NO

## 2024-05-01 ASSESSMENT — ACTIVITIES OF DAILY LIVING (ADL)
HEARING - LEFT EAR: FUNCTIONAL
JUDGMENT_ADEQUATE_SAFELY_COMPLETE_DAILY_ACTIVITIES: YES
LACK_OF_TRANSPORTATION: NO
ADEQUATE_TO_COMPLETE_ADL: YES
DRESSING YOURSELF: INDEPENDENT
TOILETING: INDEPENDENT
WALKS IN HOME: INDEPENDENT
ASSISTIVE_DEVICE: EYEGLASSES
BATHING: INDEPENDENT
GROOMING: INDEPENDENT
HEARING - RIGHT EAR: FUNCTIONAL
PATIENT'S MEMORY ADEQUATE TO SAFELY COMPLETE DAILY ACTIVITIES?: YES
FEEDING YOURSELF: INDEPENDENT

## 2024-05-01 ASSESSMENT — LIFESTYLE VARIABLES
SKIP TO QUESTIONS 9-10: 1
HOW OFTEN DO YOU HAVE A DRINK CONTAINING ALCOHOL: NEVER
AUDIT-C TOTAL SCORE: 0
SKIP TO QUESTIONS 9-10: 1
AUDIT-C TOTAL SCORE: 0
PRESCIPTION_ABUSE_PAST_12_MONTHS: NO
AUDIT-C TOTAL SCORE: 0
SUBSTANCE_ABUSE_PAST_12_MONTHS: NO
HOW MANY STANDARD DRINKS CONTAINING ALCOHOL DO YOU HAVE ON A TYPICAL DAY: PATIENT DOES NOT DRINK
HOW OFTEN DO YOU HAVE 6 OR MORE DRINKS ON ONE OCCASION: NEVER

## 2024-05-01 ASSESSMENT — PAIN SCALES - GENERAL
PAINLEVEL_OUTOF10: 6
PAINLEVEL_OUTOF10: 6
PAINLEVEL_OUTOF10: 5 - MODERATE PAIN
PAINLEVEL_OUTOF10: 10 - WORST POSSIBLE PAIN
PAINLEVEL_OUTOF10: 9
PAINLEVEL_OUTOF10: 6
PAINLEVEL_OUTOF10: 10 - WORST POSSIBLE PAIN
PAINLEVEL_OUTOF10: 6
PAINLEVEL_OUTOF10: 8
PAINLEVEL_OUTOF10: 10 - WORST POSSIBLE PAIN
PAINLEVEL_OUTOF10: 8
PAINLEVEL_OUTOF10: 6
PAINLEVEL_OUTOF10: 8
PAINLEVEL_OUTOF10: 10 - WORST POSSIBLE PAIN
PAINLEVEL_OUTOF10: 8
PAINLEVEL_OUTOF10: 6
PAINLEVEL_OUTOF10: 8
PAINLEVEL_OUTOF10: 8

## 2024-05-01 ASSESSMENT — PAIN - FUNCTIONAL ASSESSMENT
PAIN_FUNCTIONAL_ASSESSMENT: 0-10
PAIN_FUNCTIONAL_ASSESSMENT: UNABLE TO SELF-REPORT
PAIN_FUNCTIONAL_ASSESSMENT: 0-10

## 2024-05-01 ASSESSMENT — PATIENT HEALTH QUESTIONNAIRE - PHQ9
2. FEELING DOWN, DEPRESSED OR HOPELESS: NOT AT ALL
1. LITTLE INTEREST OR PLEASURE IN DOING THINGS: NOT AT ALL
SUM OF ALL RESPONSES TO PHQ9 QUESTIONS 1 & 2: 0

## 2024-05-01 ASSESSMENT — PAIN DESCRIPTION - ORIENTATION: ORIENTATION: MID

## 2024-05-01 ASSESSMENT — PAIN DESCRIPTION - LOCATION: LOCATION: ABDOMEN

## 2024-05-01 NOTE — PERIOPERATIVE NURSING NOTE
Dr. Mcdaniel at the bedside and stated ok to give hydralazine in pacu. Order placed by Dr. Mcdaniel.

## 2024-05-01 NOTE — ANESTHESIA POSTPROCEDURE EVALUATION
Patient: Mk Fleming    Procedure Summary       Date: 05/01/24 Room / Location: [facility] OR 15 / [facility] OR    Anesthesia Start: 0844 Anesthesia Stop: 1230    Procedure: Cholecystectomy Laparoscopy possible open (Abdomen) Diagnosis:       Gallstones      Biliary cirrhosis (Multi)      (Gallstones [K80.20])      (Biliary cirrhosis (Multi) [K74.5])    Surgeons: Reza Salas MD Responsible Provider: Homero Alvarez MD    Anesthesia Type: general ASA Status: 3            Anesthesia Type: general    Vitals Value Taken Time   /61 05/01/24 1233   Temp 36.3 05/01/24 1233   Pulse 91 05/01/24 1233   Resp 16 05/01/24 1233   SpO2 98 05/01/24 1233       Anesthesia Post Evaluation    Patient location during evaluation: PACU  Patient participation: complete - patient participated  Level of consciousness: awake  Pain management: adequate  Airway patency: patent  Cardiovascular status: acceptable  Respiratory status: acceptable  Hydration status: acceptable  Postoperative Nausea and Vomiting: none  Comments: Patient is awake and alert.  No current complaints;  PACU nurse at bedside.  Pain reasonably controlled.  Normothermic.  Euvolemic and hemodynamically stable.  Stable respiratory status;  no current nausea or emesis.  Indicated PACU care given.    Homero Alvarez MD    No notable events documented.

## 2024-05-01 NOTE — ANESTHESIA PROCEDURE NOTES
Airway  Date/Time: 5/1/2024 9:01 AM  Urgency: elective    Difficult airway    Staffing  Performed: attending   Authorized by: Homero Alvarez MD    Performed by: GAYATHRI Segovia  Patient location during procedure: OR    Indications and Patient Condition  Indications for airway management: anesthesia  Spontaneous ventilation: present  Sedation level: moderate (conscious sedation)  Preoxygenated: yes  Patient position: sniffing  Mask difficulty assessment: 0 - not attempted  Planned trial extubation    Final Airway Details  Final airway type: endotracheal airway      Successful airway: ETT  Cuffed: yes   Successful intubation technique: flexible bronchoscopy  Endotracheal tube insertion site: oral  ETT size (mm): 6.0  Placement verified by: bronchoscopy and capnometry   Number of attempts at approach: 1  Ventilation between attempts: none    Additional Comments  Elective awake fiberoptic intubation secondary to history of difficult airway  6 ml 4% viscous lidocaine nebulized into airway

## 2024-05-01 NOTE — ANESTHESIA PREPROCEDURE EVALUATION
Patient: Mk Fleming    Procedure Information       Date/Time: 05/01/24 0815    Procedure: Cholecystectomy Laparoscopy possible open (Abdomen)    Location: [facility] OR 15 / [facility] OR    Surgeons: Reza Salas MD            Relevant Problems   Cardiac   (+) Hypertension   (+) PAD (peripheral artery disease) (CMS-HCC)      Pulmonary   (+) COPD (chronic obstructive pulmonary disease) (Multi)   (+) Emphysema, unspecified (Multi)      GI   (+) Duodenal ulcer   (+) Dysphagia, oropharyngeal phase   (+) Upper GI bleed      /Renal   (+) Enlarged prostate with lower urinary tract symptoms (LUTS)      Liver   (+) Cholelithiasis   (+) Chronic cholecystitis   (+) Cirrhosis of liver (Multi)   (+) Gallstones   (+) Hepatitis C virus      Endocrine   (+) Hypothyroidism      Hematology   (+) Anemia      Musculoskeletal   (+) Chronic low back pain      ID   (+) Hepatitis C virus       Clinical information reviewed:   Tobacco  Allergies  Meds  Problems  Med Hx  Surg Hx   Fam Hx  Soc   Hx        NPO Detail:  NPO/Void Status  Carbohydrate Drink Given Prior to Surgery? : N  Date of Last Liquid: 05/01/24  Time of Last Liquid: 0000  Date of Last Solid: 05/01/24  Time of Last Solid: 0000  Last Intake Type: Clear fluids  Time of Last Void: 0635         Physical Exam    Airway  Mallampati: IV  TM distance: >3 FB  Neck ROM: limited     Cardiovascular - normal exam  Rhythm: regular  Rate: normal     Dental   (+) upper dentures, lower dentures     Pulmonary    Abdominal          Anesthesia Plan    History of general anesthesia?: yes  History of complications of general anesthesia?: no    ASA 3     general     The patient is not a current smoker.    intravenous induction   Anesthetic plan and risks discussed with patient.  Use of blood products discussed with who consented to blood products.    Plan discussed with CAA.    Plan general endotracheal anesthesia with peripheral IV placement and ASA standard noninvasive  monitors.  Awake fiberoptic intubation planned;  process explained to patient in detail and all patient questions answered.  The possibility of blood product transfusion was also described in detail.  Risks, benefits, alternatives of this plan were described in detail to the patient, who indicated understanding and agreed to proceed.    Homero Alvarez MD

## 2024-05-01 NOTE — BRIEF OP NOTE
Date: 2024  OR Location: [facility] OR    Name: Mk Fleming, : 1950, Age: 73 y.o., MRN: [id number], Sex: male    Diagnosis  Pre-op Diagnosis     * Gallstones [K80.20]     * Biliary cirrhosis (Multi) [K74.5] Post-op Diagnosis     * Gallstones [K80.20]     * Biliary cirrhosis (Multi) [K74.5]     Procedures  Cholecystectomy Laparoscopy possible open  69211 - SD LAPAROSCOPY SURG CHOLECYSTECTOMY      Surgeons      * Reza Salas - Primary    Resident/Fellow/Other Assistant:  Surgeons and Role:     * Cade Mcdaniel MD - Assisting     * Tamir Sharp MD - Resident - Assisting    Procedure Summary  Anesthesia: General  ASA: III  Anesthesia Staff: Anesthesiologist: Homero Alvarez MD  C-AA: GAYATHRI Segovia  Estimated Blood Loss: 100 mL  Intra-op Medications: Administrations occurring from 0815 to 1030 on 24:  * No intraprocedure medications in log *           Anesthesia Record               Intraprocedure I/O Totals          Intake    LR bolus 1600.00 mL    Phenylephrine Drip 0.00 mL    The total shown is the total volume documented since Anesthesia Start was filed.    Total Intake 1600 mL       Output    Urine 240 mL    Est. Blood Loss 100 mL    Total Output 340 mL       Net    Net Volume 1260 mL          Specimen:   ID Type Source Tests Collected by Time   1 : GALLBLADDER Tissue GALLBLADDER CHOLECYSTECTOMY SURGICAL PATHOLOGY EXAM Reza Salas MD 2024 1113        Staff:   Circulator: Varun Srinivasan RN; Carmen Colón RN  Relief Circulator: Bess Infante RN  Scrub Person: Aleyda Fitzgerald RN; Zoë Jay RN          Findings: Hydrops, no clearly identified cystic duct, likely candidate clipped x3, no obvious bilious leakage GB entered, stones removed    Complications:  None; patient tolerated the procedure well.     Disposition:  Admit LT9  Condition: stable  Specimens Collected:   ID Type Source Tests Collected by Time   1 : GALLBLADDER Tissue GALLBLADDER CHOLECYSTECTOMY SURGICAL  PATHOLOGY EXAM Reza Salas MD 5/1/2024 1113         Reza Salas  Phone Number: [phone]

## 2024-05-01 NOTE — PERIOPERATIVE NURSING NOTE
1225- Patient received pain medication , dilaudid , at bedside from anesthesia upon arrival to pacu. See Epic.

## 2024-05-01 NOTE — PROGRESS NOTES
"Post op Exam     Mk Fleming is a 73 y.o. male on day 0 of admission 5 hous post op lap darinel with drain placement. Pain controlled at this time. Had some urinary retention resulting in briscoe placement. No nausea or vomiting. Tachy to the low 100s. Bps normotensive . Had some urinary retention resulting in briscoe placement       Objective     Physical Exam  Constitutional:       General: He is not in acute distress.     Appearance: Normal appearance. He is not ill-appearing.   HENT:      Right Ear: External ear normal.      Left Ear: External ear normal.   Eyes:      Pupils: Pupils are equal, round, and reactive to light.   Cardiovascular:      Rate and Rhythm: Tachycardia present.   Abdominal:      General: There is no distension.      Tenderness: There is abdominal tenderness (around incisions).      Comments: RUQ drain  in place 250 ml out sanguineous, non bilious incisions intact no drainage    Skin:     General: Skin is warm.   Neurological:      General: No focal deficit present.      Mental Status: He is alert and oriented to person, place, and time.         Last Recorded Vitals  Blood pressure 142/64, pulse 106, temperature 36.3 °C (97.3 °F), resp. rate 12, height 1.702 m (5' 7\"), weight 60.8 kg (134 lb 0.6 oz), SpO2 96%.  Intake/Output last 3 Shifts:  No intake/output data recorded.    Relevant Results                This patient has a urinary catheter   Reason for the urinary catheter remaining today? urinary retention/bladder outlet obstruction, acute or chronic    Plan    - Admit to transplant service   - NPO, okay for tube feeds  - PRN pain and nausea control   - Drain checks  - morning labs   - Calos Sharp MD   Transplant Resident            "

## 2024-05-01 NOTE — ANESTHESIA PROCEDURE NOTES
Peripheral IV  Date/Time: 5/1/2024 9:12 AM      Placement  Needle size: 16 G  Laterality: right  Location: wrist  Local anesthetic: none  Site prep: alcohol  Technique: anatomical landmarks  Attempts: 1

## 2024-05-01 NOTE — ANESTHESIA PROCEDURE NOTES
Peripheral IV  Date/Time: 5/1/2024 6:55 AM      Placement  Needle size: 22 G  Laterality: left  Location: hand  Local anesthetic: none  Site prep: alcohol  Technique: anatomical landmarks  Attempts: 1

## 2024-05-01 NOTE — PERIOPERATIVE NURSING NOTE
Bladder scan at 1600 was 220ml. Refused the briscoe. Dr. Mcdaniel notified.  Bladder scan at 1700 was 239ml. Patient stated he felt like he had to void a lot and said he would take the briscoe now.

## 2024-05-02 LAB
ALBUMIN SERPL BCP-MCNC: 3.3 G/DL (ref 3.4–5)
ALP SERPL-CCNC: 59 U/L (ref 33–136)
ALT SERPL W P-5'-P-CCNC: 8 U/L (ref 10–52)
ANION GAP SERPL CALC-SCNC: 15 MMOL/L (ref 10–20)
AST SERPL W P-5'-P-CCNC: 29 U/L (ref 9–39)
BASOPHILS # BLD AUTO: 0.03 X10*3/UL (ref 0–0.1)
BASOPHILS NFR BLD AUTO: 0.4 %
BILIRUB DIRECT SERPL-MCNC: 0.2 MG/DL (ref 0–0.3)
BILIRUB SERPL-MCNC: 0.4 MG/DL (ref 0–1.2)
BUN SERPL-MCNC: 72 MG/DL (ref 6–23)
CALCIUM SERPL-MCNC: 8.7 MG/DL (ref 8.6–10.6)
CHLORIDE SERPL-SCNC: 99 MMOL/L (ref 98–107)
CO2 SERPL-SCNC: 27 MMOL/L (ref 21–32)
CREAT SERPL-MCNC: 2.23 MG/DL (ref 0.5–1.3)
EGFRCR SERPLBLD CKD-EPI 2021: 30 ML/MIN/1.73M*2
EOSINOPHIL # BLD AUTO: 0.11 X10*3/UL (ref 0–0.4)
EOSINOPHIL NFR BLD AUTO: 1.4 %
ERYTHROCYTE [DISTWIDTH] IN BLOOD BY AUTOMATED COUNT: 13.7 % (ref 11.5–14.5)
GLUCOSE SERPL-MCNC: 98 MG/DL (ref 74–99)
HCT VFR BLD AUTO: 28.9 % (ref 41–52)
HGB BLD-MCNC: 9.1 G/DL (ref 13.5–17.5)
IMM GRANULOCYTES # BLD AUTO: 0.03 X10*3/UL (ref 0–0.5)
IMM GRANULOCYTES NFR BLD AUTO: 0.4 % (ref 0–0.9)
LYMPHOCYTES # BLD AUTO: 0.56 X10*3/UL (ref 0.8–3)
LYMPHOCYTES NFR BLD AUTO: 6.9 %
MAGNESIUM SERPL-MCNC: 2.37 MG/DL (ref 1.6–2.4)
MCH RBC QN AUTO: 31.7 PG (ref 26–34)
MCHC RBC AUTO-ENTMCNC: 31.5 G/DL (ref 32–36)
MCV RBC AUTO: 101 FL (ref 80–100)
MONOCYTES # BLD AUTO: 0.68 X10*3/UL (ref 0.05–0.8)
MONOCYTES NFR BLD AUTO: 8.4 %
NEUTROPHILS # BLD AUTO: 6.72 X10*3/UL (ref 1.6–5.5)
NEUTROPHILS NFR BLD AUTO: 82.5 %
NRBC BLD-RTO: 0 /100 WBCS (ref 0–0)
PHOSPHATE SERPL-MCNC: 4.2 MG/DL (ref 2.5–4.9)
PLATELET # BLD AUTO: 133 X10*3/UL (ref 150–450)
POTASSIUM SERPL-SCNC: 4.2 MMOL/L (ref 3.5–5.3)
PROT SERPL-MCNC: 7 G/DL (ref 6.4–8.2)
RBC # BLD AUTO: 2.87 X10*6/UL (ref 4.5–5.9)
SODIUM SERPL-SCNC: 137 MMOL/L (ref 136–145)
WBC # BLD AUTO: 8.1 X10*3/UL (ref 4.4–11.3)

## 2024-05-02 PROCEDURE — 2500000001 HC RX 250 WO HCPCS SELF ADMINISTERED DRUGS (ALT 637 FOR MEDICARE OP): Performed by: STUDENT IN AN ORGANIZED HEALTH CARE EDUCATION/TRAINING PROGRAM

## 2024-05-02 PROCEDURE — 85025 COMPLETE CBC W/AUTO DIFF WBC: CPT | Performed by: SURGERY

## 2024-05-02 PROCEDURE — 2500000001 HC RX 250 WO HCPCS SELF ADMINISTERED DRUGS (ALT 637 FOR MEDICARE OP): Performed by: SURGERY

## 2024-05-02 PROCEDURE — 97161 PT EVAL LOW COMPLEX 20 MIN: CPT | Mod: GP

## 2024-05-02 PROCEDURE — 2500000004 HC RX 250 GENERAL PHARMACY W/ HCPCS (ALT 636 FOR OP/ED): Performed by: SURGERY

## 2024-05-02 PROCEDURE — 36415 COLL VENOUS BLD VENIPUNCTURE: CPT | Performed by: SURGERY

## 2024-05-02 PROCEDURE — 99232 SBSQ HOSP IP/OBS MODERATE 35: CPT | Performed by: TRANSPLANT SURGERY

## 2024-05-02 PROCEDURE — 80048 BASIC METABOLIC PNL TOTAL CA: CPT | Performed by: SURGERY

## 2024-05-02 PROCEDURE — 83735 ASSAY OF MAGNESIUM: CPT | Performed by: SURGERY

## 2024-05-02 PROCEDURE — 84100 ASSAY OF PHOSPHORUS: CPT | Performed by: SURGERY

## 2024-05-02 PROCEDURE — 2500000004 HC RX 250 GENERAL PHARMACY W/ HCPCS (ALT 636 FOR OP/ED): Performed by: STUDENT IN AN ORGANIZED HEALTH CARE EDUCATION/TRAINING PROGRAM

## 2024-05-02 PROCEDURE — 82248 BILIRUBIN DIRECT: CPT | Performed by: STUDENT IN AN ORGANIZED HEALTH CARE EDUCATION/TRAINING PROGRAM

## 2024-05-02 PROCEDURE — 1100000001 HC PRIVATE ROOM DAILY

## 2024-05-02 RX ORDER — ADHESIVE BANDAGE
30 BANDAGE TOPICAL ONCE
Status: COMPLETED | OUTPATIENT
Start: 2024-05-02 | End: 2024-05-02

## 2024-05-02 RX ORDER — BISACODYL 10 MG/1
10 SUPPOSITORY RECTAL DAILY
Status: DISCONTINUED | OUTPATIENT
Start: 2024-05-02 | End: 2024-05-04

## 2024-05-02 RX ORDER — SENNOSIDES 8.6 MG/1
2 TABLET ORAL NIGHTLY
Status: DISCONTINUED | OUTPATIENT
Start: 2024-05-02 | End: 2024-05-04 | Stop reason: HOSPADM

## 2024-05-02 RX ORDER — OXYCODONE HYDROCHLORIDE 5 MG/1
10 TABLET ORAL EVERY 4 HOURS PRN
Status: DISCONTINUED | OUTPATIENT
Start: 2024-05-02 | End: 2024-05-03

## 2024-05-02 RX ORDER — METHOCARBAMOL 500 MG/1
500 TABLET, FILM COATED ORAL EVERY 8 HOURS SCHEDULED
Status: DISCONTINUED | OUTPATIENT
Start: 2024-05-02 | End: 2024-05-04 | Stop reason: HOSPADM

## 2024-05-02 RX ORDER — OXYCODONE HYDROCHLORIDE 5 MG/1
10 TABLET ORAL EVERY 4 HOURS PRN
Status: DISCONTINUED | OUTPATIENT
Start: 2024-05-02 | End: 2024-05-02

## 2024-05-02 RX ORDER — POLYETHYLENE GLYCOL 3350 17 G/17G
17 POWDER, FOR SOLUTION ORAL 2 TIMES DAILY
Status: DISCONTINUED | OUTPATIENT
Start: 2024-05-02 | End: 2024-05-04

## 2024-05-02 RX ADMIN — ATORVASTATIN CALCIUM 10 MG: 10 TABLET, FILM COATED ORAL at 21:01

## 2024-05-02 RX ADMIN — ACETAMINOPHEN 650 MG: 650 SOLUTION ORAL at 09:46

## 2024-05-02 RX ADMIN — POLYETHYLENE GLYCOL 3350 17 G: 17 POWDER, FOR SOLUTION ORAL at 21:00

## 2024-05-02 RX ADMIN — OXYCODONE HYDROCHLORIDE 10 MG: 5 TABLET ORAL at 23:11

## 2024-05-02 RX ADMIN — MAGNESIUM HYDROXIDE 30 ML: 400 SUSPENSION ORAL at 18:38

## 2024-05-02 RX ADMIN — ACETAMINOPHEN 650 MG: 650 SOLUTION ORAL at 03:14

## 2024-05-02 RX ADMIN — ATENOLOL 50 MG: 50 TABLET ORAL at 09:42

## 2024-05-02 RX ADMIN — ACETAMINOPHEN 650 MG: 650 SOLUTION ORAL at 18:38

## 2024-05-02 RX ADMIN — SENNOSIDES 17.2 MG: 8.6 TABLET, FILM COATED ORAL at 21:01

## 2024-05-02 RX ADMIN — METHOCARBAMOL 500 MG: 500 TABLET ORAL at 18:38

## 2024-05-02 RX ADMIN — POLYETHYLENE GLYCOL 3350 17 G: 17 POWDER, FOR SOLUTION ORAL at 11:21

## 2024-05-02 RX ADMIN — CEFAZOLIN SODIUM 2 G: 2 INJECTION, SOLUTION INTRAVENOUS at 04:16

## 2024-05-02 RX ADMIN — SODIUM CHLORIDE, POTASSIUM CHLORIDE, SODIUM LACTATE AND CALCIUM CHLORIDE 50 ML/HR: 600; 310; 30; 20 INJECTION, SOLUTION INTRAVENOUS at 04:28

## 2024-05-02 RX ADMIN — OXYCODONE HYDROCHLORIDE 10 MG: 5 TABLET ORAL at 04:18

## 2024-05-02 RX ADMIN — OXYCODONE HYDROCHLORIDE 10 MG: 5 TABLET ORAL at 18:38

## 2024-05-02 RX ADMIN — OXYCODONE HYDROCHLORIDE 10 MG: 5 TABLET ORAL at 14:34

## 2024-05-02 RX ADMIN — OXYCODONE HYDROCHLORIDE 10 MG: 5 TABLET ORAL at 09:42

## 2024-05-02 RX ADMIN — LEVOTHYROXINE SODIUM 50 MCG: 50 TABLET ORAL at 06:17

## 2024-05-02 RX ADMIN — ESOMEPRAZOLE MAGNESIUM 40 MG: 40 FOR SUSPENSION ORAL at 06:17

## 2024-05-02 ASSESSMENT — PAIN SCALES - GENERAL
PAINLEVEL_OUTOF10: 9
PAINLEVEL_OUTOF10: 10 - WORST POSSIBLE PAIN
PAINLEVEL_OUTOF10: 9
PAINLEVEL_OUTOF10: 10 - WORST POSSIBLE PAIN

## 2024-05-02 ASSESSMENT — PAIN DESCRIPTION - ORIENTATION: ORIENTATION: MID

## 2024-05-02 ASSESSMENT — COGNITIVE AND FUNCTIONAL STATUS - GENERAL
WALKING IN HOSPITAL ROOM: A LITTLE
CLIMB 3 TO 5 STEPS WITH RAILING: A LOT
DAILY ACTIVITIY SCORE: 24
TURNING FROM BACK TO SIDE WHILE IN FLAT BAD: A LITTLE
MOVING TO AND FROM BED TO CHAIR: A LITTLE
CLIMB 3 TO 5 STEPS WITH RAILING: A LOT
STANDING UP FROM CHAIR USING ARMS: A LITTLE
DAILY ACTIVITIY SCORE: 24
MOBILITY SCORE: 17
STANDING UP FROM CHAIR USING ARMS: A LITTLE
WALKING IN HOSPITAL ROOM: A LITTLE
MOVING FROM LYING ON BACK TO SITTING ON SIDE OF FLAT BED WITH BEDRAILS: A LITTLE
MOVING FROM LYING ON BACK TO SITTING ON SIDE OF FLAT BED WITH BEDRAILS: A LITTLE
MOVING TO AND FROM BED TO CHAIR: A LITTLE
TURNING FROM BACK TO SIDE WHILE IN FLAT BAD: A LITTLE
MOBILITY SCORE: 17

## 2024-05-02 ASSESSMENT — PAIN SCALES - PAIN ASSESSMENT IN ADVANCED DEMENTIA (PAINAD): TOTALSCORE: MEDICATION (SEE MAR)

## 2024-05-02 ASSESSMENT — PAIN - FUNCTIONAL ASSESSMENT
PAIN_FUNCTIONAL_ASSESSMENT: 0-10

## 2024-05-02 ASSESSMENT — PAIN DESCRIPTION - LOCATION
LOCATION: ABDOMEN
LOCATION: ABDOMEN

## 2024-05-02 ASSESSMENT — ACTIVITIES OF DAILY LIVING (ADL): ADL_ASSISTANCE: INDEPENDENT

## 2024-05-02 NOTE — PROGRESS NOTES
Physical Therapy    Physical Therapy Evaluation    Patient Name: kM Fleming  MRN: [id number]  Today's Date: 5/2/2024   Time Calculation  Start Time: 0921  Stop Time: 0936  Time Calculation (min): 15 min    Assessment/Plan   PT Assessment  PT Assessment Results: Decreased strength, Decreased range of motion, Decreased endurance, Impaired balance, Decreased mobility, Pain, Decreased safety awareness  Rehab Prognosis: Good  End of Session Communication: Bedside nurse  End of Session Patient Position: Bed, 3 rail up, Alarm on  IP OR SWING BED PT PLAN  Inpatient or Swing Bed: Inpatient  PT Plan  Treatment/Interventions: Bed mobility, Transfer training, Gait training, Balance training, Strengthening  PT Plan: Skilled PT  PT Frequency: 3 times per week  PT Discharge Recommendations: Low intensity level of continued care  Equipment Recommended upon Discharge: Wheeled walker  PT Recommended Transfer Status: Assist x1  PT - OK to Discharge: Yes      Subjective   General Visit Information:  Prior to Session Communication: Bedside nurse  Patient Position Received:  (pt standing at EOB using urinal)   Home Living:  Home Living  Type of Home: Apartment  Lives With: Alone (stated his sister is able to assist)  Home Adaptive Equipment:  (standard walker)  Home Layout: One level  Home Access: Elevator  Prior Level of Function:  Prior Function Per Pt/Caregiver Report  ADL Assistance: Independent  Homemaking Assistance: Needs assistance (has a HHA 1x/wk that assists with IADL's)  Ambulatory Assistance: Independent  Prior Function Comments: (-) drive  Precautions:  Precautions  Medical Precautions: Abdominal precautions, Fall precautions       Objective     Pain:  Pain Assessment  Pain Assessment: 0-10  Pain Score: 10 - Worst possible pain  Pain Location: Abdomen (RN informed pt requesting pain medication)  Cognition:  Cognition  Overall Cognitive Status: Within Functional Limits      Extremity/Trunk Assessments:  Strength:     ANIE    RUE : Exceptions to WFL  RUE AROM (degrees)  RUE AROM Comment:  (shoulder flex ~90 degrees with c/o pain)  RUE Strength  RUE Overall Strength: Greater than or equal to 3/5 as evidenced by functional mobility  LUE   LUE: Exceptions to WFL  LUE AROM (degrees)  LUE AROM Comment: shoulder flex ~90 degrees with c/o pain  LUE Strength  LUE Overall Strength: Greater than or equal to 3/5 as evidenced by functional mobility  RLE   RLE : Within Functional Limits  LLE   LLE : Within Functional Limits    General Assessments:     Sensation  Light Touch: No apparent deficits     Static Sitting Balance  Static Sitting-Level of Assistance: Distant supervision  Dynamic Sitting Balance  Dynamic Sitting-Comments: SBA  Static Standing Balance  Static Standing-Level of Assistance: Close supervision (with walker)  Dynamic Standing Balance  Dynamic Standing-Comments: CGA with walker    Functional Assessments:  Bed Mobility  Bed Mobility: Yes  Bed Mobility 1  Bed Mobility 1: Sitting to supine  Level of Assistance 1: Contact guard  Bed Mobility Comments 1: verbal cues for logroll  Transfers  Transfer: Yes  Transfer 1  Technique 1: Sit to stand, Stand to sit  Transfer Device 1: Walker  Transfer Level of Assistance 1: Contact guard  Trials/Comments 1: verbal cues for hand placement  Ambulation/Gait Training  Ambulation/Gait Training Performed: Yes  Ambulation/Gait Training 1  Device 1: Rolling walker  Assistance 1: Contact guard  Quality of Gait 1: Decreased step length, Forward flexed posture, Diminished heel strike (decreased stanley; verbal cues for safe positioning of walker)  Comments/Distance (ft) 1: 80 feet          Outcome Measures:  [facility] Basic Mobility  Turning from your back to your side while in a flat bed without using bedrails: A little  Moving from lying on your back to sitting on the side of a flat bed without using bedrails: A little  Moving to and from bed to chair (including a wheelchair): A little  Standing up from a  chair using your arms (e.g. wheelchair or bedside chair): A little  To walk in hospital room: A little  Climbing 3-5 steps with railing: A lot  Basic Mobility - Total Score: 17          Encounter Problems       Encounter Problems (Active)       Balance       No LOB with transfers/ambulation        Start:  05/02/24    Expected End:  05/23/24               Mobility       STG - Patient will ambulate 150 feet with walker modified independent        Start:  05/02/24    Expected End:  05/23/24               PT Transfers       STG - Patient will perform bed mobility independent        Start:  05/02/24    Expected End:  05/23/24            STG - Patient will transfer sit to and from stand with walker modified independent        Start:  05/02/24    Expected End:  05/23/24                   Education Documentation  Precautions, taught by Chelsea Zuñiga PT at 5/2/2024 10:10 AM.  Learner: Patient  Readiness: Acceptance  Method: Explanation  Response: Needs Reinforcement    Body Mechanics, taught by Chelsea Zuñiga PT at 5/2/2024 10:10 AM.  Learner: Patient  Readiness: Acceptance  Method: Explanation  Response: Needs Reinforcement    Mobility Training, taught by Chelsea Zuñiga PT at 5/2/2024 10:10 AM.  Learner: Patient  Readiness: Acceptance  Method: Explanation  Response: Needs Reinforcement    Education Comments  No comments found.            05/02/24 at 10:10 AM   Chelsea Zuñiga PT

## 2024-05-02 NOTE — PROGRESS NOTES
"Mk Fleming is a 73 y.o. male on day 1 of admission presenting with Gallstones.    Subjective   Doing well after lap darinel       Objective   Vitals:    05/02/24 0719   BP: 166/74   Pulse: 76   Resp: 16   Temp: 36.8 °C (98.2 °F)   SpO2: 90%       Physical Exam  Constitutional:       Appearance: Normal appearance.   Eyes:      Pupils: Pupils are equal, round, and reactive to light.   Cardiovascular:      Rate and Rhythm: Normal rate.   Pulmonary:      Effort: Pulmonary effort is normal. No respiratory distress.   Abdominal:      General: There is no distension.      Palpations: Abdomen is soft.      Comments: Incision clean, dry, intact  SOL: SS   Musculoskeletal:         General: Normal range of motion.      Right lower leg: No edema.      Left lower leg: No edema.   Skin:     General: Skin is warm and dry.   Neurological:      General: No focal deficit present.      Mental Status: He is alert and oriented to person, place, and time.   Psychiatric:         Mood and Affect: Mood normal.         Behavior: Behavior normal.         Last Recorded Vitals  Blood pressure 166/74, pulse 76, temperature 36.8 °C (98.2 °F), temperature source Temporal, resp. rate 16, height 1.702 m (5' 7\"), weight 60.8 kg (134 lb 0.6 oz), SpO2 90%.  Intake/Output last 3 Shifts:  I/O last 3 completed shifts:  In: 2620.9 (43.1 mL/kg) [I.V.:920.9 (15.1 mL/kg); IV Piggyback:1700]  Out: 915 (15 mL/kg) [Urine:590 (0.3 mL/kg/hr); Drains:225; Blood:100]  Weight: 60.8 kg     Relevant Results  Lab Results   Component Value Date    WBC 8.1 05/02/2024    HGB 9.1 (L) 05/02/2024    HCT 28.9 (L) 05/02/2024     (H) 05/02/2024     (L) 05/02/2024     Lab Results   Component Value Date    GLUCOSE 98 05/02/2024    CALCIUM 8.7 05/02/2024     05/02/2024    K 4.2 05/02/2024    CO2 27 05/02/2024    CL 99 05/02/2024    BUN 72 (H) 05/02/2024    CREATININE 2.23 (H) 05/02/2024     Lab Results   Component Value Date    ALT 8 (L) 05/02/2024    AST 29 " 05/02/2024    ALKPHOS 59 05/02/2024    BILITOT 0.4 05/02/2024         Assessment/Plan   Principal Problem:    Gallstones  Active Problems:    Cirrhosis of liver (Multi)    Compensated cirrhotic s/p lap darinel  Difficult cholecystectomy with chronically occluded cystic duct   Doing well  PEG tube dependent, miralax via G tube  Remove briscoe today  Drain: clear      I spent 35 minutes in the professional and overall care of this patient.      Reza Salas MD

## 2024-05-02 NOTE — CARE PLAN
The patient's goals for the shift include      The clinical goals for the shift include safety      Problem: Fall/Injury  Goal: Not fall by end of shift  Outcome: Progressing  Goal: Be free from injury by end of the shift  Outcome: Progressing  Goal: Verbalize understanding of personal risk factors for fall in the hospital  Outcome: Progressing  Goal: Verbalize understanding of risk factor reduction measures to prevent injury from fall in the home  Outcome: Progressing  Goal: Use assistive devices by end of the shift  Outcome: Progressing  Goal: Pace activities to prevent fatigue by end of the shift  Outcome: Progressing     Problem: Pain  Goal: Takes deep breaths with improved pain control throughout the shift  Outcome: Progressing  Goal: Turns in bed with improved pain control throughout the shift  Outcome: Progressing  Goal: Walks with improved pain control throughout the shift  Outcome: Progressing  Goal: Performs ADL's with improved pain control throughout shift  Outcome: Progressing  Goal: Participates in PT with improved pain control throughout the shift  Outcome: Progressing  Goal: Free from opioid side effects throughout the shift  Outcome: Progressing  Goal: Free from acute confusion related to pain meds throughout the shift  Outcome: Progressing

## 2024-05-02 NOTE — CONSULTS
Nutrition Initial Assessment:   Nutrition Assessment    Reason for Assessment: Provider consult order, Tube feeding recommendations    Patient is a 73 y.o. male presenting for scheduled lap cholecystectomy for treatment of cholelithiasis and biliary cirrhosis (5/1)      Per H&P: Preoperative evaluation of anemia, cholelithiasis, cirrhosis, CKD stage III, COPD that is well-controlled with no recent exacerbations, GERD, hepatitis C status post retroviral therapy, hypertension, hyperlipidemia, hypothyroidism, laryngeal cancer status post chemo and radiation with significant dysphagia and PEG dependency, PAD.     Past Medical History:   Diagnosis    Anemia    Cholecystitis    Cholelithiasis    Cirrhosis (Multi)    CKD (chronic kidney disease)    stage 3    COPD (chronic obstructive pulmonary disease) (Multi)    Dysphagia    peg dependent    GERD (gastroesophageal reflux disease)    HCV (hepatitis C virus)    s/p SVR    Hoarseness of voice    Hyperlipidemia    Hypertension    Hypothyroidism    Laryngeal cancer (Multi)    s/p chemo and radiation therapy    PAD (peripheral artery disease) (CMS-HCC)    Personal history of other diseases of the respiratory system    History of bronchitis    Personal history of other specified conditions    History of chest pain    Pulmonary emphysema (Multi)     Past Surgical History:   Procedure Laterality Date    ESOPHAGOGASTRODUODENOSCOPY      HERNIA REPAIR      IR ANGIOGRAM AORTA ABDOMEN  07/03/2022    IR ANGIOGRAM AORTA ABDOMEN 7/3/2022 [facility]    IR ANGIOGRAM INFERIOR EPIGASTRIC  07/03/2022    IR ANGIOGRAM INFERIOR EPIGASTRIC 7/3/2022 [facility]    IR EMBOLIZATION LYMPH NODE Bilateral 07/03/2022    IR EMBOLIZATION LYMPH NODE 7/3/2022 [facility]ICAL [facility]    OTHER SURGICAL HISTORY  11/19/2018    Tooth extraction    OTHER SURGICAL HISTORY  11/19/2018    Pulmonary decortication    OTHER SURGICAL HISTORY  10/20/2022    Percutaneous endoscopic gastrostomy tube  "insertion         Nutrition History:  Energy Intake: Good > 75 %  Food and Nutrient History: Met with patient this morning. Pt reports that he does not eat anything by mouth and had 4 Boost drinks a day. Pt reports he flushes his PEG with 2 syringes unclear as to size.  Food Allergies/Intolerances:  None  GI Symptoms: Abdominal pain  Oral Problems: Chewing difficulty, Swallowing difficulty, and Pt is NPO       Anthropometrics:  Height: 170.2 cm (5' 7\")   Weight: 60.8 kg (134 lb 0.6 oz)   BMI (Calculated): 20.99  IBW/kg (Dietitian Calculated): 67.3 kg  Percent of IBW: 90 %       Weight History:   Wt Readings from Last 16 Encounters:   05/01/24 60.8 kg (134 lb 0.6 oz)   04/25/24 62.1 kg (136 lb 12.8 oz) (2% wt loss x ~ 1 week) - significant   04/22/24 62.2 kg (137 lb 0.5 oz)   04/17/24 60.8 kg (134 lb)   02/07/24 62.1 kg (137 lb)   02/06/24 62.2 kg (137 lb 1.6 oz)   01/24/24 63.5 kg (140 lb)   01/19/24 62.1 kg (137 lb)   01/18/24 62.1 kg (137 lb)   01/04/24 64 kg (141 lb)   12/20/23 64 kg (141 lb)   12/13/23 64 kg (141 lb)   10/23/23 63.2 kg (139 lb 6.4 oz)   10/11/23 63.5 kg (140 lb)   07/28/23 62.3 kg (137 lb 6.4 oz)   05/24/23 64.4 kg (142 lb) (6% wt loss x ~ 1 year) - not significant         Weight Change %:  Weight History / % Weight Change: Pt reports weight loss in the past week but unsure as to amount  Significant Weight Loss: Yes  Interpretation of Weight Loss: 1-2% in 1 week    Nutrition Focused Physical Exam Findings:  defer: pt with other service and in pain     Nutrition Significant Labs:  CBC Trend:   Results from last 7 days   Lab Units 05/02/24  0624 05/01/24  1414   WBC AUTO x10*3/uL 8.1 6.9   RBC AUTO x10*6/uL 2.87* 3.10*   HEMOGLOBIN g/dL 9.1* 9.8*   HEMATOCRIT % 28.9* 29.1*   MCV fL 101* 94   PLATELETS AUTO x10*3/uL 133* 157    Liver Function Trend:   Results from last 7 days   Lab Units 05/02/24  0624 05/01/24  1348   ALK PHOS U/L 59 71   AST U/L 29 33   ALT U/L 8* 15   BILIRUBIN TOTAL mg/dL 0.4 " 0.9    , Renal Lab Trend:   Results from last 7 days   Lab Units 05/02/24  0624 05/01/24  1348   POTASSIUM mmol/L 4.2 4.9   PHOSPHORUS mg/dL 4.2  --    SODIUM mmol/L 137 138   MAGNESIUM mg/dL 2.37  --    EGFR mL/min/1.73m*2 30* 30*   BUN mg/dL 72* 77*   CREATININE mg/dL 2.23* 2.23*       Nutrition Specific Medications:  Scheduled medications  acetaminophen, 650 mg, g-tube, q6h Or  acetaminophen, 650 mg, g-tube, q6h Or  acetaminophen, 650 mg, rectal, q6h  atenolol, 50 mg, g-tube, Daily  atorvastatin, 10 mg, g-tube, Nightly  bisacodyl, 10 mg, rectal, Daily  ceFAZolin, 2 g, intravenous, q8h  esomeprazole, 40 mg, g-tube, Daily before breakfast  levothyroxine, 50 mcg, g-tube, Daily before breakfast  polyethylene glycol, 17 g, oral, BID      I/O:    ; Stool Appearance: Unable to assess (05/01/24 2108)    Dietary Orders (From admission, onward)       Start     Ordered    05/02/24 0719  Enteral feeding with NPO Boost VHC; GT (gastric tube); 237; TID as desired; 60; Water; With each bolus  Diet effective now        Question Answer Comment   Tube feeding formula: Boost VHC    Feeding route: GT (gastric tube)    Tube feeding bolus (mL): 237    Tube feeding bolus frequency: TID as desired    Tube feeding flush (mL): 60    Flush type: Water    Flush frequency: With each bolus        05/02/24 0719                     Estimated Needs:   Total Energy Estimated Needs (kCal):  (9613-5071)  Method for Estimating Needs: ABW x 30-35  Total Protein Estimated Needs (g): 80 g  Method for Estimating Needs: ABW x 1.3+  Total Fluid Estimated Needs (mL):  (Per team)           Nutrition Diagnosis   Malnutrition Diagnosis  Patient has Malnutrition Diagnosis: No    Nutrition Diagnosis  Patient has Nutrition Diagnosis: Yes  Diagnosis Status (1): New  Nutrition Diagnosis 1: Increased nutrient needs  Related to (1): increased metabolic demand  As Evidenced by (1): s/p lap cholecystectomy       Nutrition Interventions/Recommendations          Nutrition Prescription:    Recommend checking vitamin D and supplementing as needed  Recommend checking B12 and folate and supplementing as needed  Would recommend continuing Boost VHC QID with water flushes as needed per team  Provides: 2120 kcal, 88g PRO  Please make sure Boost is not provided 1 hour before and after Synthroid.  If pt has issues with tolerance, or GI symptoms, please re-consult for formula adjustment           Nutrition Interventions:   Interventions: Enteral intake    Nutrition Monitoring and Evaluation   Food/Nutrient Related History Monitoring  Monitoring and Evaluation Plan: Energy intake, Enteral and parenteral nutrition intake  Energy Intake: Estimated energy intake  Criteria: >75% of estimated energy needs  Enteral and Parenteral Nutrition Intake: Enteral nutrition intake    Body Composition/Growth/Weight History  Monitoring and Evaluation Plan: Weight    Biochemical Data, Medical Tests and Procedures  Monitoring and Evaluation Plan: Electrolyte/renal panel, Glucose/endocrine profile, Vitamin profile  Electrolyte and Renal Panel: Sodium, Potassium, Magnesium, Phosphorus  Criteria: WNL  Glucose/Endocrine Profile: Glucose, casual  Criteria: WNL  Vitamin Profile: Vitamin D, 25 hydroxy  Criteria: WNL      Time Spent/Follow-up Reminder:   Time Spent (min): 45 minutes  Last Date of Nutrition Visit: 05/02/24  Nutrition Follow-Up Needed?: Dietitian to reassess per policy  Follow up Comment: Boost VHC QID

## 2024-05-03 LAB
25(OH)D3 SERPL-MCNC: 31 NG/ML (ref 30–100)
ALBUMIN SERPL BCP-MCNC: 3.8 G/DL (ref 3.4–5)
ANION GAP SERPL CALC-SCNC: 15 MMOL/L (ref 10–20)
BUN SERPL-MCNC: 63 MG/DL (ref 6–23)
CALCIUM SERPL-MCNC: 9.7 MG/DL (ref 8.6–10.6)
CHLORIDE SERPL-SCNC: 99 MMOL/L (ref 98–107)
CO2 SERPL-SCNC: 28 MMOL/L (ref 21–32)
CREAT SERPL-MCNC: 1.99 MG/DL (ref 0.5–1.3)
EGFRCR SERPLBLD CKD-EPI 2021: 35 ML/MIN/1.73M*2
ERYTHROCYTE [DISTWIDTH] IN BLOOD BY AUTOMATED COUNT: 13.7 % (ref 11.5–14.5)
FOLATE SERPL-MCNC: 17.5 NG/ML
GLUCOSE SERPL-MCNC: 135 MG/DL (ref 74–99)
HCT VFR BLD AUTO: 33.6 % (ref 41–52)
HGB BLD-MCNC: 10.5 G/DL (ref 13.5–17.5)
MAGNESIUM SERPL-MCNC: 2.74 MG/DL (ref 1.6–2.4)
MCH RBC QN AUTO: 31 PG (ref 26–34)
MCHC RBC AUTO-ENTMCNC: 31.3 G/DL (ref 32–36)
MCV RBC AUTO: 99 FL (ref 80–100)
NRBC BLD-RTO: 0 /100 WBCS (ref 0–0)
PHOSPHATE SERPL-MCNC: 2.3 MG/DL (ref 2.5–4.9)
PLATELET # BLD AUTO: 154 X10*3/UL (ref 150–450)
POTASSIUM SERPL-SCNC: 4.3 MMOL/L (ref 3.5–5.3)
RBC # BLD AUTO: 3.39 X10*6/UL (ref 4.5–5.9)
SODIUM SERPL-SCNC: 138 MMOL/L (ref 136–145)
VIT B12 SERPL-MCNC: 1115 PG/ML (ref 211–911)
WBC # BLD AUTO: 8.4 X10*3/UL (ref 4.4–11.3)

## 2024-05-03 PROCEDURE — 36415 COLL VENOUS BLD VENIPUNCTURE: CPT | Performed by: STUDENT IN AN ORGANIZED HEALTH CARE EDUCATION/TRAINING PROGRAM

## 2024-05-03 PROCEDURE — 82306 VITAMIN D 25 HYDROXY: CPT | Performed by: STUDENT IN AN ORGANIZED HEALTH CARE EDUCATION/TRAINING PROGRAM

## 2024-05-03 PROCEDURE — 2500000001 HC RX 250 WO HCPCS SELF ADMINISTERED DRUGS (ALT 637 FOR MEDICARE OP): Performed by: STUDENT IN AN ORGANIZED HEALTH CARE EDUCATION/TRAINING PROGRAM

## 2024-05-03 PROCEDURE — 99024 POSTOP FOLLOW-UP VISIT: CPT | Performed by: TRANSPLANT SURGERY

## 2024-05-03 PROCEDURE — 80069 RENAL FUNCTION PANEL: CPT | Performed by: STUDENT IN AN ORGANIZED HEALTH CARE EDUCATION/TRAINING PROGRAM

## 2024-05-03 PROCEDURE — 2500000004 HC RX 250 GENERAL PHARMACY W/ HCPCS (ALT 636 FOR OP/ED): Performed by: SURGERY

## 2024-05-03 PROCEDURE — 85027 COMPLETE CBC AUTOMATED: CPT | Performed by: STUDENT IN AN ORGANIZED HEALTH CARE EDUCATION/TRAINING PROGRAM

## 2024-05-03 PROCEDURE — 82040 ASSAY OF SERUM ALBUMIN: CPT

## 2024-05-03 PROCEDURE — 1100000001 HC PRIVATE ROOM DAILY

## 2024-05-03 PROCEDURE — 82746 ASSAY OF FOLIC ACID SERUM: CPT | Performed by: STUDENT IN AN ORGANIZED HEALTH CARE EDUCATION/TRAINING PROGRAM

## 2024-05-03 PROCEDURE — 82607 VITAMIN B-12: CPT | Performed by: STUDENT IN AN ORGANIZED HEALTH CARE EDUCATION/TRAINING PROGRAM

## 2024-05-03 PROCEDURE — 2500000001 HC RX 250 WO HCPCS SELF ADMINISTERED DRUGS (ALT 637 FOR MEDICARE OP)

## 2024-05-03 PROCEDURE — 2500000001 HC RX 250 WO HCPCS SELF ADMINISTERED DRUGS (ALT 637 FOR MEDICARE OP): Performed by: SURGERY

## 2024-05-03 PROCEDURE — 2500000004 HC RX 250 GENERAL PHARMACY W/ HCPCS (ALT 636 FOR OP/ED): Performed by: STUDENT IN AN ORGANIZED HEALTH CARE EDUCATION/TRAINING PROGRAM

## 2024-05-03 PROCEDURE — 83735 ASSAY OF MAGNESIUM: CPT | Performed by: STUDENT IN AN ORGANIZED HEALTH CARE EDUCATION/TRAINING PROGRAM

## 2024-05-03 RX ORDER — HYDROCHLOROTHIAZIDE 25 MG/1
25 TABLET ORAL DAILY
Status: DISCONTINUED | OUTPATIENT
Start: 2024-05-03 | End: 2024-05-04 | Stop reason: HOSPADM

## 2024-05-03 RX ORDER — LOSARTAN POTASSIUM 50 MG/1
50 TABLET ORAL DAILY
Status: DISCONTINUED | OUTPATIENT
Start: 2024-05-03 | End: 2024-05-04 | Stop reason: HOSPADM

## 2024-05-03 RX ORDER — OXYCODONE HYDROCHLORIDE 5 MG/1
10 TABLET ORAL EVERY 4 HOURS PRN
Status: DISCONTINUED | OUTPATIENT
Start: 2024-05-03 | End: 2024-05-04 | Stop reason: HOSPADM

## 2024-05-03 RX ADMIN — ESOMEPRAZOLE MAGNESIUM 40 MG: 40 FOR SUSPENSION ORAL at 06:12

## 2024-05-03 RX ADMIN — HYDROCHLOROTHIAZIDE 25 MG: 25 TABLET ORAL at 17:04

## 2024-05-03 RX ADMIN — ATENOLOL 50 MG: 50 TABLET ORAL at 09:40

## 2024-05-03 RX ADMIN — OXYCODONE HYDROCHLORIDE 10 MG: 5 TABLET ORAL at 21:23

## 2024-05-03 RX ADMIN — METHOCARBAMOL 500 MG: 500 TABLET ORAL at 14:56

## 2024-05-03 RX ADMIN — OXYCODONE HYDROCHLORIDE 10 MG: 5 TABLET ORAL at 15:09

## 2024-05-03 RX ADMIN — ACETAMINOPHEN 650 MG: 650 SOLUTION ORAL at 14:56

## 2024-05-03 RX ADMIN — OXYCODONE HYDROCHLORIDE 10 MG: 5 TABLET ORAL at 10:29

## 2024-05-03 RX ADMIN — ATORVASTATIN CALCIUM 10 MG: 10 TABLET, FILM COATED ORAL at 21:23

## 2024-05-03 RX ADMIN — METHOCARBAMOL 500 MG: 500 TABLET ORAL at 21:23

## 2024-05-03 RX ADMIN — ACETAMINOPHEN 650 MG: 650 SOLUTION ORAL at 09:40

## 2024-05-03 RX ADMIN — METHOCARBAMOL 500 MG: 500 TABLET ORAL at 09:40

## 2024-05-03 RX ADMIN — ONDANSETRON 4 MG: 2 INJECTION INTRAMUSCULAR; INTRAVENOUS at 21:23

## 2024-05-03 RX ADMIN — METHOCARBAMOL 500 MG: 500 TABLET ORAL at 01:54

## 2024-05-03 RX ADMIN — POLYETHYLENE GLYCOL 3350 17 G: 17 POWDER, FOR SOLUTION ORAL at 09:41

## 2024-05-03 RX ADMIN — SENNOSIDES 17.2 MG: 8.6 TABLET, FILM COATED ORAL at 21:23

## 2024-05-03 RX ADMIN — ACETAMINOPHEN 650 MG: 650 SOLUTION ORAL at 21:22

## 2024-05-03 RX ADMIN — LOSARTAN POTASSIUM 50 MG: 50 TABLET, FILM COATED ORAL at 17:56

## 2024-05-03 RX ADMIN — LEVOTHYROXINE SODIUM 50 MCG: 50 TABLET ORAL at 05:46

## 2024-05-03 RX ADMIN — POLYETHYLENE GLYCOL 3350 17 G: 17 POWDER, FOR SOLUTION ORAL at 21:22

## 2024-05-03 RX ADMIN — OXYCODONE HYDROCHLORIDE 10 MG: 5 TABLET ORAL at 05:46

## 2024-05-03 RX ADMIN — ACETAMINOPHEN 650 MG: 650 SOLUTION ORAL at 01:54

## 2024-05-03 ASSESSMENT — PAIN SCALES - GENERAL
PAINLEVEL_OUTOF10: 8
PAINLEVEL_OUTOF10: 8
PAINLEVEL_OUTOF10: 10 - WORST POSSIBLE PAIN
PAINLEVEL_OUTOF10: 3
PAINLEVEL_OUTOF10: 9

## 2024-05-03 ASSESSMENT — COGNITIVE AND FUNCTIONAL STATUS - GENERAL
STANDING UP FROM CHAIR USING ARMS: A LITTLE
DAILY ACTIVITIY SCORE: 24
TURNING FROM BACK TO SIDE WHILE IN FLAT BAD: A LITTLE
DAILY ACTIVITIY SCORE: 24
CLIMB 3 TO 5 STEPS WITH RAILING: A LOT
MOBILITY SCORE: 17
MOVING FROM LYING ON BACK TO SITTING ON SIDE OF FLAT BED WITH BEDRAILS: A LITTLE
MOBILITY SCORE: 23
WALKING IN HOSPITAL ROOM: A LITTLE
CLIMB 3 TO 5 STEPS WITH RAILING: A LITTLE
MOVING TO AND FROM BED TO CHAIR: A LITTLE

## 2024-05-03 ASSESSMENT — PAIN - FUNCTIONAL ASSESSMENT
PAIN_FUNCTIONAL_ASSESSMENT: 0-10

## 2024-05-03 ASSESSMENT — PAIN DESCRIPTION - DESCRIPTORS: DESCRIPTORS: DISCOMFORT;TENDER

## 2024-05-03 NOTE — PROGRESS NOTES
"Mk Fleming is a 73 y.o. male on day 2 of admission presenting with Gallstones.    Subjective   Doing well after lap darinel  +flatus       Objective   Vitals:    05/03/24 0840   BP: 159/81   Pulse: 95   Resp: 16   Temp:    SpO2: 95%       Physical Exam  Constitutional:       Appearance: Normal appearance.   Eyes:      Pupils: Pupils are equal, round, and reactive to light.   Cardiovascular:      Rate and Rhythm: Normal rate.   Pulmonary:      Effort: Pulmonary effort is normal. No respiratory distress.   Abdominal:      General: There is no distension.      Palpations: Abdomen is soft.      Comments: Incision clean, dry, intact  SOL: SS   Musculoskeletal:         General: Normal range of motion.      Right lower leg: No edema.      Left lower leg: No edema.   Skin:     General: Skin is warm and dry.   Neurological:      General: No focal deficit present.      Mental Status: He is alert and oriented to person, place, and time.   Psychiatric:         Mood and Affect: Mood normal.         Behavior: Behavior normal.         Last Recorded Vitals  Blood pressure 159/81, pulse 95, temperature 36.5 °C (97.7 °F), temperature source Temporal, resp. rate 16, height 1.702 m (5' 7\"), weight 60.8 kg (134 lb 0.6 oz), SpO2 95%.  Intake/Output last 3 Shifts:  I/O last 3 completed shifts:  In: 435.9 (7.2 mL/kg) [I.V.:335.9 (5.5 mL/kg); IV Piggyback:100]  Out: 1165 (19.2 mL/kg) [Urine:925 (0.4 mL/kg/hr); Drains:240]  Weight: 60.8 kg     Relevant Results  Lab Results   Component Value Date    WBC 8.1 05/02/2024    HGB 9.1 (L) 05/02/2024    HCT 28.9 (L) 05/02/2024     (H) 05/02/2024     (L) 05/02/2024     Lab Results   Component Value Date    GLUCOSE 98 05/02/2024    CALCIUM 8.7 05/02/2024     05/02/2024    K 4.2 05/02/2024    CO2 27 05/02/2024    CL 99 05/02/2024    BUN 72 (H) 05/02/2024    CREATININE 2.23 (H) 05/02/2024     Lab Results   Component Value Date    ALT 8 (L) 05/02/2024    AST 29 05/02/2024    ALKPHOS " 59 05/02/2024    BILITOT 0.4 05/02/2024         Assessment/Plan   Principal Problem:    Gallstones  Active Problems:    Cirrhosis of liver (Multi)    Compensated cirrhotic s/p lap darinel  Difficult cholecystectomy with chronically occluded cystic duct   Doing well  PEG tube dependent, miralax via G tube  Voiding without briscoe  Drain: clear, plan to remove before discharge    I spent 35 minutes in the professional and overall care of this patient.      Reza Salas MD

## 2024-05-03 NOTE — OP NOTE
Cholecystectomy Laparoscopy possible open Operative Note     Date: 2024  OR Location: [facility] OR    Name: Mk Fleming, : 1950, Age: 73 y.o., MRN: [id number], Sex: male    Diagnosis  Pre-op Diagnosis     * Gallstones [K80.20]     * Biliary cirrhosis (Multi) [K74.5] Post-op Diagnosis     * Gallstones [K80.20]     * Biliary cirrhosis (Multi) [K74.5]     Procedures  Cholecystectomy Laparoscopy possible open  [address] - [place] LAPAROSCOPY SURG CHOLECYSTECTOMY      Surgeons      * Reza Salas - Primary    Resident/Fellow/Other Assistant:  Surgeons and Role:     * Cade Mcdaniel MD - Assisting     * Tamir Sharp MD - Resident - Assisting    Procedure Summary  Anesthesia: General  ASA: III  Anesthesia Staff: Anesthesiologist: Homero Alvarez MD  C-AA: GAYATHRI Segovia  Estimated Blood Loss: 20 mL  Intra-op Medications: Administrations occurring from 0815 to 1030 on 24:  * No intraprocedure medications in log *           Anesthesia Record               Intraprocedure I/O Totals          Intake    LR bolus 1600.00 mL    Phenylephrine Drip 0.00 mL    The total shown is the total volume documented since Anesthesia Start was filed.    Total Intake 1600 mL       Output    Urine 240 mL    Est. Blood Loss 100 mL    Total Output 340 mL       Net    Net Volume 1260 mL          Specimen:   ID Type Source Tests Collected by Time   1 : GALLBLADDER Tissue GALLBLADDER CHOLECYSTECTOMY SURGICAL PATHOLOGY EXAM Reza Salas MD 2024 1113        Staff:   Circulator: Varun Srinivasan RN; Carmen Colón RN  Relief Circulator: Bess Infante RN  Scrub Person: Aleyda Fitzgerald RN; Zoë Jay RN         Drains and/or Catheters:   Closed/Suction Drain Lateral RLQ Bulb (Active)   Site Description Healing 24 0900   Dressing Status Clean;Dry 24 0900   Drainage Appearance Bloody 24 181   Status To bulb suction 24   Output (mL) 25 mL 24 2110       Gastrostomy/Enterostomy  Gastrostomy LLQ (Active)   Surrounding Skin Dry;Intact 05/02/24 0900   Drain Status Clamped 05/01/24 1815   Dressing Status Other (Comment) 05/02/24 0900       [REMOVED] Urethral Catheter Non-latex;Straight-tip 16 Fr. (Removed)       [REMOVED] Urethral Catheter 16 Fr. (Removed)   Reason for Continuing Urinary Catheterization acute urinary retention 05/01/24 2200   Output (mL) 50 mL 05/02/24 1443         Findings:     Chronic cholecystitis with obliterated cystic duct  Evidence of hydropic gallbladder      Indications: Mk Fleming is an 73 y.o. male who is having surgery for symptomatic cholelithiasis. He has been admitted to the hospital for cholecystitis before which was managed non-operatively due to his history of cirrhosis.    The patient was seen in the preoperative area. The risks, benefits, complications, treatment options, non-operative alternatives, expected recovery and outcomes were discussed with the patient. The possibilities of reaction to medication, pulmonary aspiration, injury to surrounding structures, bleeding, recurrent infection, the need for additional procedures, failure to diagnose a condition, and creating a complication requiring transfusion or operation were discussed with the patient. The patient concurred with the proposed plan, giving informed consent.  The site of surgery was properly noted/marked if necessary per policy. The patient has been actively warmed in preoperative area. Preoperative antibiotics have been ordered and given within 1 hours of incision. Venous thrombosis prophylaxis have been ordered including bilateral sequential compression devices    Procedure Details:     The patient was brought to the operating room and placed in the supine position. After adequate general endotracheal anesthesia, the abdomen was prepped and draped in a sterile fashion. The patient was given antibiotics intravenously.   A paraumbilical incision was made and subcutaneous tissue dissected  to fascia. The fascia was grasped using Kochers and the peritoneal cavity sharply entered. Awilda 10mm trocar was inserted. The abdomen was insufflated to a pressure of 15 mm. The laparoscope was placed, and the abdomen was examined. There was no injury from the initial entry. Three 5mm accessory ports were placed under direct visualization; one was placed in the subxiphoid region, one in the midclavicular line, and one in the midaxillary line. The patient was then placed in the reverse Trendelenburg position and rolled to the left.  The liver appeared to have advance fibrosis and likely cirrhosis. Omental adhesion were lysed using Ligasure device which revealed a chronically severely inflamed gallbladder. The assistant then grasped the fundus of the gallbladder pushing above the dome of the liver. We began our difficult dissection. The infundibulum of the gallbladder was grasped and retracted laterally. We tried to safely dissect the infundibulum region of the gallbladder as cystic duct anatomy cannot be safely delineated. Significant difficulty was encountered here due the chronic cholecystitis. Medial and lateral dissection revealed what was likely the cystis duct. We were able to visualize the right hepatic artery because the gallbladder was quite adhering to the portal structures. We safely lifted the gallbladder up to keep safe distance. At this point, the cystic artery was clearly identified and was clipped and divided. What appeared to be the cystic duct had very inflamed tissue and we avulsed it during our retraction despite being super careful. There was no lumen identified in the tissue. The stump was clipped x 3 on the patient side. We carefully dissected the gallbladder off the liver fossa and did not identify another duct. The gallbladder was slowly and carefully taken off the liver bed using hook cautery. Once it was free, the liver bed was checked. There was no bleeding or bile leak noted.   The  gallbladder was then placed in the endo-catch pouch and brought out the umbilicus under direct visualization. The area was checked one final time. The right upper quadrant was irrigated and suctioned dry. Flowseal was placed in the surgical bed. A channel drain was placed. Each of the ports was removed under direct visualization. The abdomen was desufflated. The fascia of the umbilicus was closed with #0 Vicryl suture. The skin incisions were closed with #4-0 Vicryl subcuticular sutures. It should be noted that each of the incisions and subcutaneous tissues were locally anesthetized with 0.25% Marcaine. Dressings were placed. All counts were correct and I was present in the entire case.     Complications:  None; patient tolerated the procedure well.    Disposition: PACU - hemodynamically stable.  Condition: stable     Attending Attestation: I was present and scrubbed for the entire procedure.    Reza Salas  Phone Number: [phone]

## 2024-05-03 NOTE — CARE PLAN
The patient's goals for the shift include      The clinical goals for the shift include pain control score <7      Problem: Fall/Injury  Goal: Not fall by end of shift  Outcome: Progressing  Goal: Be free from injury by end of the shift  Outcome: Progressing  Goal: Verbalize understanding of personal risk factors for fall in the hospital  Outcome: Progressing  Goal: Verbalize understanding of risk factor reduction measures to prevent injury from fall in the home  Outcome: Progressing  Goal: Use assistive devices by end of the shift  Outcome: Progressing  Goal: Pace activities to prevent fatigue by end of the shift  Outcome: Progressing     Problem: Pain  Goal: Takes deep breaths with improved pain control throughout the shift  Outcome: Progressing  Goal: Turns in bed with improved pain control throughout the shift  Outcome: Progressing  Goal: Walks with improved pain control throughout the shift  Outcome: Progressing  Goal: Performs ADL's with improved pain control throughout shift  Outcome: Progressing  Goal: Participates in PT with improved pain control throughout the shift  Outcome: Progressing  Goal: Free from opioid side effects throughout the shift  Outcome: Progressing  Goal: Free from acute confusion related to pain meds throughout the shift  Outcome: Progressing

## 2024-05-03 NOTE — CARE PLAN
The patient's goals for the shift include      The clinical goals for the shift include pain control      Problem: Fall/Injury  Goal: Not fall by end of shift  Outcome: Progressing  Goal: Be free from injury by end of the shift  Outcome: Progressing  Goal: Verbalize understanding of personal risk factors for fall in the hospital  Outcome: Progressing  Goal: Verbalize understanding of risk factor reduction measures to prevent injury from fall in the home  Outcome: Progressing  Goal: Use assistive devices by end of the shift  Outcome: Progressing  Goal: Pace activities to prevent fatigue by end of the shift  Outcome: Progressing     Problem: Pain  Goal: Takes deep breaths with improved pain control throughout the shift  Outcome: Progressing  Goal: Turns in bed with improved pain control throughout the shift  Outcome: Progressing  Goal: Walks with improved pain control throughout the shift  Outcome: Progressing  Goal: Performs ADL's with improved pain control throughout shift  Outcome: Progressing  Goal: Participates in PT with improved pain control throughout the shift  Outcome: Progressing  Goal: Free from opioid side effects throughout the shift  Outcome: Progressing  Goal: Free from acute confusion related to pain meds throughout the shift  Outcome: Progressing

## 2024-05-04 ENCOUNTER — APPOINTMENT (OUTPATIENT)
Dept: RADIOLOGY | Facility: HOSPITAL | Age: 74
DRG: 418 | End: 2024-05-04
Payer: COMMERCIAL

## 2024-05-04 VITALS
HEIGHT: 67 IN | OXYGEN SATURATION: 91 % | WEIGHT: 134.04 LBS | DIASTOLIC BLOOD PRESSURE: 71 MMHG | SYSTOLIC BLOOD PRESSURE: 128 MMHG | RESPIRATION RATE: 20 BRPM | BODY MASS INDEX: 21.04 KG/M2 | HEART RATE: 87 BPM | TEMPERATURE: 97.5 F

## 2024-05-04 LAB
ALBUMIN SERPL BCP-MCNC: 3.7 G/DL (ref 3.4–5)
ALP SERPL-CCNC: 84 U/L (ref 33–136)
ALT SERPL W P-5'-P-CCNC: 6 U/L (ref 10–52)
AST SERPL W P-5'-P-CCNC: 30 U/L (ref 9–39)
BILIRUB DIRECT SERPL-MCNC: 0.2 MG/DL (ref 0–0.3)
BILIRUB SERPL-MCNC: 0.6 MG/DL (ref 0–1.2)
PROT SERPL-MCNC: 7.9 G/DL (ref 6.4–8.2)

## 2024-05-04 PROCEDURE — 2500000001 HC RX 250 WO HCPCS SELF ADMINISTERED DRUGS (ALT 637 FOR MEDICARE OP): Performed by: SURGERY

## 2024-05-04 PROCEDURE — 2500000001 HC RX 250 WO HCPCS SELF ADMINISTERED DRUGS (ALT 637 FOR MEDICARE OP): Performed by: STUDENT IN AN ORGANIZED HEALTH CARE EDUCATION/TRAINING PROGRAM

## 2024-05-04 PROCEDURE — 99024 POSTOP FOLLOW-UP VISIT: CPT | Performed by: STUDENT IN AN ORGANIZED HEALTH CARE EDUCATION/TRAINING PROGRAM

## 2024-05-04 PROCEDURE — 71045 X-RAY EXAM CHEST 1 VIEW: CPT

## 2024-05-04 PROCEDURE — 2500000001 HC RX 250 WO HCPCS SELF ADMINISTERED DRUGS (ALT 637 FOR MEDICARE OP)

## 2024-05-04 PROCEDURE — 71045 X-RAY EXAM CHEST 1 VIEW: CPT | Performed by: RADIOLOGY

## 2024-05-04 RX ORDER — BISACODYL 10 MG/1
10 SUPPOSITORY RECTAL DAILY PRN
Status: DISCONTINUED | OUTPATIENT
Start: 2024-05-04 | End: 2024-05-04 | Stop reason: HOSPADM

## 2024-05-04 RX ORDER — METHOCARBAMOL 500 MG/1
500 TABLET, FILM COATED ORAL EVERY 8 HOURS SCHEDULED
Qty: 21 TABLET | Refills: 0 | Status: SHIPPED | OUTPATIENT
Start: 2024-05-04 | End: 2024-05-11

## 2024-05-04 RX ORDER — LIDOCAINE HYDROCHLORIDE 10 MG/ML
10 INJECTION INFILTRATION; PERINEURAL ONCE
Status: DISCONTINUED | OUTPATIENT
Start: 2024-05-04 | End: 2024-05-04 | Stop reason: HOSPADM

## 2024-05-04 RX ORDER — ACETAMINOPHEN 160 MG/5ML
650 SOLUTION ORAL EVERY 6 HOURS PRN
COMMUNITY
Start: 2024-05-04 | End: 2024-05-11

## 2024-05-04 RX ORDER — POLYETHYLENE GLYCOL 3350 17 G/17G
17 POWDER, FOR SOLUTION ORAL DAILY PRN
Status: DISCONTINUED | OUTPATIENT
Start: 2024-05-04 | End: 2024-05-04

## 2024-05-04 RX ORDER — OXYCODONE HYDROCHLORIDE 5 MG/1
5 TABLET ORAL EVERY 4 HOURS PRN
Qty: 10 TABLET | Refills: 0 | Status: SHIPPED | OUTPATIENT
Start: 2024-05-04

## 2024-05-04 RX ADMIN — OXYCODONE HYDROCHLORIDE 10 MG: 5 TABLET ORAL at 04:00

## 2024-05-04 RX ADMIN — LOSARTAN POTASSIUM 50 MG: 50 TABLET, FILM COATED ORAL at 09:34

## 2024-05-04 RX ADMIN — OXYCODONE HYDROCHLORIDE 10 MG: 5 TABLET ORAL at 10:00

## 2024-05-04 RX ADMIN — METHOCARBAMOL 500 MG: 500 TABLET ORAL at 06:09

## 2024-05-04 RX ADMIN — ATENOLOL 50 MG: 50 TABLET ORAL at 09:29

## 2024-05-04 RX ADMIN — LEVOTHYROXINE SODIUM 50 MCG: 50 TABLET ORAL at 05:00

## 2024-05-04 RX ADMIN — HYDROCHLOROTHIAZIDE 25 MG: 25 TABLET ORAL at 09:29

## 2024-05-04 RX ADMIN — ACETAMINOPHEN 650 MG: 650 SOLUTION ORAL at 06:09

## 2024-05-04 RX ADMIN — ESOMEPRAZOLE MAGNESIUM 40 MG: 40 FOR SUSPENSION ORAL at 06:09

## 2024-05-04 ASSESSMENT — PAIN - FUNCTIONAL ASSESSMENT
PAIN_FUNCTIONAL_ASSESSMENT: 0-10
PAIN_FUNCTIONAL_ASSESSMENT: 0-10

## 2024-05-04 ASSESSMENT — PAIN SCALES - GENERAL
PAINLEVEL_OUTOF10: 8
PAINLEVEL_OUTOF10: 9
PAINLEVEL_OUTOF10: 3

## 2024-05-04 ASSESSMENT — PAIN DESCRIPTION - DESCRIPTORS: DESCRIPTORS: DISCOMFORT;TENDER

## 2024-05-04 NOTE — CARE PLAN
Problem: Fall/Injury  Goal: Not fall by end of shift  Outcome: Adequate for Discharge  Goal: Be free from injury by end of the shift  Outcome: Adequate for Discharge  Goal: Verbalize understanding of personal risk factors for fall in the hospital  Outcome: Adequate for Discharge  Goal: Verbalize understanding of risk factor reduction measures to prevent injury from fall in the home  Outcome: Adequate for Discharge  Goal: Use assistive devices by end of the shift  Outcome: Adequate for Discharge  Goal: Pace activities to prevent fatigue by end of the shift  Outcome: Adequate for Discharge     Problem: Pain  Goal: Takes deep breaths with improved pain control throughout the shift  Outcome: Adequate for Discharge  Goal: Turns in bed with improved pain control throughout the shift  Outcome: Adequate for Discharge  Goal: Walks with improved pain control throughout the shift  Outcome: Adequate for Discharge  Goal: Performs ADL's with improved pain control throughout shift  Outcome: Adequate for Discharge  Goal: Participates in PT with improved pain control throughout the shift  Outcome: Adequate for Discharge  Goal: Free from opioid side effects throughout the shift  Outcome: Adequate for Discharge  Goal: Free from acute confusion related to pain meds throughout the shift  Outcome: Adequate for Discharge     Problem: Skin  Goal: Participates in plan/prevention/treatment measures  Outcome: Adequate for Discharge  Goal: Prevent/manage excess moisture  Outcome: Adequate for Discharge  Goal: Prevent/minimize sheer/friction injuries  Outcome: Adequate for Discharge  Goal: Promote/optimize nutrition  Outcome: Adequate for Discharge  Goal: Promote skin healing  Outcome: Adequate for Discharge

## 2024-05-04 NOTE — DISCHARGE INSTRUCTIONS
[facility]  DEPARTMENT OF SURGERY    Thank you for allowing us to be involved in your care. We recognize how challenging a hospital admission can be, and we value your trust in us to provide you with all-encompassing and compassionate care.    Procedure(s):  - Laparoscopic cholecystectomy    Complication(s):  - none apparent      Activity:  - No heavy lifting for four weeks.  - Avoid lifting anything heavier than a jug of milk.  - You are safe to walk and climb stairs.    Diet:  - Continue G-tube feeding    Cleaning and Hygiene:  - It is okay to shower with gentle soap and water.  - Allow water to gently run over your surgical incisions/wounds.  - Avoid submerging your surgical incisions/wounds as this can introduce infections or compromise the integrity of your healing wounds.  - Do not apply lotions or creams to your incision.    Restrictions:  - No swimming, tub soaks, or hot tubs for four weeks.  - No driving or operating heavy machinery while taking narcotic pain medications.    Medications:  - Take all prescriptions as described/ordered.    Signs and symptoms to pay attention to include:  - Fever greater than 102 degrees Fahrenheit  - Chills  - Increased pain not relieved with medication  - Redness around an incision  - Drainage from an incision  - Worsening swelling around an incision  - Severe nausea or vomiting  - Severe constipation or diarrhea  - Difficulty voiding or burning with urination  If you notice any of these, call our office or seek professional medical care in an urgent care clinic or in the emergency department.    Follow-Up:  - Please refer to your After Visit Summary for a full listing of your upcoming appointments, including any with your surgical team and with your primary care provider.    Additional Information:  - Patient has pain medications at home already    If you have any questions or concerns, please contact our office.

## 2024-05-04 NOTE — DISCHARGE SUMMARY
Discharge Diagnosis  Gallstones    Issues Requiring Follow-Up  Acute changes in clinical condition.     Test Results Pending At Discharge  Pending Labs       Order Current Status    Surgical Pathology Exam In process            Hospital Course  Patient admitted 5/1 for an elective lap darinel for for treatment of cholelithiasis and biliary cirrhosis. Patient did well postoperatively. Szymanski discharged POD 1 and passed TOV. Diet advanced through G-tube tube feeds. Patient desired to stay an extra day. SOL drain discharged POD 3 5/4 and closed with 2.0 nylon. Patient to be discharged home 5/4.     Pertinent Physical Exam At Time of Discharge  Physical Exam  Constitutional:       Appearance: Normal appearance.   Eyes:      Pupils: Pupils are equal, round, and reactive to light.   Cardiovascular:      Rate and Rhythm: Normal rate.   Pulmonary:      Effort: Pulmonary effort is normal. No respiratory distress.   Abdominal:      General: There is no distension.      Palpations: Abdomen is soft.      Comments: Incision clean, dry, intact. Drain removed, suture placed.   Musculoskeletal:         General: Normal range of motion.      Right lower leg: No edema.      Left lower leg: No edema.   Skin:     General: Skin is warm and dry.   Neurological:      General: No focal deficit present.      Mental Status: He is alert and oriented to person, place, and time.   Psychiatric:         Mood and Affect: Mood normal.         Behavior: Behavior normal.   Home Medications     Medication List      START taking these medications     acetaminophen 650 mg/20.3 mL solution oral liquid; Commonly known as:   Tylenol; 20.3 mL (650 mg) by g-tube route every 6 hours if needed for pain   for up to 7 days.   methocarbamol 500 mg tablet; Commonly known as: Robaxin; 1 tablet (500   mg) by g-tube route every 8 hours for 7 days.     CHANGE how you take these medications     oxyCODONE 5 mg immediate release tablet; Commonly known as: Roxicodone;   1  tablet (5 mg) by g-tube route every 4 hours if needed for severe pain (7   - 10) (For breakthrough pain).; What changed: See the new instructions.     CONTINUE taking these medications     * albuterol 2.5 mg /3 mL (0.083 %) nebulizer solution   * albuterol 90 mcg/actuation inhaler   atenolol 50 mg tablet; Commonly known as: Tenormin   atorvastatin 10 mg tablet; Commonly known as: Lipitor   esomeprazole 20 mg packet; Commonly known as: NexIUM; Take 20 mg by   mouth once daily in the morning. Take before meals.   fluticasone 50 mcg/actuation nasal spray; Commonly known as: Flonase   hydroCHLOROthiazide 25 mg tablet; Commonly known as: HYDRODiuril   levothyroxine 50 mcg tablet; Commonly known as: Synthroid, Levoxyl   losartan 50 mg tablet; Commonly known as: Cozaar   Miralax 17 gram/dose powder; Generic drug: polyethylene glycol   naloxone 4 mg/0.1 mL nasal spray; Commonly known as: Narcan; Administer   1 spray (4 mg) into affected nostril(s) if needed for opioid reversal or   respiratory depression. May repeat every 2-3 minutes if needed,   alternating nostrils, until medical assistance becomes available.  * This list has 2 medication(s) that are the same as other medications   prescribed for you. Read the directions carefully, and ask your doctor or   other care provider to review them with you.       Outpatient Follow-Up  Future Appointments   Date Time Provider Department [place]   5/15/2024  1:00 PM Reza Salas MD [id number] [facility]   6/26/2024 11:40 AM Paddy Gallardo MD [id number] Academic   10/21/2024  1:00 PM Amrik Sprague MD [id number] [place]       Shawna Gonzáles MD

## 2024-05-04 NOTE — NURSING NOTE
Removed Pt IV x 2. Dressing placed. Pt tolerated well without complication. Reviewed discharge instructions and answered all questions pertaining to D/C. Pt awaiting transport via wheelchair for his friend to come pick him up.

## 2024-05-04 NOTE — PROGRESS NOTES
"Mk Fleming is a 73 y.o. male on day 3 of admission presenting with Gallstones.    Subjective   Doing well after lap darinel  +flatus       Objective   Vitals:    05/04/24 0741   BP: 128/67   Pulse: 80   Resp: 19   Temp: 36.3 °C (97.3 °F)   SpO2: 91%       Physical Exam  Constitutional:       Appearance: Normal appearance.   Eyes:      Pupils: Pupils are equal, round, and reactive to light.   Cardiovascular:      Rate and Rhythm: Normal rate.   Pulmonary:      Effort: Pulmonary effort is normal. No respiratory distress.   Abdominal:      General: There is no distension.      Palpations: Abdomen is soft.      Comments: Incision clean, dry, intact  SOL: SS   Musculoskeletal:         General: Normal range of motion.      Right lower leg: No edema.      Left lower leg: No edema.   Skin:     General: Skin is warm and dry.   Neurological:      General: No focal deficit present.      Mental Status: He is alert and oriented to person, place, and time.   Psychiatric:         Mood and Affect: Mood normal.         Behavior: Behavior normal.         Last Recorded Vitals  Blood pressure 128/67, pulse 80, temperature 36.3 °C (97.3 °F), temperature source Temporal, resp. rate 19, height 1.702 m (5' 7\"), weight 60.8 kg (134 lb 0.6 oz), SpO2 91%.  Intake/Output last 3 Shifts:  I/O last 3 completed shifts:  In: 297 (4.9 mL/kg) [NG/GT:297]  Out: 1475 (24.3 mL/kg) [Urine:1225 (0.6 mL/kg/hr); Drains:250]  Weight: 60.8 kg     Relevant Results  Lab Results   Component Value Date    WBC 8.4 05/03/2024    HGB 10.5 (L) 05/03/2024    HCT 33.6 (L) 05/03/2024    MCV 99 05/03/2024     05/03/2024     Lab Results   Component Value Date    GLUCOSE 135 (H) 05/03/2024    CALCIUM 9.7 05/03/2024     05/03/2024    K 4.3 05/03/2024    CO2 28 05/03/2024    CL 99 05/03/2024    BUN 63 (H) 05/03/2024    CREATININE 1.99 (H) 05/03/2024     Lab Results   Component Value Date    ALT 6 (L) 05/03/2024    AST 30 05/03/2024    ALKPHOS 84 05/03/2024    " BILITOT 0.6 05/03/2024         Assessment/Plan   Principal Problem:    Gallstones  Active Problems:    Cirrhosis of liver (Multi)    Compensated cirrhotic s/p lap darinel  Difficult cholecystectomy with chronically occluded cystic duct   Doing well  PEG tube dependent, miralax via G tube  Voiding without briscoe  Drain: clear, remove  DC home, patient agrees  RTC w Dr Salas as scheduled    I spent 35 minutes in the professional and overall care of this patient.      Stefano Valdivia MD

## 2024-05-07 LAB
LABORATORY COMMENT REPORT: NORMAL
PATH REPORT.FINAL DX SPEC: NORMAL
PATH REPORT.GROSS SPEC: NORMAL
PATH REPORT.RELEVANT HX SPEC: NORMAL
PATH REPORT.TOTAL CANCER: NORMAL

## 2024-05-15 ENCOUNTER — OFFICE VISIT (OUTPATIENT)
Dept: SURGERY | Facility: CLINIC | Age: 74
End: 2024-05-15
Payer: COMMERCIAL

## 2024-05-15 VITALS
BODY MASS INDEX: 21.14 KG/M2 | WEIGHT: 135 LBS | SYSTOLIC BLOOD PRESSURE: 152 MMHG | DIASTOLIC BLOOD PRESSURE: 83 MMHG | HEART RATE: 55 BPM

## 2024-05-15 DIAGNOSIS — K74.60 CIRRHOSIS OF LIVER WITHOUT ASCITES, UNSPECIFIED HEPATIC CIRRHOSIS TYPE (MULTI): ICD-10-CM

## 2024-05-15 DIAGNOSIS — K80.20 CALCULUS OF GALLBLADDER WITHOUT CHOLECYSTITIS WITHOUT OBSTRUCTION: Primary | ICD-10-CM

## 2024-05-15 PROCEDURE — 3079F DIAST BP 80-89 MM HG: CPT | Performed by: TRANSPLANT SURGERY

## 2024-05-15 PROCEDURE — 1159F MED LIST DOCD IN RCRD: CPT | Performed by: TRANSPLANT SURGERY

## 2024-05-15 PROCEDURE — 3077F SYST BP >= 140 MM HG: CPT | Performed by: TRANSPLANT SURGERY

## 2024-05-15 PROCEDURE — 99024 POSTOP FOLLOW-UP VISIT: CPT | Performed by: TRANSPLANT SURGERY

## 2024-05-15 PROCEDURE — 1160F RVW MEDS BY RX/DR IN RCRD: CPT | Performed by: TRANSPLANT SURGERY

## 2024-05-15 PROCEDURE — 1111F DSCHRG MED/CURRENT MED MERGE: CPT | Performed by: TRANSPLANT SURGERY

## 2024-05-15 NOTE — PROGRESS NOTES
Subjective   Patient ID: Mk Fleming is a 73 y.o. male who presents for post operative appt.    S/p cholecystectomy 5/1/24.  HPI  No complaints  Doing well after lap darinel    Review of Systems   Constitutional:  Negative for chills and fever.   HENT: Negative.  Negative for congestion.    Eyes: Negative.    Respiratory:  Negative for cough and shortness of breath.    Cardiovascular:  Negative for chest pain.   Gastrointestinal:  Negative for abdominal distention, abdominal pain, constipation and diarrhea.   Endocrine: Negative for cold intolerance and heat intolerance.   Genitourinary:  Negative for dysuria, frequency, hematuria and urgency.   Musculoskeletal:  Negative for arthralgias.   Skin:  Negative for color change.   Allergic/Immunologic: Negative for environmental allergies.   Neurological:  Negative for dizziness, weakness and light-headedness.   Hematological:  Negative for adenopathy.   Psychiatric/Behavioral:  Negative for agitation, confusion and hallucinations.        Objective   Physical Exam  Incision site clean/dry/intact, healing well. Abdomen is flat  G tube in place    Assessment/Plan   Did well after lap darinel, very evidence of chronic cholecystitis  No liver decompensation symptoms  I am still awaiting final pathology    Patient to follow up Dr. Gallardo in June

## 2024-05-17 ASSESSMENT — ENCOUNTER SYMPTOMS
WEAKNESS: 0
SHORTNESS OF BREATH: 0
DYSURIA: 0
CONFUSION: 0
COUGH: 0
DIZZINESS: 0
FEVER: 0
HEMATURIA: 0
ABDOMINAL DISTENTION: 0
COLOR CHANGE: 0
LIGHT-HEADEDNESS: 0
CHILLS: 0
DIARRHEA: 0
ABDOMINAL PAIN: 0
FREQUENCY: 0
CONSTIPATION: 0
HALLUCINATIONS: 0
ARTHRALGIAS: 0
AGITATION: 0
EYES NEGATIVE: 1
ADENOPATHY: 0

## 2024-05-30 ENCOUNTER — TELEPHONE (OUTPATIENT)
Dept: OTOLARYNGOLOGY | Facility: HOSPITAL | Age: 74
End: 2024-05-30
Payer: COMMERCIAL

## 2024-05-30 NOTE — TELEPHONE ENCOUNTER
I reached out to Maddie as we sent an order in February for a y-port connector.  I was checking to see if the order was good for a full year or if they needed a new order.    Aleshia Wilson messaged me that she will ship 2 to him today, however moving forward due to billing issues, Maddie is requesting that if/when he needs a new y-port connector, he request them to be included in his monthly enteral nutrition order.    I called Rachana Lonnie back and got his voice mail.  I left him a message as outlined above.

## 2024-06-25 NOTE — PATIENT INSTRUCTIONS
Patients with advanced fibrosis or cirrhosis are at increased risk for liver cancer.  Successful treatment of liver cancer depends on early detection.  You should have regular interval screening for liver cancer every 6 months.  A blood test called Alpha-fetoprotein (AFP) and ultrasound of the liver is an important part of liver cancer screening to detect tumors.  You should also expect to have a follow up appointment with your liver doctor every 6 months.     [phone]    (Please see below for department name when scheduling)    Please have the following done before your next appointment:    [x]   LABS Due : Today and Dec   [x]   IMAGING Type : Ultrasound Due : Today and Dec  (Department Radiology)  [x]   FOLLOW UP APPOINTMENT with Dr. Gallardo Due : Dec  (Department Gastro)    *Please have tests done before your next appointment    If you have any questions or need assistance, please don't hesitate to contact us.    Post: Office [phone] Fax: [phone]  Hepatology Nurse Coordinator: Aleshia SPANGLER [phone]

## 2024-06-26 ENCOUNTER — OFFICE VISIT (OUTPATIENT)
Dept: GASTROENTEROLOGY | Facility: HOSPITAL | Age: 74
End: 2024-06-26
Payer: COMMERCIAL

## 2024-06-26 VITALS
DIASTOLIC BLOOD PRESSURE: 67 MMHG | WEIGHT: 132 LBS | TEMPERATURE: 95.8 F | OXYGEN SATURATION: 98 % | HEIGHT: 68 IN | SYSTOLIC BLOOD PRESSURE: 164 MMHG | HEART RATE: 73 BPM | BODY MASS INDEX: 20 KG/M2

## 2024-06-26 DIAGNOSIS — Z86.19 HEPATITIS C VIRUS INFECTION CURED AFTER ANTIVIRAL DRUG THERAPY: ICD-10-CM

## 2024-06-26 DIAGNOSIS — K76.9 LIVER DISEASE, CHRONIC, WITH CIRRHOSIS (MULTI): ICD-10-CM

## 2024-06-26 DIAGNOSIS — K74.60 LIVER DISEASE, CHRONIC, WITH CIRRHOSIS (MULTI): ICD-10-CM

## 2024-06-26 PROCEDURE — 1159F MED LIST DOCD IN RCRD: CPT | Performed by: INTERNAL MEDICINE

## 2024-06-26 PROCEDURE — 1160F RVW MEDS BY RX/DR IN RCRD: CPT | Performed by: INTERNAL MEDICINE

## 2024-06-26 PROCEDURE — 1125F AMNT PAIN NOTED PAIN PRSNT: CPT | Performed by: INTERNAL MEDICINE

## 2024-06-26 PROCEDURE — 3077F SYST BP >= 140 MM HG: CPT | Performed by: INTERNAL MEDICINE

## 2024-06-26 PROCEDURE — 1036F TOBACCO NON-USER: CPT | Performed by: INTERNAL MEDICINE

## 2024-06-26 PROCEDURE — 99214 OFFICE O/P EST MOD 30 MIN: CPT | Performed by: INTERNAL MEDICINE

## 2024-06-26 PROCEDURE — 3078F DIAST BP <80 MM HG: CPT | Performed by: INTERNAL MEDICINE

## 2024-06-26 ASSESSMENT — PAIN SCALES - GENERAL: PAINLEVEL: 8

## 2024-06-26 NOTE — PROGRESS NOTES
Subjective   Patient ID: Mk Fleming is a 73 y.o. male who presents for Follow-up and Cirrhosis.  HPI  HCV with cirrhosis and SVR.   Compensated cirrhosis.   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  Did have cholecystectomy 5/1/24 by Reza Rudolph.    AFP 8/15/23 4.  Some abdominal  discomfort with distention.     Review of Systems  No report diarrhea.     PEG in place.   No bleeding reported.      Objective   Physical Exam  Physical Exam  Constitutional:       Appearance: Normal appearance.   Eyes:      General: No scleral icterus.  Cardiovascular:      Rate and Rhythm: Normal rate and regular rhythm.      Heart sounds: No murmur heard.     No gallop.   Pulmonary:      Effort: Pulmonary effort is normal.      Breath sounds: Normal breath sounds.   Abdominal:      General: Abdomen is flat. Bowel sounds are normal. There is mild gaseous distension.      Palpations: Abdomen is soft. There is no fluid wave, hepatomegaly or splenomegaly.      Tenderness: There is no abdominal tenderness.   PER tube in place mobile.   Non-tender no bleeding.     Musculoskeletal:      Cervical back: Normal range of motion. No tenderness.      Right lower leg: No edema.      Left lower leg: No edema.     Skin:     Coloration: Skin is not jaundiced.   Neurological:      General: No focal deficit present.      Mental Status: She is alert.       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    Will get ultrasound and repeat labs.   Follow up in 6 months.    Can try gas ex or simethicone for gas.  Follow up with PCP for refill of oxycodone.             Paddy Gallardo MD 06/26/24 11:48 AM

## 2024-07-12 ENCOUNTER — HOSPITAL ENCOUNTER (EMERGENCY)
Facility: HOSPITAL | Age: 74
Discharge: HOME | End: 2024-07-12
Attending: EMERGENCY MEDICINE
Payer: COMMERCIAL

## 2024-07-12 VITALS
OXYGEN SATURATION: 98 % | HEIGHT: 68 IN | BODY MASS INDEX: 20 KG/M2 | WEIGHT: 132 LBS | DIASTOLIC BLOOD PRESSURE: 75 MMHG | TEMPERATURE: 96.3 F | SYSTOLIC BLOOD PRESSURE: 155 MMHG | HEART RATE: 73 BPM | RESPIRATION RATE: 16 BRPM

## 2024-07-12 DIAGNOSIS — Z23 NEED FOR TDAP VACCINATION: ICD-10-CM

## 2024-07-12 DIAGNOSIS — S01.81XA LACERATION OF FOREHEAD, INITIAL ENCOUNTER: Primary | ICD-10-CM

## 2024-07-12 PROCEDURE — 12011 RPR F/E/E/N/L/M 2.5 CM/<: CPT | Performed by: EMERGENCY MEDICINE

## 2024-07-12 PROCEDURE — 99284 EMERGENCY DEPT VISIT MOD MDM: CPT | Performed by: EMERGENCY MEDICINE

## 2024-07-12 PROCEDURE — 99283 EMERGENCY DEPT VISIT LOW MDM: CPT | Mod: 25

## 2024-07-12 PROCEDURE — 90715 TDAP VACCINE 7 YRS/> IM: CPT

## 2024-07-12 PROCEDURE — 2500000005 HC RX 250 GENERAL PHARMACY W/O HCPCS

## 2024-07-12 PROCEDURE — 2500000004 HC RX 250 GENERAL PHARMACY W/ HCPCS (ALT 636 FOR OP/ED)

## 2024-07-12 PROCEDURE — 12011 RPR F/E/E/N/L/M 2.5 CM/<: CPT

## 2024-07-12 PROCEDURE — 90471 IMMUNIZATION ADMIN: CPT

## 2024-07-12 ASSESSMENT — PAIN - FUNCTIONAL ASSESSMENT: PAIN_FUNCTIONAL_ASSESSMENT: 0-10

## 2024-07-12 ASSESSMENT — LIFESTYLE VARIABLES
EVER HAD A DRINK FIRST THING IN THE MORNING TO STEADY YOUR NERVES TO GET RID OF A HANGOVER: NO
EVER FELT BAD OR GUILTY ABOUT YOUR DRINKING: NO
HAVE YOU EVER FELT YOU SHOULD CUT DOWN ON YOUR DRINKING: NO
HAVE PEOPLE ANNOYED YOU BY CRITICIZING YOUR DRINKING: NO
TOTAL SCORE: 0

## 2024-07-12 ASSESSMENT — PAIN SCALES - GENERAL: PAINLEVEL_OUTOF10: 0 - NO PAIN

## 2024-07-12 NOTE — ED PROCEDURE NOTE
Procedure  Laceration Repair    Performed by: Ruth Payne DO  Authorized by: Indu Dexter MD    Consent:     Consent obtained:  Verbal    Consent given by:  Patient    Risks, benefits, and alternatives were discussed: yes      Risks discussed:  Infection, pain, poor cosmetic result and poor wound healing    Alternatives discussed:  No treatment  Universal protocol:     Procedure explained and questions answered to patient or proxy's satisfaction: yes      Relevant documents present and verified: yes      Test results available: no      Imaging studies available: no      Required blood products, implants, devices, and special equipment available: no      Site/side marked: no      Immediately prior to procedure, a time out was called: no      Patient identity confirmed:  Verbally with patient  Anesthesia:     Anesthesia method:  Topical application    Topical anesthetic:  LET  Laceration details:     Location:  Scalp    Scalp location:  Frontal    Length (cm):  2    Depth (mm):  0.5  Exploration:     Imaging outcome: foreign body not noted      Contaminated: no    Treatment:     Area cleansed with:  Saline    Amount of cleaning:  Standard  Skin repair:     Repair method:  Sutures    Suture size:  5-0    Suture material:  Prolene    Suture technique:  Simple interrupted    Number of sutures:  3  Approximation:     Approximation:  Close  Post-procedure details:     Procedure completion:  Tolerated well, no immediate complications               Ruth Payne DO  Resident  07/12/24 [id number]

## 2024-07-12 NOTE — ED PROVIDER NOTES
Emergency Department Provider Note        History of Present Illness     History provided by: Patient  Limitations to History: None  External Records Reviewed with Brief Summary: None    HPI:  Mk Fleming is a 73 y.o. male with hypertension, oropharyngeal cancer s/p surgery and XRT, hepatitis C infection s/p failed ribavirin/interferon regimen c/b advanced hepatic fibrosis, s/p Epclusa (with undetectable viral load), severe esophageal stricture and gastric ulcers, chronically G-tube dependent, presenting to the ED today with head laceration.  Patient notes he was trying to open a door and slipped on the handle and hit his head on the glass.  Patient denies loss of consciousness or emesis as a result of injury. Patient reports feeling dizzy initially, but it has since resolved. No vision changes, headache or other pain.     Physical Exam   Triage vitals:  T 35.7 °C (96.3 °F)  HR 73  /75  RR 16  O2 98 % None (Room air)    General: Awake, alert, in no acute distress  Eyes: Gaze conjugate.  No scleral icterus or injection  HENT: 2 cm linear laceration over right forehead, does not cross midline. Well approximated. Minimal active bleeding.  CV: Regular rate, regular rhythm.   Resp: Breathing non-labored, speaking in full sentences.  Clear to auscultation bilaterally  MSK/Extremities: No gross bony deformities. Moving all extremities  Skin: Warm. Appropriate color  Neuro: Alert. Oriented. Face symmetric. Speech is fluent. CN II-XII intact. 5/5 strength in bilaterally upper and lower extremities. Gross sensation intact in b/l UE and LE. Ambulating without difficulty.   Psych: Appropriate mood and affect    Medical Decision Making & ED Course   Medical Decision Makin y.o. male with past medical history as noted above presenting to the ED with facial laceration.  Vital signs are stable.  Has a 2 to 2-1/2 cm linear laceration on the right side of the forehead.  Intracranial pathology and hemorrhage were on  my differential, but clinical picture was not consistent with this diagnosis. [place] CT was utilized, patient had a 1 for age, otherwise all other criteria were negative. Patient did not endorse tenderness to palpation of C spine, ruling out C spine injury. Facial fracture less likely due to lack of pain. After discussing with patient decided to use LET cream for local anesthetic.  3 sutures were placed with appropriate approximation and hemostasis. See procedure note for further information. Patient was also administered tetanus vaccination as last booster was 9 years ago.  Patient counseled to have suture removed in 5 days. Discussed return precautions, patient verbally agreed.   ----  Scoring Tools Utilized: [place] CT head    Differential diagnoses considered include but are not limited to: intracranial hemorrhage, facial fracture, C spine injury     Social Determinants of Health which Significantly Impact Care: None identified     Independent Result Review and Interpretation: Relevant laboratory and radiographic results were reviewed and independently interpreted by myself.  As necessary, they are commented on in the ED Course.    Chronic conditions affecting the patient's care: As documented above in [facility]    The patient was discussed with the following consultants/services: None    Care Considerations: As documented above in [facility]    ED Course:  Diagnoses as of 07/12/24 1818   Laceration of forehead, initial encounter   Need for Tdap vaccination     Disposition   As a result of the work-up, the patient was discharged home.  he was informed of his diagnosis and instructed to come back with any concerns or worsening of condition.  he and was agreeable to the plan as discussed above.  he was given the opportunity to ask questions.  All of the patient's questions were answered.    Procedures   Procedures    This was a shared visit with an ED attending.  The patient was seen and discussed with the ED  attending    Ruth Payne, DO  Emergency Medicine     Ruth Payne, DO  Resident  07/12/24 [id number]

## 2024-07-12 NOTE — DISCHARGE INSTRUCTIONS
You have been evaluated in the Emergency Department today for a laceration to your forehead. Your laceration was repaired in the ED with sutures. Please keep the area surrounding the laceration clean and dry. Please keep the area out of the sunlight for the next 6 months to help prevent scarring. You should have the sutures removed in 7-10 days by your primary care physician, or at your local urgent care or ER. If you develop redness or swelling at the site of your laceration please come back to the ER for a wound check.    You were also given your updated Tetanus vaccination.     We recommend you take 600mg ibuprofen every 6 hours or tylenol 650mg every 6 hours as needed for pain. If needed, you can alternate these medications so that you take one medication every 3 hours. For instance, at noon take ibuprofen, then at 3pm take tylenol, then at 6pm take ibuprofen.    Please follow up with your primary care physician in 7-10 days for suture removal. You can also return to the ER or another urgent care facility for this service.    Return to the Emergency Department if you experience discharge from your laceration, redness around your laceration, warmth around your laceration, fever, vomiting, numbness, tingling, or any other concerning symptoms.

## 2024-07-22 ENCOUNTER — LAB (OUTPATIENT)
Dept: LAB | Facility: HOSPITAL | Age: 74
End: 2024-07-22
Payer: COMMERCIAL

## 2024-07-22 DIAGNOSIS — K76.9 LIVER DISEASE, CHRONIC, WITH CIRRHOSIS (MULTI): ICD-10-CM

## 2024-07-22 DIAGNOSIS — Z86.19 HEPATITIS C VIRUS INFECTION CURED AFTER ANTIVIRAL DRUG THERAPY: ICD-10-CM

## 2024-07-22 DIAGNOSIS — C32.9 LARYNGEAL CANCER (MULTI): ICD-10-CM

## 2024-07-22 DIAGNOSIS — Z85.21 ENCOUNTER FOR FOLLOW-UP SURVEILLANCE OF LARYNGEAL CANCER: ICD-10-CM

## 2024-07-22 DIAGNOSIS — K74.60 LIVER DISEASE, CHRONIC, WITH CIRRHOSIS (MULTI): ICD-10-CM

## 2024-07-22 DIAGNOSIS — Z08 ENCOUNTER FOR FOLLOW-UP SURVEILLANCE OF LARYNGEAL CANCER: ICD-10-CM

## 2024-07-22 DIAGNOSIS — K80.20 GALLSTONES: ICD-10-CM

## 2024-07-22 DIAGNOSIS — R10.84 GENERALIZED ABDOMINAL PAIN: ICD-10-CM

## 2024-07-22 LAB
AFP SERPL-MCNC: <4 NG/ML (ref 0–9)
ALBUMIN SERPL BCP-MCNC: 3.8 G/DL (ref 3.4–5)
ALP SERPL-CCNC: 75 U/L (ref 33–136)
ALT SERPL W P-5'-P-CCNC: 11 U/L (ref 10–52)
ANION GAP SERPL CALC-SCNC: 13 MMOL/L (ref 10–20)
AST SERPL W P-5'-P-CCNC: 20 U/L (ref 9–39)
BILIRUB DIRECT SERPL-MCNC: 0.1 MG/DL (ref 0–0.3)
BILIRUB SERPL-MCNC: 0.4 MG/DL (ref 0–1.2)
BUN SERPL-MCNC: 108 MG/DL (ref 6–23)
CALCIUM SERPL-MCNC: 9.5 MG/DL (ref 8.6–10.3)
CHLORIDE SERPL-SCNC: 98 MMOL/L (ref 98–107)
CO2 SERPL-SCNC: 29 MMOL/L (ref 21–32)
CREAT SERPL-MCNC: 2.43 MG/DL (ref 0.5–1.3)
EGFRCR SERPLBLD CKD-EPI 2021: 27 ML/MIN/1.73M*2
GLUCOSE SERPL-MCNC: 142 MG/DL (ref 74–99)
HOLD SPECIMEN: NORMAL
INR PPP: 1.1 (ref 0.9–1.1)
LIPASE SERPL-CCNC: 375 U/L (ref 9–82)
POTASSIUM SERPL-SCNC: 4.2 MMOL/L (ref 3.5–5.3)
PROT SERPL-MCNC: 8.4 G/DL (ref 6.4–8.2)
PROTHROMBIN TIME: 12.8 SECONDS (ref 9.8–12.8)
SODIUM SERPL-SCNC: 136 MMOL/L (ref 136–145)
TSH SERPL-ACNC: 3.05 MIU/L (ref 0.44–3.98)

## 2024-07-22 PROCEDURE — 84443 ASSAY THYROID STIM HORMONE: CPT

## 2024-07-22 PROCEDURE — 82248 BILIRUBIN DIRECT: CPT

## 2024-07-22 PROCEDURE — 82105 ALPHA-FETOPROTEIN SERUM: CPT

## 2024-07-22 PROCEDURE — 85610 PROTHROMBIN TIME: CPT

## 2024-07-22 PROCEDURE — 36415 COLL VENOUS BLD VENIPUNCTURE: CPT

## 2024-07-22 PROCEDURE — 80053 COMPREHEN METABOLIC PANEL: CPT

## 2024-07-22 PROCEDURE — 83690 ASSAY OF LIPASE: CPT

## 2024-07-26 ENCOUNTER — OFFICE VISIT (OUTPATIENT)
Dept: OTOLARYNGOLOGY | Facility: HOSPITAL | Age: 74
End: 2024-07-26
Payer: COMMERCIAL

## 2024-07-26 ENCOUNTER — APPOINTMENT (OUTPATIENT)
Dept: RADIOLOGY | Facility: HOSPITAL | Age: 74
DRG: 395 | End: 2024-07-26
Payer: COMMERCIAL

## 2024-07-26 ENCOUNTER — HOSPITAL ENCOUNTER (INPATIENT)
Facility: HOSPITAL | Age: 74
LOS: 1 days | Discharge: HOME | DRG: 395 | End: 2024-07-28
Attending: OTOLARYNGOLOGY | Admitting: OTOLARYNGOLOGY
Payer: COMMERCIAL

## 2024-07-26 ENCOUNTER — HOSPITAL ENCOUNTER (EMERGENCY)
Facility: HOSPITAL | Age: 74
DRG: 395 | End: 2024-07-26
Payer: COMMERCIAL

## 2024-07-26 VITALS — BODY MASS INDEX: 19.1 KG/M2 | HEIGHT: 68 IN | WEIGHT: 126 LBS | TEMPERATURE: 98.2 F

## 2024-07-26 DIAGNOSIS — R13.12 DYSPHAGIA, OROPHARYNGEAL PHASE: ICD-10-CM

## 2024-07-26 DIAGNOSIS — C76.0 HEAD AND NECK CANCER (MULTI): Primary | ICD-10-CM

## 2024-07-26 DIAGNOSIS — C32.9 LARYNGEAL CANCER (MULTI): Primary | ICD-10-CM

## 2024-07-26 LAB
ALBUMIN SERPL BCP-MCNC: 3.8 G/DL (ref 3.4–5)
ANION GAP SERPL CALC-SCNC: 17 MMOL/L (ref 10–20)
BUN SERPL-MCNC: 117 MG/DL (ref 6–23)
CALCIUM SERPL-MCNC: 10 MG/DL (ref 8.6–10.6)
CHLORIDE SERPL-SCNC: 97 MMOL/L (ref 98–107)
CO2 SERPL-SCNC: 28 MMOL/L (ref 21–32)
CREAT SERPL-MCNC: 2.59 MG/DL (ref 0.5–1.3)
EGFRCR SERPLBLD CKD-EPI 2021: 25 ML/MIN/1.73M*2
GLUCOSE SERPL-MCNC: 108 MG/DL (ref 74–99)
PHOSPHATE SERPL-MCNC: 4.2 MG/DL (ref 2.5–4.9)
POTASSIUM SERPL-SCNC: 4.1 MMOL/L (ref 3.5–5.3)
SODIUM SERPL-SCNC: 138 MMOL/L (ref 136–145)

## 2024-07-26 PROCEDURE — 84100 ASSAY OF PHOSPHORUS: CPT | Performed by: STUDENT IN AN ORGANIZED HEALTH CARE EDUCATION/TRAINING PROGRAM

## 2024-07-26 PROCEDURE — 36415 COLL VENOUS BLD VENIPUNCTURE: CPT | Performed by: STUDENT IN AN ORGANIZED HEALTH CARE EDUCATION/TRAINING PROGRAM

## 2024-07-26 PROCEDURE — 96372 THER/PROPH/DIAG INJ SC/IM: CPT | Performed by: STUDENT IN AN ORGANIZED HEALTH CARE EDUCATION/TRAINING PROGRAM

## 2024-07-26 PROCEDURE — 2500000004 HC RX 250 GENERAL PHARMACY W/ HCPCS (ALT 636 FOR OP/ED): Performed by: STUDENT IN AN ORGANIZED HEALTH CARE EDUCATION/TRAINING PROGRAM

## 2024-07-26 PROCEDURE — 99213 OFFICE O/P EST LOW 20 MIN: CPT | Performed by: OTOLARYNGOLOGY

## 2024-07-26 PROCEDURE — 99222 1ST HOSP IP/OBS MODERATE 55: CPT | Performed by: OTOLARYNGOLOGY

## 2024-07-26 PROCEDURE — G0378 HOSPITAL OBSERVATION PER HR: HCPCS

## 2024-07-26 PROCEDURE — 74018 RADEX ABDOMEN 1 VIEW: CPT

## 2024-07-26 PROCEDURE — 2500000001 HC RX 250 WO HCPCS SELF ADMINISTERED DRUGS (ALT 637 FOR MEDICARE OP): Performed by: STUDENT IN AN ORGANIZED HEALTH CARE EDUCATION/TRAINING PROGRAM

## 2024-07-26 PROCEDURE — 74018 RADEX ABDOMEN 1 VIEW: CPT | Performed by: RADIOLOGY

## 2024-07-26 PROCEDURE — 99221 1ST HOSP IP/OBS SF/LOW 40: CPT | Performed by: SURGERY

## 2024-07-26 PROCEDURE — 96361 HYDRATE IV INFUSION ADD-ON: CPT

## 2024-07-26 PROCEDURE — 80069 RENAL FUNCTION PANEL: CPT | Performed by: STUDENT IN AN ORGANIZED HEALTH CARE EDUCATION/TRAINING PROGRAM

## 2024-07-26 RX ORDER — ALBUTEROL SULFATE 90 UG/1
2 AEROSOL, METERED RESPIRATORY (INHALATION) EVERY 6 HOURS PRN
Status: DISCONTINUED | OUTPATIENT
Start: 2024-07-26 | End: 2024-07-28 | Stop reason: HOSPADM

## 2024-07-26 RX ORDER — ONDANSETRON 4 MG/1
4 TABLET, FILM COATED ORAL EVERY 8 HOURS PRN
Status: DISCONTINUED | OUTPATIENT
Start: 2024-07-26 | End: 2024-07-28 | Stop reason: HOSPADM

## 2024-07-26 RX ORDER — TERAZOSIN 5 MG/1
CAPSULE ORAL
COMMUNITY
Start: 2024-06-30

## 2024-07-26 RX ORDER — ATENOLOL 50 MG/1
50 TABLET ORAL DAILY
Status: DISCONTINUED | OUTPATIENT
Start: 2024-07-26 | End: 2024-07-27

## 2024-07-26 RX ORDER — TERAZOSIN 1 MG/1
1 CAPSULE ORAL NIGHTLY
Status: DISCONTINUED | OUTPATIENT
Start: 2024-07-26 | End: 2024-07-26

## 2024-07-26 RX ORDER — METHOCARBAMOL 500 MG/1
500 TABLET, FILM COATED ORAL EVERY 8 HOURS SCHEDULED
Status: DISCONTINUED | OUTPATIENT
Start: 2024-07-26 | End: 2024-07-27

## 2024-07-26 RX ORDER — ONDANSETRON HYDROCHLORIDE 2 MG/ML
4 INJECTION, SOLUTION INTRAVENOUS EVERY 8 HOURS PRN
Status: DISCONTINUED | OUTPATIENT
Start: 2024-07-26 | End: 2024-07-28 | Stop reason: HOSPADM

## 2024-07-26 RX ORDER — ENOXAPARIN SODIUM 100 MG/ML
30 INJECTION SUBCUTANEOUS EVERY 24 HOURS
Status: DISCONTINUED | OUTPATIENT
Start: 2024-07-26 | End: 2024-07-26 | Stop reason: ALTCHOICE

## 2024-07-26 RX ORDER — OXYCODONE HYDROCHLORIDE 5 MG/1
5 TABLET ORAL EVERY 4 HOURS PRN
Status: DISCONTINUED | OUTPATIENT
Start: 2024-07-26 | End: 2024-07-27

## 2024-07-26 RX ORDER — ESOMEPRAZOLE MAGNESIUM 20 MG/1
20 GRANULE, DELAYED RELEASE ORAL
Status: DISCONTINUED | OUTPATIENT
Start: 2024-07-27 | End: 2024-07-27

## 2024-07-26 RX ORDER — SODIUM CHLORIDE, SODIUM LACTATE, POTASSIUM CHLORIDE, CALCIUM CHLORIDE 600; 310; 30; 20 MG/100ML; MG/100ML; MG/100ML; MG/100ML
60 INJECTION, SOLUTION INTRAVENOUS CONTINUOUS
Status: DISCONTINUED | OUTPATIENT
Start: 2024-07-26 | End: 2024-07-27

## 2024-07-26 RX ORDER — NALOXONE HYDROCHLORIDE 0.4 MG/ML
0.2 INJECTION, SOLUTION INTRAMUSCULAR; INTRAVENOUS; SUBCUTANEOUS EVERY 5 MIN PRN
Status: DISCONTINUED | OUTPATIENT
Start: 2024-07-26 | End: 2024-07-28 | Stop reason: HOSPADM

## 2024-07-26 RX ORDER — CHLORHEXIDINE GLUCONATE ORAL RINSE 1.2 MG/ML
15 SOLUTION DENTAL 2 TIMES DAILY
Status: DISCONTINUED | OUTPATIENT
Start: 2024-07-26 | End: 2024-07-28 | Stop reason: HOSPADM

## 2024-07-26 RX ORDER — ACETAMINOPHEN 160 MG/5ML
1000 SOLUTION ORAL EVERY 8 HOURS SCHEDULED
Status: DISCONTINUED | OUTPATIENT
Start: 2024-07-26 | End: 2024-07-28 | Stop reason: HOSPADM

## 2024-07-26 RX ORDER — ACETAMINOPHEN 325 MG/1
975 TABLET ORAL EVERY 8 HOURS SCHEDULED
Status: DISCONTINUED | OUTPATIENT
Start: 2024-07-26 | End: 2024-07-28 | Stop reason: HOSPADM

## 2024-07-26 RX ORDER — ALBUTEROL SULFATE 0.83 MG/ML
3 SOLUTION RESPIRATORY (INHALATION) EVERY 6 HOURS PRN
Status: DISCONTINUED | OUTPATIENT
Start: 2024-07-26 | End: 2024-07-28 | Stop reason: HOSPADM

## 2024-07-26 RX ORDER — HYDROCHLOROTHIAZIDE 25 MG/1
25 TABLET ORAL DAILY
Status: DISCONTINUED | OUTPATIENT
Start: 2024-07-26 | End: 2024-07-27

## 2024-07-26 RX ORDER — NALOXONE HYDROCHLORIDE 4 MG/.1ML
4 SPRAY NASAL AS NEEDED
Status: DISCONTINUED | OUTPATIENT
Start: 2024-07-26 | End: 2024-07-28 | Stop reason: HOSPADM

## 2024-07-26 RX ORDER — HEPARIN SODIUM 5000 [USP'U]/ML
5000 INJECTION, SOLUTION INTRAVENOUS; SUBCUTANEOUS EVERY 8 HOURS
Status: DISCONTINUED | OUTPATIENT
Start: 2024-07-26 | End: 2024-07-28 | Stop reason: HOSPADM

## 2024-07-26 RX ORDER — FLUTICASONE PROPIONATE 50 MCG
1 SPRAY, SUSPENSION (ML) NASAL DAILY PRN
Status: DISCONTINUED | OUTPATIENT
Start: 2024-07-26 | End: 2024-07-28 | Stop reason: HOSPADM

## 2024-07-26 RX ORDER — ATORVASTATIN CALCIUM 10 MG/1
10 TABLET, FILM COATED ORAL NIGHTLY
Status: DISCONTINUED | OUTPATIENT
Start: 2024-07-26 | End: 2024-07-27

## 2024-07-26 RX ORDER — POLYETHYLENE GLYCOL 3350 17 G/17G
17 POWDER, FOR SOLUTION ORAL DAILY PRN
Status: DISCONTINUED | OUTPATIENT
Start: 2024-07-26 | End: 2024-07-27

## 2024-07-26 RX ORDER — LOSARTAN POTASSIUM 50 MG/1
50 TABLET ORAL DAILY
Status: DISCONTINUED | OUTPATIENT
Start: 2024-07-26 | End: 2024-07-28 | Stop reason: HOSPADM

## 2024-07-26 RX ORDER — TERAZOSIN 1 MG/1
5 CAPSULE ORAL NIGHTLY
Status: DISCONTINUED | OUTPATIENT
Start: 2024-07-26 | End: 2024-07-27

## 2024-07-26 RX ORDER — LEVOTHYROXINE SODIUM 50 UG/1
50 TABLET ORAL
Status: DISCONTINUED | OUTPATIENT
Start: 2024-07-27 | End: 2024-07-27

## 2024-07-26 RX ORDER — CHLORHEXIDINE GLUCONATE ORAL RINSE 1.2 MG/ML
SOLUTION DENTAL
COMMUNITY
Start: 2024-07-11

## 2024-07-26 RX ADMIN — HEPARIN SODIUM 5000 UNITS: 5000 INJECTION INTRAVENOUS; SUBCUTANEOUS at 20:49

## 2024-07-26 RX ADMIN — SODIUM CHLORIDE, POTASSIUM CHLORIDE, SODIUM LACTATE AND CALCIUM CHLORIDE 60 ML/HR: 600; 310; 30; 20 INJECTION, SOLUTION INTRAVENOUS at 20:49

## 2024-07-26 RX ADMIN — CHLORHEXIDINE GLUCONATE 0.12% ORAL RINSE 15 ML: 1.2 LIQUID ORAL at 20:49

## 2024-07-26 SDOH — SOCIAL STABILITY: SOCIAL INSECURITY: HAVE YOU HAD THOUGHTS OF HARMING ANYONE ELSE?: NO

## 2024-07-26 SDOH — SOCIAL STABILITY: SOCIAL INSECURITY: ARE THERE ANY APPARENT SIGNS OF INJURIES/BEHAVIORS THAT COULD BE RELATED TO ABUSE/NEGLECT?: NO

## 2024-07-26 SDOH — SOCIAL STABILITY: SOCIAL INSECURITY: HAS ANYONE EVER THREATENED TO HURT YOUR FAMILY OR YOUR PETS?: NO

## 2024-07-26 SDOH — SOCIAL STABILITY: SOCIAL INSECURITY: DO YOU FEEL ANYONE HAS EXPLOITED OR TAKEN ADVANTAGE OF YOU FINANCIALLY OR OF YOUR PERSONAL PROPERTY?: NO

## 2024-07-26 SDOH — SOCIAL STABILITY: SOCIAL INSECURITY: DOES ANYONE TRY TO KEEP YOU FROM HAVING/CONTACTING OTHER FRIENDS OR DOING THINGS OUTSIDE YOUR HOME?: NO

## 2024-07-26 SDOH — SOCIAL STABILITY: SOCIAL INSECURITY: ABUSE: ADULT

## 2024-07-26 SDOH — SOCIAL STABILITY: SOCIAL INSECURITY: WERE YOU ABLE TO COMPLETE ALL THE BEHAVIORAL HEALTH SCREENINGS?: YES

## 2024-07-26 SDOH — SOCIAL STABILITY: SOCIAL INSECURITY: DO YOU FEEL UNSAFE GOING BACK TO THE PLACE WHERE YOU ARE LIVING?: NO

## 2024-07-26 SDOH — SOCIAL STABILITY: SOCIAL INSECURITY: ARE YOU OR HAVE YOU BEEN THREATENED OR ABUSED PHYSICALLY, EMOTIONALLY, OR SEXUALLY BY ANYONE?: NO

## 2024-07-26 ASSESSMENT — PAIN SCALES - GENERAL
PAINLEVEL_OUTOF10: 2
PAINLEVEL_OUTOF10: 3

## 2024-07-26 ASSESSMENT — COGNITIVE AND FUNCTIONAL STATUS - GENERAL
DAILY ACTIVITIY SCORE: 24
DAILY ACTIVITIY SCORE: 24
MOBILITY SCORE: 24
MOBILITY SCORE: 24
PATIENT BASELINE BEDBOUND: NO

## 2024-07-26 ASSESSMENT — ACTIVITIES OF DAILY LIVING (ADL)
ADEQUATE_TO_COMPLETE_ADL: YES
GROOMING: INDEPENDENT
HEARING - LEFT EAR: FUNCTIONAL
DRESSING YOURSELF: INDEPENDENT
FEEDING YOURSELF: INDEPENDENT
HEARING - RIGHT EAR: FUNCTIONAL
PATIENT'S MEMORY ADEQUATE TO SAFELY COMPLETE DAILY ACTIVITIES?: YES
BATHING: INDEPENDENT
ASSISTIVE_DEVICE: WALKER
JUDGMENT_ADEQUATE_SAFELY_COMPLETE_DAILY_ACTIVITIES: YES
TOILETING: INDEPENDENT
LACK_OF_TRANSPORTATION: NO
WALKS IN HOME: INDEPENDENT

## 2024-07-26 ASSESSMENT — LIFESTYLE VARIABLES
SKIP TO QUESTIONS 9-10: 1
AUDIT-C TOTAL SCORE: 0
HOW OFTEN DO YOU HAVE 6 OR MORE DRINKS ON ONE OCCASION: NEVER
AUDIT-C TOTAL SCORE: 0
HOW OFTEN DO YOU HAVE A DRINK CONTAINING ALCOHOL: NEVER
HOW MANY STANDARD DRINKS CONTAINING ALCOHOL DO YOU HAVE ON A TYPICAL DAY: PATIENT DOES NOT DRINK

## 2024-07-26 ASSESSMENT — PATIENT HEALTH QUESTIONNAIRE - PHQ9
SUM OF ALL RESPONSES TO PHQ9 QUESTIONS 1 & 2: 0
1. LITTLE INTEREST OR PLEASURE IN DOING THINGS: NOT AT ALL
2. FEELING DOWN, DEPRESSED OR HOPELESS: NOT AT ALL

## 2024-07-26 ASSESSMENT — PAIN - FUNCTIONAL ASSESSMENT
PAIN_FUNCTIONAL_ASSESSMENT: 0-10
PAIN_FUNCTIONAL_ASSESSMENT: 0-10

## 2024-07-26 NOTE — H&P
History Of Present Illness  Mk Fleming is a 73 y.o. male with a hx of  HTN, Cirrhosis, hepatitis C, chronic kidney disease, COPD, hypothyroidism.  He notably has a history of advanced laryngeal squamous cell carcinoma which was treated with chemoradiation.  He has remained without evidence of recurrence though has persistent dysphagia and is essentially unable to tolerate any p.o. diet.  He has had a PEG in place for many years now which she has used for enteral access.  He was seen in clinic by Dr. Chahal to today with concern for dislodgment of the PEG tube and this was unable to be replaced.  As such it was recommended he be admitted for further assessment and in order to obtain enteral access.  Patient notes that this happened about 2 days ago she has also had some discomfort and pain around his PEG site.  He denies any fever or chills or other change.  He does note pain more so than usual as he has not been able to take his normal oxycodone doses.  No other concerns or complaints at this time     Past Medical History  Past Medical History:   Diagnosis Date    Anemia     Cholecystitis     Cholelithiasis     Cirrhosis (Multi)     CKD (chronic kidney disease)     stage 3    COPD (chronic obstructive pulmonary disease) (Multi)     Dysphagia     peg dependent    GERD (gastroesophageal reflux disease)     HCV (hepatitis C virus)     s/p SVR    Hoarseness of voice     Hyperlipidemia     Hypertension     Hypothyroidism     Laryngeal cancer (Multi)     s/p chemo and radiation therapy    PAD (peripheral artery disease) (CMS-HCC)     Personal history of other diseases of the respiratory system     History of bronchitis    Personal history of other specified conditions     History of chest pain    Pulmonary emphysema (Multi)        Surgical History  Past Surgical History:   Procedure Laterality Date    ESOPHAGOGASTRODUODENOSCOPY      HERNIA REPAIR      IR ANGIOGRAM AORTA ABDOMEN  07/03/2022    IR ANGIOGRAM AORTA  ABDOMEN 7/3/2022 [facility] CL[facility]ICAL LEGAC[facility]    IR ANGIOGRAM INFERIOR EPIGASTRIC  07/03/2022    IR ANGIOGRAM INFERIOR EPIGASTRIC 7/3/2022 [facility] CLINICAL LEGAC[facility]    IR EMBOLIZATION LYMPH NODE Bilateral 07/03/2022    IR EMBOLIZATION LYMPH NODE 7/3/2022 [facility] CLINICAL LEGACY    OTHER SURGICAL HISTORY  11/19/2018    Tooth extraction    OTHER SURGICAL HISTORY  11/19/2018    Pulmonary decortication    OTHER SURGICAL HISTORY  10/20/2022    Percutaneous endoscopic gastrostomy tube insertion        Social History  He reports that he quit smoking about 30 years ago. His smoking use included cigarettes. He has never been exposed to tobacco smoke. He has never used smokeless tobacco. He reports that he does not currently use alcohol. He reports that he does not currently use drugs after having used the following drugs: Cocaine.    Family History  Family History   Problem Relation Name Age of Onset    Pancreatic cancer Mother      Hypertension Sister      Hypertension Brother          PHYSICAL EXAMINATION:  Constitutional:  No acute distress  Voice: Voice weak and breathy  Respiration:  Breathing comfortably, no stridor  Eyes:  EOM intact, sclera normal  Neuro:  Alert and conversive  Head and Face:  Symmetric facial features, no masses or lesions  Salivary Glands:  Parotid and submandibular glands normal bilaterally  Ears: Normal external ears  Nose:  External nose midline, anterior rhinoscopy is normal with limited visualization to the anterior aspect of the inferior turbinates, no bleeding or drainage, no lesions  Oral Cavity/Oropharynx/Lips:  Normal mucous membranes, normal floor of mouth/tongue/OP, no masses or lesions, tonsils   Neck/Lymph:  No LAD, no thyroid masses, trachea midline  Skin:  Neck skin is without scar or injury  Abdomen: Soft and nondistended.  Opening where prior PEG tube was in place is small without any indurated edges or surrounding erythema.  No obvious fluid collection.  No drainage.    Assessment and  plan:  Patient is a 73-year-old male with a history of laryngeal cancer treated with chemoradiation.  He has persistent dysphagia and is unable to tolerate p.o.  He presents in the setting of PEG dislodgment and no enteral access.  There is no acute concern for infection or other process on exam.    -Will consult acute care surgery for assistance with troubleshooting PEG and possible replacement.  May need enteral access via Dobbhoff tube if unable to fix PEG in the short-term-  -Home medications ordered  -Maintenance IV fluids ordered  -Will obtain baseline labs in the a.m.      Dylan Álvarez MD PGY4 ENT

## 2024-07-26 NOTE — PROGRESS NOTES
Provider Impressions     Dysphagia status post treatment of laryngeal cancer. The patient is peg dependent. There was an attempt to changing the tube today.  Unfortunately the tube was in the party wall and a new tube could not be inserted.  The patient will be admitted for further management.    He will be seen in the hospital.    Chief Complaint     Follow-up regarding some issues with the PEG tube      History of Present Illness    This patient was treated with a combination chemotherapy and radiation therapy for the management of a T4 laryngeal cancer. He does have some issues with dysphagia. He has been peg dependent.  For the past 2 days he has not been able to push any food in his tube.  He is here today to have this addressed.    Physical Exam    Examination of the PEG site and the tube shows the tube to be very deteriorated.  The tube was also found to be only at 2 cm.  The abdomen is soft but there is some slight induration around the PEG site.  An attempt was made to introduce a new tube.  Unfortunately this was not possible because no opening into the stomach could be identified.  A dressing was applied over the area.

## 2024-07-26 NOTE — CARE PLAN
Problem: Fall/Injury  Goal: Not fall by end of shift  Outcome: Progressing  Goal: Be free from injury by end of the shift  Outcome: Progressing  Goal: Verbalize understanding of personal risk factors for fall in the hospital  Outcome: Progressing  Goal: Verbalize understanding of risk factor reduction measures to prevent injury from fall in the home  Outcome: Progressing  Goal: Use assistive devices by end of the shift  Outcome: Progressing  Goal: Pace activities to prevent fatigue by end of the shift  Outcome: Progressing   The patient's goals for the shift include      The clinical goals for the shift include pt will remain safe and fee of injury through 7/27 at 0700

## 2024-07-26 NOTE — CONSULTS
[facility]  ACUTE CARE SURGERY - HISTORY AND PHYSICAL / CONSULT    Patient Name: Mk Fleming  MRN: [id number]  Admit Date: 726  : 1950  AGE: 73 y.o.   GENDER: male  ==============================================================================  TODAY'S ASSESSMENT AND PLAN OF CARE:  Mk Fleming is a 73 y.o. male presenting with PMHx of T4 laryngeal cancer s/p XRT in , MARICARMEN, PEG dependent since 2019 admitted for PEG tube dislodgement.    Per report PEG tube was having difficulty being flushed for the last two days. Presented to clinic today where PEG tube found to be only advanced 2cm. Attempt was made to replace PEG which was unsuccessful and admitted.     Assessed patient at bedside. A 16F briscoe was advanced into tract. Tube study was completed at bedside with follow up KUB showing appropriate placement of tube with contrast filling intraluminally in the stomach and proximal duodenum, no evidence of contrast leak.     Plan:   1. Recommend PEG replacement with ENT. ACS available to assist if needed.       Seen, discussed, and assessed with Dr. Huertas, PGY-3. Discussed with Dr. Beclher, critical care fellow. Staffing pending cosignature/addendum with Dr. Fox, ACS attending.   ==============================================================================  CHIEF COMPLAINT/REASON FOR CONSULT: PEG dislodgement    Mk Fleming is a 73 y.o. male presenting with PMHx of T4 laryngeal cancer s/p XRT in 2019, MARICARMEN, PEG dependent since 2019 admitted for PEG tube dislodgement.    PAST MEDICAL HISTORY:   PMH:   Past Medical History:   Diagnosis Date    Anemia     Cholecystitis     Cholelithiasis     Cirrhosis (Multi)     CKD (chronic kidney disease)     stage 3    COPD (chronic obstructive pulmonary disease) (Multi)     Dysphagia     peg dependent    GERD (gastroesophageal reflux disease)     HCV (hepatitis C virus)     s/p SVR    Hoarseness of voice     Hyperlipidemia      Hypertension     Hypothyroidism     Laryngeal cancer (Multi)     s/p chemo and radiation therapy    PAD (peripheral artery disease) (CMS-HCC)     Personal history of other diseases of the respiratory system     History of bronchitis    Personal history of other specified conditions     History of chest pain    Pulmonary emphysema (Multi)        PSH:   Past Surgical History:   Procedure Laterality Date    ESOPHAGOGASTRODUODENOSCOPY      HERNIA REPAIR      IR ANGIOGRAM AORTA ABDOMEN  2022    IR ANGIOGRAM AORTA ABDOMEN 7/3/2022 [facility]ICAL LEGAC[facility]    IR ANGIOGRAM INFERIOR EPIGASTRIC  2022    IR ANGIOGRAM INFERIOR EPIGASTRIC 7/3/2022 [facility] CLINICAL [facility]GAC[facility]    IR EMBOLIZATION LYMPH NODE Bilateral 2022    IR EMBOLIZATION LYMPH NODE 7/3/2022 [facility] CLINICAL LEGAC[facility]    OTHER SURGICAL HISTORY  2018    Tooth extraction    OTHER SURGICAL HISTORY  2018    Pulmonary decortication    OTHER SURGICAL HISTORY  10/20/2022    Percutaneous endoscopic gastrostomy tube insertion     FH:   Family History   Problem Relation Name Age of Onset    Pancreatic cancer Mother      Hypertension Sister      Hypertension Brother       SOCIAL HISTORY:    Smoking:   Social History     Tobacco Use   Smoking Status Former    Current packs/day: 0.00    Types: Cigarettes    Quit date:     Years since quittin.5    Passive exposure: Never   Smokeless Tobacco Never       Alcohol:   Social History     Substance and Sexual Activity   Alcohol Use Not Currently     MEDICATIONS:   Prior to Admission medications    Medication Sig Start Date End Date Taking? Authorizing Provider   albuterol 2.5 mg /3 mL (0.083 %) nebulizer solution Inhale 3 mL every 6 hours if needed for wheezing. 4-6 hours as needed 19   Historical Provider, MD   albuterol 90 mcg/actuation inhaler Inhale 2 puffs every 6 hours if needed for shortness of breath. 23   Historical Provider, MD   atenolol (Tenormin) 50 mg tablet 1 tablet (50 mg) by g-tube  route once daily.    Historical Provider, MD   atorvastatin (Lipitor) 10 mg tablet 1 tablet (10 mg) by g-tube route once daily at bedtime.    Historical Provider, MD   chlorhexidine (Peridex) 0.12 % solution PLACE 15 MILLILITERS SWISH IN MOUTH FOR 30 SECONDS THEN SPIT OUT 7/11/24   Historical Provider, MD   esomeprazole (NexIUM) 20 mg packet Take 20 mg by mouth once daily in the morning. Take before meals.  Patient taking differently: 20 mg by g-tube route once daily in the morning. Take before meals. 1/3/24 12/28/24  Jaime Flores MD   fluticasone (Flonase) 50 mcg/actuation nasal spray Administer 1 spray into each nostril once daily as needed. 7/7/21   Historical Provider, MD   hydroCHLOROthiazide (HYDRODiuril) 25 mg tablet 1 tablet (25 mg) by g-tube route once daily.    Historical Provider, MD   levothyroxine (Synthroid, Levoxyl) 50 mcg tablet 1 tablet (50 mcg) by g-tube route once daily in the morning. Take before meals.    Historical Provider, MD   losartan (Cozaar) 50 mg tablet 1 tablet (50 mg) by g-tube route once daily.    Historical Provider, MD   methocarbamol (Robaxin) 500 mg tablet 1 tablet (500 mg) by g-tube route every 8 hours for 7 days. 5/4/24 5/11/24  Shawna Gonzáles MD   naloxone (Narcan) 4 mg/0.1 mL nasal spray Administer 1 spray (4 mg) into affected nostril(s) if needed for opioid reversal or respiratory depression. May repeat every 2-3 minutes if needed, alternating nostrils, until medical assistance becomes available. 12/23/23   Raymond Rubio MD   oxyCODONE (Roxicodone) 5 mg immediate release tablet 1 tablet (5 mg) by g-tube route every 4 hours if needed for severe pain (7 - 10) (For breakthrough pain). 5/4/24   Cade Mcdaniel MD   polyethylene glycol (Miralax) 17 gram/dose powder 17 g by g-tube route once daily as needed (constipation).    Historical Provider, MD   terazosin (Hytrin) 5 mg capsule  6/30/24   Historical Provider, MD     ALLERGIES:   No Known  Allergies    PHYSICAL EXAM:  Physical Exam  Pulmonary:      Effort: Pulmonary effort is normal.   Abdominal:      General: There is no distension.      Palpations: Abdomen is soft.      Tenderness: There is no abdominal tenderness. There is no guarding.      Comments: Opening of tract with healthy appearing skin, no excoriation, erythema.    Neurological:      Mental Status: He is alert.       IMAGING SUMMARY:   Interpreted By:  Garcia Vasquez,   STUDY:  XR ABDOMEN 1 VIEW; 7/26/2024 7:08 pm      INDICATION:  Signs/Symptoms:PEG evaluation.      COMPARISON:  02/13/2022.      ACCESSION NUMBER(S):  [id number]      ORDERING CLINICIAN:  MARQUITA ESTRADA      FINDINGS:  Peg tube overlies the left upper quadrant. Interval installation of  contrast within the stomach and proximal small bowel loops. No gross  evidence of leak. Coils overlie the right upper quadrant.  Nonobstructive bowel gas pattern. Limited evaluation of  pneumoperitoneum on supine imaging, however no gross evidence of free  air is noted.      Visualized lungs are clear.      Osseous structures demonstrate no acute bony changes.      IMPRESSION:  1.  Interval installation of contrast PEG tube. No gross evidence of  contrast leak.      Signed by: Garcia Vasquez 7/26/2024 7:41 PM  Dictation workstation:   [id number]    LABS:      Results from last 7 days   Lab Units 07/22/24  1133   INR  1.1     Results from last 7 days   Lab Units 07/22/24  1133   SODIUM mmol/L 136   POTASSIUM mmol/L 4.2   CHLORIDE mmol/L 98   CO2 mmol/L 29   BUN mg/dL 108*   CREATININE mg/dL 2.43*   CALCIUM mg/dL 9.5   PROTEIN TOTAL g/dL 8.4*   BILIRUBIN TOTAL mg/dL 0.4   ALK PHOS U/L 75   ALT U/L 11   AST U/L 20   GLUCOSE mg/dL 142*     Results from last 7 days   Lab Units 07/22/24  1133   BILIRUBIN TOTAL mg/dL 0.4   BILIRUBIN DIRECT mg/dL 0.1             I have reviewed all laboratory and imaging results ordered/pertinent for this encounter.

## 2024-07-27 ENCOUNTER — APPOINTMENT (OUTPATIENT)
Dept: RADIOLOGY | Facility: HOSPITAL | Age: 74
DRG: 395 | End: 2024-07-27
Payer: COMMERCIAL

## 2024-07-27 LAB
ALBUMIN SERPL BCP-MCNC: 2.9 G/DL (ref 3.4–5)
ANION GAP SERPL CALC-SCNC: 15 MMOL/L (ref 10–20)
BUN SERPL-MCNC: 110 MG/DL (ref 6–23)
CALCIUM SERPL-MCNC: 9 MG/DL (ref 8.6–10.6)
CHLORIDE SERPL-SCNC: 101 MMOL/L (ref 98–107)
CO2 SERPL-SCNC: 27 MMOL/L (ref 21–32)
CREAT SERPL-MCNC: 2.5 MG/DL (ref 0.5–1.3)
EGFRCR SERPLBLD CKD-EPI 2021: 26 ML/MIN/1.73M*2
ERYTHROCYTE [DISTWIDTH] IN BLOOD BY AUTOMATED COUNT: 14.2 % (ref 11.5–14.5)
GLUCOSE SERPL-MCNC: 81 MG/DL (ref 74–99)
HCT VFR BLD AUTO: 24.7 % (ref 41–52)
HGB BLD-MCNC: 7.9 G/DL (ref 13.5–17.5)
MCH RBC QN AUTO: 31 PG (ref 26–34)
MCHC RBC AUTO-ENTMCNC: 32 G/DL (ref 32–36)
MCV RBC AUTO: 97 FL (ref 80–100)
NRBC BLD-RTO: 0 /100 WBCS (ref 0–0)
PHOSPHATE SERPL-MCNC: 4.2 MG/DL (ref 2.5–4.9)
PLATELET # BLD AUTO: 185 X10*3/UL (ref 150–450)
POTASSIUM SERPL-SCNC: 4.2 MMOL/L (ref 3.5–5.3)
RBC # BLD AUTO: 2.55 X10*6/UL (ref 4.5–5.9)
SODIUM SERPL-SCNC: 139 MMOL/L (ref 136–145)
WBC # BLD AUTO: 4.1 X10*3/UL (ref 4.4–11.3)

## 2024-07-27 PROCEDURE — 2500000004 HC RX 250 GENERAL PHARMACY W/ HCPCS (ALT 636 FOR OP/ED): Performed by: STUDENT IN AN ORGANIZED HEALTH CARE EDUCATION/TRAINING PROGRAM

## 2024-07-27 PROCEDURE — 3E0G76Z INTRODUCTION OF NUTRITIONAL SUBSTANCE INTO UPPER GI, VIA NATURAL OR ARTIFICIAL OPENING: ICD-10-PCS | Performed by: OTOLARYNGOLOGY

## 2024-07-27 PROCEDURE — 2550000001 HC RX 255 CONTRASTS

## 2024-07-27 PROCEDURE — 74018 RADEX ABDOMEN 1 VIEW: CPT | Performed by: RADIOLOGY

## 2024-07-27 PROCEDURE — 96361 HYDRATE IV INFUSION ADD-ON: CPT

## 2024-07-27 PROCEDURE — 36415 COLL VENOUS BLD VENIPUNCTURE: CPT | Performed by: STUDENT IN AN ORGANIZED HEALTH CARE EDUCATION/TRAINING PROGRAM

## 2024-07-27 PROCEDURE — 0D20XUZ CHANGE FEEDING DEVICE IN UPPER INTESTINAL TRACT, EXTERNAL APPROACH: ICD-10-PCS

## 2024-07-27 PROCEDURE — 96376 TX/PRO/DX INJ SAME DRUG ADON: CPT

## 2024-07-27 PROCEDURE — G0378 HOSPITAL OBSERVATION PER HR: HCPCS

## 2024-07-27 PROCEDURE — 85027 COMPLETE CBC AUTOMATED: CPT | Performed by: STUDENT IN AN ORGANIZED HEALTH CARE EDUCATION/TRAINING PROGRAM

## 2024-07-27 PROCEDURE — 43762 RPLC GTUBE NO REVJ TRC: CPT

## 2024-07-27 PROCEDURE — 99232 SBSQ HOSP IP/OBS MODERATE 35: CPT | Performed by: OTOLARYNGOLOGY

## 2024-07-27 PROCEDURE — 96374 THER/PROPH/DIAG INJ IV PUSH: CPT

## 2024-07-27 PROCEDURE — 80069 RENAL FUNCTION PANEL: CPT | Performed by: STUDENT IN AN ORGANIZED HEALTH CARE EDUCATION/TRAINING PROGRAM

## 2024-07-27 PROCEDURE — 96372 THER/PROPH/DIAG INJ SC/IM: CPT | Performed by: STUDENT IN AN ORGANIZED HEALTH CARE EDUCATION/TRAINING PROGRAM

## 2024-07-27 PROCEDURE — 2500000001 HC RX 250 WO HCPCS SELF ADMINISTERED DRUGS (ALT 637 FOR MEDICARE OP): Performed by: STUDENT IN AN ORGANIZED HEALTH CARE EDUCATION/TRAINING PROGRAM

## 2024-07-27 PROCEDURE — 74018 RADEX ABDOMEN 1 VIEW: CPT

## 2024-07-27 PROCEDURE — 2500000001 HC RX 250 WO HCPCS SELF ADMINISTERED DRUGS (ALT 637 FOR MEDICARE OP)

## 2024-07-27 RX ORDER — HYDROCHLOROTHIAZIDE 25 MG/1
25 TABLET ORAL DAILY
Status: DISCONTINUED | OUTPATIENT
Start: 2024-07-28 | End: 2024-07-28 | Stop reason: HOSPADM

## 2024-07-27 RX ORDER — LEVOTHYROXINE SODIUM 50 UG/1
50 TABLET ORAL
Status: DISCONTINUED | OUTPATIENT
Start: 2024-07-28 | End: 2024-07-27

## 2024-07-27 RX ORDER — OXYCODONE HYDROCHLORIDE 5 MG/1
2.5 TABLET ORAL EVERY 8 HOURS PRN
Status: DISCONTINUED | OUTPATIENT
Start: 2024-07-27 | End: 2024-07-28 | Stop reason: HOSPADM

## 2024-07-27 RX ORDER — ESOMEPRAZOLE MAGNESIUM 20 MG/1
20 GRANULE, DELAYED RELEASE ORAL
Status: DISCONTINUED | OUTPATIENT
Start: 2024-07-28 | End: 2024-07-27

## 2024-07-27 RX ORDER — POLYETHYLENE GLYCOL 3350 17 G/17G
17 POWDER, FOR SOLUTION ORAL DAILY PRN
Status: DISCONTINUED | OUTPATIENT
Start: 2024-07-27 | End: 2024-07-28 | Stop reason: HOSPADM

## 2024-07-27 RX ORDER — HYDROMORPHONE HYDROCHLORIDE 1 MG/ML
0.2 INJECTION, SOLUTION INTRAMUSCULAR; INTRAVENOUS; SUBCUTANEOUS EVERY 4 HOURS PRN
Status: DISCONTINUED | OUTPATIENT
Start: 2024-07-27 | End: 2024-07-28 | Stop reason: HOSPADM

## 2024-07-27 RX ORDER — TERAZOSIN 1 MG/1
5 CAPSULE ORAL NIGHTLY
Status: DISCONTINUED | OUTPATIENT
Start: 2024-07-27 | End: 2024-07-28 | Stop reason: HOSPADM

## 2024-07-27 RX ORDER — ATENOLOL 50 MG/1
50 TABLET ORAL DAILY
Status: DISCONTINUED | OUTPATIENT
Start: 2024-07-28 | End: 2024-07-27

## 2024-07-27 RX ORDER — ESOMEPRAZOLE MAGNESIUM 20 MG/1
20 GRANULE, DELAYED RELEASE ORAL
Status: DISCONTINUED | OUTPATIENT
Start: 2024-07-27 | End: 2024-07-28 | Stop reason: HOSPADM

## 2024-07-27 RX ORDER — METHOCARBAMOL 500 MG/1
500 TABLET, FILM COATED ORAL EVERY 8 HOURS SCHEDULED
Status: DISCONTINUED | OUTPATIENT
Start: 2024-07-27 | End: 2024-07-28 | Stop reason: HOSPADM

## 2024-07-27 RX ORDER — ATORVASTATIN CALCIUM 10 MG/1
10 TABLET, FILM COATED ORAL NIGHTLY
Status: DISCONTINUED | OUTPATIENT
Start: 2024-07-27 | End: 2024-07-28 | Stop reason: HOSPADM

## 2024-07-27 RX ORDER — LEVOTHYROXINE SODIUM 50 UG/1
50 TABLET ORAL
Status: DISCONTINUED | OUTPATIENT
Start: 2024-07-27 | End: 2024-07-28 | Stop reason: HOSPADM

## 2024-07-27 RX ORDER — ATENOLOL 50 MG/1
50 TABLET ORAL DAILY
Status: DISCONTINUED | OUTPATIENT
Start: 2024-07-27 | End: 2024-07-28 | Stop reason: HOSPADM

## 2024-07-27 RX ADMIN — ACETAMINOPHEN 975 MG: 325 TABLET ORAL at 14:11

## 2024-07-27 RX ADMIN — ATENOLOL 50 MG: 50 TABLET ORAL at 14:11

## 2024-07-27 RX ADMIN — HYDROMORPHONE HYDROCHLORIDE 0.2 MG: 1 INJECTION, SOLUTION INTRAMUSCULAR; INTRAVENOUS; SUBCUTANEOUS at 21:38

## 2024-07-27 RX ADMIN — HEPARIN SODIUM 5000 UNITS: 5000 INJECTION INTRAVENOUS; SUBCUTANEOUS at 21:38

## 2024-07-27 RX ADMIN — CHLORHEXIDINE GLUCONATE 0.12% ORAL RINSE 15 ML: 1.2 LIQUID ORAL at 21:39

## 2024-07-27 RX ADMIN — HEPARIN SODIUM 5000 UNITS: 5000 INJECTION INTRAVENOUS; SUBCUTANEOUS at 04:42

## 2024-07-27 RX ADMIN — DIATRIZOATE MEGLUMINE AND DIATRIZOATE SODIUM 30 ML: 660; 100 LIQUID ORAL; RECTAL at 19:43

## 2024-07-27 RX ADMIN — HEPARIN SODIUM 5000 UNITS: 5000 INJECTION INTRAVENOUS; SUBCUTANEOUS at 11:18

## 2024-07-27 RX ADMIN — CHLORHEXIDINE GLUCONATE 0.12% ORAL RINSE 15 ML: 1.2 LIQUID ORAL at 11:18

## 2024-07-27 RX ADMIN — HYDROMORPHONE HYDROCHLORIDE 0.2 MG: 1 INJECTION, SOLUTION INTRAMUSCULAR; INTRAVENOUS; SUBCUTANEOUS at 02:40

## 2024-07-27 ASSESSMENT — PAIN - FUNCTIONAL ASSESSMENT: PAIN_FUNCTIONAL_ASSESSMENT: 0-10

## 2024-07-27 ASSESSMENT — PAIN SCALES - GENERAL
PAINLEVEL_OUTOF10: 0 - NO PAIN
PAINLEVEL_OUTOF10: 7
PAINLEVEL_OUTOF10: 0 - NO PAIN
PAINLEVEL_OUTOF10: 7

## 2024-07-27 NOTE — PROGRESS NOTES
"Mk Fleming is a 73 y.o. male with hx of advanced laryngeal squamous cell carcinoma which was treated with chemoradiation on day 1 of admission presenting as a direct admit from Dr. Momo barnett. He takes no PO and has had a PEG for many years, but this was dislodged and was unable to be replaced.     Subjective   No acute events overnight.       Objective     Physical Exam  PHYSICAL EXAMINATION:  Constitutional:  No acute distress  Voice: Voice weak  Respiration:  Breathing comfortably, no stridor  Cardiovascular:  No clubbing/cyanosis/edema in hands  Eyes:  EOM intact, sclera normal  Neuro:  Alert and oriented times 3, Cranial nerves II-XII grossly intact and symmetric bilaterally  Head and Face:  Symmetric facial features, no masses or lesions, sinuses non-tender to palpation  Salivary Glands:  Parotid and submandibular glands normal bilaterally  Right Ear:  Normal external ear, normal hearing to voice.  Left Ear: Normal external ear, normal hearing to  voice.  Nose: External nose midline, anterior rhinoscopy is normal with limited visualization to the anterior aspect of the interior turbinates, no bleeding or drainage, no lesions  Oral Cavity/Oropharynx/Lips:  Normal mucous membranes, normal floor of mouth/tongue/OP, no masses or lesions  Neck/Lymph:  No LAD, no thyroid masses, trachea midline  Skin:  Neck skin is without scar or injury  Abdomen: Soft and nondistended.  Opening where prior PEG tube was in place is small without any indurated edges or surrounding erythema.  No obvious fluid collection. No drainage.   Psych:  Alert and oriented with appropriate mood and affect      Last Recorded Vitals  Blood pressure 129/70, pulse 84, temperature 36.8 °C (98.2 °F), temperature source Temporal, resp. rate 18, height 1.727 m (5' 8\"), weight 59 kg (130 lb), SpO2 98%.  Intake/Output last 3 Shifts:  I/O last 3 completed shifts:  In: 592 (10 mL/kg) [I.V.:592 (10 mL/kg)]  Out: - (0 mL/kg)   Weight: 59 kg "     Relevant Results        Scheduled medications  acetaminophen, 975 mg, oral, q8h KARLA   Or  acetaminophen, 1,000 mg, oral, q8h KARLA   Or  acetaminophen, 1,000 mg, g-tube, q8h KARLA  atenolol, 50 mg, g-tube, Daily  atorvastatin, 10 mg, g-tube, Nightly  chlorhexidine, 15 mL, Swish & Spit, BID  diatrizoate makenna-diatrizoat sod, 60 mL, oral, Once  esomeprazole, 20 mg, g-tube, Daily before breakfast  heparin (porcine), 5,000 Units, subcutaneous, q8h  [START ON 7/28/2024] hydroCHLOROthiazide, 25 mg, g-tube, Daily  levothyroxine, 50 mcg, g-tube, Daily before breakfast  [Held by provider] losartan, 50 mg, nasogastric tube, Daily  methocarbamol, 500 mg, g-tube, q8h KARLA  terazosin, 5 mg, g-tube, Nightly      Continuous medications     PRN medications  PRN medications: albuterol, albuterol, fluticasone, HYDROmorphone, naloxone, naloxone, ondansetron **OR** ondansetron, oxyCODONE, polyethylene glycol           Assessment/Plan   Principal Problem:    Head and neck cancer (Multi)    Patient is a 73-year-old male with a history of laryngeal cancer treated with chemoradiation.  He has persistent dysphagia and is unable to tolerate p.o.  He presents in the setting of PEG dislodgment and no enteral access.  There is no acute concern for infection or other process on exam.     - Will consult acute care surgery for assistance with troubleshooting PEG and possible replacement; home bolus TFs to be started  -Home medications  -Maintenance IV fluids       Marely Clements, PGY1  ENT

## 2024-07-27 NOTE — PROGRESS NOTES
"[facility]  ACUTE CARE SURGERY - PROGRESS NOTE    Patient Name: Mk Fleming  MRN: [id number]  Admit Date: 726  : 1950  AGE: 73 y.o.   GENDER: male  ==============================================================================  TODAY'S ASSESSMENT AND PLAN OF CARE:  74 y/o M w PMHx of T4 laryngeal cancer s/p XRT in 2019, MARICARMEN, PEG dependent since 2019 admitted for PEG tube dislodgement. Szymanski placed in G-tube tract on admission last night. PEG tube replaced at bedside today.    Plan:  - PEG tube replaced at bedside today per primary team request. Placed a 3.0cm 20Fr MINI-ONE balloon button.  - Please obtain contrasted KUB to confirm location in stomach.  - OK for feeds and meds if intraluminal.  - Patient will require education with this particular gastrostomy as it is somewhat different than his prior.    ACS will sign off at this time.    Subjective   ==============================================================================  CHIEF COMPLAINT / EVENTS LAST 24HRS / HPI:  No acute events overnight. Patient seen and evaluated this AM due to primary team request for gastrostomy tube replacement. Feels well. No new concerns.    MEDICAL HISTORY / ROS:   Admission history reviewed. Pertinent changes as follows:  None.    A 12-point review of systems was performed, and was negative except as above.     Objective   Vital Signs past 24h:  Heart Rate:  [75-89]   Temp:  [35.8 °C (96.4 °F)-36.8 °C (98.2 °F)]   Resp:  [16-18]   BP: (115-154)/(64-86)   Height:  [172.7 cm (5' 8\")]   Weight:  [59 kg (130 lb)]   SpO2:  [95 %-98 %]     I/O past 24h:  I/O last 2 completed shifts:  In: 592 (10 mL/kg) [I.V.:592 (10 mL/kg)]  Out: - (0 mL/kg)   Weight: 59 kg      PHYSICAL EXAM:  GEN: No acute distress. Alert, awake and conversive.  HEENT: Sclera anicteric. Moist mucous membranes.  RESP: Breathing non-labored, equal chest rise. On RA.  CV: Regular rate, normotensive  GI: Abdomen soft, " nondistended, nontender. 16fr briscoe in place in gastrostomy tract.  : Deferred.  MSK: No gross deformities. Moves all extremities spontaneously.  NEURO: Alert and oriented x3. No focal deficits.  PSYCH: Appropriate mood and affect.  SKIN: No rashes or lesions.    Labs past 24h:  Results for orders placed or performed during the hospital encounter of 07/26/24 (from the past 24 hour(s))   Renal Function Panel   Result Value Ref Range    Glucose 108 (H) 74 - 99 mg/dL    Sodium 138 136 - 145 mmol/L    Potassium 4.1 3.5 - 5.3 mmol/L    Chloride 97 (L) 98 - 107 mmol/L    Bicarbonate 28 21 - 32 mmol/L    Anion Gap 17 10 - 20 mmol/L    Urea Nitrogen 117 (HH) 6 - 23 mg/dL    Creatinine 2.59 (H) 0.50 - 1.30 mg/dL    eGFR 25 (L) >60 mL/min/1.73m*2    Calcium 10.0 8.6 - 10.6 mg/dL    Phosphorus 4.2 2.5 - 4.9 mg/dL    Albumin 3.8 3.4 - 5.0 g/dL   Renal Function Panel   Result Value Ref Range    Glucose 81 74 - 99 mg/dL    Sodium 139 136 - 145 mmol/L    Potassium 4.2 3.5 - 5.3 mmol/L    Chloride 101 98 - 107 mmol/L    Bicarbonate 27 21 - 32 mmol/L    Anion Gap 15 10 - 20 mmol/L    Urea Nitrogen 110 (HH) 6 - 23 mg/dL    Creatinine 2.50 (H) 0.50 - 1.30 mg/dL    eGFR 26 (L) >60 mL/min/1.73m*2    Calcium 9.0 8.6 - 10.6 mg/dL    Phosphorus 4.2 2.5 - 4.9 mg/dL    Albumin 2.9 (L) 3.4 - 5.0 g/dL   CBC   Result Value Ref Range    WBC 4.1 (L) 4.4 - 11.3 x10*3/uL    nRBC 0.0 0.0 - 0.0 /100 WBCs    RBC 2.55 (L) 4.50 - 5.90 x10*6/uL    Hemoglobin 7.9 (L) 13.5 - 17.5 g/dL    Hematocrit 24.7 (L) 41.0 - 52.0 %    MCV 97 80 - 100 fL    MCH 31.0 26.0 - 34.0 pg    MCHC 32.0 32.0 - 36.0 g/dL    RDW 14.2 11.5 - 14.5 %    Platelets 185 150 - 450 x10*3/uL       Imaging within past 24h:  XR abdomen 1 view    Result Date: 7/26/2024  Interpreted By:  Garcia Vasquez, STUDY: XR ABDOMEN 1 VIEW; 7/26/2024 7:08 pm   INDICATION: Signs/Symptoms:PEG evaluation.   COMPARISON: 02/13/2022.   ACCESSION NUMBER(S): [id number]   ORDERING CLINICIAN: MARQUITA ESTRADA    FINDINGS: Peg tube overlies the left upper quadrant. Interval installation of contrast within the stomach and proximal small bowel loops. No gross evidence of leak. Coils overlie the right upper quadrant. Nonobstructive bowel gas pattern. Limited evaluation of pneumoperitoneum on supine imaging, however no gross evidence of free air is noted.   Visualized lungs are clear.   Osseous structures demonstrate no acute bony changes.       1.  Interval installation of contrast PEG tube. No gross evidence of contrast leak.   Signed by: Garcia Vasquez 7/26/2024 7:41 PM Dictation workstation:   [id number]     I have reviewed the imaging above as it pertains to the patient's surgical concerns and agree with the radiologist's interpretation.       Harpal Grimaldo MD  PGY-2 General Surgery  Acute Care Surgery [id number]

## 2024-07-27 NOTE — NURSING NOTE
Patient educated on safety for having the bed in the lowest position due to risk for falls. Patient refuses to have bed lowered or bed alarm on. Charge RN notified.

## 2024-07-27 NOTE — CONSULTS
"Nutrition Initial Assessment:   Nutrition Assessment    Reason for Assessment: Admission nursing screening for patient being on home tube feed.    Patient is a 73 y.o. male presenting with dislodged PEG, in need of replacement.     Past medical history includes HTN, Cirrhosis, hepatitis C, chronic kidney disease, COPD, hypothyroidism, advanced laryngeal squamous cell carcinoma which was treated with chemoradiation-- without evidence of recurrence though has persistent dysphagia and is essentially unable to tolerate any p.o. diet; PEG in place for enteral access    Nutrition History:  Food and Nutrient History: Met with patient.  He reports that he was doing Boost VHC as his tube feed at home.  Says that he was supposed to do 4 per day but was only doign 3 lately.  When asked why, he initially cannot state why.  Why questioned further, he says that he has pain with feeds as well as nausea.  No vomiting.  Complains of white mucus in his mouth in the mornings-- not associated with tube feed boluses. Since he has decrease the amount of TF he has been getting, he has lost weight.  He is anxious to hear the plan for PEG replacement and what his regimen should be for homegoing re: TF.  Told patient goal would be to keep his formula the same but he may need to spread out boluses into 5 per day instead of 4 per day if volume is causing him pain or discomfort.      Anthropometrics:  Height: 172.7 cm (5' 8\")   Weight: 59 kg (130 lb)   BMI (Calculated): 19.77  IBW/kg (Dietitian Calculated): 70 kg  Percent of IBW: 84 %     Weight History:     7/26/24: 59kg  6/26/24: 59.9kg  4/25/24: 62.1kg  2/7/24: 62.1kg  1/4/24: 64kg  12/13/23: 64kg  10/11/23: 63.5kg  7/28/23: 62.3kg    Pt reports a usual body weight of 63.6kg.  He is down 7% from his usual body weight-- appears he last weighed close to his usual body weight about 3 months ago.  Thinks he is closer to 57.3kg now.     Weight Change %:       Nutrition Focused Physical Exam " "Findings:    Subcutaneous Fat Loss:   Orbital Fat Pads: Severe (dark circles, hollowing and loose skin)  Buccal Fat Pads: Severe (hollow, sunken and narrow face)  Triceps: Severe (negligible fat tissue)  Muscle Wasting:  Temporalis: Severe (hollowed scooping depression)  Pectoralis (Clavicular Region): Severe (protruding prominent clavicle)  Deltoid/Trapezius: Severe (squared shoulders, acromion process prominent)  Quadriceps: Severe (depressions on inner and outer thigh)  Gastrocnemius: Mild-Moderate (not well developed muscle)  Edema:  Edema: none  Physical Findings:  Skin: Negative    Nutrition Significant Labs:  BMP Trend:   Results from last 7 days   Lab Units 07/27/24 0655 07/26/24 1937 07/22/24  1133   GLUCOSE mg/dL 81 108* 142*   CALCIUM mg/dL 9.0 10.0 9.5   SODIUM mmol/L 139 138 136   POTASSIUM mmol/L 4.2 4.1 4.2   CO2 mmol/L 27 28 29   CHLORIDE mmol/L 101 97* 98   BUN mg/dL 110* 117* 108*   CREATININE mg/dL 2.50* 2.59* 2.43*    , A1C:No results found for: \"HGBA1C\", BG POCT trend:    , Renal Lab Trend:   Results from last 7 days   Lab Units 07/27/24 0655 07/26/24 1937 07/22/24 1133 07/22/24  1133   POTASSIUM mmol/L 4.2 4.1  --  4.2   PHOSPHORUS mg/dL 4.2 4.2   < >  --    SODIUM mmol/L 139 138  --  136   EGFR mL/min/1.73m*2 26* 25*  --  27*   BUN mg/dL 110* 117*  --  108*   CREATININE mg/dL 2.50* 2.59*  --  2.43*    < > = values in this interval not displayed.    , Vit D:   Lab Results   Component Value Date    VITD25 31 05/03/2024        Nutrition Specific Medications:  Scheduled medications  acetaminophen, 975 mg, oral, q8h KARLA   Or  acetaminophen, 1,000 mg, oral, q8h KARLA   Or  acetaminophen, 1,000 mg, g-tube, q8h KARLA  [Held by provider] atenolol, 50 mg, nasogastric tube, Daily  [Held by provider] atorvastatin, 10 mg, nasogastric tube, Nightly  chlorhexidine, 15 mL, Swish & Spit, BID  diatrizoate makenna-diatrizoat sod, 60 mL, oral, Once  [Held by provider] esomeprazole, 20 mg, nasogastric tube, Daily " before breakfast  heparin (porcine), 5,000 Units, subcutaneous, q8h  hydroCHLOROthiazide, 25 mg, nasogastric tube, Daily  [Held by provider] levothyroxine, 50 mcg, nasogastric tube, Daily before breakfast  [Held by provider] losartan, 50 mg, nasogastric tube, Daily  [Held by provider] methocarbamol, 500 mg, nasogastric tube, q8h KARLA  [Held by provider] terazosin, 5 mg, nasogastric tube, Nightly      Continuous medications  lactated Ringer's, 60 mL/hr, Last Rate: 60 mL/hr (07/27/24 1119)      PRN medications  PRN medications: albuterol, albuterol, fluticasone, HYDROmorphone, naloxone, naloxone, ondansetron **OR** ondansetron, oxyCODONE, polyethylene glycol     I/O:   Last BM Date: 07/25/24 (per patient);          Dietary Orders (From admission, onward)       Start     Ordered    07/26/24 1838  NPO Diet; Effective now  Diet effective now         07/26/24 1837                     Estimated Needs:   Total Energy Estimated Needs (kCal):  (9519-4528)  Method for Estimating Needs: MSJ= 1315  Total Protein Estimated Needs (g):  (85+)  Method for Estimating Needs: 1.4 x 59kg            Nutrition Diagnosis   Malnutrition Diagnosis  Patient has Malnutrition Diagnosis: Yes  Diagnosis Status: New  Malnutrition Diagnosis: Severe malnutrition related to acute disease or injury  As Evidenced by: pt has only been getting 75% of his estimated needs via TF d/t issues with abdominal pain and nausea; he has lost 7% of his weight in about 3 months and has areas of severe fat and muscle wasting on physical exam; he is 84% of his DBW        Unclear cause of pt's discomfort/pain with TF boluses.  The volume he is getting with each bolus is low as he is on the most concentrated formula possible.  If he continues to miss his 4th bolus feed, his weight will continue to decline.  He should have closer monitoring by RDN at discharge to assure he is getting all of the nutrition that he needs.     Nutrition Interventions/Recommendations          Nutrition Prescription:   For TF once PEG replaced: trial going back to home regimen of one carton (240mls) of Boost VHC 4 times daily.  Flush PEG with 60mls of water before and after each bolus feed.   If pt has tolerance issues to this regimen, can decrease bolus volume and increase bolus frequency: 190mls given 5 times daily.  The open portion of each carton will need refrigeration until it is time for his next feed.   If he continues to have tolerance issues to boluses, may need to consider trial of nocturnal feeds and getting patient a pump for home use.    Please reconsult this service if this is the plan.   Please place consult to outpatient RDN for patient to follow-up so that his nutrition regimen can be closely monitored after discharge.          Nutrition Interventions:    TF at goal= 2120kcals, 88grams protein       Nutrition Education:   Not appropriate       Nutrition Monitoring and Evaluation   Food/Nutrient Related History Monitoring  Monitoring and Evaluation Plan: Enteral and parenteral nutrition intake  Criteria: TF at goal to meet 100% estimated needs       Time Spent/Follow-up Reminder:   Time Spent (min): 60 minutes  Last Date of Nutrition Visit: 07/27/24  Nutrition Follow-Up Needed?: Dietitian to reassess per policy  Follow up Comment: needs PEG replaced; TF recs left

## 2024-07-27 NOTE — PROGRESS NOTES
Transitional care coordinator note:  Just updated that patient may be discharging soon.  Verified that DME company is [employer] for his tube feeds.  Need final nutrition notes, and additional supplies may be needed upon discharge.  Started referral in [facility].     TCC to follow up tomorrow with notes.     Alvaro De La Torre RN

## 2024-07-28 VITALS
RESPIRATION RATE: 16 BRPM | BODY MASS INDEX: 19.7 KG/M2 | DIASTOLIC BLOOD PRESSURE: 71 MMHG | HEIGHT: 68 IN | OXYGEN SATURATION: 95 % | HEART RATE: 84 BPM | SYSTOLIC BLOOD PRESSURE: 151 MMHG | TEMPERATURE: 97.2 F | WEIGHT: 130 LBS

## 2024-07-28 LAB
ALBUMIN SERPL BCP-MCNC: 3.5 G/DL (ref 3.4–5)
ANION GAP SERPL CALC-SCNC: 14 MMOL/L (ref 10–20)
BUN SERPL-MCNC: 104 MG/DL (ref 6–23)
CALCIUM SERPL-MCNC: 9.6 MG/DL (ref 8.6–10.6)
CHLORIDE SERPL-SCNC: 101 MMOL/L (ref 98–107)
CO2 SERPL-SCNC: 29 MMOL/L (ref 21–32)
CREAT SERPL-MCNC: 2.02 MG/DL (ref 0.5–1.3)
EGFRCR SERPLBLD CKD-EPI 2021: 34 ML/MIN/1.73M*2
ERYTHROCYTE [DISTWIDTH] IN BLOOD BY AUTOMATED COUNT: 14.1 % (ref 11.5–14.5)
GLUCOSE SERPL-MCNC: 103 MG/DL (ref 74–99)
HCT VFR BLD AUTO: 28.2 % (ref 41–52)
HGB BLD-MCNC: 9 G/DL (ref 13.5–17.5)
MCH RBC QN AUTO: 30.8 PG (ref 26–34)
MCHC RBC AUTO-ENTMCNC: 31.9 G/DL (ref 32–36)
MCV RBC AUTO: 97 FL (ref 80–100)
NRBC BLD-RTO: 0 /100 WBCS (ref 0–0)
PHOSPHATE SERPL-MCNC: 3.2 MG/DL (ref 2.5–4.9)
PLATELET # BLD AUTO: 197 X10*3/UL (ref 150–450)
POTASSIUM SERPL-SCNC: 3.9 MMOL/L (ref 3.5–5.3)
RBC # BLD AUTO: 2.92 X10*6/UL (ref 4.5–5.9)
SODIUM SERPL-SCNC: 140 MMOL/L (ref 136–145)
WBC # BLD AUTO: 3.2 X10*3/UL (ref 4.4–11.3)

## 2024-07-28 PROCEDURE — 99221 1ST HOSP IP/OBS SF/LOW 40: CPT | Performed by: OTOLARYNGOLOGY

## 2024-07-28 PROCEDURE — G0378 HOSPITAL OBSERVATION PER HR: HCPCS

## 2024-07-28 PROCEDURE — 36415 COLL VENOUS BLD VENIPUNCTURE: CPT | Performed by: STUDENT IN AN ORGANIZED HEALTH CARE EDUCATION/TRAINING PROGRAM

## 2024-07-28 PROCEDURE — 2500000004 HC RX 250 GENERAL PHARMACY W/ HCPCS (ALT 636 FOR OP/ED): Performed by: STUDENT IN AN ORGANIZED HEALTH CARE EDUCATION/TRAINING PROGRAM

## 2024-07-28 PROCEDURE — 96376 TX/PRO/DX INJ SAME DRUG ADON: CPT

## 2024-07-28 PROCEDURE — 96372 THER/PROPH/DIAG INJ SC/IM: CPT | Performed by: STUDENT IN AN ORGANIZED HEALTH CARE EDUCATION/TRAINING PROGRAM

## 2024-07-28 PROCEDURE — 1170000001 HC PRIVATE ONCOLOGY ROOM DAILY

## 2024-07-28 PROCEDURE — 2500000001 HC RX 250 WO HCPCS SELF ADMINISTERED DRUGS (ALT 637 FOR MEDICARE OP)

## 2024-07-28 PROCEDURE — 85027 COMPLETE CBC AUTOMATED: CPT | Performed by: STUDENT IN AN ORGANIZED HEALTH CARE EDUCATION/TRAINING PROGRAM

## 2024-07-28 PROCEDURE — 80069 RENAL FUNCTION PANEL: CPT | Performed by: STUDENT IN AN ORGANIZED HEALTH CARE EDUCATION/TRAINING PROGRAM

## 2024-07-28 PROCEDURE — 2500000001 HC RX 250 WO HCPCS SELF ADMINISTERED DRUGS (ALT 637 FOR MEDICARE OP): Performed by: STUDENT IN AN ORGANIZED HEALTH CARE EDUCATION/TRAINING PROGRAM

## 2024-07-28 RX ORDER — OXYCODONE HCL 5 MG/5 ML
5 SOLUTION, ORAL ORAL EVERY 6 HOURS PRN
Qty: 60 ML | Refills: 0 | Status: SHIPPED | OUTPATIENT
Start: 2024-07-28 | End: 2024-07-28 | Stop reason: HOSPADM

## 2024-07-28 RX ORDER — SENNOSIDES 8.8 MG/5ML
5 LIQUID ORAL NIGHTLY
Qty: 30 ML | Refills: 150 | Status: SHIPPED | OUTPATIENT
Start: 2024-07-28 | End: 2024-07-28 | Stop reason: HOSPADM

## 2024-07-28 RX ADMIN — LEVOTHYROXINE SODIUM 50 MCG: 0.05 TABLET ORAL at 06:28

## 2024-07-28 RX ADMIN — ATENOLOL 50 MG: 50 TABLET ORAL at 08:34

## 2024-07-28 RX ADMIN — ACETAMINOPHEN 1000 MG: 650 SOLUTION ORAL at 06:28

## 2024-07-28 RX ADMIN — HEPARIN SODIUM 5000 UNITS: 5000 INJECTION INTRAVENOUS; SUBCUTANEOUS at 11:54

## 2024-07-28 RX ADMIN — CHLORHEXIDINE GLUCONATE 0.12% ORAL RINSE 15 ML: 1.2 LIQUID ORAL at 08:34

## 2024-07-28 RX ADMIN — ESOMEPRAZOLE MAGNESIUM 20 MG: 20 FOR SUSPENSION ORAL at 06:28

## 2024-07-28 RX ADMIN — HYDROMORPHONE HYDROCHLORIDE 0.2 MG: 1 INJECTION, SOLUTION INTRAMUSCULAR; INTRAVENOUS; SUBCUTANEOUS at 04:26

## 2024-07-28 RX ADMIN — HEPARIN SODIUM 5000 UNITS: 5000 INJECTION INTRAVENOUS; SUBCUTANEOUS at 04:08

## 2024-07-28 RX ADMIN — HYDROCHLOROTHIAZIDE 25 MG: 25 TABLET ORAL at 08:34

## 2024-07-28 ASSESSMENT — PAIN SCALES - PAIN ASSESSMENT IN ADVANCED DEMENTIA (PAINAD)
CONSOLABILITY: UNABLE TO CONSOLE, DISTRACT OR REASSURE
FACIALEXPRESSION: FACIAL GRIMACING
BREATHING: NORMAL
BODYLANGUAGE: TENSE, DISTRESSED PACING, FIDGETING
TOTALSCORE: MEDICATION (SEE MAR)
TOTALSCORE: 5

## 2024-07-28 ASSESSMENT — PAIN - FUNCTIONAL ASSESSMENT
PAIN_FUNCTIONAL_ASSESSMENT: 0-10
PAIN_FUNCTIONAL_ASSESSMENT: 0-10

## 2024-07-28 ASSESSMENT — COGNITIVE AND FUNCTIONAL STATUS - GENERAL
MOBILITY SCORE: 24
DAILY ACTIVITIY SCORE: 24

## 2024-07-28 ASSESSMENT — PAIN DESCRIPTION - LOCATION: LOCATION: ABDOMEN

## 2024-07-28 ASSESSMENT — PAIN SCALES - GENERAL
PAINLEVEL_OUTOF10: 4
PAINLEVEL_OUTOF10: 7
PAINLEVEL_OUTOF10: 0 - NO PAIN

## 2024-07-28 NOTE — HOSPITAL COURSE
Mk Fleming is a 72 yo male with presenting problem with his G tube, who underwent  g-tube change by general surgery team. Patient had an uncomplicated hospital course.     Patient's post procedure was uncomplicated. Tolerating tube feeds.     On day of discharge, post-operative pain was well controlled with enteral pain medication, breathing on room air, voiding spontaneously ambulating well, and was tolerating tube feed diet. Educated on using new PEG tube and is comfortable handling feeds and maintenance. Follow-up arranged with Dr. Barr.

## 2024-07-28 NOTE — CARE PLAN
Problem: Pain  Goal: Takes deep breaths with improved pain control throughout the shift  Outcome: Progressing  Goal: Turns in bed with improved pain control throughout the shift  Outcome: Progressing  Goal: Walks with improved pain control throughout the shift  Outcome: Progressing  Goal: Performs ADL's with improved pain control throughout shift  Outcome: Progressing  Goal: Participates in PT with improved pain control throughout the shift  Outcome: Progressing  Goal: Free from opioid side effects throughout the shift  Outcome: Progressing  Goal: Free from acute confusion related to pain meds throughout the shift  Outcome: Progressing     Problem: Fall/Injury  Goal: Not fall by end of shift  Outcome: Progressing  Goal: Be free from injury by end of the shift  Outcome: Progressing  Goal: Verbalize understanding of personal risk factors for fall in the hospital  Outcome: Progressing  Goal: Verbalize understanding of risk factor reduction measures to prevent injury from fall in the home  Outcome: Progressing  Goal: Use assistive devices by end of the shift  Outcome: Progressing  Goal: Pace activities to prevent fatigue by end of the shift  Outcome: Progressing   The patient's goals for the shift include      The clinical goals for the shift include remain free from pain

## 2024-07-28 NOTE — PROGRESS NOTES
"Mk Fleming is a 73 y.o. male with hx of advanced laryngeal squamous cell carcinoma which was treated with chemoradiation on day 1 of admission presenting as a direct admit from Dr. Barr clinic. He takes no PO and has had a PEG for many years, but this was dislodged and was unable to be replaced.     Subjective   No acute events overnight. Tolerating feeds.      Objective     Physical Exam  PHYSICAL EXAMINATION:  Constitutional:  No acute distress  Voice: Voice weak  Respiration:  Breathing comfortably, no stridor  Cardiovascular:  No clubbing/cyanosis/edema in hands  Eyes:  EOM intact, sclera normal  Neuro:  Alert and oriented times 3, Cranial nerves II-XII grossly intact and symmetric bilaterally  Head and Face:  Symmetric facial features, no masses or lesions, sinuses non-tender to palpation  Salivary Glands:  Parotid and submandibular glands normal bilaterally  Nose: External nose midline, anterior rhinoscopy is normal with limited visualization to the anterior aspect of the interior turbinates, no bleeding or drainage, no lesions  Oral Cavity/Oropharynx/Lips:  Normal mucous membranes, normal floor of mouth/tongue/OP, no masses or lesions  Neck/Lymph:  No LAD, no thyroid masses, trachea midline  Skin:  Neck skin is without scar or injury  Abdomen: Soft and nondistended.  New PEG in place, well appearing without surrounding erythema or leakage.   Psych:  Alert and oriented with appropriate mood and affect      Last Recorded Vitals  Blood pressure 151/71, pulse 84, temperature 36.2 °C (97.2 °F), temperature source Temporal, resp. rate 16, height 1.727 m (5' 8\"), weight 59 kg (130 lb), SpO2 95%.  Intake/Output last 3 Shifts:  I/O last 3 completed shifts:  In: 1257 (21.3 mL/kg) [I.V.:870 (14.8 mL/kg); NG/GT:387]  Out: 400 (6.8 mL/kg) [Urine:400 (0.2 mL/kg/hr)]  Weight: 59 kg     Relevant Results        Scheduled medications  acetaminophen, 975 mg, oral, q8h KARLA   Or  acetaminophen, 1,000 mg, oral, q8h KARLA   " Or  acetaminophen, 1,000 mg, g-tube, q8h KARLA  atenolol, 50 mg, g-tube, Daily  atorvastatin, 10 mg, g-tube, Nightly  chlorhexidine, 15 mL, Swish & Spit, BID  esomeprazole, 20 mg, g-tube, Daily before breakfast  heparin (porcine), 5,000 Units, subcutaneous, q8h  hydroCHLOROthiazide, 25 mg, g-tube, Daily  levothyroxine, 50 mcg, g-tube, Daily before breakfast  [Held by provider] losartan, 50 mg, nasogastric tube, Daily  methocarbamol, 500 mg, g-tube, q8h KARLA  terazosin, 5 mg, g-tube, Nightly      Continuous medications     PRN medications  PRN medications: albuterol, albuterol, fluticasone, HYDROmorphone, naloxone, naloxone, ondansetron **OR** ondansetron, oxyCODONE, polyethylene glycol           Assessment/Plan   Principal Problem:    Head and neck cancer (Multi)    Patient is a 73-year-old male with a history of laryngeal cancer treated with chemoradiation.  He has persistent dysphagia and is unable to tolerate p.o.  He presents in the setting of PEG dislodgment and no enteral access.  There is no acute concern for infection or other process on exam.     - PEG changed to 3.0cm 20Fr MINI-ONE balloon button   -Home medications  -Maintenance IV fluids    Pt seen and examined with attending.      07/28/24 at 11:27 AM - Isaiah Romo DO

## 2024-07-28 NOTE — DISCHARGE SUMMARY
Discharge Diagnosis  Head and neck cancer (Multi)    Issues Requiring Follow-Up  New PEG tube placement    Test Results Pending At Discharge  Pending Labs       No current pending labs.            Hospital Course  Mk Fleming is a 74 yo male with presenting problem with his G tube, who underwent  g-tube change by general surgery team. Patient had an uncomplicated hospital course.     Patient's post procedure was uncomplicated. Tolerating tube feeds.     On day of discharge, post-operative pain was well controlled with enteral pain medication, breathing on room air, voiding spontaneously ambulating well, and was tolerating tube feed diet. Educated on using new PEG tube and is comfortable handling feeds and maintenance. Follow-up arranged with Dr. Barr.     Pertinent Physical Exam At Time of Discharge  Physical Exam  PHYSICAL EXAMINATION:  Constitutional:  No acute distress  Voice: Voice weak  Respiration:  Breathing comfortably, no stridor  Cardiovascular:  No clubbing/cyanosis/edema in hands  Eyes:  EOM intact, sclera normal  Neuro:  Alert and oriented times 3, Cranial nerves II-XII grossly intact and symmetric bilaterally  Head and Face:  Symmetric facial features, no masses or lesions, sinuses non-tender to palpation  Salivary Glands:  Parotid and submandibular glands normal bilaterally  Nose: External nose midline, anterior rhinoscopy is normal with limited visualization to the anterior aspect of the interior turbinates, no bleeding or drainage, no lesions  Oral Cavity/Oropharynx/Lips:  Normal mucous membranes, normal floor of mouth/tongue/OP, no masses or lesions  Neck/Lymph:  No LAD, no thyroid masses, trachea midline  Skin:  Neck skin is without scar or injury  Abdomen: Soft and nondistended.  new PEG in place, well appearing without leakage or surrounding erythema  Psych:  Alert and oriented with appropriate mood and affect  Home Medications     Medication List      ASK your doctor about these  medications     * albuterol 2.5 mg /3 mL (0.083 %) nebulizer solution   * albuterol 90 mcg/actuation inhaler   atenolol 50 mg tablet; Commonly known as: Tenormin   atorvastatin 10 mg tablet; Commonly known as: Lipitor   chlorhexidine 0.12 % solution; Commonly known as: Peridex   esomeprazole 20 mg packet; Commonly known as: NexIUM; Take 20 mg by   mouth once daily in the morning. Take before meals.   fluticasone 50 mcg/actuation nasal spray; Commonly known as: Flonase   hydroCHLOROthiazide 25 mg tablet; Commonly known as: HYDRODiuril   levothyroxine 50 mcg tablet; Commonly known as: Synthroid, Levoxyl   losartan 50 mg tablet; Commonly known as: Cozaar   methocarbamol 500 mg tablet; Commonly known as: Robaxin; 1 tablet (500   mg) by g-tube route every 8 hours for 7 days.   Miralax 17 gram/dose powder; Generic drug: polyethylene glycol   naloxone 4 mg/0.1 mL nasal spray; Commonly known as: Narcan; Administer   1 spray (4 mg) into affected nostril(s) if needed for opioid reversal or   respiratory depression. May repeat every 2-3 minutes if needed,   alternating nostrils, until medical assistance becomes available.   oxyCODONE 5 mg immediate release tablet; Commonly known as: Roxicodone;   1 tablet (5 mg) by g-tube route every 4 hours if needed for severe pain (7   - 10) (For breakthrough pain).   terazosin 5 mg capsule; Commonly known as: Hytrin  * This list has 2 medication(s) that are the same as other medications   prescribed for you. Read the directions carefully, and ask your doctor or   other care provider to review them with you.       Outpatient Follow-Up  Future Appointments   Date Time Provider Department Center   8/2/2024  1:30 PM [facility] [place]   8/26/2024  1:00 PM Amrik Sprague MD [id number] Romo DO

## 2024-07-29 ENCOUNTER — HOSPITAL ENCOUNTER (EMERGENCY)
Facility: HOSPITAL | Age: 74
Discharge: HOME | End: 2024-07-29
Payer: COMMERCIAL

## 2024-07-29 VITALS
DIASTOLIC BLOOD PRESSURE: 70 MMHG | BODY MASS INDEX: 21.66 KG/M2 | HEIGHT: 65 IN | TEMPERATURE: 98.2 F | WEIGHT: 130 LBS | SYSTOLIC BLOOD PRESSURE: 121 MMHG | OXYGEN SATURATION: 99 % | HEART RATE: 78 BPM | RESPIRATION RATE: 15 BRPM

## 2024-07-29 DIAGNOSIS — R13.12 DYSPHAGIA, OROPHARYNGEAL PHASE: Primary | ICD-10-CM

## 2024-07-29 DIAGNOSIS — T85.598A OBSTRUCTION OF FEEDING TUBE, INITIAL ENCOUNTER: Primary | ICD-10-CM

## 2024-07-29 DIAGNOSIS — C32.9 LARYNGEAL CANCER (MULTI): ICD-10-CM

## 2024-07-29 PROCEDURE — 99284 EMERGENCY DEPT VISIT MOD MDM: CPT | Mod: 25

## 2024-07-29 PROCEDURE — 43762 RPLC GTUBE NO REVJ TRC: CPT

## 2024-07-29 PROCEDURE — 99283 EMERGENCY DEPT VISIT LOW MDM: CPT

## 2024-07-29 ASSESSMENT — PAIN SCALES - GENERAL: PAINLEVEL_OUTOF10: 0 - NO PAIN

## 2024-07-29 ASSESSMENT — PAIN - FUNCTIONAL ASSESSMENT: PAIN_FUNCTIONAL_ASSESSMENT: 0-10

## 2024-07-29 ASSESSMENT — COLUMBIA-SUICIDE SEVERITY RATING SCALE - C-SSRS
1. IN THE PAST MONTH, HAVE YOU WISHED YOU WERE DEAD OR WISHED YOU COULD GO TO SLEEP AND NOT WAKE UP?: NO
2. HAVE YOU ACTUALLY HAD ANY THOUGHTS OF KILLING YOURSELF?: NO
6. HAVE YOU EVER DONE ANYTHING, STARTED TO DO ANYTHING, OR PREPARED TO DO ANYTHING TO END YOUR LIFE?: NO

## 2024-07-29 NOTE — ED TRIAGE NOTES
Pt has a feeding tube that was put in Saturday. It stopped working and pt was given a tube to change it out with. Pt has the tube but can't see how to change it.

## 2024-07-29 NOTE — ED PROVIDER NOTES
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  States that he believes that one of the tubes connected to his feeding tube port is clogged.  He denies any pain or injury.  States that he would like help changing it out and making sure it works before being discharged home so that he could use it himself.  Denies any other complaints.  No changes in urination or bowel movement.  No nausea or vomiting.  No fevers, chills, myalgia.              Patient History   Past Medical History:   Diagnosis Date    Anemia     Cholecystitis     Cholelithiasis     Cirrhosis (Multi)     CKD (chronic kidney disease)     stage 3    COPD (chronic obstructive pulmonary disease) (Multi)     Dysphagia     peg dependent    GERD (gastroesophageal reflux disease)     HCV (hepatitis C virus)     s/p SVR    Hoarseness of voice     Hyperlipidemia     Hypertension     Hypothyroidism     Laryngeal cancer (Multi)     s/p chemo and radiation therapy    PAD (peripheral artery disease) (CMS-HCC)     Personal history of other diseases of the respiratory system     History of bronchitis    Personal history of other specified conditions     History of chest pain    Pulmonary emphysema (Multi)      Past Surgical History:   Procedure Laterality Date    ESOPHAGOGASTRODUODENOSCOPY      HERNIA REPAIR      IR ANGIOGRAM AORTA ABDOMEN  07/03/2022    IR ANGIOGRAM AORTA ABDOMEN 7/3/2022 [facility] CLINICAL LEGACY    IR ANGIOGRAM INFERIOR EPIGASTRIC  07/03/2022    IR ANGIOGRAM INFERIOR EPIGASTRIC 7/3/2022 [facility] CLINICAL LEGACY    IR EMBOLIZATION LYMPH NODE Bilateral 07/03/2022    IR EMBOLIZATION LYMPH NODE 7/3/2022 [facility] CLINICAL LEGACY    OTHER SURGICAL HISTORY  11/19/2018    Tooth  extraction    OTHER SURGICAL HISTORY  2018    Pulmonary decortication    OTHER SURGICAL HISTORY  10/20/2022    Percutaneous endoscopic gastrostomy tube insertion     Family History   Problem Relation Name Age of Onset    Pancreatic cancer Mother      Hypertension Sister      Hypertension Brother       Social History     Tobacco Use    Smoking status: Former     Current packs/day: 0.00     Types: Cigarettes     Quit date:      Years since quittin.5     Passive exposure: Never    Smokeless tobacco: Never   Vaping Use    Vaping status: Never Used   Substance Use Topics    Alcohol use: Not Currently    Drug use: Not Currently     Types: 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   HENT:      Head: Normocephalic and atraumatic.      Mouth/Throat:      Mouth: Mucous membranes are moist.      Pharynx: Oropharynx is clear.   Eyes:      Extraocular Movements: Extraocular movements intact.      Conjunctiva/sclera: Conjunctivae normal.      Pupils: Pupils are equal, round, and reactive to light.   Cardiovascular:      Rate and Rhythm: Normal rate and regular rhythm.      Pulses: Normal pulses.      Heart sounds: Normal heart sounds.   Pulmonary:      Effort: Pulmonary effort is normal.      Breath sounds: Normal breath sounds.   Abdominal:      General: Abdomen is flat.      Palpations: Abdomen is soft.      Comments: Feeding tube with port.  No swelling.  No tenderness.  No erythema.  No guarding.   Musculoskeletal:         General: Normal range of motion.      Cervical back: Normal range of motion and neck supple.   Skin:     General: Skin is warm and dry.      Capillary Refill:  Capillary refill takes less than 2 seconds.   Neurological:      General: No focal deficit present.      Mental Status: He is alert and oriented to person, place, and time.   Psychiatric:         Mood and Affect: Mood normal.         Behavior: Behavior normal.         Thought Content: Thought content normal.         Judgment: Judgment normal.           ED Course & MDM   Diagnoses as of 07/29/24 1522   Obstruction of feeding tube, initial encounter                       [place] Coma Scale Score: 15                        Medical Decision Making  Vital signs reviewed, unremarkable at this time.  Patient is well-appearing and in no apparent distress.  Speaks full sentences without difficulty.  Diagnostic testing not needed at this point.  Patient has no concerning findings on exam.  He is only asking for help with his feeding tube.  I was able to remove the clogged portion and replace it with an unclogged new tube.  We were able to flush it successfully and patient states that that is all he needed today.  Advised him to follow-up as scheduled with primary care and his other services and to return to the ED with any new or worsening symptoms.  Patient in agreement this plan.  Discharged in stable condition.        Procedure  Procedures     SHIRA Cantrell-KADEEM  07/29/24 1529

## 2024-08-02 ENCOUNTER — HOSPITAL ENCOUNTER (OUTPATIENT)
Dept: RADIOLOGY | Facility: HOSPITAL | Age: 74
Discharge: HOME | End: 2024-08-02
Payer: COMMERCIAL

## 2024-08-02 DIAGNOSIS — R10.9 UNSPECIFIED ABDOMINAL PAIN: ICD-10-CM

## 2024-08-02 PROCEDURE — 74176 CT ABD & PELVIS W/O CONTRAST: CPT

## 2024-08-03 ENCOUNTER — APPOINTMENT (OUTPATIENT)
Dept: RADIOLOGY | Facility: HOSPITAL | Age: 74
End: 2024-08-03
Payer: COMMERCIAL

## 2024-08-03 ENCOUNTER — HOSPITAL ENCOUNTER (EMERGENCY)
Facility: HOSPITAL | Age: 74
Discharge: HOME | End: 2024-08-04
Attending: EMERGENCY MEDICINE
Payer: COMMERCIAL

## 2024-08-03 DIAGNOSIS — T85.528A DISLODGED GASTROSTOMY TUBE: Primary | ICD-10-CM

## 2024-08-03 PROCEDURE — 36415 COLL VENOUS BLD VENIPUNCTURE: CPT

## 2024-08-03 PROCEDURE — 99284 EMERGENCY DEPT VISIT MOD MDM: CPT

## 2024-08-03 PROCEDURE — 74018 RADEX ABDOMEN 1 VIEW: CPT

## 2024-08-03 PROCEDURE — 2550000001 HC RX 255 CONTRASTS: Performed by: STUDENT IN AN ORGANIZED HEALTH CARE EDUCATION/TRAINING PROGRAM

## 2024-08-03 PROCEDURE — 43762 RPLC GTUBE NO REVJ TRC: CPT

## 2024-08-03 PROCEDURE — 99285 EMERGENCY DEPT VISIT HI MDM: CPT | Performed by: EMERGENCY MEDICINE

## 2024-08-03 RX ORDER — MORPHINE SULFATE 4 MG/ML
2 INJECTION INTRAVENOUS ONCE
Status: DISCONTINUED | OUTPATIENT
Start: 2024-08-03 | End: 2024-08-04 | Stop reason: HOSPADM

## 2024-08-03 RX ADMIN — DIATRIZOATE MEGLUMINE AND DIATRIZOATE SODIUM 60 ML: 660; 100 LIQUID ORAL; RECTAL at 22:25

## 2024-08-03 ASSESSMENT — LIFESTYLE VARIABLES
EVER FELT BAD OR GUILTY ABOUT YOUR DRINKING: NO
TOTAL SCORE: 0
HAVE PEOPLE ANNOYED YOU BY CRITICIZING YOUR DRINKING: NO
HAVE YOU EVER FELT YOU SHOULD CUT DOWN ON YOUR DRINKING: NO
EVER HAD A DRINK FIRST THING IN THE MORNING TO STEADY YOUR NERVES TO GET RID OF A HANGOVER: NO

## 2024-08-03 ASSESSMENT — COLUMBIA-SUICIDE SEVERITY RATING SCALE - C-SSRS
2. HAVE YOU ACTUALLY HAD ANY THOUGHTS OF KILLING YOURSELF?: NO
1. IN THE PAST MONTH, HAVE YOU WISHED YOU WERE DEAD OR WISHED YOU COULD GO TO SLEEP AND NOT WAKE UP?: NO
6. HAVE YOU EVER DONE ANYTHING, STARTED TO DO ANYTHING, OR PREPARED TO DO ANYTHING TO END YOUR LIFE?: NO

## 2024-08-03 ASSESSMENT — PAIN - FUNCTIONAL ASSESSMENT: PAIN_FUNCTIONAL_ASSESSMENT: 0-10

## 2024-08-03 ASSESSMENT — PAIN SCALES - GENERAL: PAINLEVEL_OUTOF10: 8

## 2024-08-03 ASSESSMENT — PAIN DESCRIPTION - PROGRESSION: CLINICAL_PROGRESSION: NOT CHANGED

## 2024-08-03 ASSESSMENT — PAIN DESCRIPTION - PAIN TYPE: TYPE: ACUTE PAIN

## 2024-08-03 ASSESSMENT — PAIN DESCRIPTION - LOCATION: LOCATION: ABDOMEN

## 2024-08-04 VITALS
TEMPERATURE: 98.2 F | RESPIRATION RATE: 16 BRPM | BODY MASS INDEX: 21.67 KG/M2 | OXYGEN SATURATION: 97 % | HEIGHT: 65 IN | WEIGHT: 130.07 LBS | SYSTOLIC BLOOD PRESSURE: 156 MMHG | DIASTOLIC BLOOD PRESSURE: 65 MMHG | HEART RATE: 89 BPM

## 2024-08-04 ASSESSMENT — ENCOUNTER SYMPTOMS
SHORTNESS OF BREATH: 0
CHILLS: 0
VOICE CHANGE: 0
WEAKNESS: 0
BLOOD IN STOOL: 0
VOMITING: 0
DYSURIA: 0
FEVER: 0
RECTAL PAIN: 0
NECK PAIN: 0
DYSPHORIC MOOD: 0

## 2024-08-04 NOTE — CONSULTS
[facility]  ACUTE CARE SURGERY - HISTORY AND PHYSICAL / CONSULT    Patient Name: Mk Fleming  MRN: [id number]  Admit Date: 803  : 1950  AGE: 73 y.o.   GENDER: male  ==============================================================================  TODAY'S ASSESSMENT AND PLAN OF CARE:  Pt. Dependent on G-tube for feeding with g-tube dislodged, now s/p PEG replacement at bedside in the ED with PEG replacement kit. He tolerated the procedure well and bedside tube study with gastrograffin injected into the PEG showed contrast in the stomach and duodenum.     Pt. Can follow up outpatient with his GI provider.    ==============================================================================  CHIEF COMPLAINT/REASON FOR CONSULT:  G-tube out of place    PAST MEDICAL HISTORY:   PMH:   He has a history of T4 laryngeal cancer s/p XRT in 2019, MARICARMEN since then, has been following with Dr. Sprague and Dr. Barr. He has been PEG dependent since 2019.  Past Medical History:   Diagnosis Date    Anemia     Cholecystitis     Cholelithiasis     Cirrhosis (Multi)     CKD (chronic kidney disease)     stage 3    COPD (chronic obstructive pulmonary disease) (Multi)     Dysphagia     peg dependent    GERD (gastroesophageal reflux disease)     HCV (hepatitis C virus)     s/p SVR    Hoarseness of voice     Hyperlipidemia     Hypertension     Hypothyroidism     Laryngeal cancer (Multi)     s/p chemo and radiation therapy    PAD (peripheral artery disease) (CMS-HCC)     Personal history of other diseases of the respiratory system     History of bronchitis    Personal history of other specified conditions     History of chest pain    Pulmonary emphysema (Multi)        PSH:   Past Surgical History:   Procedure Laterality Date    ESOPHAGOGASTRODUODENOSCOPY      HERNIA REPAIR      IR ANGIOGRAM AORTA ABDOMEN  2022    IR ANGIOGRAM AORTA ABDOMEN 7/3/2022 [facility] CLINICAL LEGACY    IR ANGIOGRAM INFERIOR  EPIGASTRIC  2022    IR ANGIOGRAM INFERIOR EPIGASTRIC 7/3/2022 [facility] CLINICAL LEGACY    IR EMBOLIZATION LYMPH NODE Bilateral 2022    IR EMBOLIZATION LYMPH NODE 7/3/2022 [facility] CLINICAL LEGACY    OTHER SURGICAL HISTORY  2018    Tooth extraction    OTHER SURGICAL HISTORY  2018    Pulmonary decortication    OTHER SURGICAL HISTORY  10/20/2022    Percutaneous endoscopic gastrostomy tube insertion     Family History   Problem Relation Name Age of Onset    Pancreatic cancer Mother      Hypertension Sister      Hypertension Brother       SOCIAL HISTORY:    Smoking: No   Social History     Tobacco Use   Smoking Status Former    Current packs/day: 0.00    Types: Cigarettes    Quit date:     Years since quittin.6    Passive exposure: Never   Smokeless Tobacco Never       Social History     Substance and Sexual Activity   Alcohol Use Not Currently       MEDICATIONS:   Prior to Admission medications    Medication Sig Start Date End Date Taking? Authorizing Provider   albuterol 2.5 mg /3 mL (0.083 %) nebulizer solution Inhale 3 mL every 6 hours if needed for wheezing. 4-6 hours as needed 19   Historical Provider, MD   albuterol 90 mcg/actuation inhaler Inhale 2 puffs every 6 hours if needed for shortness of breath. 23   Historical Provider, MD   atenolol (Tenormin) 50 mg tablet 1 tablet (50 mg) by g-tube route once daily.    Historical Provider, MD   atorvastatin (Lipitor) 10 mg tablet 1 tablet (10 mg) by g-tube route once daily at bedtime.    Historical Provider, MD   chlorhexidine (Peridex) 0.12 % solution PLACE 15 MILLILITERS SWISH IN MOUTH FOR 30 SECONDS THEN SPIT OUT 24   Historical Provider, MD   esomeprazole (NexIUM) 20 mg packet Take 20 mg by mouth once daily in the morning. Take before meals. 1/3/24 12/28/24  Jaime Flores MD   fluticasone (Flonase) 50 mcg/actuation nasal spray Administer 1 spray into each nostril once daily as needed. 21   Historical Provider, MD    hydroCHLOROthiazide (HYDRODiuril) 25 mg tablet 1 tablet (25 mg) by g-tube route once daily.    Historical Provider, MD   levothyroxine (Synthroid, Levoxyl) 50 mcg tablet 1 tablet (50 mcg) by g-tube route once daily in the morning. Take before meals.    Historical Provider, MD   losartan (Cozaar) 50 mg tablet 1 tablet (50 mg) by g-tube route once daily.    Historical Provider, MD   methocarbamol (Robaxin) 500 mg tablet 1 tablet (500 mg) by g-tube route every 8 hours for 7 days. 5/4/24 5/11/24  Shawna Gonzáles MD   naloxone (Narcan) 4 mg/0.1 mL nasal spray Administer 1 spray (4 mg) into affected nostril(s) if needed for opioid reversal or respiratory depression. May repeat every 2-3 minutes if needed, alternating nostrils, until medical assistance becomes available. 12/23/23   Raymond Rubio MD   oxyCODONE (Roxicodone) 5 mg immediate release tablet 1 tablet (5 mg) by g-tube route every 4 hours if needed for severe pain (7 - 10) (For breakthrough pain). 5/4/24   Cade Mcdaniel MD   polyethylene glycol (Miralax) 17 gram/dose powder 17 g by g-tube route once daily as needed (constipation).    Historical Provider, MD   terazosin (Hytrin) 5 mg capsule  6/30/24   Historical Provider, MD   oxyCODONE (Roxicodone) 5 mg/5 mL solution 5 mL (5 mg) by g-tube route every 6 hours if needed for severe pain (7 - 10). 7/28/24 7/28/24  Dylan Álvarez MD   senna 8.8 mg/5 mL syrup 5 mL by g-tube route once daily at bedtime for 6 days. 7/28/24 7/28/24  Dylan Álvarez MD     ALLERGIES:   No Known Allergies    REVIEW OF SYSTEMS:  Review of Systems   Constitutional:  Negative for chills and fever.   HENT:  Negative for voice change.    Eyes:  Negative for visual disturbance.   Respiratory:  Negative for shortness of breath.    Cardiovascular:  Negative for chest pain.   Gastrointestinal:  Negative for blood in stool, rectal pain and vomiting.   Genitourinary:  Negative for dysuria.   Musculoskeletal:  Negative for neck pain.    Neurological:  Negative for weakness.   Psychiatric/Behavioral:  Negative for dysphoric mood.      PHYSICAL EXAM:  Physical Exam  Constitutional:       Appearance: Normal appearance.   HENT:      Mouth/Throat:      Mouth: Mucous membranes are moist.   Eyes:      Conjunctiva/sclera: Conjunctivae normal.   Cardiovascular:      Rate and Rhythm: Normal rate.   Pulmonary:      Effort: Pulmonary effort is normal.   Abdominal:      Comments: Soft, nontender, nondistended with briscoe catheter in g-tube tract with healthy skin surrounding tract exit.   Musculoskeletal:         General: Normal range of motion.   Skin:     General: Skin is warm and dry.   Neurological:      General: No focal deficit present.      Mental Status: He is alert.   Psychiatric:         Mood and Affect: Mood normal.         Behavior: Behavior normal.         Thought Content: Thought content normal.         Judgment: Judgment normal.       IMAGING SUMMARY:  (summary of findings, not a copy of dictation)    Bedside portable x-ray showed contrast in stomach and duodenum.    LABS:  Results from last 7 days   Lab Units 07/28/24  0636   WBC AUTO x10*3/uL 3.2*   HEMOGLOBIN g/dL 9.0*   HEMATOCRIT % 28.2*   PLATELETS AUTO x10*3/uL 197         Results from last 7 days   Lab Units 07/28/24  0636   SODIUM mmol/L 140   POTASSIUM mmol/L 3.9   CHLORIDE mmol/L 101   CO2 mmol/L 29   BUN mg/dL 104*   CREATININE mg/dL 2.02*   CALCIUM mg/dL 9.6   GLUCOSE mg/dL 103*                 I have reviewed all laboratory and imaging results ordered/pertinent for this encounter.

## 2024-08-04 NOTE — ED PROVIDER NOTES
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 Patient reports that he was sitting about an hour and a half ago whenever he felt something pop in his stomach, feeding tube fell out, rating pain 8 out of 10 at site of feeding tube. Patient denies fever, nausea, vomiting, shortness of breath, chest pain, back pain, diarrhea, constipation, dysuria, edema, erythema, skin changes that would be concerning for any infection around G-tube site.    Patient History   Past Medical History:   Diagnosis Date    Anemia     Cholecystitis     Cholelithiasis     Cirrhosis (Multi)     CKD (chronic kidney disease)     stage 3    COPD (chronic obstructive pulmonary disease) (Multi)     Dysphagia     peg dependent    GERD (gastroesophageal reflux disease)     HCV (hepatitis C virus)     s/p SVR    Hoarseness of voice     Hyperlipidemia     Hypertension     Hypothyroidism     Laryngeal cancer (Multi)     s/p chemo and radiation therapy    PAD (peripheral artery disease) (CMS-HCC)     Personal history of other diseases of the respiratory system     History of bronchitis    Personal history of other specified conditions     History of chest pain    Pulmonary emphysema (Multi)      Past Surgical History:   Procedure Laterality Date    ESOPHAGOGASTRODUODENOSCOPY      HERNIA REPAIR      IR ANGIOGRAM AORTA ABDOMEN  07/03/2022    IR ANGIOGRAM AORTA ABDOMEN 7/3/2022 [facility] CLINICAL LEGACY    IR ANGIOGRAM INFERIOR EPIGASTRIC  07/03/2022    IR ANGIOGRAM INFERIOR EPIGASTRIC 7/3/2022 [facility] CLINICAL LEGACY    IR EMBOLIZATION LYMPH NODE Bilateral 07/03/2022    IR EMBOLIZATION LYMPH NODE 7/3/2022 [facility] CLINICAL LEGACY    OTHER SURGICAL HISTORY  11/19/2018    Tooth extraction    OTHER SURGICAL HISTORY  11/19/2018    Pulmonary  decortication    OTHER SURGICAL HISTORY  10/20/2022    Percutaneous endoscopic gastrostomy tube insertion     Family History   Problem Relation Name Age of Onset    Pancreatic cancer Mother      Hypertension Sister      Hypertension Brother       Social History     Tobacco Use    Smoking status: Former     Current packs/day: 0.00     Types: Cigarettes     Quit date:      Years since quittin.6     Passive exposure: Never    Smokeless tobacco: Never   Vaping Use    Vaping status: Never Used   Substance Use Topics    Alcohol use: Not Currently    Drug use: Not Currently     Types: 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   Constitutional:       General: He is not in acute distress.     Appearance: He is well-developed.   HENT:      Head: Normocephalic and atraumatic.   Eyes:      Extraocular Movements: Extraocular movements intact.      Conjunctiva/sclera: Conjunctivae normal.   Cardiovascular:      Rate and Rhythm: Normal rate and regular rhythm.      Heart sounds: Normal heart sounds. No murmur heard.  Pulmonary:      Effort: Pulmonary effort is normal. No respiratory distress.      Breath sounds: Normal breath sounds.   Abdominal:      General: Abdomen is flat.      Palpations: Abdomen is soft.      Tenderness: There is abdominal tenderness.      Comments: Tenderness around g tube side, some mucousy discharge, nonpurulent, no erythema, ecchymosis, skin changes around site   Musculoskeletal:         General: No swelling.      Right lower leg: No edema.      Left lower leg: No edema.   Skin:     General: Skin is warm.      Capillary Refill: Capillary refill takes less than 2 seconds.   Neurological:      Mental Status: He is alert.   Psychiatric:         Mood and Affect: Mood normal.            ED Course & MDM   ED Course as of 08/04/24 0020   Sat Aug 03, 2024   2155 Szymanski placed to keep tract open [AT]   2248 Per ACS, does not need any imaging at this time [AT]      ED Course User Index  [AT] Carlee Rivera MD         Diagnoses as of 08/04/24 0020   Dislodged gastrostomy tube                       [place] Coma Scale Score: 15                        Medical Decision Making  Patient hypertensive otherwise vitals normal. Patient reports he did not take his blood pressure medication today, given elevated blood pressure above 200s, will give home medications. Otherwise patient asymptomatic, less concern for any hypertensive emergency or urgency at this time.    History is inconsistent with any acute pathology for abdominal pain such as ACS, mesenteric ischemia, SBO, diverticulitis, gastritis, pancreatitis, nephrolithiasis, cholelithiasis or appendicitis. Pain localized to area of G-tube, reports felt like something was in his stomach and G-tube fell out, likely balloon rupture or deflated causing dislodgment G-tube. Per chart review G-tube was placed by general surgery on 7/28. General surgery was consulted to examine and replace G-tube. Per ACS, G-tube was replaced and x-ray confirmed placement. Told to schedule follow-up appointment with GI doctor. Patient discharged with return precautions.      Procedure  Procedures     Carlee Rivera MD  Resident  08/04/24 0020

## 2024-08-04 NOTE — DISCHARGE INSTRUCTIONS
.Please follow up with your primary care doctor and GI doctor.    Return if you have:  Temperature greater than 100.4  Signs of infection: pus draining warmth redness  Persistent nausea and vomiting  Severe uncontrolled pain  Changes in urinary or bowel movements  Any other questions or concerns you may have after discharge    In an emergency, call 911 or go to an Emergency Department at a nearby hospital

## 2024-08-04 NOTE — ED TRIAGE NOTES
Patient came to ED by EMS after having the feeding tube he had placed here 2 weeks ago pop out when he was sitting at home. Patient states he takes BP meds but did not take them this morning.

## 2024-08-09 ENCOUNTER — APPOINTMENT (OUTPATIENT)
Dept: OTOLARYNGOLOGY | Facility: HOSPITAL | Age: 74
End: 2024-08-09
Payer: COMMERCIAL

## 2024-08-09 ENCOUNTER — TELEPHONE (OUTPATIENT)
Dept: OTOLARYNGOLOGY | Facility: HOSPITAL | Age: 74
End: 2024-08-09
Payer: COMMERCIAL

## 2024-08-09 DIAGNOSIS — C76.0 HEAD AND NECK CANCER (MULTI): ICD-10-CM

## 2024-08-09 DIAGNOSIS — R13.12 DYSPHAGIA, OROPHARYNGEAL PHASE: ICD-10-CM

## 2024-08-09 NOTE — TELEPHONE ENCOUNTER
Patient states he needs a new Y-port for his PEG tube.  Would like to know if it can be mailed.  Please advise

## 2024-08-09 NOTE — PROCEDURES
[facility]  DEPARTMENT OF SURGERY  PROCEDURE NOTE    PROCEDURE:  Gastrostomy tube replacement, bedside    INDICATION:  Dislodged gastrostomy tube    PERFORMING PHYSICIAN:  Harpal Grimaldo MD     ASSISTING PHYSICIAN:  None    CONSENT:  Verbal    ESTIMATED BLOOD LOSS:  0 cc    ANESTHESIA:  None    PROCEDURE DETAILS:  Consulted for patient regarding dislodged gastrostomy tube, with tract present for over 5 years. At that time, a briscoe catheter was replaced into the tract. Subsequently re-engaged by ENT service for replacement of gastrostomy tube at bedside. Explained the indication for gastrostomy tube replacement to patient, who provided verbal consent.    The briscoe catheter balloon was deflated at bedside. The amount of catheter within the tract was measured to be approximately 3 cm. A 3.0cm 20 Azeri MINI-ONE balloon button kit was opened and lubricated. Placed into the mature gastrostomy tract using an introducer. The balloon was inflated using tap water without meeting any resistance. Attaching the feeding port, water was easily flushed in and out of the port with return of gastric contents. The gastrostomy tube rotated freely with about 0.4cm of give. At the conclusion of the procedure, gastrotomy teaching was provided and a KUB was requested from primary team.    COMPLICATIONS:  None    PATIENT CONDITION:  Tolerated well

## 2024-08-09 NOTE — TELEPHONE ENCOUNTER
"Will place an order with [employer] his DME provider to send him y-port connector.    I called and informed Mr. Fleming.  He expressed concern that he won't get the right one.  \"The one you have in the office is the right one.\"    I explained that we don't have any at this time.  I also wanted him to be able to order it via [employer] so that he can get it with his nutritional shipment each month if needed.    I told him I would specify on the order what was needed.  He has an apt with Dr. Barr on 8/20/24.  I stated he should get the new y-port before the apt.  If wrong, I asked that he bring it with him to the apt.  I also stated that if we get our shipment in, I will put one aside for him and give it to him on 8/20/24.    Mr. Fleming was agreeable and appreciative of the call back, help and plan.  "

## 2024-08-13 NOTE — DOCUMENTATION CLARIFICATION NOTE
"    PATIENT:               ADILIA BHANDARI  ACCT #:                  [id number]  MRN:                       [id number]  :                       1950  ADMIT DATE:       2024 4:03 PM  DISCH DATE:        2024 2:10 PM  RESPONDING PROVIDER #:        [id number]          PROVIDER RESPONSE TEXT:    Severe Protein Calorie Malnutrition    CDI QUERY TEXT:    Clarification        Instruction:    Based on your assessment of the patient and the clinical information, please provide the requested documentation by clicking on the appropriate radio button and enter any additional information if prompted.    Question: Please further clarify this patient nutritional status as    When answering this query, please exercise your independent professional judgment. The fact that a question is being asked, does not imply that any particular answer is desired or expected.    The patient's clinical indicators include:  Clinical Information:  The  Discharge Summary, Dr. Romo:  \"74 yo male with presenting problem with his G tube, who underwent  g-tube change by general surgery team.\"      Clinical Indicators:  The  Nutrition Consult, TOÑA Bailey RDN, LD:  \"BMI, Calculated: 19.77\"  \"Malnutrition Diagnosis  Patient has Malnutrition Diagnosis: Yes  Diagnosis Status: New  Malnutrition Diagnosis: Severe malnutrition related to acute disease or injury  As Evidenced by: pt has only been getting 75 percent of his estimated needs via TF d/t issues with abdominal pain and nausea; he has lost 7 percent of his weight in about 3 months and has areas of severe fat and muscle wasting on physical exam; he is 84 percent of his DBW\"      Treatment:  -Nutrition consult  -Tube feed      Risk Factors:  -BMI 19.77  -Has lost 7 percent of his weight in about 3 months  -Has areas of severe fat and muscle wasting  Options provided:  -- Severe Protein Calorie Malnutrition  -- Other - I will add my own diagnosis  -- Refer to Clinical Documentation " Reviewer    Query created by: Katalina Nielsen on 8/2/2024 2:59 PM      Electronically signed by:  ANGELITA SILVA-CNP 8/13/2024 7:09 AM

## 2024-08-19 NOTE — PROGRESS NOTES
Provider Impressions     Dysphagia status post treatment of laryngeal cancer. The patient is peg dependent.  He now has a PEG that was placed in the emergency room recently.  He will be following up for his cancer care with my partner.    I will see him on appearing basis.    Chief Complaint     Follow-up regarding some issues with the PEG tube      History of Present Illness    This patient was treated with a combination chemotherapy and radiation therapy for the management of a T4 laryngeal cancer. He does have some issues with dysphagia. He has been peg dependent.  When I last saw him I was not able to reposition the PEG tube.  He was first to fitted with a Szymanski catheter.  He came back to the emergency room in early August and a regular tube was placed by one of the residents.  He does not seem to have any issues at this point.      Physical Exam    Examination of the PEG site and the tube shows the tube to be in proper position.  It is fairly tight and I loosened it a little bit.  There may have been a little bit of leakage around the tube.

## 2024-08-20 ENCOUNTER — OFFICE VISIT (OUTPATIENT)
Dept: OTOLARYNGOLOGY | Facility: HOSPITAL | Age: 74
End: 2024-08-20
Payer: COMMERCIAL

## 2024-08-20 VITALS — WEIGHT: 125.4 LBS | TEMPERATURE: 97.9 F | BODY MASS INDEX: 19 KG/M2 | HEIGHT: 68 IN

## 2024-08-20 DIAGNOSIS — C32.9 LARYNGEAL CANCER (MULTI): ICD-10-CM

## 2024-08-20 DIAGNOSIS — R13.12 DYSPHAGIA, OROPHARYNGEAL PHASE: Primary | ICD-10-CM

## 2024-08-20 PROCEDURE — 3008F BODY MASS INDEX DOCD: CPT | Performed by: OTOLARYNGOLOGY

## 2024-08-20 PROCEDURE — 99213 OFFICE O/P EST LOW 20 MIN: CPT | Performed by: OTOLARYNGOLOGY

## 2024-08-20 PROCEDURE — 1160F RVW MEDS BY RX/DR IN RCRD: CPT | Performed by: OTOLARYNGOLOGY

## 2024-08-20 PROCEDURE — 1111F DSCHRG MED/CURRENT MED MERGE: CPT | Performed by: OTOLARYNGOLOGY

## 2024-08-20 PROCEDURE — 1159F MED LIST DOCD IN RCRD: CPT | Performed by: OTOLARYNGOLOGY

## 2024-08-20 PROCEDURE — 1036F TOBACCO NON-USER: CPT | Performed by: OTOLARYNGOLOGY

## 2024-08-20 ASSESSMENT — PATIENT HEALTH QUESTIONNAIRE - PHQ9
2. FEELING DOWN, DEPRESSED OR HOPELESS: NOT AT ALL
SUM OF ALL RESPONSES TO PHQ9 QUESTIONS 1 AND 2: 0
1. LITTLE INTEREST OR PLEASURE IN DOING THINGS: NOT AT ALL

## 2024-08-26 ENCOUNTER — OFFICE VISIT (OUTPATIENT)
Dept: OTOLARYNGOLOGY | Facility: HOSPITAL | Age: 74
End: 2024-08-26
Payer: COMMERCIAL

## 2024-08-26 VITALS — TEMPERATURE: 97.2 F | BODY MASS INDEX: 18.79 KG/M2 | WEIGHT: 124 LBS | HEIGHT: 68 IN

## 2024-08-26 DIAGNOSIS — R13.12 OROPHARYNGEAL DYSPHAGIA: ICD-10-CM

## 2024-08-26 DIAGNOSIS — R49.0 MUSCLE TENSION DYSPHONIA: ICD-10-CM

## 2024-08-26 DIAGNOSIS — Z85.21 HX OF LARYNGEAL CANCER: Primary | ICD-10-CM

## 2024-08-26 DIAGNOSIS — R13.14 PHARYNGOESOPHAGEAL DYSPHAGIA: ICD-10-CM

## 2024-08-26 DIAGNOSIS — Z85.21 ENCOUNTER FOR FOLLOW-UP SURVEILLANCE OF LARYNGEAL CANCER: ICD-10-CM

## 2024-08-26 DIAGNOSIS — R13.19 ESOPHAGEAL DYSPHAGIA: ICD-10-CM

## 2024-08-26 DIAGNOSIS — Z78.9 DOES NOT USE RESPIRATORY SUPPORT FOR VOICE: ICD-10-CM

## 2024-08-26 DIAGNOSIS — R49.0 VOICE HOARSENESS: ICD-10-CM

## 2024-08-26 DIAGNOSIS — R49.8 VOICE FATIGUE: ICD-10-CM

## 2024-08-26 DIAGNOSIS — Z08 ENCOUNTER FOR FOLLOW-UP SURVEILLANCE OF LARYNGEAL CANCER: ICD-10-CM

## 2024-08-26 PROCEDURE — 99214 OFFICE O/P EST MOD 30 MIN: CPT | Performed by: OTOLARYNGOLOGY

## 2024-08-26 PROCEDURE — 1159F MED LIST DOCD IN RCRD: CPT | Performed by: OTOLARYNGOLOGY

## 2024-08-26 PROCEDURE — 1111F DSCHRG MED/CURRENT MED MERGE: CPT | Performed by: OTOLARYNGOLOGY

## 2024-08-26 PROCEDURE — 3008F BODY MASS INDEX DOCD: CPT | Performed by: OTOLARYNGOLOGY

## 2024-08-26 PROCEDURE — 31579 LARYNGOSCOPY TELESCOPIC: CPT | Performed by: OTOLARYNGOLOGY

## 2024-08-26 ASSESSMENT — PATIENT HEALTH QUESTIONNAIRE - PHQ9
SUM OF ALL RESPONSES TO PHQ9 QUESTIONS 1 & 2: 0
2. FEELING DOWN, DEPRESSED OR HOPELESS: NOT AT ALL
1. LITTLE INTEREST OR PLEASURE IN DOING THINGS: NOT AT ALL

## 2024-08-26 NOTE — PROGRESS NOTES
ASSESSMENT AND PLAN:   Mk Fleming is a 74 y.o. 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 Patient has lost about 5 lbs over last few months despite efforts to maintain weight.     Physical exam today finds woody neck. Significant secretions in the nasopharynx, nasopharyngeal incompetence, absent right epiglottis, and mobile VC. Insufficient on right VC. No concerning masses or lesions. TSH is stable.     Referral to nutritionist consult to work at weight gain. Patient is feeding tube dependent. Follow-up in 6 months.        Reason For Consult  Chief Complaint   Patient presents with    Follow-up        HISTORY OF PRESENT ILLNESS:  Mk Fleming is a 74 y.o. male presenting for a follow-up visit with me for surveillance exam.    Patient has history of T4 laryngeal cancer.     Patient reports intermittent hoarseness is improving.   Patient has lost about 5 or 6 pounds and would like to see a nutritionist.     Patient does not smoke  Patient does not have any teeth.     Prior History:   Last seen 4/22/24 Assessment and Plan:  Mk Fleming is a 73 y.o. male with a history of XRT in 2019 for a T4 laryngeal cancer. He has had sig dysphagia and is PEG dependent. His weight is stable at 130 bs.     Today's examination to include flexible laryngoscopy demonstrates no masses or lesions in the airway. He has mucosal changes in the anterior glottic inlet that are stable with a partial epiglottectomy on the right   We discussed findings. He is MARICARMEN.   I would like to see the patient back in  6 months. We will obtain TSH and CXR. HE will be undergoing a procedure with Dr. Reza Salas and is scheduled for preop testing.        Past Medical History  He has a past medical history of Anemia, Cholecystitis, Cholelithiasis, Cirrhosis (Multi), CKD (chronic kidney disease), COPD (chronic obstructive pulmonary disease) (Multi), Dysphagia, GERD (gastroesophageal reflux disease), HCV (hepatitis C  "virus), Hoarseness of voice, Hyperlipidemia, Hypertension, Hypothyroidism, Laryngeal cancer (Multi), PAD (peripheral artery disease) (CMS-HCC), Personal history of other diseases of the respiratory system, Personal history of other specified conditions, and Pulmonary emphysema (Multi). Surgical History  He has a past surgical history that includes Other surgical history (11/19/2018); Other surgical history (11/19/2018); Other surgical history (10/20/2022); IR embolization lymph node (Bilateral, 07/03/2022); IR angiogram inferior epigastric (07/03/2022); IR angiogram aorta abdomen (07/03/2022); Hernia repair; and Esophagogastroduodenoscopy.   Social History  He reports that he quit smoking about 30 years ago. His smoking use included cigarettes. He has never been exposed to tobacco smoke. He has never used smokeless tobacco. He reports that he does not currently use alcohol. He reports that he does not currently use drugs after having used the following drugs: Cocaine. Allergies  Patient has no known allergies.     Family History  Family History   Problem Relation Name Age of Onset    Pancreatic cancer Mother      Hypertension Sister      Hypertension Brother         Review of Systems  All 10 systems were reviewed and negative except for above.      Last Recorded Vitals  Temperature 36.2 °C (97.2 °F), height 1.727 m (5' 8\"), weight 56.2 kg (124 lb).    Physical Exam  ENT Physical Exam   ENT Physical Exam  Constitutional  Appearance: patient appears well-developed and well-nourished,  Head and Face  Appearance: head appears normal and face appears normal;  Ear  Auricles: right auricle normal; left auricle normal;  Nose  External Nose: nares patent bilaterally;  Oral Cavity/Oropharynx  Lips: normal;  Neck  Neck: neck normal; neck palpation normal;  Respiratory  Inspection: no retractions visible;  Cardiovascular  Inspection: no peripheral edema present;  Neurovestibular  Mental Status: alert and oriented;  Psychiatric: " mood normal;  Cranial Nerves: cranial nerves intact;     Relevant Results  TSH Results  Component  Ref Range & Units 1 mo ago  (7/22/24) 4 mo ago  (4/25/24) 1 yr ago  (12/14/22) 4 yr ago  (3/25/20) 4 yr ago  (12/3/19) 5 yr ago  (2/20/19) 5 yr ago  (12/13/18)   Thyroid Stimulating Hormone  0.44 - 3.98 mIU/L 3.05 4.84 High  3.75 CM 2.26 CM 4.01 High  CM 1.35 CM 1.92 CM     Chest X-Ray Results  4/25/24  IMPRESSION:  1. Hyperinflated lungs as can be seen in COPD/emphysema.  2. No definite focal filtrate, sizeable pleural effusion, edema or  pneumothorax.  5/4/24  IMPRESSION:  1. Bilateral lung hyperinflation suggestive of COPD/emphysema.  2. Bandlike opacities in the left lung base are likely atelectatic.  Superimposed infectious process is not excluded but felt less likely.  Follow-up with PA and lateral x-ray as clinically indicated.     Procedures   Flexible Laryngoscopy w/ Videostroboscopy    VOICE AND SPEECH CHARACTERISTICS:  Normal spoken speech, (+) mild dysphonia, (+) moderate roughness, (+) mild breathiness, (+) mild asthenia, (+) moderate strain.    Intelligibility: reduced.   Resonance: balanced.   Vocal Loudness: reduced.   Breath Support: decreased.    PROCEDURE:    Indications:  exam   Procedure Note  Endoscopy Type:  Flexible Laryngoscopy  Procedure Details:    The patient was placed in the sitting position.  After topical anesthesia and decongestion, the 4 mm laryngoscope was passed.  The nasal cavities, nasopharynx, oropharynx, hypopharynx, and larynx were all examined.  Vocal cords were examined during respiration and phonation.  The following findings were noted:  Condition:  Stable.  Patient tolerated procedure well.  Complications:  None  Patient is seated in the exam chair. After adequate topical anesthesia, I advance the flexible endoscope. The examination included evaluation of the weston, vallecula, base of tongue, pyriforms, post-cricoid area, larynx and immediate subglottis.    Reflux Finding  Score  Subglottic edema: Present  Thick Mucus: Absent   Granuloma: Absent   Ventricular Obliteration: Absent  Erythema/Hyperemia: Complete  IA Thickening: Mild   TVC Edema: Mild  Diffuse Laryngitis: Moderate    Gross Arytenoid Movement: symmetric.  Arytenoid Height: normal.   Supraglottic Tension: lateral.    Closure: irregular.    Time Spent  Prep time on day of patient encounter: 10 minutes  Time spent directly with patient, family or caregiver: 15 minutes  Additional Time Spent on Patient Care Activities/Discussion with SLP re care plan: 5 minutes  Documentation Time: 10 minutes  Other Time Spent: 0 minutes  Total: 40 minutes     Scribe Attestation  By signing my name below, IClaudia , Scribe   attest that this documentation has been prepared under the direction and in the presence of Amrik Sprague MD.

## 2024-09-09 ENCOUNTER — LAB (OUTPATIENT)
Dept: LAB | Facility: LAB | Age: 74
End: 2024-09-09
Payer: COMMERCIAL

## 2024-09-09 DIAGNOSIS — Z86.19 HEPATITIS C VIRUS INFECTION CURED AFTER ANTIVIRAL DRUG THERAPY: ICD-10-CM

## 2024-09-09 DIAGNOSIS — K76.9 LIVER DISEASE, CHRONIC, WITH CIRRHOSIS (MULTI): ICD-10-CM

## 2024-09-09 DIAGNOSIS — K74.60 LIVER DISEASE, CHRONIC, WITH CIRRHOSIS (MULTI): ICD-10-CM

## 2024-09-09 LAB
AFP SERPL-MCNC: <4 NG/ML (ref 0–9)
ALBUMIN SERPL BCP-MCNC: 3.7 G/DL (ref 3.4–5)
ALP SERPL-CCNC: 89 U/L (ref 33–136)
ALT SERPL W P-5'-P-CCNC: 12 U/L (ref 10–52)
ANION GAP SERPL CALC-SCNC: 11 MMOL/L (ref 10–20)
AST SERPL W P-5'-P-CCNC: 22 U/L (ref 9–39)
BILIRUB DIRECT SERPL-MCNC: 0.1 MG/DL (ref 0–0.3)
BILIRUB SERPL-MCNC: 0.5 MG/DL (ref 0–1.2)
BUN SERPL-MCNC: 84 MG/DL (ref 6–23)
CALCIUM SERPL-MCNC: 9.3 MG/DL (ref 8.6–10.6)
CHLORIDE SERPL-SCNC: 93 MMOL/L (ref 98–107)
CO2 SERPL-SCNC: 30 MMOL/L (ref 21–32)
CREAT SERPL-MCNC: 2.1 MG/DL (ref 0.5–1.3)
EGFRCR SERPLBLD CKD-EPI 2021: 32 ML/MIN/1.73M*2
GLUCOSE SERPL-MCNC: 156 MG/DL (ref 74–99)
INR PPP: 1 (ref 0.9–1.1)
POTASSIUM SERPL-SCNC: 3.9 MMOL/L (ref 3.5–5.3)
PROT SERPL-MCNC: 7.8 G/DL (ref 6.4–8.2)
PROTHROMBIN TIME: 11.6 SECONDS (ref 9.8–12.8)
SODIUM SERPL-SCNC: 130 MMOL/L (ref 136–145)

## 2024-09-09 PROCEDURE — 85610 PROTHROMBIN TIME: CPT

## 2024-09-09 PROCEDURE — 80053 COMPREHEN METABOLIC PANEL: CPT

## 2024-09-09 PROCEDURE — 82105 ALPHA-FETOPROTEIN SERUM: CPT

## 2024-09-09 PROCEDURE — 36415 COLL VENOUS BLD VENIPUNCTURE: CPT

## 2024-09-09 PROCEDURE — 82248 BILIRUBIN DIRECT: CPT

## 2024-09-17 ENCOUNTER — TELEPHONE (OUTPATIENT)
Dept: OTOLARYNGOLOGY | Facility: HOSPITAL | Age: 74
End: 2024-09-17
Payer: COMMERCIAL

## 2024-09-17 NOTE — TELEPHONE ENCOUNTER
Mr. Fleming left me a message at 9:34 a.m. which I returned.  He informed me that the August 9th y-port connector request that I sent to [place], was never shipped to him.    I asked if he was requesting his nutrition soon.  He stated he did this morning.  I asked if he requested a y-port and he stated he did.  Due to cost [place] requested that y-ports if needed are requested and shipped with the nutrition, which Don was informed of in August.      I asked him to call me if there is no y-port connector in with his nutrition when he receives it later this week.    He was agreeable with the plan and has my direct dial office number.

## 2024-09-26 ENCOUNTER — APPOINTMENT (OUTPATIENT)
Dept: NUTRITION | Facility: CLINIC | Age: 74
End: 2024-09-26
Payer: COMMERCIAL

## 2024-10-15 ENCOUNTER — OFFICE VISIT (OUTPATIENT)
Dept: GASTROENTEROLOGY | Facility: HOSPITAL | Age: 74
End: 2024-10-15
Payer: COMMERCIAL

## 2024-10-15 VITALS
SYSTOLIC BLOOD PRESSURE: 149 MMHG | HEIGHT: 68 IN | TEMPERATURE: 96.9 F | OXYGEN SATURATION: 97 % | HEART RATE: 76 BPM | WEIGHT: 136 LBS | DIASTOLIC BLOOD PRESSURE: 86 MMHG | BODY MASS INDEX: 20.61 KG/M2

## 2024-10-15 DIAGNOSIS — T85.528A DISLODGED GASTROSTOMY TUBE: ICD-10-CM

## 2024-10-15 PROCEDURE — 99213 OFFICE O/P EST LOW 20 MIN: CPT | Performed by: NURSE PRACTITIONER

## 2024-10-15 PROCEDURE — 3077F SYST BP >= 140 MM HG: CPT | Performed by: NURSE PRACTITIONER

## 2024-10-15 PROCEDURE — 3079F DIAST BP 80-89 MM HG: CPT | Performed by: NURSE PRACTITIONER

## 2024-10-15 PROCEDURE — 3008F BODY MASS INDEX DOCD: CPT | Performed by: NURSE PRACTITIONER

## 2024-10-15 PROCEDURE — 1160F RVW MEDS BY RX/DR IN RCRD: CPT | Performed by: NURSE PRACTITIONER

## 2024-10-15 PROCEDURE — 1159F MED LIST DOCD IN RCRD: CPT | Performed by: NURSE PRACTITIONER

## 2024-10-15 PROCEDURE — 1036F TOBACCO NON-USER: CPT | Performed by: NURSE PRACTITIONER

## 2024-10-15 PROCEDURE — 1126F AMNT PAIN NOTED NONE PRSNT: CPT | Performed by: NURSE PRACTITIONER

## 2024-10-15 ASSESSMENT — ENCOUNTER SYMPTOMS
HEMATOLOGIC/LYMPHATIC NEGATIVE: 1
CONSTITUTIONAL NEGATIVE: 1
MUSCULOSKELETAL NEGATIVE: 1
NEUROLOGICAL NEGATIVE: 1
CARDIOVASCULAR NEGATIVE: 1
EYES NEGATIVE: 1
ALLERGIC/IMMUNOLOGIC NEGATIVE: 1
RESPIRATORY NEGATIVE: 1
ENDOCRINE NEGATIVE: 1
PSYCHIATRIC NEGATIVE: 1

## 2024-10-15 ASSESSMENT — PAIN SCALES - GENERAL: PAINLEVEL: 0-NO PAIN

## 2024-10-15 NOTE — PATIENT INSTRUCTIONS
Hx of laryngeal ca s/p chemo/radiation with severe esophageal stenosis and PEG tube dependant. Please continue the feedings through your tube.    HCV cirrhosis- follow up with Dr. Gallardo in Jan 2025.    I will see you back for follow up as needed

## 2024-10-15 NOTE — PROGRESS NOTES
Subjective   Patient ID: Mk Fleming is a 74 y.o. male who presents for No chief complaint on file..  HPI  74-year-old male for new patient visit for PEG tube dislodged  Medical history includes laryngeal CA status post chemoradiation in 2019, he is PEG feeding tube dependent, HCV cirrhosis  Lap darinel 5/1/2024 7/1/2022 EGD showed a benign-appearing esophageal severe stenosis, most likely related to radiation, 2 cm hiatal hernia, intact gastrostomy with patent G-tube with healthy appearing mucosa, non-obstructing nonbleeding duodenal ulcers  PEG tube was replaced 8/4/2024 abdominal x-ray with Gastrografin study showed a PEG tube within the gastric fundus contrast seen in antrum and duodenum  Labs reviewed 9/9/2024 normal AFP, PT/INR, LFTs  BUN 24 creatinine 2.10  7/28/2024 H&H 9.0 and 28.2  Doing bolus feedings and tolerating well  Weight stable  Nothing by mouth  Meds through his tube as well   BM- good          Review of Systems   Constitutional: Negative.    HENT: Negative.     Eyes: Negative.    Respiratory: Negative.     Cardiovascular: Negative.    Gastrointestinal:         PEG feeding dependant   Endocrine: Negative.    Genitourinary: Negative.    Musculoskeletal: Negative.    Skin: Negative.    Allergic/Immunologic: Negative.    Neurological: Negative.    Hematological: Negative.    Psychiatric/Behavioral: Negative.         Objective   Physical Exam  Constitutional:       Appearance: Normal appearance.   HENT:      Head: Normocephalic.      Nose: Nose normal.      Mouth/Throat:      Mouth: Mucous membranes are moist.   Eyes:      Pupils: Pupils are equal, round, and reactive to light.   Cardiovascular:      Rate and Rhythm: Normal rate and regular rhythm.      Pulses: Normal pulses.      Heart sounds: Normal heart sounds.   Pulmonary:      Effort: Pulmonary effort is normal.      Breath sounds: Normal breath sounds.   Abdominal:      General: Bowel sounds are normal.      Palpations: Abdomen is soft.       Comments: PEG tube site intact, no drainage or erythema   Musculoskeletal:         General: Normal range of motion.      Cervical back: Normal range of motion.   Skin:     General: Skin is warm and dry.   Neurological:      Mental Status: He is alert.   Psychiatric:         Mood and Affect: Mood normal.         Assessment/Plan        Hx of laryngeal ca s/p chemo/radiation with severe esophageal stenosis and PEG tube dependant. Please continue the feedings through your tube.    HCV cirrhosis- follow up with Dr. Gallardo in Jan 2025.    I will see you back for follow up as needed    SHIRA Cruz-CNP 10/15/24 12:59 PM

## 2024-10-21 ENCOUNTER — APPOINTMENT (OUTPATIENT)
Dept: OTOLARYNGOLOGY | Facility: CLINIC | Age: 74
End: 2024-10-21
Payer: COMMERCIAL

## 2024-10-31 ENCOUNTER — APPOINTMENT (OUTPATIENT)
Dept: NUTRITION | Facility: CLINIC | Age: 74
End: 2024-10-31
Payer: COMMERCIAL

## 2024-10-31 PROBLEM — Z85.21 HX OF LARYNGEAL CANCER: Status: ACTIVE | Noted: 2024-10-31

## 2024-10-31 PROBLEM — R13.19 ESOPHAGEAL DYSPHAGIA: Status: ACTIVE | Noted: 2024-10-31

## 2024-11-01 ENCOUNTER — HOSPITAL ENCOUNTER (OUTPATIENT)
Dept: RADIOLOGY | Facility: HOSPITAL | Age: 74
Discharge: HOME | End: 2024-11-01
Payer: COMMERCIAL

## 2024-11-01 DIAGNOSIS — K76.9 LIVER DISEASE, CHRONIC, WITH CIRRHOSIS (MULTI): ICD-10-CM

## 2024-11-01 DIAGNOSIS — Z86.19 HEPATITIS C VIRUS INFECTION CURED AFTER ANTIVIRAL DRUG THERAPY: ICD-10-CM

## 2024-11-01 DIAGNOSIS — K74.60 LIVER DISEASE, CHRONIC, WITH CIRRHOSIS (MULTI): ICD-10-CM

## 2024-11-01 PROCEDURE — 76705 ECHO EXAM OF ABDOMEN: CPT

## 2024-11-05 ENCOUNTER — TELEMEDICINE CLINICAL SUPPORT (OUTPATIENT)
Dept: HEMATOLOGY/ONCOLOGY | Facility: CLINIC | Age: 74
End: 2024-11-05
Payer: COMMERCIAL

## 2024-11-05 VITALS — WEIGHT: 136.02 LBS | BODY MASS INDEX: 20.62 KG/M2 | HEIGHT: 68 IN

## 2024-11-05 DIAGNOSIS — Z08 ENCOUNTER FOR FOLLOW-UP SURVEILLANCE OF LARYNGEAL CANCER: ICD-10-CM

## 2024-11-05 DIAGNOSIS — Z85.21 ENCOUNTER FOR FOLLOW-UP SURVEILLANCE OF LARYNGEAL CANCER: ICD-10-CM

## 2024-11-05 DIAGNOSIS — R13.19 ESOPHAGEAL DYSPHAGIA: ICD-10-CM

## 2024-11-05 DIAGNOSIS — Z85.21 HX OF LARYNGEAL CANCER: ICD-10-CM

## 2024-11-05 DIAGNOSIS — R13.12 OROPHARYNGEAL DYSPHAGIA: ICD-10-CM

## 2024-11-05 NOTE — PROGRESS NOTES
"NUTRITION Assessment NOTE    Nutrition Assessment     Reason for Visit:  Mk Fleming is a 74 y.o. male with history of laryngeal cancer, oropharyngeal dysphagia and is PEG tube dependent.  Referral received for working toward  weight gain.      Visit Type: Clinical Nutrition, Oncology, Telephone Visit:  A telephone visit (audio only) between the patient (at the distant site) and the provider (at the originating site) was utilized to provide this telehealth service.    Verbal Consent for Encounter: Verbal consent was requested and obtained from patient on this date for a telehealth visit.       Lab Results   Component Value Date/Time    GLUCOSE 156 (H) 09/09/2024 1007     (L) 09/09/2024 1007    K 3.9 09/09/2024 1007    CL 93 (L) 09/09/2024 1007    CO2 30 09/09/2024 1007    ANIONGAP 11 09/09/2024 1007    BUN 84 (H) 09/09/2024 1007    CREATININE 2.10 (H) 09/09/2024 1007    EGFR 32 (L) 09/09/2024 1007    CALCIUM 9.3 09/09/2024 1007    ALBUMIN 3.7 09/09/2024 1007    ALKPHOS 89 09/09/2024 1007    PROT 7.8 09/09/2024 1007    AST 22 09/09/2024 1007    BILITOT 0.5 09/09/2024 1007    ALT 12 09/09/2024 1007     Lab Results   Component Value Date/Time    VITD25 31 05/03/2024 0517       Anthropometrics:  Anthropometrics  Height: 172.7 cm (5' 7.99\")  Weight: 61.7 kg (136 lb 0.4 oz) (this weight was from 10/15/24.  Patient reported that he does not have a scale at home.)  BMI (Calculated): 20.69  IBW/kg (Dietitian Calculated): 70 kg  Percent of IBW: 88 %  Weight Change  Weight History / % Weight Change: Patient weight appears stable over the last 5 months.  In August noted some weight loss, however since he has gained weight back.        Wt Readings from Last 10 Encounters:   11/05/24 61.7 kg (136 lb 0.4 oz)   10/15/24 61.7 kg (136 lb)   08/26/24 56.2 kg (124 lb)   08/20/24 56.9 kg (125 lb 6.4 oz)   08/04/24 59 kg (130 lb 1.1 oz)   07/29/24 59 kg (130 lb)   07/26/24 59 kg (130 lb)   07/26/24 57.2 kg (126 lb)   07/12/24 " "59.9 kg (132 lb)   06/26/24 59.9 kg (132 lb)        Food And Nutrient Intake:  Food and Nutrient History  Energy Intake: Good > 75 %  Fluid Intake: appears good per pt reports  GI Symptoms: diarrhea (only sometimes per pt)  GI Symptoms greater than 2 weeks: intermittent  Oral Problems: dysphagia (PEG dependent)                         Enteral Nutrition Intake  Enteral Nutrition Formula/Solution: Boost VHC 4 cartons per day (Medication administration via PEG tube 2 times per day; crushes and mixes with water.)  Feeding Tube Flush: 60ml water flush before bolus feeds and medication administration and 180ml after each bolus feed and med administration. (Total of 1440ml water per day.)    Boost VHC 4 times per day is providing 2120 calories, 88 grams of protein, 636 ml free water per day.  Water flushes providing total of 1440ml per day, plus free water from Boost VHC, pt receiving total of 2,076ml water per day, plus little more from water added to crushed meds.                                     Nutrition Focused Physical Exam Findings:  defer: phone visit                        Energy Needs  Calculated Energy Needs Using Equations  Height: 172.7 cm (5' 7.99\")  Estimated Energy Needs  Total Energy Estimated Needs (kCal): 2200 kCal  Total Estimated Energy Need per Day (kCal/kg): 35 kCal/kg  Method for Estimating Needs: 35-40kcals/kg (0139-1441 kcals/day)  Estimated Fluid Needs  Total Fluid Estimated Needs (mL): 2200 mL  Method for Estimating Needs: 1ml/kcal/day  Estimated Protein Needs  Total Protein Estimated Needs (g): 74 g  Total Protein Estimated Needs (g/kg): 1.2 g/kg  Method for Estimating Needs: 1.2-1.5g protein/day (74-92g protein per day)        Nutrition Diagnosis             Nutrition Interventions/Recommendations   Nutrition Prescription  Individualized Nutrition Prescription Provided for : Maintain weight via PEG tube feedings.    Food and Nutrition Delivery  Food and Nutrition Delivery  Enteral " Nutrition: Enteral nutrition site care, Feeding tube flush  Total Nutrition Provided: Continue with Boost VHC 4 cartons per day with water flushes of 60ml before feeds and medication administration, and 180ml after TF and medicaiton administration.  Goals: Continue with current regimen as noted some weight gain since August when consistently doing 4 cartons per day.  Feeding Assistance: Mouth care  Goals: Discussed oral care and encouraged good oral hygeine; reviewed baking soda/ salt water swish  Other:: Hydration  Goals: Continue with current water flush routine; appears to be adequate based on pt reports.    Nutrition Education  Nutrition Education  Nutrition Education Content: Content related nutrition education  Goals: PEG tube feedings.    Coordination of Care  Coordination of Nutrition Care by a Nutrition Professional  Collaboration and referral of nutrition care: Collaboration by nutrition professional with other providers  Goals: Recommend pt may benefit from seeing a dietitian in person at [facility] next time patient in MD oncologist office.  Patient lives too far from this location in [place] to come in person visits he stated.  Noted pt receives TF formula and supplies through [place].    Patient Instructions   Continue with current TF regimen of Boost VHC 4 bolus feeds per day, with water flushes of 60ml before and 180ml after each bolus feed.  60ml water flush prior to medication administration, and 180ml after (2 times per day).   If any GI intolerances occur or weight loss noted prior to our next scheduled phone visit, call this RDN - contact name and number provided.    Recommended RDN closer to where pt lives, as he can see them in person.      Nutrition Monitoring and Evaluation   Food/Nutrient Related History Monitoring  Monitoring and Evaluation Plan: Enteral and parenteral nutrition intake  Enteral and Parenteral Nutrition Intake: Enteral nutrition formula/solution, Enteral nutrition  intake  Criteria: Continue with Boost VHC 4 cartons per day.  If pt with weight loss, GI intolerances, may change TF formula to Nutren 2.0.  Body Composition/Growth/Weight History  Monitoring and Evaluation Plan: Weight  Weight: Measured weight  Criteria: Plan to monitor weights; pt only gets weighed at MD office visit, as he does not have a scale at home.  He stated next MD visit in January.  Plan to follow up with patient at that time, as I am in [place] and this location too far for pt to travel.  Biochemical Data, Medical Tests and Procedures  Monitoring and Evaluation Plan: Electrolyte/renal panel  Criteria: Recommend obtain general chemistry, magnesium, phosphorus, iron panel, at next MD visit.  Nutrition Focused Physical Findings  Monitoring and Evaluation Plan: Digestive System  Criteria: monitor for any GI toxicities; advised pt to call prior to next RDN visit if any GI intolerance occur with current TF formula.      Time Spent  Prep time on day of patient encounter: 15 minutes  Time spent directly with patient, family or caregiver: 20 minutes  Additional Time Spent on Patient Care Activities: 10 minutes  Documentation Time: 15 minutes  Other Time Spent: 0 minutes  Total: 60 minutes

## 2025-01-08 ENCOUNTER — OFFICE VISIT (OUTPATIENT)
Dept: GASTROENTEROLOGY | Facility: HOSPITAL | Age: 75
End: 2025-01-08
Payer: COMMERCIAL

## 2025-01-08 VITALS
SYSTOLIC BLOOD PRESSURE: 118 MMHG | OXYGEN SATURATION: 97 % | HEART RATE: 80 BPM | BODY MASS INDEX: 21.09 KG/M2 | TEMPERATURE: 97.3 F | WEIGHT: 138.7 LBS | DIASTOLIC BLOOD PRESSURE: 65 MMHG

## 2025-01-08 DIAGNOSIS — K76.9 LIVER DISEASE, CHRONIC, WITH CIRRHOSIS (MULTI): ICD-10-CM

## 2025-01-08 DIAGNOSIS — K74.69 OTHER CIRRHOSIS OF LIVER: ICD-10-CM

## 2025-01-08 DIAGNOSIS — K74.60 LIVER DISEASE, CHRONIC, WITH CIRRHOSIS (MULTI): ICD-10-CM

## 2025-01-08 PROCEDURE — 99214 OFFICE O/P EST MOD 30 MIN: CPT | Performed by: INTERNAL MEDICINE

## 2025-01-08 PROCEDURE — 3078F DIAST BP <80 MM HG: CPT | Performed by: INTERNAL MEDICINE

## 2025-01-08 PROCEDURE — 1126F AMNT PAIN NOTED NONE PRSNT: CPT | Performed by: INTERNAL MEDICINE

## 2025-01-08 PROCEDURE — 3074F SYST BP LT 130 MM HG: CPT | Performed by: INTERNAL MEDICINE

## 2025-01-08 PROCEDURE — 1159F MED LIST DOCD IN RCRD: CPT | Performed by: INTERNAL MEDICINE

## 2025-01-08 RX ORDER — AMLODIPINE BESYLATE 5 MG/1
TABLET ORAL
COMMUNITY
Start: 2025-01-03

## 2025-01-08 SDOH — ECONOMIC STABILITY: FOOD INSECURITY: WITHIN THE PAST 12 MONTHS, THE FOOD YOU BOUGHT JUST DIDN'T LAST AND YOU DIDN'T HAVE MONEY TO GET MORE.: NEVER TRUE

## 2025-01-08 SDOH — ECONOMIC STABILITY: FOOD INSECURITY: WITHIN THE PAST 12 MONTHS, YOU WORRIED THAT YOUR FOOD WOULD RUN OUT BEFORE YOU GOT MONEY TO BUY MORE.: NEVER TRUE

## 2025-01-08 ASSESSMENT — LIFESTYLE VARIABLES
SKIP TO QUESTIONS 9-10: 1
HOW OFTEN DO YOU HAVE A DRINK CONTAINING ALCOHOL: NEVER
HOW MANY STANDARD DRINKS CONTAINING ALCOHOL DO YOU HAVE ON A TYPICAL DAY: PATIENT DOES NOT DRINK
HOW OFTEN DO YOU HAVE SIX OR MORE DRINKS ON ONE OCCASION: NEVER
AUDIT-C TOTAL SCORE: 0

## 2025-01-08 ASSESSMENT — PAIN SCALES - GENERAL: PAINLEVEL_OUTOF10: 0-NO PAIN

## 2025-01-08 NOTE — PROGRESS NOTES
[facility]  [facility]  Clinic Note      Patient Information  Mk Fleming                                                                 Subjective:     Chief Complaint   Patient presents with    Follow-up       HPI:  Mk Fleming is a 73 y.o. male who presents for Follow-up and Cirrhosis.    HCV with cirrhosis and SVR.   Compensated cirrhosis.   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  Did have cholecystectomy 5/1/24 by Reza Rudolph.        Patient feeling well. Asymptomatic    PEG in place and gaining weight with tube feeds.       Past Medical History:   Past Medical History:   Diagnosis Date    Anemia     Cholecystitis     Cholelithiasis     Cirrhosis (Multi)     CKD (chronic kidney disease)     stage 3    COPD (chronic obstructive pulmonary disease) (Multi)     Dysphagia     peg dependent    GERD (gastroesophageal reflux disease)     HCV (hepatitis C virus)     s/p SVR    Hoarseness of voice     Hyperlipidemia     Hypertension     Hypothyroidism     Laryngeal cancer (Multi)     s/p chemo and radiation therapy    PAD (peripheral artery disease) (CMS-HCC)     Personal history of other diseases of the respiratory system     History of bronchitis    Personal history of other specified conditions     History of chest pain    Pulmonary emphysema (Multi)        Past Surgical History:   Past Surgical History:   Procedure Laterality Date    ESOPHAGOGASTRODUODENOSCOPY      HERNIA REPAIR      IR ANGIOGRAM AORTA ABDOMEN  07/03/2022    IR ANGIOGRAM AORTA ABDOMEN 7/3/2022 [facility] CLINICAL LEGACY    IR ANGIOGRAM INFERIOR EPIGASTRIC  07/03/2022    IR ANGIOGRAM INFERIOR EPIGASTRIC 7/3/2022 [facility] CLINICAL LEGACY    IR EMBOLIZATION LYMPH NODE Bilateral 07/03/2022    IR EMBOLIZATION LYMPH NODE 7/3/2022 [facility] CLINICAL LEGACY    OTHER SURGICAL HISTORY  11/19/2018    Tooth extraction    OTHER SURGICAL HISTORY  11/19/2018    Pulmonary  decortication    OTHER SURGICAL HISTORY  10/20/2022    Percutaneous endoscopic gastrostomy tube insertion       Past Family History:   Family History   Problem Relation Name Age of Onset    Pancreatic cancer Mother      Hypertension Sister      Hypertension Brother         Social History:   Social History     Tobacco Use   Smoking Status Former    Current packs/day: 0.00    Types: Cigarettes    Quit date:     Years since quittin.0    Passive exposure: Never   Smokeless Tobacco Never     Social History     Substance and Sexual Activity   Alcohol Use Not Currently     Social History     Substance and Sexual Activity   Drug Use Not Currently    Types: 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       Allergies: No Known Allergies    MEDS:  Current Outpatient Medications   Medication Instructions    albuterol 2.5 mg /3 mL (0.083 %) nebulizer solution 3 mL, inhalation, Every 6 hours PRN, 4-6 hours as needed    albuterol 90 mcg/actuation inhaler 2 puffs, inhalation, Every 6 hours PRN    amLODIPine (Norvasc) 5 mg tablet     atenolol (TENORMIN) 50 mg, g-tube, Daily    atorvastatin (LIPITOR) 10 mg, g-tube, Nightly    chlorhexidine (Peridex) 0.12 % solution PLACE 15 MILLILITERS SWISH IN MOUTH FOR 30 SECONDS THEN SPIT OUT    esomeprazole (NEXIUM) 20 mg, oral, Daily before breakfast    fluticasone (Flonase) 50 mcg/actuation nasal spray 1 spray, Each Nostril, Daily PRN    hydroCHLOROthiazide (HYDRODIURIL) 25 mg, g-tube, Daily    levothyroxine (SYNTHROID, LEVOXYL) 50 mcg, g-tube, Daily before breakfast    losartan (COZAAR) 50 mg, g-tube, Daily    methocarbamol (ROBAXIN) 500 mg, g-tube, Every 8 hours scheduled    naloxone (NARCAN) 4 mg, nasal, As needed, May repeat every 2-3 minutes if needed, alternating nostrils, until medical assistance becomes available.    polyethylene glycol (MIRALAX) 17 g, g-tube, Daily PRN    terazosin (Hytrin) 5 mg capsule         ROS:   General: no chills, no fevers  Cardiovascular: no chest pain, no  palpitations  Others in 12 systems ROS were discussed and negative. Positive pertinent systems are listed in HPI.                                                                 Physical Exam:     /65   Pulse 80   Temp 36.3 °C (97.3 °F)   Wt 62.9 kg (138 lb 11.2 oz)   SpO2 97%   BMI 21.09 kg/m²     Physical Exam   Alert and oriented  Abd soft ND NT. PEG visualzied  Skin warm and dry  Ambulatory  NO LE swelling                                                                    Labs:     Lab Results   Component Value Date    WBC 3.2 (L) 07/28/2024    WBC 4.1 (L) 07/27/2024    WBC 8.4 05/03/2024    HGB 9.0 (L) 07/28/2024    HGB 7.9 (L) 07/27/2024    HGB 10.5 (L) 05/03/2024    MCV 97 07/28/2024    MCV 97 07/27/2024    MCV 99 05/03/2024     07/28/2024     07/27/2024     05/03/2024       Lab Results   Component Value Date    GLUCOSE 156 (H) 09/09/2024    CALCIUM 9.3 09/09/2024     (L) 09/09/2024    K 3.9 09/09/2024    CO2 30 09/09/2024    CL 93 (L) 09/09/2024    BUN 84 (H) 09/09/2024    CREATININE 2.10 (H) 09/09/2024       Lab Results   Component Value Date    ALT 12 09/09/2024    ALT 11 07/22/2024    ALT 6 (L) 05/03/2024    AST 22 09/09/2024    AST 20 07/22/2024    AST 30 05/03/2024    ALKPHOS 89 09/09/2024    ALKPHOS 75 07/22/2024    ALKPHOS 84 05/03/2024    BILITOT 0.5 09/09/2024    BILITOT 0.4 07/22/2024    BILITOT 0.6 05/03/2024                                                                                  Imaging           === 11/01/24 ===    US ABDOMEN LIMITED LIVER    - Impression -  Cirrhotic morphology of the liver. No focal hepatic lesion is evident.    I personally reviewed the image(s)/study and resident interpretation.  I agree with the findings as stated by resident Melchor Roe.  Data analyzed and images interpreted at [place], [place].    MACRO:  None    Signed by: Pasha Cortez 11/1/2024 3:21 PM  Dictation  workstation:   [id number]  === 08/02/24 ===    CT ABDOMEN PELVIS WO IV CONTRAST    - Impression -  1. No etiology evident for abdominal pain.  2. Findings otherwise stable such as in place G-tube and cirrhosis.    Signed by: Pasha Cortez 8/5/2024 12:02 PM  Dictation workstation:   [id number]                                                                     GI Procedures:                                                                  Assessment & Plan:     Assessment/Plan:   Cirrhosis from HCV with SVR. Will get ultrasound and repeat labs. Follow up in 6 months.     Detailed Counseling:     Assessed the patients understanding of their medications and discussed their treatment plan, assessed patient's response to medications and barriers to adherence    David Calle MD   PGY6, Gastroenterology Fellow

## 2025-01-08 NOTE — PATIENT INSTRUCTIONS
Return to clinic in 6 months. Please obtain labs and ultrasound before your next visit.     Thank you

## 2025-01-31 ENCOUNTER — APPOINTMENT (OUTPATIENT)
Dept: RADIOLOGY | Facility: HOSPITAL | Age: 75
End: 2025-01-31
Payer: COMMERCIAL

## 2025-01-31 ENCOUNTER — CLINICAL SUPPORT (OUTPATIENT)
Dept: EMERGENCY MEDICINE | Facility: HOSPITAL | Age: 75
End: 2025-01-31
Payer: COMMERCIAL

## 2025-01-31 ENCOUNTER — ANESTHESIA (OUTPATIENT)
Dept: OPERATING ROOM | Facility: HOSPITAL | Age: 75
End: 2025-01-31
Payer: COMMERCIAL

## 2025-01-31 ENCOUNTER — HOSPITAL ENCOUNTER (INPATIENT)
Facility: HOSPITAL | Age: 75
End: 2025-01-31
Attending: EMERGENCY MEDICINE | Admitting: SURGERY
Payer: COMMERCIAL

## 2025-01-31 ENCOUNTER — ANESTHESIA EVENT (OUTPATIENT)
Dept: OPERATING ROOM | Facility: HOSPITAL | Age: 75
End: 2025-01-31
Payer: COMMERCIAL

## 2025-01-31 DIAGNOSIS — K94.23 PEG TUBE MALFUNCTION (MULTI): Primary | ICD-10-CM

## 2025-01-31 LAB
ABO GROUP (TYPE) IN BLOOD: NORMAL
ALBUMIN SERPL BCP-MCNC: 3.7 G/DL (ref 3.4–5)
ALP SERPL-CCNC: 85 U/L (ref 33–136)
ALT SERPL W P-5'-P-CCNC: 10 U/L (ref 10–52)
ANION GAP BLDV CALCULATED.4IONS-SCNC: 6 MMOL/L (ref 10–25)
ANION GAP BLDV CALCULATED.4IONS-SCNC: 9 MMOL/L (ref 10–25)
ANION GAP SERPL CALC-SCNC: 14 MMOL/L (ref 10–20)
ANTIBODY SCREEN: NORMAL
APPEARANCE UR: CLEAR
APTT PPP: 34 SECONDS (ref 27–38)
AST SERPL W P-5'-P-CCNC: 17 U/L (ref 9–39)
BASE EXCESS BLDV CALC-SCNC: 5.6 MMOL/L (ref -2–3)
BASE EXCESS BLDV CALC-SCNC: 7.1 MMOL/L (ref -2–3)
BASOPHILS # BLD AUTO: 0.02 X10*3/UL (ref 0–0.1)
BASOPHILS NFR BLD AUTO: 0.2 %
BILIRUB SERPL-MCNC: 0.5 MG/DL (ref 0–1.2)
BILIRUB UR STRIP.AUTO-MCNC: NEGATIVE MG/DL
BODY TEMPERATURE: 37 DEGREES CELSIUS
BODY TEMPERATURE: 37 DEGREES CELSIUS
BUN SERPL-MCNC: 119 MG/DL (ref 6–23)
CA-I BLDV-SCNC: 1.18 MMOL/L (ref 1.1–1.33)
CA-I BLDV-SCNC: 1.23 MMOL/L (ref 1.1–1.33)
CALCIUM SERPL-MCNC: 9.7 MG/DL (ref 8.6–10.6)
CHLORIDE BLDV-SCNC: 95 MMOL/L (ref 98–107)
CHLORIDE BLDV-SCNC: 98 MMOL/L (ref 98–107)
CHLORIDE SERPL-SCNC: 91 MMOL/L (ref 98–107)
CO2 SERPL-SCNC: 30 MMOL/L (ref 21–32)
COLOR UR: ABNORMAL
CREAT SERPL-MCNC: 3.28 MG/DL (ref 0.5–1.3)
EGFRCR SERPLBLD CKD-EPI 2021: 19 ML/MIN/1.73M*2
EOSINOPHIL # BLD AUTO: 0.04 X10*3/UL (ref 0–0.4)
EOSINOPHIL NFR BLD AUTO: 0.4 %
ERYTHROCYTE [DISTWIDTH] IN BLOOD BY AUTOMATED COUNT: 14.1 % (ref 11.5–14.5)
GLUCOSE BLDV-MCNC: 111 MG/DL (ref 74–99)
GLUCOSE BLDV-MCNC: 121 MG/DL (ref 74–99)
GLUCOSE SERPL-MCNC: 113 MG/DL (ref 74–99)
GLUCOSE UR STRIP.AUTO-MCNC: NORMAL MG/DL
HCO3 BLDV-SCNC: 31.1 MMOL/L (ref 22–26)
HCO3 BLDV-SCNC: 33.1 MMOL/L (ref 22–26)
HCT VFR BLD AUTO: 27.3 % (ref 41–52)
HCT VFR BLD EST: 22 % (ref 41–52)
HCT VFR BLD EST: 29 % (ref 41–52)
HGB BLD-MCNC: 9.2 G/DL (ref 13.5–17.5)
HGB BLDV-MCNC: 7.3 G/DL (ref 13.5–17.5)
HGB BLDV-MCNC: 9.6 G/DL (ref 13.5–17.5)
HOLD SPECIMEN: NORMAL
IMM GRANULOCYTES # BLD AUTO: 0.06 X10*3/UL (ref 0–0.5)
IMM GRANULOCYTES NFR BLD AUTO: 0.7 % (ref 0–0.9)
INHALED O2 CONCENTRATION: 21 %
INR PPP: 1.2 (ref 0.9–1.1)
KETONES UR STRIP.AUTO-MCNC: NEGATIVE MG/DL
LACTATE BLDV-SCNC: 0.9 MMOL/L (ref 0.4–2)
LACTATE BLDV-SCNC: 1.6 MMOL/L (ref 0.4–2)
LEUKOCYTE ESTERASE UR QL STRIP.AUTO: NEGATIVE
LIPASE SERPL-CCNC: 67 U/L (ref 9–82)
LYMPHOCYTES # BLD AUTO: 0.5 X10*3/UL (ref 0.8–3)
LYMPHOCYTES NFR BLD AUTO: 5.6 %
MCH RBC QN AUTO: 30.9 PG (ref 26–34)
MCHC RBC AUTO-ENTMCNC: 33.7 G/DL (ref 32–36)
MCV RBC AUTO: 92 FL (ref 80–100)
MONOCYTES # BLD AUTO: 1.13 X10*3/UL (ref 0.05–0.8)
MONOCYTES NFR BLD AUTO: 12.7 %
NEUTROPHILS # BLD AUTO: 7.18 X10*3/UL (ref 1.6–5.5)
NEUTROPHILS NFR BLD AUTO: 80.4 %
NITRITE UR QL STRIP.AUTO: NEGATIVE
NRBC BLD-RTO: 0 /100 WBCS (ref 0–0)
OXYHGB MFR BLDV: 15 % (ref 45–75)
OXYHGB MFR BLDV: 59.9 % (ref 45–75)
PCO2 BLDV: 49 MM HG (ref 41–51)
PCO2 BLDV: 56 MM HG (ref 41–51)
PH BLDV: 7.38 PH (ref 7.33–7.43)
PH BLDV: 7.41 PH (ref 7.33–7.43)
PH UR STRIP.AUTO: 6 [PH]
PLATELET # BLD AUTO: 162 X10*3/UL (ref 150–450)
PO2 BLDV: 20 MM HG (ref 35–45)
PO2 BLDV: 37 MM HG (ref 35–45)
POTASSIUM BLDV-SCNC: 4.9 MMOL/L (ref 3.5–5.3)
POTASSIUM BLDV-SCNC: 6.1 MMOL/L (ref 3.5–5.3)
POTASSIUM SERPL-SCNC: 5.2 MMOL/L (ref 3.5–5.3)
PROT SERPL-MCNC: 8.4 G/DL (ref 6.4–8.2)
PROT UR STRIP.AUTO-MCNC: ABNORMAL MG/DL
PROTHROMBIN TIME: 13.2 SECONDS (ref 9.8–12.8)
RBC # BLD AUTO: 2.98 X10*6/UL (ref 4.5–5.9)
RBC # UR STRIP.AUTO: ABNORMAL /UL
RBC #/AREA URNS AUTO: NORMAL /HPF
RH FACTOR (ANTIGEN D): NORMAL
SAO2 % BLDV: 15 % (ref 45–75)
SAO2 % BLDV: 62 % (ref 45–75)
SODIUM BLDV-SCNC: 130 MMOL/L (ref 136–145)
SODIUM BLDV-SCNC: 131 MMOL/L (ref 136–145)
SODIUM SERPL-SCNC: 130 MMOL/L (ref 136–145)
SP GR UR STRIP.AUTO: 1.01
UROBILINOGEN UR STRIP.AUTO-MCNC: NORMAL MG/DL
WBC # BLD AUTO: 8.9 X10*3/UL (ref 4.4–11.3)
WBC #/AREA URNS AUTO: NORMAL /HPF

## 2025-01-31 PROCEDURE — 36415 COLL VENOUS BLD VENIPUNCTURE: CPT

## 2025-01-31 PROCEDURE — 99223 1ST HOSP IP/OBS HIGH 75: CPT | Performed by: SURGERY

## 2025-01-31 PROCEDURE — 93005 ELECTROCARDIOGRAM TRACING: CPT

## 2025-01-31 PROCEDURE — 1100000001 HC PRIVATE ROOM DAILY

## 2025-01-31 PROCEDURE — 84132 ASSAY OF SERUM POTASSIUM: CPT

## 2025-01-31 PROCEDURE — 99285 EMERGENCY DEPT VISIT HI MDM: CPT | Performed by: EMERGENCY MEDICINE

## 2025-01-31 PROCEDURE — 2500000005 HC RX 250 GENERAL PHARMACY W/O HCPCS

## 2025-01-31 PROCEDURE — 2500000004 HC RX 250 GENERAL PHARMACY W/ HCPCS (ALT 636 FOR OP/ED): Performed by: PHARMACIST

## 2025-01-31 PROCEDURE — 82805 BLOOD GASES W/O2 SATURATION: CPT

## 2025-01-31 PROCEDURE — 83690 ASSAY OF LIPASE: CPT

## 2025-01-31 PROCEDURE — 74177 CT ABD & PELVIS W/CONTRAST: CPT

## 2025-01-31 PROCEDURE — 86900 BLOOD TYPING SEROLOGIC ABO: CPT | Performed by: EMERGENCY MEDICINE

## 2025-01-31 PROCEDURE — 99285 EMERGENCY DEPT VISIT HI MDM: CPT | Mod: 25 | Performed by: EMERGENCY MEDICINE

## 2025-01-31 PROCEDURE — 74177 CT ABD & PELVIS W/CONTRAST: CPT | Performed by: RADIOLOGY

## 2025-01-31 PROCEDURE — 85025 COMPLETE CBC W/AUTO DIFF WBC: CPT

## 2025-01-31 PROCEDURE — 2550000001 HC RX 255 CONTRASTS: Performed by: EMERGENCY MEDICINE

## 2025-01-31 PROCEDURE — 96375 TX/PRO/DX INJ NEW DRUG ADDON: CPT

## 2025-01-31 PROCEDURE — 81001 URINALYSIS AUTO W/SCOPE: CPT

## 2025-01-31 PROCEDURE — 2500000004 HC RX 250 GENERAL PHARMACY W/ HCPCS (ALT 636 FOR OP/ED)

## 2025-01-31 PROCEDURE — 96365 THER/PROPH/DIAG IV INF INIT: CPT

## 2025-01-31 PROCEDURE — 85610 PROTHROMBIN TIME: CPT | Performed by: EMERGENCY MEDICINE

## 2025-01-31 RX ORDER — VANCOMYCIN HYDROCHLORIDE 1 G/20ML
INJECTION, POWDER, LYOPHILIZED, FOR SOLUTION INTRAVENOUS DAILY PRN
Status: DISCONTINUED | OUTPATIENT
Start: 2025-01-31 | End: 2025-02-03

## 2025-01-31 RX ORDER — SILVER NITRATE 38.21; 12.74 MG/1; MG/1
STICK TOPICAL ONCE
Status: COMPLETED | OUTPATIENT
Start: 2025-01-31 | End: 2025-01-31

## 2025-01-31 RX ORDER — MORPHINE SULFATE 4 MG/ML
4 INJECTION INTRAVENOUS ONCE
Status: COMPLETED | OUTPATIENT
Start: 2025-01-31 | End: 2025-01-31

## 2025-01-31 RX ORDER — VANCOMYCIN HYDROCHLORIDE 500 MG/100ML
500 INJECTION, SOLUTION INTRAVENOUS ONCE
Status: COMPLETED | OUTPATIENT
Start: 2025-01-31 | End: 2025-01-31

## 2025-01-31 RX ORDER — OXYCODONE HYDROCHLORIDE 5 MG/1
1 TABLET ORAL EVERY 12 HOURS PRN
COMMUNITY
Start: 2025-01-27

## 2025-01-31 RX ORDER — SUCRALFATE 1 G/10ML
1 SUSPENSION ORAL 2 TIMES DAILY
COMMUNITY

## 2025-01-31 RX ORDER — PROCHLORPERAZINE MALEATE 10 MG
1 TABLET ORAL EVERY 6 HOURS PRN
COMMUNITY
Start: 2025-01-26

## 2025-01-31 RX ORDER — SILVER NITRATE 38.21; 12.74 MG/1; MG/1
STICK TOPICAL
Status: COMPLETED
Start: 2025-01-31 | End: 2025-01-31

## 2025-01-31 RX ORDER — VANCOMYCIN HYDROCHLORIDE 750 MG/150ML
750 INJECTION, SOLUTION INTRAVENOUS ONCE
Status: COMPLETED | OUTPATIENT
Start: 2025-01-31 | End: 2025-01-31

## 2025-01-31 RX ADMIN — VANCOMYCIN HYDROCHLORIDE 500 MG: 500 INJECTION, SOLUTION INTRAVENOUS at 14:01

## 2025-01-31 RX ADMIN — PIPERACILLIN SODIUM AND TAZOBACTAM SODIUM 3.38 G: 3; .375 INJECTION, SOLUTION INTRAVENOUS at 17:47

## 2025-01-31 RX ADMIN — VANCOMYCIN HYDROCHLORIDE 750 MG: 750 INJECTION, SOLUTION INTRAVENOUS at 13:16

## 2025-01-31 RX ADMIN — IOHEXOL 80 ML: 350 INJECTION, SOLUTION INTRAVENOUS at 16:31

## 2025-01-31 RX ADMIN — SILVER NITRATE 1 APPLICATION: 38.21; 12.74 STICK TOPICAL at 23:35

## 2025-01-31 RX ADMIN — PIPERACILLIN SODIUM AND TAZOBACTAM SODIUM 3.38 G: 3; .375 INJECTION, SOLUTION INTRAVENOUS at 12:40

## 2025-01-31 RX ADMIN — SILVER NITRATE APPLICATORS 1 APPLICATION: 25; 75 STICK TOPICAL at 23:35

## 2025-01-31 RX ADMIN — MORPHINE SULFATE 4 MG: 4 INJECTION INTRAVENOUS at 12:27

## 2025-01-31 ASSESSMENT — ENCOUNTER SYMPTOMS
ABDOMINAL DISTENTION: 0
NAUSEA: 0
VOMITING: 0
EYES NEGATIVE: 1
RESPIRATORY NEGATIVE: 1
FEVER: 0
CHILLS: 0
MUSCULOSKELETAL NEGATIVE: 1
NEUROLOGICAL NEGATIVE: 1
CARDIOVASCULAR NEGATIVE: 1
DIAPHORESIS: 0

## 2025-01-31 ASSESSMENT — PAIN SCALES - GENERAL
PAINLEVEL_OUTOF10: 8
PAINLEVEL_OUTOF10: 0 - NO PAIN
PAINLEVEL_OUTOF10: 10 - WORST POSSIBLE PAIN

## 2025-01-31 ASSESSMENT — PAIN - FUNCTIONAL ASSESSMENT: PAIN_FUNCTIONAL_ASSESSMENT: 0-10

## 2025-01-31 ASSESSMENT — LIFESTYLE VARIABLES
TOTAL SCORE: 0
HAVE YOU EVER FELT YOU SHOULD CUT DOWN ON YOUR DRINKING: NO
EVER FELT BAD OR GUILTY ABOUT YOUR DRINKING: NO
EVER HAD A DRINK FIRST THING IN THE MORNING TO STEADY YOUR NERVES TO GET RID OF A HANGOVER: NO
HAVE PEOPLE ANNOYED YOU BY CRITICIZING YOUR DRINKING: NO

## 2025-01-31 ASSESSMENT — PAIN DESCRIPTION - LOCATION: LOCATION: ABDOMEN

## 2025-01-31 NOTE — H&P
" [facility]  ACUTE CARE SURGERY - HISTORY AND PHYSICAL / CONSULT    Patient Name: Mk Fleming  MRN: [id number]  Admit Date: 131  : 1950  AGE: 74 y.o.   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 Patient states that around 3 days ago, his \"guts fell out\" and he was having leakage around the PEG site with feeds, which prompted him to come to the ED. He was last seen by GI outpatient 25 who noted that PEG was in place and he was gaining weight with tube feeds. Last peg dislodgement appears to be 2024, for which PEG was replaced bedside in the ED without issue.   Patient states he is passing gas and having BM. Has mild abdominal pain, is tender to palpation around peg site.     RECS:  - CT showing balloon dislodged through subcutaneous tissue, skin.   - Will see for wound care  - Will place and maintain briscoe through tract  - Admit to [facility]      ==============================================================================  CHIEF COMPLAINT/REASON FOR CONSULT:  Peg tube dislodgment    PAST MEDICAL HISTORY:   PMH:   Past Medical History:   Diagnosis Date    Anemia     Cholecystitis     Cholelithiasis     Cirrhosis (Multi)     CKD (chronic kidney disease)     stage 3    COPD (chronic obstructive pulmonary disease) (Multi)     Dysphagia     peg dependent    GERD (gastroesophageal reflux disease)     HCV (hepatitis C virus)     s/p SVR    Hoarseness of voice     Hyperlipidemia     Hypertension     Hypothyroidism     Laryngeal cancer (Multi)     s/p chemo and radiation " therapy    PAD (peripheral artery disease) (CMS-HCC)     Personal history of other diseases of the respiratory system     History of bronchitis    Personal history of other specified conditions     History of chest pain    Pulmonary emphysema (Multi)         PSH:   Cholecystectomy 24   Past Surgical History:   Procedure Laterality Date    ESOPHAGOGASTRODUODENOSCOPY      HERNIA REPAIR      IR ANGIOGRAM AORTA ABDOMEN  2022    IR ANGIOGRAM AORTA ABDOMEN 7/3/2022 [facility] CLINICAL LEGACY    IR ANGIOGRAM INFERIOR EPIGASTRIC  2022    IR ANGIOGRAM INFERIOR EPIGASTRIC 7/3/2022 [facility] CLINICAL LEGACY    IR EMBOLIZATION LYMPH NODE Bilateral 2022    IR EMBOLIZATION LYMPH NODE 7/3/2022 [facility] CLINICAL LEGACY    OTHER SURGICAL HISTORY  2018    Tooth extraction    OTHER SURGICAL HISTORY  2018    Pulmonary decortication    OTHER SURGICAL HISTORY  10/20/2022    Percutaneous endoscopic gastrostomy tube insertion     FH:   Family History   Problem Relation Name Age of Onset    Pancreatic cancer Mother      Hypertension Sister      Hypertension Brother       SOCIAL HISTORY:    Smoking:   Social History     Tobacco Use   Smoking Status Former    Current packs/day: 0.00    Types: Cigarettes    Quit date:     Years since quittin.1    Passive exposure: Never   Smokeless Tobacco Never       Alcohol:   Social History     Substance and Sexual Activity   Alcohol Use Not Currently         MEDICATIONS:   Prior to Admission medications    Medication Sig Start Date End Date Taking? Authorizing Provider   albuterol 2.5 mg /3 mL (0.083 %) nebulizer solution Inhale 3 mL every 6 hours if needed for wheezing. 4-6 hours as needed 19   Historical Provider, MD   albuterol 90 mcg/actuation inhaler Inhale 2 puffs every 6 hours if needed for shortness of breath. 23   Historical Provider, MD   amLODIPine (Norvasc) 5 mg tablet  1/3/25   Historical Provider, MD   atenolol (Tenormin) 50 mg tablet 1 tablet (50 mg) by  g-tube route once daily.    Historical Provider, MD   atorvastatin (Lipitor) 10 mg tablet 1 tablet (10 mg) by g-tube route once daily at bedtime.    Historical Provider, MD   chlorhexidine (Peridex) 0.12 % solution PLACE 15 MILLILITERS SWISH IN MOUTH FOR 30 SECONDS THEN SPIT OUT 7/11/24   Historical Provider, MD   esomeprazole (NexIUM) 20 mg packet Take 20 mg by mouth once daily in the morning. Take before meals. 1/3/24 12/28/24  Jaime Flores MD   fluticasone (Flonase) 50 mcg/actuation nasal spray Administer 1 spray into each nostril once daily as needed. 7/7/21   Historical Provider, MD   hydroCHLOROthiazide (HYDRODiuril) 25 mg tablet 1 tablet (25 mg) by g-tube route once daily.    Historical Provider, MD   levothyroxine (Synthroid, Levoxyl) 50 mcg tablet 1 tablet (50 mcg) by g-tube route once daily in the morning. Take before meals.    Historical Provider, MD   losartan (Cozaar) 50 mg tablet 1 tablet (50 mg) by g-tube route once daily.    Historical Provider, MD   methocarbamol (Robaxin) 500 mg tablet 1 tablet (500 mg) by g-tube route every 8 hours for 7 days. 5/4/24 5/11/24  Shawna Gonzáles MD   naloxone (Narcan) 4 mg/0.1 mL nasal spray Administer 1 spray (4 mg) into affected nostril(s) if needed for opioid reversal or respiratory depression. May repeat every 2-3 minutes if needed, alternating nostrils, until medical assistance becomes available. 12/23/23   Raymond Rubio MD   polyethylene glycol (Miralax) 17 gram/dose powder Mix 17 g of powder and drink once daily as needed (constipation).    Historical Provider, MD   terazosin (Hytrin) 5 mg capsule  6/30/24   Historical Provider, MD     ALLERGIES:   No Known Allergies    REVIEW OF SYSTEMS:  Review of Systems   Constitutional:  Negative for chills, diaphoresis and fever.   HENT: Negative.     Eyes: Negative.    Respiratory: Negative.     Cardiovascular: Negative.    Gastrointestinal:  Negative for abdominal distention, nausea and vomiting.         Mild abdominal pain with palpation   Genitourinary: Negative.    Musculoskeletal: Negative.    Neurological: Negative.      PHYSICAL EXAM:  Physical Exam  Constitutional:       General: He is not in acute distress.     Appearance: He is not ill-appearing, toxic-appearing or diaphoretic.   HENT:      Head: Normocephalic and atraumatic.      Nose: Nose normal.      Mouth/Throat:      Mouth: Mucous membranes are dry.      Pharynx: Oropharynx is clear.   Eyes:      Extraocular Movements: Extraocular movements intact.      Conjunctiva/sclera: Conjunctivae normal.   Cardiovascular:      Rate and Rhythm: Normal rate and regular rhythm.   Pulmonary:      Effort: Pulmonary effort is normal. No respiratory distress.   Abdominal:      Palpations: There is no mass.      Tenderness: There is abdominal tenderness. There is no rebound.   Musculoskeletal:      Cervical back: Normal range of motion.   Skin:     General: Skin is warm and dry.   Neurological:      General: No focal deficit present.      Mental Status: He is alert and oriented to person, place, and time.   Psychiatric:         Mood and Affect: Mood normal.         Behavior: Behavior normal.       IMAGING SUMMARY:    IMPRESSION:  1.  Dislodged and embedded percutaneous gastrostomy balloon and  stomal length within the left upper abdominal subcutaneous and  intramuscular space. There are surrounding inflammatory changes and  contained non loculated fluid collection, which may require drainage  and or debridement prior to possible PEG tube  reinsertion/replacement. Anterior gastric wall is in close proximity  of the abdominal wall thickening and enterocutaneous fistula should  be ruled out  2. Remaining findings as described above.    LABS:          Results from last 7 days   Lab Units 01/31/25  1110   SODIUM mmol/L 130*   POTASSIUM mmol/L 5.2   CHLORIDE mmol/L 91*   CO2 mmol/L 30   BUN mg/dL 119*   CREATININE mg/dL 3.28*   CALCIUM mg/dL 9.7   PROTEIN TOTAL g/dL 8.4*    BILIRUBIN TOTAL mg/dL 0.5   ALK PHOS U/L 85   ALT U/L 10   AST U/L 17   GLUCOSE mg/dL 113*                 I have reviewed all laboratory and imaging results ordered/pertinent for this encounter.

## 2025-01-31 NOTE — ED NOTES
Per Dr. Chen no BC to be obtained prior to IV ATB administration.        Yue Carter, RN  01/31/25 0217

## 2025-01-31 NOTE — PROGRESS NOTES
Patient has been identified as having an emergent need for administration of iodinated contrast for CT scan prior to result of laboratory studies OR despite known elevated GFR due to possibility of life and/or limb threatening pathology.    I acknowledge the risks and benefits of emergently proceeding with contrast administration including that, at present, it is the position of the [employer] College [employer] that contrast induced nephropathy (SEKOU) is a rare but possible consequence. At this time the benefits of proceeding with contrast administration outweigh the risks.    Attempts will be made to mitigate possible SEKOU risk with IV fluid hydration if able.    Garcia Cortez, [facility]

## 2025-01-31 NOTE — CONSULTS
"Vancomycin Dosing by Pharmacy- Initial    Mk Fleming is a 74 y.o. year old male who Pharmacy has been consulted for vancomycin dosing for cellulitis, skin and soft tissue. Based on the patient's indication and renal status this patient is being dosed based on a goal AUC of 400-600.     Renal function can be described as No Renal Function Information Available at this time    Renal function is unknown compared to Sept 2024      Visit Vitals  /68   Pulse 89   Temp 36.6 °C (97.9 °F) (Oral)   Resp 18        Lab Results   Component Value Date    CREATININE 2.10 (H) 09/09/2024    CREATININE 2.02 (H) 07/28/2024    CREATININE 2.50 (H) 07/27/2024    CREATININE 2.59 (H) 07/26/2024        Patient weight is No results found for: \"PTWEIGHT\"    Lab Results   Component Value Date    VANCORANDOM 7.0 07/04/2022        No intake/output data recorded.    Lab Results   Component Value Date    PATIENTTEMP 37.0 01/31/2025    PATIENTTEMP 37.0 01/08/2023    PATIENTTEMP 37.0 12/21/2022    PATIENTTEMP 37.0 07/03/2022          Assessment/Plan    Patient will be given a loading dose of 1250 mg (~20mg/kg)    Follow-up level will be ordered on 2/1 at AM labs unless clinically indicated sooner.  Will continue to monitor renal function daily while on vancomycin and order serum creatinine at least every 48 hours if not already ordered.  Follow for continued vancomycin needs, clinical response, and signs/symptoms of toxicity.     Thank you for allowing me to participate in the care of this patient.     Chetna Almodovar, PharmD             "

## 2025-01-31 NOTE — PROGRESS NOTES
Pharmacy Medication History Review    Mk Fleming is a 74 y.o. male admitted for PEG tube malfunction (Multi). Pharmacy reviewed the patient's [id number] medications and allergies for accuracy.    Medications ADDED:  Sucralfate  Oxycodone  Prochlorperazine  Medications CHANGED:  Methocarbamol   Atorvastatin  Medications REMOVED:   none     The list below reflects the updated PTA list.   Prior to Admission Medications   Prescriptions Last Dose Informant   albuterol 2.5 mg /3 mL (0.083 %) nebulizer solution Past Month Self   Sig: Inhale 3 mL every 6 hours if needed for wheezing. 4-6 hours as needed   albuterol 90 mcg/actuation inhaler Past Month Self   Sig: Inhale 2 puffs every 6 hours if needed for shortness of breath.   amLODIPine (Norvasc) 5 mg tablet 2025 Self   Si tablet (5 mg) by g-tube route once daily.   atenolol (Tenormin) 50 mg tablet 2025 Self   Si tablet (50 mg) by g-tube route once daily.   atorvastatin (Lipitor) 10 mg tablet 2025 Self   Si tablet (10 mg) by g-tube route once daily at bedtime.   Patient taking differently: 1 tablet (10 mg) by g-tube route once daily in the morning.   chlorhexidine (Peridex) 0.12 % solution 2025 Self   Sig: PLACE 15 MILLILITERS SWISH IN MOUTH FOR 30 SECONDS THEN SPIT OUT   esomeprazole (NexIUM) 20 mg packet 2025 Self   Sig: Take 20 mg by mouth once daily in the morning. Take before meals.   fluticasone (Flonase) 50 mcg/actuation nasal spray Past Month Self   Sig: Administer 1 spray into each nostril once daily as needed.   hydroCHLOROthiazide (HYDRODiuril) 25 mg tablet 2025 Self   Si tablet (25 mg) by g-tube route once daily.   levothyroxine (Synthroid, Levoxyl) 50 mcg tablet 2025 Self   Si tablet (50 mcg) by g-tube route once daily in the morning. Take before meals.   losartan (Cozaar) 50 mg tablet 2025 Self   Si tablet (50 mg) by g-tube route once daily.   methocarbamol (Robaxin) 500 mg tablet  Not Taking    Si tablet (500 mg) by g-tube route every 8 hours for 7 days.   Patient not taking: Reported on 2025   naloxone (Narcan) 4 mg/0.1 mL nasal spray  Self   Sig: Administer 1 spray (4 mg) into affected nostril(s) if needed for opioid reversal or respiratory depression. May repeat every 2-3 minutes if needed, alternating nostrils, until medical assistance becomes available.   oxyCODONE (Roxicodone) 5 mg immediate release tablet 2025 Self   Si tablet (5 mg) by g-tube route every 12 hours if needed for moderate pain (4 - 6).   polyethylene glycol (Miralax) 17 gram/dose powder Past Week Self   Sig: Mix 17 g of powder and drink once daily as needed (constipation).   prochlorperazine (Compazine) 10 mg tablet Past Week Self   Si tablet (10 mg) by g-tube route every 6 hours if needed for vomiting or nausea.   sucralfate (Carafate) 100 mg/mL suspension 2025 Self   Sig: 10 mL (1 g) by g-tube route 2 times a day.   terazosin (Hytrin) 5 mg capsule 2025 Self   Si capsule (5 mg) by g-tube route once daily.      Facility-Administered Medications: None        The list below reflects the updated allergy list. Please review each documented allergy for additional clarification and justification.  Allergies  Reviewed by Jeane Mcallister on 2025   No Known Allergies         Patient accepts M2B at discharge.     Sources:   [facility]  Pharmacy dispense history  Patient interview Good historian  Chart Review  Care Everywhere  After visit summary from [facility] on 10/15/2024 and 2025.      Additional Comments:  Patient reports taking atorvastatin as 1 tablet by g-tube route once daily in the morning.   Patient reports he does not take methocarbamol.   Patient reports taking Sucralfate suspension 2 times daily with meals.   Patient reports taking Oxycodone and Prochlorperazine as needed.      JEANE MCALLISTER  Pharmacy Technician  25     Secure Chat preferred   If no response call  "[id number] or Vocera \"Med Rec\"   "

## 2025-01-31 NOTE — ED TRIAGE NOTES
BIB [facility] for PEG tube complications. Prolapse and leaking observed around PEG site. Pt states during his enteral feeds he noticed it was leaking around the site and endorses increased pain around site X1 Day.   HX: laryngeal squamous cell carcinoma,COPD, HTN, hypothyroidism,

## 2025-01-31 NOTE — ED PROVIDER NOTES
History of Present Illness     History provided by: Patient  Limitations to History: None  External Records Reviewed with Brief Summary: None    HPI:  Mk Fleming is a 74 y.o. male with pmhx of HCV, compensated cirrhosis and esophageal stricture s/p radiation from laryngeal carcinoma presenting with abdominal pain and leaking from his PEG tube insertion site.     Patient is purely dependent on PEG tube, does not take in any food PO. He noticed about three days ago that there was tube feed leaking from the PEG tube insertion site, with some protrusion of stoma. He thought it would go away on its own, which is why he delayed presentation. He has hot had a bowel movement for two days, which he associates with his increased use of oxycodone for pain (3x a day), which he said is normal for him when he takes oxy, He has passed flatus and has no changes in urination. He endorsed some nausea but no vomiting, fever or chills.     Physical Exam   Triage vitals:  T 36.6 °C (97.9 °F)  HR 89  /68  RR 18  O2 97 % None (Room air)    Physical Exam  Constitutional:       Appearance: Normal appearance.   Cardiovascular:      Rate and Rhythm: Normal rate and regular rhythm.      Pulses: Normal pulses.      Heart sounds: Normal heart sounds.   Pulmonary:      Effort: Pulmonary effort is normal.      Breath sounds: Normal breath sounds.   Abdominal:      General: Bowel sounds are normal. There is no distension.      Palpations: Abdomen is soft.      Tenderness: There is abdominal tenderness. There is no guarding or rebound.      Comments: Stoma protruding from skin with areas of necrotic tissue, able to express tube feeds from area.    Neurological:      Mental Status: He is alert.          Medical Decision Making & ED Course   Medical Decision Makin y.o. male dependent on PEG feeding s/p laryngeal carcinoma leading to esophageal stricture presenting with prolapsed stoma from PEG tube insertion site. He is currently  hemodynamically stable with no fever, tachycardia, and hypotension. However the stoma is firm and violaceous with areas concerning for ischemia and necrosis, as well as a possible perforation of the necrotic areas. Since the abdomen is tender without guarding, there is unlikely an intraabdominal perforation, and since he is still passing gas a SBO is unlikely. We will get a CT of the abdomen to evaluate for further intraabdominal pathology.   ----      Differential diagnoses considered include but are not limited to: necrosis of stoma, SBO, abdominal perforation      Social Determinants of Health which Significantly Impact Care: None identified     EKG Independent Interpretation: EKG interpreted by myself. Please see ED Course for full interpretation.    Independent Result Review and Interpretation: Relevant laboratory and radiographic results were reviewed and independently interpreted by myself.  As necessary, they are commented on in the ED Course.    Chronic conditions affecting the patient's care: As documented above in [facility]    The patient was discussed with the following consultants/services:  acute care surgery.    Care Considerations: As documented above in [facility]    ED Course:  ED Course as of 02/02/25 2237 Fri Jan 31, 2025   1045 ATTENDING ATTESTATION  74-year-old male with multiple medical comorbidities most notable for esophageal carcinoma status post radiation currently purely dependent on a PEG tube for nutrition presenting to the emergency department with abdominal pain distention and loss of PEG tube function.  The patient states that the tube fell out about 3 days ago, he noted prolapse and pain to the externalized portion of the got, thought it would get better over time.  As the patient arrives his hemodynamics are normal, no tachycardia no fever no obvious evidence of sepsis, as we undress and evaluate the stoma, it is firm, violaceous with some superficial dark color change consistent with  ischemia and early necrosis, there appears to be PEG tube feeding that we will extravasate from the lateral aspect of the stoma and a concern for possible perforation of the exposed area.  The abdomen is tender without guarding, we will obtain CT scan to evaluate the interabdominal space, he is passing gas so I doubt obstruction, CT to see and evaluate the patient anticipate he will need to come in for further evaluation, possible surgical revision, we will preemptively start antibiotics  Critical access hospital [[facility]]   1202 BLOOD GAS VENOUS FULL PANEL(!!)  Was full panel significant for normal pH, mild hypercapnia, elevated potassium of 6.1 with formal potassium pending, and normal lactate 1.6. [MH]   1256 Blood Gas Venous Full Panel Unsolicited(!)  Venous full panel with normal pH and lactate. [MH]   1256 CBC and Auto Differential(!)  CBC without leukocytosis and chronic anemia with a hemoglobin 9.2. [MH]   1257 Blood Gas Venous Full Panel Unsolicited(!)  Venous full panel with normal potassium. [MH]   1317 Urinalysis with Reflex Culture and Microscopic(!)  UA without thigh concerning for UTI. [MH]   1317 Comprehensive metabolic panel(!!)  BUN and creatinine near baseline.  No indication for emergent dialysis at this time. []      ED Course User Index  [] Vahid Chen MD  [] Garcia Cortez, DO         Diagnoses as of 02/02/25 2237   PEG tube malfunction (Multi)     Disposition   ACS noted to the admit the patient to their service for plan for OR intervention.  Discussed ED findings and admission with the patient.  Patient stated understanding and agreement with the plan.  All questions were answered.  Patient admitted to ACS in stable condition.    Procedures       Patient seen and discussed with ED attending physician.    JOSEFINA TIERNEY MS3  Emergency Medicine     Josefina Tierney  01/31/25 1240    In agreement with above.  Edited as needed.  Vahid Chen MD  Emergency medicine PGY 3       Vahid Chen,  MD  Resident  02/02/25 1447

## 2025-02-01 ENCOUNTER — APPOINTMENT (OUTPATIENT)
Dept: RADIOLOGY | Facility: HOSPITAL | Age: 75
End: 2025-02-01
Payer: COMMERCIAL

## 2025-02-01 LAB
ALBUMIN SERPL BCP-MCNC: 2.9 G/DL (ref 3.4–5)
ANION GAP SERPL CALC-SCNC: 13 MMOL/L (ref 10–20)
ATRIAL RATE: 83 BPM
BUN SERPL-MCNC: 109 MG/DL (ref 6–23)
CALCIUM SERPL-MCNC: 8.8 MG/DL (ref 8.6–10.6)
CHLORIDE SERPL-SCNC: 94 MMOL/L (ref 98–107)
CO2 SERPL-SCNC: 30 MMOL/L (ref 21–32)
CREAT SERPL-MCNC: 3.48 MG/DL (ref 0.5–1.3)
EGFRCR SERPLBLD CKD-EPI 2021: 18 ML/MIN/1.73M*2
ERYTHROCYTE [DISTWIDTH] IN BLOOD BY AUTOMATED COUNT: 14.2 % (ref 11.5–14.5)
GLUCOSE SERPL-MCNC: 92 MG/DL (ref 74–99)
HCT VFR BLD AUTO: 23.8 % (ref 41–52)
HGB BLD-MCNC: 7.8 G/DL (ref 13.5–17.5)
HOLD SPECIMEN: NORMAL
MAGNESIUM SERPL-MCNC: 2.64 MG/DL (ref 1.6–2.4)
MCH RBC QN AUTO: 31.3 PG (ref 26–34)
MCHC RBC AUTO-ENTMCNC: 32.8 G/DL (ref 32–36)
MCV RBC AUTO: 96 FL (ref 80–100)
NRBC BLD-RTO: 0 /100 WBCS (ref 0–0)
P AXIS: 88 DEGREES
P OFFSET: 194 MS
P ONSET: 143 MS
PHOSPHATE SERPL-MCNC: 4.4 MG/DL (ref 2.5–4.9)
PLATELET # BLD AUTO: 132 X10*3/UL (ref 150–450)
POTASSIUM SERPL-SCNC: 4.4 MMOL/L (ref 3.5–5.3)
PR INTERVAL: 162 MS
Q ONSET: 224 MS
QRS COUNT: 14 BEATS
QRS DURATION: 70 MS
QT INTERVAL: 362 MS
QTC CALCULATION(BAZETT): 425 MS
QTC FREDERICIA: 403 MS
R AXIS: 74 DEGREES
RBC # BLD AUTO: 2.49 X10*6/UL (ref 4.5–5.9)
SODIUM SERPL-SCNC: 133 MMOL/L (ref 136–145)
T AXIS: 84 DEGREES
T OFFSET: 405 MS
VANCOMYCIN SERPL-MCNC: 16.7 UG/ML (ref 5–20)
VENTRICULAR RATE: 83 BPM
WBC # BLD AUTO: 4.9 X10*3/UL (ref 4.4–11.3)

## 2025-02-01 PROCEDURE — 2500000004 HC RX 250 GENERAL PHARMACY W/ HCPCS (ALT 636 FOR OP/ED)

## 2025-02-01 PROCEDURE — 74018 RADEX ABDOMEN 1 VIEW: CPT

## 2025-02-01 PROCEDURE — 1100000001 HC PRIVATE ROOM DAILY

## 2025-02-01 PROCEDURE — 74176 CT ABD & PELVIS W/O CONTRAST: CPT | Performed by: RADIOLOGY

## 2025-02-01 PROCEDURE — 85027 COMPLETE CBC AUTOMATED: CPT

## 2025-02-01 PROCEDURE — 80202 ASSAY OF VANCOMYCIN: CPT | Performed by: PHARMACIST

## 2025-02-01 PROCEDURE — 2500000004 HC RX 250 GENERAL PHARMACY W/ HCPCS (ALT 636 FOR OP/ED): Performed by: STUDENT IN AN ORGANIZED HEALTH CARE EDUCATION/TRAINING PROGRAM

## 2025-02-01 PROCEDURE — 74018 RADEX ABDOMEN 1 VIEW: CPT | Performed by: STUDENT IN AN ORGANIZED HEALTH CARE EDUCATION/TRAINING PROGRAM

## 2025-02-01 PROCEDURE — 99231 SBSQ HOSP IP/OBS SF/LOW 25: CPT | Performed by: SURGERY

## 2025-02-01 PROCEDURE — 2500000004 HC RX 250 GENERAL PHARMACY W/ HCPCS (ALT 636 FOR OP/ED): Mod: JZ

## 2025-02-01 PROCEDURE — 93010 ELECTROCARDIOGRAM REPORT: CPT

## 2025-02-01 PROCEDURE — 36415 COLL VENOUS BLD VENIPUNCTURE: CPT

## 2025-02-01 PROCEDURE — 2500000004 HC RX 250 GENERAL PHARMACY W/ HCPCS (ALT 636 FOR OP/ED): Performed by: PHARMACY TECHNICIAN

## 2025-02-01 PROCEDURE — 74176 CT ABD & PELVIS W/O CONTRAST: CPT

## 2025-02-01 PROCEDURE — 83735 ASSAY OF MAGNESIUM: CPT

## 2025-02-01 PROCEDURE — 84100 ASSAY OF PHOSPHORUS: CPT

## 2025-02-01 RX ORDER — SODIUM CHLORIDE, SODIUM LACTATE, POTASSIUM CHLORIDE, CALCIUM CHLORIDE 600; 310; 30; 20 MG/100ML; MG/100ML; MG/100ML; MG/100ML
100 INJECTION, SOLUTION INTRAVENOUS CONTINUOUS
Status: DISCONTINUED | OUTPATIENT
Start: 2025-02-01 | End: 2025-02-01

## 2025-02-01 RX ORDER — LOSARTAN POTASSIUM 50 MG/1
50 TABLET ORAL DAILY
Status: DISCONTINUED | OUTPATIENT
Start: 2025-02-01 | End: 2025-02-05 | Stop reason: HOSPADM

## 2025-02-01 RX ORDER — ALBUTEROL SULFATE 90 UG/1
2 INHALANT RESPIRATORY (INHALATION) EVERY 6 HOURS PRN
Status: DISCONTINUED | OUTPATIENT
Start: 2025-02-01 | End: 2025-02-05 | Stop reason: HOSPADM

## 2025-02-01 RX ORDER — PANTOPRAZOLE SODIUM 40 MG/10ML
40 INJECTION, POWDER, LYOPHILIZED, FOR SOLUTION INTRAVENOUS
Status: DISCONTINUED | OUTPATIENT
Start: 2025-02-02 | End: 2025-02-05 | Stop reason: HOSPADM

## 2025-02-01 RX ORDER — ONDANSETRON HYDROCHLORIDE 2 MG/ML
4 INJECTION, SOLUTION INTRAVENOUS ONCE
Status: COMPLETED | OUTPATIENT
Start: 2025-02-01 | End: 2025-02-01

## 2025-02-01 RX ORDER — AMLODIPINE BESYLATE 5 MG/1
5 TABLET ORAL DAILY
Status: DISCONTINUED | OUTPATIENT
Start: 2025-02-01 | End: 2025-02-05 | Stop reason: HOSPADM

## 2025-02-01 RX ORDER — HYDROMORPHONE HYDROCHLORIDE 0.2 MG/ML
0.2 INJECTION INTRAMUSCULAR; INTRAVENOUS; SUBCUTANEOUS EVERY 4 HOURS PRN
Status: DISCONTINUED | OUTPATIENT
Start: 2025-02-01 | End: 2025-02-05 | Stop reason: HOSPADM

## 2025-02-01 RX ORDER — TERAZOSIN 5 MG/1
5 CAPSULE ORAL DAILY
Status: DISCONTINUED | OUTPATIENT
Start: 2025-02-01 | End: 2025-02-05 | Stop reason: HOSPADM

## 2025-02-01 RX ORDER — PROCHLORPERAZINE MALEATE 10 MG
10 TABLET ORAL EVERY 6 HOURS PRN
Status: DISCONTINUED | OUTPATIENT
Start: 2025-02-01 | End: 2025-02-05 | Stop reason: HOSPADM

## 2025-02-01 RX ORDER — ATENOLOL 50 MG/1
50 TABLET ORAL DAILY
Status: DISCONTINUED | OUTPATIENT
Start: 2025-02-01 | End: 2025-02-05 | Stop reason: HOSPADM

## 2025-02-01 RX ORDER — VANCOMYCIN HYDROCHLORIDE 1 G/200ML
1000 INJECTION, SOLUTION INTRAVENOUS ONCE
Status: COMPLETED | OUTPATIENT
Start: 2025-02-01 | End: 2025-02-01

## 2025-02-01 RX ORDER — SODIUM CHLORIDE, SODIUM LACTATE, POTASSIUM CHLORIDE, CALCIUM CHLORIDE 600; 310; 30; 20 MG/100ML; MG/100ML; MG/100ML; MG/100ML
100 INJECTION, SOLUTION INTRAVENOUS CONTINUOUS
Status: ACTIVE | OUTPATIENT
Start: 2025-02-01 | End: 2025-02-02

## 2025-02-01 RX ORDER — FLUTICASONE PROPIONATE 50 MCG
1 SPRAY, SUSPENSION (ML) NASAL DAILY PRN
Status: DISCONTINUED | OUTPATIENT
Start: 2025-02-01 | End: 2025-02-05 | Stop reason: HOSPADM

## 2025-02-01 RX ORDER — ATORVASTATIN CALCIUM 10 MG/1
10 TABLET, FILM COATED ORAL EVERY MORNING
Status: DISCONTINUED | OUTPATIENT
Start: 2025-02-01 | End: 2025-02-05 | Stop reason: HOSPADM

## 2025-02-01 RX ORDER — ALBUTEROL SULFATE 0.83 MG/ML
3 SOLUTION RESPIRATORY (INHALATION) EVERY 6 HOURS PRN
Status: DISCONTINUED | OUTPATIENT
Start: 2025-02-01 | End: 2025-02-05 | Stop reason: HOSPADM

## 2025-02-01 RX ORDER — LEVOTHYROXINE SODIUM 50 UG/1
50 TABLET ORAL
Status: DISCONTINUED | OUTPATIENT
Start: 2025-02-02 | End: 2025-02-05 | Stop reason: HOSPADM

## 2025-02-01 RX ORDER — DIATRIZOATE MEGLUMINE AND DIATRIZOATE SODIUM 660; 100 MG/ML; MG/ML
60 SOLUTION ORAL; RECTAL ONCE
Status: DISCONTINUED | OUTPATIENT
Start: 2025-02-01 | End: 2025-02-05 | Stop reason: HOSPADM

## 2025-02-01 RX ADMIN — HYDROMORPHONE HYDROCHLORIDE 0.2 MG: 0.2 INJECTION, SOLUTION INTRAMUSCULAR; INTRAVENOUS; SUBCUTANEOUS at 08:36

## 2025-02-01 RX ADMIN — VANCOMYCIN HYDROCHLORIDE 1000 MG: 1 INJECTION, SOLUTION INTRAVENOUS at 11:38

## 2025-02-01 RX ADMIN — PIPERACILLIN SODIUM AND TAZOBACTAM SODIUM 3.38 G: 3; .375 INJECTION, SOLUTION INTRAVENOUS at 08:36

## 2025-02-01 RX ADMIN — HYDROMORPHONE HYDROCHLORIDE 0.2 MG: 0.2 INJECTION, SOLUTION INTRAMUSCULAR; INTRAVENOUS; SUBCUTANEOUS at 14:46

## 2025-02-01 RX ADMIN — ONDANSETRON 4 MG: 2 INJECTION INTRAMUSCULAR; INTRAVENOUS at 21:49

## 2025-02-01 RX ADMIN — SODIUM CHLORIDE, POTASSIUM CHLORIDE, SODIUM LACTATE AND CALCIUM CHLORIDE 100 ML/HR: 600; 310; 30; 20 INJECTION, SOLUTION INTRAVENOUS at 03:36

## 2025-02-01 RX ADMIN — SODIUM CHLORIDE, POTASSIUM CHLORIDE, SODIUM LACTATE AND CALCIUM CHLORIDE 100 ML/HR: 600; 310; 30; 20 INJECTION, SOLUTION INTRAVENOUS at 13:20

## 2025-02-01 RX ADMIN — PIPERACILLIN SODIUM AND TAZOBACTAM SODIUM 3.38 G: 3; .375 INJECTION, SOLUTION INTRAVENOUS at 21:19

## 2025-02-01 RX ADMIN — PIPERACILLIN SODIUM AND TAZOBACTAM SODIUM 3.38 G: 3; .375 INJECTION, SOLUTION INTRAVENOUS at 02:34

## 2025-02-01 RX ADMIN — PIPERACILLIN SODIUM AND TAZOBACTAM SODIUM 3.38 G: 3; .375 INJECTION, SOLUTION INTRAVENOUS at 14:46

## 2025-02-01 RX ADMIN — HYDROMORPHONE HYDROCHLORIDE 0.2 MG: 0.2 INJECTION, SOLUTION INTRAMUSCULAR; INTRAVENOUS; SUBCUTANEOUS at 20:50

## 2025-02-01 SDOH — SOCIAL STABILITY: SOCIAL INSECURITY: DO YOU FEEL UNSAFE GOING BACK TO THE PLACE WHERE YOU ARE LIVING?: NO

## 2025-02-01 SDOH — SOCIAL STABILITY: SOCIAL INSECURITY: DOES ANYONE TRY TO KEEP YOU FROM HAVING/CONTACTING OTHER FRIENDS OR DOING THINGS OUTSIDE YOUR HOME?: NO

## 2025-02-01 SDOH — HEALTH STABILITY: MENTAL HEALTH: HOW OFTEN DO YOU HAVE SIX OR MORE DRINKS ON ONE OCCASION?: NEVER

## 2025-02-01 SDOH — SOCIAL STABILITY: SOCIAL INSECURITY
WITHIN THE LAST YEAR, HAVE YOU BEEN RAPED OR FORCED TO HAVE ANY KIND OF SEXUAL ACTIVITY BY YOUR PARTNER OR EX-PARTNER?: NO

## 2025-02-01 SDOH — ECONOMIC STABILITY: FOOD INSECURITY: WITHIN THE PAST 12 MONTHS, YOU WORRIED THAT YOUR FOOD WOULD RUN OUT BEFORE YOU GOT THE MONEY TO BUY MORE.: NEVER TRUE

## 2025-02-01 SDOH — ECONOMIC STABILITY: HOUSING INSECURITY: IN THE LAST 12 MONTHS, WAS THERE A TIME WHEN YOU WERE NOT ABLE TO PAY THE MORTGAGE OR RENT ON TIME?: NO

## 2025-02-01 SDOH — HEALTH STABILITY: PHYSICAL HEALTH
HOW OFTEN DO YOU NEED TO HAVE SOMEONE HELP YOU WHEN YOU READ INSTRUCTIONS, PAMPHLETS, OR OTHER WRITTEN MATERIAL FROM YOUR DOCTOR OR PHARMACY?: NEVER

## 2025-02-01 SDOH — ECONOMIC STABILITY: TRANSPORTATION INSECURITY: IN THE PAST 12 MONTHS, HAS LACK OF TRANSPORTATION KEPT YOU FROM MEDICAL APPOINTMENTS OR FROM GETTING MEDICATIONS?: NO

## 2025-02-01 SDOH — SOCIAL STABILITY: SOCIAL NETWORK
DO YOU BELONG TO ANY CLUBS OR ORGANIZATIONS SUCH AS CHURCH GROUPS, UNIONS, FRATERNAL OR ATHLETIC GROUPS, OR SCHOOL GROUPS?: NO

## 2025-02-01 SDOH — SOCIAL STABILITY: SOCIAL INSECURITY: WITHIN THE LAST YEAR, HAVE YOU BEEN AFRAID OF YOUR PARTNER OR EX-PARTNER?: NO

## 2025-02-01 SDOH — SOCIAL STABILITY: SOCIAL INSECURITY: ARE YOU OR HAVE YOU BEEN THREATENED OR ABUSED PHYSICALLY, EMOTIONALLY, OR SEXUALLY BY ANYONE?: NO

## 2025-02-01 SDOH — SOCIAL STABILITY: SOCIAL INSECURITY: DO YOU FEEL ANYONE HAS EXPLOITED OR TAKEN ADVANTAGE OF YOU FINANCIALLY OR OF YOUR PERSONAL PROPERTY?: NO

## 2025-02-01 SDOH — SOCIAL STABILITY: SOCIAL INSECURITY: WITHIN THE LAST YEAR, HAVE YOU BEEN HUMILIATED OR EMOTIONALLY ABUSED IN OTHER WAYS BY YOUR PARTNER OR EX-PARTNER?: NO

## 2025-02-01 SDOH — SOCIAL STABILITY: SOCIAL INSECURITY: ARE THERE ANY APPARENT SIGNS OF INJURIES/BEHAVIORS THAT COULD BE RELATED TO ABUSE/NEGLECT?: NO

## 2025-02-01 SDOH — SOCIAL STABILITY: SOCIAL INSECURITY: HAVE YOU HAD ANY THOUGHTS OF HARMING ANYONE ELSE?: NO

## 2025-02-01 SDOH — SOCIAL STABILITY: SOCIAL INSECURITY: POSSIBLE ABUSE REPORTED TO:: OTHER (COMMENT)

## 2025-02-01 SDOH — HEALTH STABILITY: MENTAL HEALTH: HOW MANY DRINKS CONTAINING ALCOHOL DO YOU HAVE ON A TYPICAL DAY WHEN YOU ARE DRINKING?: PATIENT DOES NOT DRINK

## 2025-02-01 SDOH — ECONOMIC STABILITY: INCOME INSECURITY: IN THE PAST 12 MONTHS HAS THE ELECTRIC, GAS, OIL, OR WATER COMPANY THREATENED TO SHUT OFF SERVICES IN YOUR HOME?: NO

## 2025-02-01 SDOH — SOCIAL STABILITY: SOCIAL INSECURITY: HAVE YOU HAD THOUGHTS OF HARMING ANYONE ELSE?: YES

## 2025-02-01 SDOH — ECONOMIC STABILITY: FOOD INSECURITY: WITHIN THE PAST 12 MONTHS, THE FOOD YOU BOUGHT JUST DIDN'T LAST AND YOU DIDN'T HAVE MONEY TO GET MORE.: NEVER TRUE

## 2025-02-01 SDOH — SOCIAL STABILITY: SOCIAL INSECURITY: HAS ANYONE EVER THREATENED TO HURT YOUR FAMILY OR YOUR PETS?: NO

## 2025-02-01 SDOH — ECONOMIC STABILITY: HOUSING INSECURITY: AT ANY TIME IN THE PAST 12 MONTHS, WERE YOU HOMELESS OR LIVING IN A SHELTER (INCLUDING NOW)?: NO

## 2025-02-01 SDOH — HEALTH STABILITY: PHYSICAL HEALTH: ON AVERAGE, HOW MANY MINUTES DO YOU ENGAGE IN EXERCISE AT THIS LEVEL?: 10 MIN

## 2025-02-01 SDOH — HEALTH STABILITY: MENTAL HEALTH
DO YOU FEEL STRESS - TENSE, RESTLESS, NERVOUS, OR ANXIOUS, OR UNABLE TO SLEEP AT NIGHT BECAUSE YOUR MIND IS TROUBLED ALL THE TIME - THESE DAYS?: NOT AT ALL

## 2025-02-01 SDOH — SOCIAL STABILITY: SOCIAL INSECURITY: ABUSE: ADULT

## 2025-02-01 SDOH — SOCIAL STABILITY: SOCIAL INSECURITY: ARE YOU MARRIED, WIDOWED, DIVORCED, SEPARATED, NEVER MARRIED, OR LIVING WITH A PARTNER?: WIDOWED

## 2025-02-01 SDOH — SOCIAL STABILITY: SOCIAL NETWORK: IN A TYPICAL WEEK, HOW MANY TIMES DO YOU TALK ON THE PHONE WITH FAMILY, FRIENDS, OR NEIGHBORS?: ONCE A WEEK

## 2025-02-01 SDOH — SOCIAL STABILITY: SOCIAL INSECURITY: WERE YOU ABLE TO COMPLETE ALL THE BEHAVIORAL HEALTH SCREENINGS?: YES

## 2025-02-01 SDOH — SOCIAL STABILITY: SOCIAL NETWORK: HOW OFTEN DO YOU ATTEND CHURCH OR RELIGIOUS SERVICES?: NEVER

## 2025-02-01 SDOH — HEALTH STABILITY: MENTAL HEALTH: HOW OFTEN DO YOU HAVE A DRINK CONTAINING ALCOHOL?: NEVER

## 2025-02-01 SDOH — SOCIAL STABILITY: SOCIAL NETWORK: HOW OFTEN DO YOU GET TOGETHER WITH FRIENDS OR RELATIVES?: TWICE A WEEK

## 2025-02-01 SDOH — ECONOMIC STABILITY: HOUSING INSECURITY: IN THE PAST 12 MONTHS, HOW MANY TIMES HAVE YOU MOVED WHERE YOU WERE LIVING?: 0

## 2025-02-01 SDOH — ECONOMIC STABILITY: FOOD INSECURITY: HOW HARD IS IT FOR YOU TO PAY FOR THE VERY BASICS LIKE FOOD, HOUSING, MEDICAL CARE, AND HEATING?: NOT HARD AT ALL

## 2025-02-01 SDOH — SOCIAL STABILITY: SOCIAL NETWORK: HOW OFTEN DO YOU ATTEND MEETINGS OF THE CLUBS OR ORGANIZATIONS YOU BELONG TO?: NEVER

## 2025-02-01 SDOH — HEALTH STABILITY: MENTAL HEALTH: EXPERIENCED ANY OF THE FOLLOWING LIFE EVENTS: OTHER (COMMENT)

## 2025-02-01 ASSESSMENT — PATIENT HEALTH QUESTIONNAIRE - PHQ9
1. LITTLE INTEREST OR PLEASURE IN DOING THINGS: NOT AT ALL
SUM OF ALL RESPONSES TO PHQ9 QUESTIONS 1 & 2: 0
2. FEELING DOWN, DEPRESSED OR HOPELESS: NOT AT ALL
2. FEELING DOWN, DEPRESSED OR HOPELESS: NOT AT ALL
1. LITTLE INTEREST OR PLEASURE IN DOING THINGS: NOT AT ALL
SUM OF ALL RESPONSES TO PHQ9 QUESTIONS 1 & 2: 0

## 2025-02-01 ASSESSMENT — LIFESTYLE VARIABLES
SKIP TO QUESTIONS 9-10: 1
PRESCIPTION_ABUSE_PAST_12_MONTHS: NO
AUDIT-C TOTAL SCORE: 0
PRESCIPTION_ABUSE_PAST_12_MONTHS: NO
HOW OFTEN DO YOU HAVE 6 OR MORE DRINKS ON ONE OCCASION: NEVER
SUBSTANCE_ABUSE_PAST_12_MONTHS: NO
SUBSTANCE_ABUSE_PAST_12_MONTHS: NO
HOW OFTEN DO YOU HAVE A DRINK CONTAINING ALCOHOL: NEVER

## 2025-02-01 ASSESSMENT — COGNITIVE AND FUNCTIONAL STATUS - GENERAL
DAILY ACTIVITIY SCORE: 23
MOBILITY SCORE: 24
MOBILITY SCORE: 24
EATING MEALS: A LITTLE
PATIENT BASELINE BEDBOUND: NO

## 2025-02-01 ASSESSMENT — PAIN SCALES - GENERAL
PAINLEVEL_OUTOF10: 0 - NO PAIN
PAINLEVEL_OUTOF10: 8
PAINLEVEL_OUTOF10: 3
PAINLEVEL_OUTOF10: 7
PAINLEVEL_OUTOF10: 7

## 2025-02-01 ASSESSMENT — ACTIVITIES OF DAILY LIVING (ADL)
ADEQUATE_TO_COMPLETE_ADL: YES
PATIENT'S MEMORY ADEQUATE TO SAFELY COMPLETE DAILY ACTIVITIES?: YES
FEEDING YOURSELF: NEEDS ASSISTANCE
JUDGMENT_ADEQUATE_SAFELY_COMPLETE_DAILY_ACTIVITIES: YES
BATHING: INDEPENDENT
GROOMING: INDEPENDENT
DRESSING YOURSELF: INDEPENDENT
TOILETING: INDEPENDENT
LACK_OF_TRANSPORTATION: NO
ASSISTIVE_DEVICE: WALKER;OTHER (COMMENT)
HEARING - RIGHT EAR: FUNCTIONAL
WALKS IN HOME: INDEPENDENT
HEARING - LEFT EAR: FUNCTIONAL

## 2025-02-01 ASSESSMENT — PAIN - FUNCTIONAL ASSESSMENT
PAIN_FUNCTIONAL_ASSESSMENT: 0-10

## 2025-02-01 ASSESSMENT — PAIN DESCRIPTION - LOCATION
LOCATION: ABDOMEN
LOCATION: ABDOMEN

## 2025-02-01 NOTE — CONSULTS
"Nutrition Initial Assessment:   Nutrition Assessment    The patient is a 74 y.o. male who is hospital day #1.  Pt admitted d/t PEG dislodgement and prolapse of gastric mucosa around PEG site. CT showing ballon dislodged through subcutaneous tissue. Szymanski cath in place to maintain tract.     PMHx: HCV cirrhosis (MELD Na 21), supraglottic SCC c/b dysphagia and esophageal stenosis, who is PEG dependent (placed 2019), s/p several previous replacements for dislodgement    Reason for Assessment: Admission nursing screening  Home Tube Feeding or Total Parenteral Nutrition (TPN): Yes (Comment)  Dietitian Consult Needed: Yes (Comment)  Reason for Consult: Pt on  tube feeds    Nutrition History:  Food and Nutrient History: Per chart review, pt is being followed by outpatient SCC RD. Pt has been on regimen of Boost VHC bolus @237 ml (1 cartons) x4/d. Appears when pt compliant with 4 bolus feeds he is able to maintain wt. FWF recommended by RD: 60 ml pre meds/feeds and 180 ml post meds/feeds. Pt confirmed he has been doing the regimen (bolus feeds/FWF) mentioned. He tolerates this well. No abdominal pain/N/V. Denies C/D. No PO intake. No food allergies. Reports last time he got a feed was 2 days ago. Pt thinks PEG tube will be replaced today (02/01)  Vitamin/Herbal Supplement Use: None per pt       Anthropometrics:  Start of admission anthropometrics:  Height: 165.1 cm (5' 5\")  Weight: 61.2 kg (135 lb)  BMI (Calculated): 22.47  IBW/kg (Dietitian Calculated): 64.55 kg  Percent of IBW: 95 %    Wt Hx   02/01/25 61.2 kg (134 lb 14.7 oz)   01/08/25 62.9 kg (138 lb 11.2 oz)   11/05/24 61.7 kg (136 lb 0.4 oz)   08/20/24 56.9 kg (125 lb 6.4 oz)   08/04/24 59 kg (130 lb 1.1 oz)   05/01/24 60.8 kg (134 lb 0.6 oz)   04/25/24 62.1 kg (136 lb 12.8 oz)   04/22/24 62.2 kg (137 lb 0.5 oz)   02/06/24 62.2 kg (137 lb 1.6 oz)     Weight History / % Weight Change: Per EMR wt hx, 3% wt loss x1 month and 1.5% x3 months- non significant. Overall, wt " up x6 months. Pt reports stable wt around 135#.  Significant Weight Loss: No    Nutrition Focused Physical Exam Findings:  Subcutaneous Fat Loss   Orbital Fat Pads:  (mod-severe)  Buccal Fat Pads: Defer  Triceps: Mild-Moderate (less than ample fat tissue)  Muscle Wasting  Temporalis: Well nourished (well-defined muscle)  Pectoralis (Clavicular Region): Mild-Moderate (some protrusion of clavicle)  Deltoid/Trapezius: Mild-Moderate (slight protrusion of acromion process)  Interosseous: Mild-Moderate (slightly depressed area between thumb and forefinger)  Quadriceps: Mild-Moderate (mild depression on inner and outer thigh)  Gastrocnemius: Severe (minimal muscle definition)  Edema  Edema: none  Physical Findings   Hair: Negative  Eyes: Negative  Nails: Negative  Skin: Negative           Nutrition Significant Labs:    Results from last 7 days   Lab Units 02/01/25  0629 01/31/25  1110   HEMOGLOBIN g/dL 7.8* 9.2*   MCV fL 96 92   GLUCOSE mg/dL 92 113*   POTASSIUM mmol/L 4.4 5.2   SODIUM mmol/L 133* 130*   PHOSPHORUS mg/dL 4.4  --    MAGNESIUM mg/dL 2.64*  --    CREATININE mg/dL 3.48* 3.28*   BUN mg/dL 109* 119*   ALT U/L  --  10   AST U/L  --  17   ALK PHOS U/L  --  85     Lab Results   Component Value Date    VITD25 31 05/03/2024         Nutrition Specific Medications:  piperacillin-tazobactam, 3.375 g, intravenous, q6h  vancomycin, 1,000 mg, intravenous, Once  lactated Ringer's, 100 mL/hr, Last Rate: 100 mL/hr (02/01/25 0351)        I/O:   I/O last 2 completed shifts:  In: 175 (2.9 mL/kg) [I.V.:25 (0.4 mL/kg); IV Piggyback:150]  Out: - (0 mL/kg)   Weight: 61.2 kg     Dietary Orders (From admission, onward)       Start     Ordered    02/01/25 0113  May Not Participate in Room Service  ( ROOM SERVICE MAY NOT PARTICIPATE)  Once        Question:  .  Answer:  Yes    02/01/25 0112                     Estimated Needs:   Total Energy Estimated Needs in 24 hours (kCal): 2019.6 kCal  Method for Estimating Needs: 33 kcal/kg * 61.2  kg    Total Protein Estimated Needs in 24 Hours (g): 79.56 g (+)  Method for Estimating 24 Hour Protein Needs: 1.3+ g/kg * 61.2 kg    Method for Estimating 24 Hour Fluid Needs: 1 ml/kcal or per team          Nutrition Diagnosis   Malnutrition Diagnosis  Patient has Malnutrition Diagnosis: No (Losses in NFPE likely from previous time w/ significant wt loss/malnutrition. TF has been meeting >75% of nutr needs and no significant wt loss recently.)    Nutrition Diagnosis  Patient has Nutrition Diagnosis: Yes  Diagnosis Status (1): New  Nutrition Diagnosis 1: Swallowing difficulty  Related to (1): esophageal stenosis  As Evidenced by (1): TF dependent x6 years.       Nutrition Interventions/Recommendations   Nutrition prescription for enteral nutrition    Nutrition Recommendations:  When ready to restart TF:   Boost VHC bolus @237 ml (1 cartons) x4/d.   FWF: 60 ml pre feeds/meds and 180 ml post feeds/meds.   This regimen provides: 948 ml, 2120 kcal, 88g protein, ~2075 ml free water (635 ml from TF).   If unable to start TF via PEG in the next 48-72 hrs consider placing small bore NGT to avoid extended time of being NPO.    Nutrition Goals:   Enteral Intake: Other (Comment)  Goal: Restart home TF regimen       Nutrition Monitoring and Evaluation   Monitoring and Evaluation Plan: Enteral and parenteral nutrition intake determination  Enteral and Parenteral Nutrition Intake Determination: Enteral nutrition intake - To meet > 75% estimated energy needs  Additional Plans: PEG tube replacement    Monitoring and Evaluation Plan: Body weight  Body Weight: Body weight - Maintain stable weight              New goal(s) identified         Time Spent (min): 60 minutes

## 2025-02-01 NOTE — PROGRESS NOTES
Vancomycin Dosing by Pharmacy- FOLLOW UP    Mk Fleming is a 74 y.o. year old male who Pharmacy has been consulted for vancomycin dosing for cellulitis, skin and soft tissue. Based on the patient's indication and renal status this patient is being dosed based on a goal trough/random level of 15-20.     Renal function is currently declining.    Current vancomycin dose: 1250mg given x1  ~1300    Most recent random level: 16.7 mcg/mL    Visit Vitals  /65 (BP Location: Left arm, Patient Position: Lying)   Pulse 76   Temp 36.9 °C (98.4 °F) (Temporal)   Resp 17        Lab Results   Component Value Date    CREATININE 3.48 (H) 2025    CREATININE 3.28 (H) 2025    CREATININE 2.10 (H) 2024    CREATININE 2.02 (H) 2024        Patient weight is as follows:   Vitals:    25 0002   Weight: 61.2 kg (134 lb 14.7 oz)       Cultures:  No results found for the encounter in last 14 days.       I/O last 3 completed shifts:  In: 175 (2.9 mL/kg) [I.V.:25 (0.4 mL/kg); IV Piggyback:150]  Out: - (0 mL/kg)   Weight: 61.2 kg   I/O during current shift:  No intake/output data recorded.    Temp (24hrs), Av.8 °C (98.3 °F), Min:36 °C (96.8 °F), Max:37.6 °C (99.6 °F)      Assessment/Plan    Within goal random/trough level. Re-dose with 1000mg (15mg/kg) x1 today  The next level will be obtained on  at AM labs. May be obtained sooner if clinically indicated.   Will continue to monitor renal function daily while on vancomycin and order serum creatinine at least every 48 hours if not already ordered.  Follow for continued vancomycin needs, clinical response, and signs/symptoms of toxicity.       Yvonne Tao, PharmD

## 2025-02-01 NOTE — CARE PLAN
Problem: Pain  Goal: Takes deep breaths with improved pain control throughout the shift  Outcome: Progressing  Goal: Turns in bed with improved pain control throughout the shift  Outcome: Progressing  Goal: Walks with improved pain control throughout the shift  Outcome: Progressing  Goal: Performs ADL's with improved pain control throughout shift  Outcome: Progressing  Goal: Participates in PT with improved pain control throughout the shift  Outcome: Progressing  Goal: Free from opioid side effects throughout the shift  Outcome: Progressing  Goal: Free from acute confusion related to pain meds throughout the shift  Outcome: Progressing   The patient's goals for the shift include Pt pain will be controlled    The clinical goals for the shift include Pt pain will be controlled

## 2025-02-01 NOTE — CARE PLAN
The patient's goals for the shift include Pt pain will be controlled    The clinical goals for the shift include patient will have pain <5 during shift      Problem: Pain  Goal: Takes deep breaths with improved pain control throughout the shift  Outcome: Progressing  Goal: Turns in bed with improved pain control throughout the shift  Outcome: Progressing  Goal: Walks with improved pain control throughout the shift  Outcome: Progressing  Goal: Performs ADL's with improved pain control throughout shift  Outcome: Progressing  Goal: Participates in PT with improved pain control throughout the shift  Outcome: Progressing  Goal: Free from opioid side effects throughout the shift  Outcome: Progressing  Goal: Free from acute confusion related to pain meds throughout the shift  Outcome: Progressing

## 2025-02-02 VITALS
HEART RATE: 78 BPM | OXYGEN SATURATION: 95 % | SYSTOLIC BLOOD PRESSURE: 130 MMHG | BODY MASS INDEX: 21.68 KG/M2 | HEIGHT: 66 IN | RESPIRATION RATE: 18 BRPM | DIASTOLIC BLOOD PRESSURE: 83 MMHG | TEMPERATURE: 98.4 F | WEIGHT: 134.92 LBS

## 2025-02-02 LAB
ALBUMIN SERPL BCP-MCNC: 3.5 G/DL (ref 3.4–5)
ANION GAP SERPL CALC-SCNC: 20 MMOL/L (ref 10–20)
BUN SERPL-MCNC: 97 MG/DL (ref 6–23)
CALCIUM SERPL-MCNC: 9.5 MG/DL (ref 8.6–10.6)
CHLORIDE SERPL-SCNC: 97 MMOL/L (ref 98–107)
CO2 SERPL-SCNC: 27 MMOL/L (ref 21–32)
CREAT SERPL-MCNC: 3.65 MG/DL (ref 0.5–1.3)
EGFRCR SERPLBLD CKD-EPI 2021: 17 ML/MIN/1.73M*2
ERYTHROCYTE [DISTWIDTH] IN BLOOD BY AUTOMATED COUNT: 14.2 % (ref 11.5–14.5)
GLUCOSE SERPL-MCNC: 81 MG/DL (ref 74–99)
HCT VFR BLD AUTO: 28.8 % (ref 41–52)
HGB BLD-MCNC: 8.8 G/DL (ref 13.5–17.5)
MAGNESIUM SERPL-MCNC: 2.84 MG/DL (ref 1.6–2.4)
MCH RBC QN AUTO: 31 PG (ref 26–34)
MCHC RBC AUTO-ENTMCNC: 30.6 G/DL (ref 32–36)
MCV RBC AUTO: 101 FL (ref 80–100)
NRBC BLD-RTO: 0 /100 WBCS (ref 0–0)
PHOSPHATE SERPL-MCNC: 5.1 MG/DL (ref 2.5–4.9)
PLATELET # BLD AUTO: 172 X10*3/UL (ref 150–450)
POTASSIUM SERPL-SCNC: 4.5 MMOL/L (ref 3.5–5.3)
RBC # BLD AUTO: 2.84 X10*6/UL (ref 4.5–5.9)
SODIUM SERPL-SCNC: 139 MMOL/L (ref 136–145)
VANCOMYCIN SERPL-MCNC: 24.7 UG/ML (ref 5–20)
WBC # BLD AUTO: 4.1 X10*3/UL (ref 4.4–11.3)

## 2025-02-02 PROCEDURE — 1100000001 HC PRIVATE ROOM DAILY

## 2025-02-02 PROCEDURE — 80202 ASSAY OF VANCOMYCIN: CPT | Performed by: PHARMACY TECHNICIAN

## 2025-02-02 PROCEDURE — 83735 ASSAY OF MAGNESIUM: CPT

## 2025-02-02 PROCEDURE — 2500000004 HC RX 250 GENERAL PHARMACY W/ HCPCS (ALT 636 FOR OP/ED)

## 2025-02-02 PROCEDURE — 36415 COLL VENOUS BLD VENIPUNCTURE: CPT

## 2025-02-02 PROCEDURE — 80069 RENAL FUNCTION PANEL: CPT

## 2025-02-02 PROCEDURE — 85027 COMPLETE CBC AUTOMATED: CPT

## 2025-02-02 PROCEDURE — 99233 SBSQ HOSP IP/OBS HIGH 50: CPT | Performed by: SURGERY

## 2025-02-02 PROCEDURE — 36415 COLL VENOUS BLD VENIPUNCTURE: CPT | Performed by: PHARMACY TECHNICIAN

## 2025-02-02 RX ORDER — SODIUM CHLORIDE, SODIUM LACTATE, POTASSIUM CHLORIDE, CALCIUM CHLORIDE 600; 310; 30; 20 MG/100ML; MG/100ML; MG/100ML; MG/100ML
100 INJECTION, SOLUTION INTRAVENOUS CONTINUOUS
Status: ACTIVE | OUTPATIENT
Start: 2025-02-02 | End: 2025-02-03

## 2025-02-02 RX ORDER — LIDOCAINE HYDROCHLORIDE 10 MG/ML
INJECTION, SOLUTION INFILTRATION; PERINEURAL
Status: DISPENSED
Start: 2025-02-02 | End: 2025-02-03

## 2025-02-02 RX ADMIN — HYDROMORPHONE HYDROCHLORIDE 0.2 MG: 0.2 INJECTION, SOLUTION INTRAMUSCULAR; INTRAVENOUS; SUBCUTANEOUS at 20:54

## 2025-02-02 RX ADMIN — PIPERACILLIN SODIUM AND TAZOBACTAM SODIUM 3.38 G: 3; .375 INJECTION, SOLUTION INTRAVENOUS at 20:54

## 2025-02-02 RX ADMIN — PIPERACILLIN SODIUM AND TAZOBACTAM SODIUM 3.38 G: 3; .375 INJECTION, SOLUTION INTRAVENOUS at 09:31

## 2025-02-02 RX ADMIN — HYDROMORPHONE HYDROCHLORIDE 0.2 MG: 0.2 INJECTION, SOLUTION INTRAMUSCULAR; INTRAVENOUS; SUBCUTANEOUS at 06:35

## 2025-02-02 RX ADMIN — PIPERACILLIN SODIUM AND TAZOBACTAM SODIUM 3.38 G: 3; .375 INJECTION, SOLUTION INTRAVENOUS at 14:13

## 2025-02-02 RX ADMIN — PANTOPRAZOLE SODIUM 40 MG: 40 INJECTION, POWDER, FOR SOLUTION INTRAVENOUS at 06:03

## 2025-02-02 RX ADMIN — HYDROMORPHONE HYDROCHLORIDE 0.2 MG: 0.2 INJECTION, SOLUTION INTRAMUSCULAR; INTRAVENOUS; SUBCUTANEOUS at 11:42

## 2025-02-02 RX ADMIN — SODIUM CHLORIDE, POTASSIUM CHLORIDE, SODIUM LACTATE AND CALCIUM CHLORIDE 100 ML/HR: 600; 310; 30; 20 INJECTION, SOLUTION INTRAVENOUS at 09:31

## 2025-02-02 RX ADMIN — SODIUM CHLORIDE, POTASSIUM CHLORIDE, SODIUM LACTATE AND CALCIUM CHLORIDE 100 ML/HR: 600; 310; 30; 20 INJECTION, SOLUTION INTRAVENOUS at 02:15

## 2025-02-02 RX ADMIN — PIPERACILLIN SODIUM AND TAZOBACTAM SODIUM 3.38 G: 3; .375 INJECTION, SOLUTION INTRAVENOUS at 02:15

## 2025-02-02 ASSESSMENT — PAIN DESCRIPTION - LOCATION: LOCATION: ABDOMEN

## 2025-02-02 ASSESSMENT — PAIN SCALES - GENERAL
PAINLEVEL_OUTOF10: 10 - WORST POSSIBLE PAIN
PAINLEVEL_OUTOF10: 0 - NO PAIN
PAINLEVEL_OUTOF10: 9
PAINLEVEL_OUTOF10: 3

## 2025-02-02 ASSESSMENT — COGNITIVE AND FUNCTIONAL STATUS - GENERAL
EATING MEALS: A LITTLE
DAILY ACTIVITIY SCORE: 23
MOBILITY SCORE: 24
MOBILITY SCORE: 24
EATING MEALS: A LITTLE
DAILY ACTIVITIY SCORE: 23

## 2025-02-02 ASSESSMENT — PAIN - FUNCTIONAL ASSESSMENT
PAIN_FUNCTIONAL_ASSESSMENT: 0-10
PAIN_FUNCTIONAL_ASSESSMENT: 0-10

## 2025-02-02 NOTE — CARE PLAN
Problem: Pain  Goal: Takes deep breaths with improved pain control throughout the shift  2/2/2025 1159 by Ana Merida RN  Outcome: Progressing  2/2/2025 1152 by Ana Merida RN  Outcome: Progressing  Goal: Turns in bed with improved pain control throughout the shift  2/2/2025 1159 by Ana Merida RN  Outcome: Progressing  2/2/2025 1152 by Ana Merida RN  Outcome: Progressing  Goal: Walks with improved pain control throughout the shift  2/2/2025 1159 by Ana Merida RN  Outcome: Progressing  2/2/2025 1152 by Ana Merida RN  Outcome: Progressing  Goal: Performs ADL's with improved pain control throughout shift  2/2/2025 1159 by Ana Merida RN  Outcome: Progressing  2/2/2025 1152 by Ana Merida RN  Outcome: Progressing  Goal: Participates in PT with improved pain control throughout the shift  2/2/2025 1159 by Ana Merida RN  Outcome: Progressing  2/2/2025 1152 by Ana Merida RN  Outcome: Progressing  Goal: Free from opioid side effects throughout the shift  2/2/2025 1159 by Ana Merida RN  Outcome: Progressing  2/2/2025 1152 by Ana Merida RN  Outcome: Progressing  Goal: Free from acute confusion related to pain meds throughout the shift  2/2/2025 1159 by Ana Merida RN  Outcome: Progressing  2/2/2025 1152 by Ana Merida RN  Outcome: Progressing   The patient's goals for the shift include Pt pain will be controlled    The clinical goals for the shift include patient will have pain <5 during shift

## 2025-02-02 NOTE — CARE PLAN
Problem: Pain  Goal: Takes deep breaths with improved pain control throughout the shift  Outcome: Progressing  Goal: Turns in bed with improved pain control throughout the shift  Outcome: Progressing  Goal: Walks with improved pain control throughout the shift  Outcome: Progressing  Goal: Performs ADL's with improved pain control throughout shift  Outcome: Progressing  Goal: Participates in PT with improved pain control throughout the shift  Outcome: Progressing  Goal: Free from opioid side effects throughout the shift  Outcome: Progressing  Goal: Free from acute confusion related to pain meds throughout the shift  Outcome: Progressing   The patient's goals for the shift include Pt pain will be controlled    The clinical goals for the shift include patient will have pain <5 during shift

## 2025-02-02 NOTE — PROGRESS NOTES
"[facility]  ACUTE CARE SURGERY - PROGRESS NOTE    Patient Name: Mk Fleming  MRN: [id number]  Admit Date: 131  : 1950  AGE: 74 y.o.   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 Patient states that around 3 days ago, his \"guts fell out\" and he was having leakage around the PEG site with feeds, which prompted him to come to the ED. He was last seen by GI outpatient 25 who noted that PEG was in place and he was gaining weight with tube feeds. Last peg dislodgement appears to be 2024, for which PEG was replaced bedside in the ED without issue.   Patient states he is passing gas and having BM. Has mild abdominal pain, is tender to palpation around peg site.      RECS:    NEURO:   - pain control with dilaudid    CV:  - Hold home BP meds in setting of loss of enteral access.     PULM:  - Continue home inhalers  GI:  - NPO   - Xray with contrast into tract  - hold home po PPI. IV ppi while NPO  :  - Replete electrolytes to potassium of 4.0, magnesium of 2.0.  - Voiding spontaneously.  -LR @100 while NPO    HEME/ID:  - Vanc, zosyn    ENDO:  - hold home synthyroid in setting of NPO status        Dispo: Continue current level of care.    Discussed with Dr. Odonnell.    Raymond Wheatley MD  PGY-1 General Surgery  Acute Care Surgery [id number]    Subjective   ==============================================================================  CHIEF COMPLAINT / EVENTS LAST 24HRS / HPI:  No acute events overnight. Pt with minimal pain this AM    MEDICAL HISTORY / ROS:   Admission history " "reviewed. Pertinent changes as follows:  None.         Objective   Vital Signs past 24h:  Heart Rate:  [76-87]   Temp:  [36.7 °C (98.1 °F)-37.6 °C (99.6 °F)]   Resp:  [15-18]   BP: ()/(57-73)   Height:  [167.6 cm (5' 6\")]   Weight:  [61.2 kg (134 lb 14.7 oz)]   SpO2:  [94 %-99 %]     I/O past 24h:  I/O last 2 completed shifts:  In: 25 (0.4 mL/kg) [I.V.:25 (0.4 mL/kg)]  Out: 400 (6.5 mL/kg) [Urine:400 (0.3 mL/kg/hr)]  Weight: 61.2 kg      PHYSICAL EXAM:  GEN: No acute distress. Alert, awake and conversant.  HEENT: Sclera anicteric. Dry mucous membranes.  RESP: Breathing non-labored, equal chest rise. On RA.  CV: Regular rate, normotensive  GI: Abdomen soft, mildly distended, tender to palpation PEG site without purulent discharge.   : Voiding spontaneously.  MSK: No gross deformities. Moves all extremities spontaneously.  NEURO: Alert and oriented   PSYCH: Appropriate mood and affect.    Labs past 24h:  Results for orders placed or performed during the hospital encounter of 01/31/25 (from the past 24 hours)   Vancomycin   Result Value Ref Range    Vancomycin 16.7 5.0 - 20.0 ug/mL   CBC   Result Value Ref Range    WBC 4.9 4.4 - 11.3 x10*3/uL    nRBC 0.0 0.0 - 0.0 /100 WBCs    RBC 2.49 (L) 4.50 - 5.90 x10*6/uL    Hemoglobin 7.8 (L) 13.5 - 17.5 g/dL    Hematocrit 23.8 (L) 41.0 - 52.0 %    MCV 96 80 - 100 fL    MCH 31.3 26.0 - 34.0 pg    MCHC 32.8 32.0 - 36.0 g/dL    RDW 14.2 11.5 - 14.5 %    Platelets 132 (L) 150 - 450 x10*3/uL   Renal function panel   Result Value Ref Range    Glucose 92 74 - 99 mg/dL    Sodium 133 (L) 136 - 145 mmol/L    Potassium 4.4 3.5 - 5.3 mmol/L    Chloride 94 (L) 98 - 107 mmol/L    Bicarbonate 30 21 - 32 mmol/L    Anion Gap 13 10 - 20 mmol/L    Urea Nitrogen 109 (HH) 6 - 23 mg/dL    Creatinine 3.48 (H) 0.50 - 1.30 mg/dL    eGFR 18 (L) >60 mL/min/1.73m*2    Calcium 8.8 8.6 - 10.6 mg/dL    Phosphorus 4.4 2.5 - 4.9 mg/dL    Albumin 2.9 (L) 3.4 - 5.0 g/dL   Magnesium   Result Value Ref " Range    Magnesium 2.64 (H) 1.60 - 2.40 mg/dL     *Note: Due to a large number of results and/or encounters for the requested time period, some results have not been displayed. A complete set of results can be found in Results Review.       Imaging within past 24h:  XR abdomen 1 view    Result Date: 2/1/2025  Interpreted By:  Jose Kulkarni, STUDY: XR ABDOMEN 1 VIEW;  2/1/2025 10:06 am   INDICATION: Signs/Symptoms:lateral xray of abdomen to deineate contrast position.   COMPARISON: Abdominal radiograph 02/01/2025 and abdominopelvic CT scan 01/31/2025   ACCESSION NUMBER(S): [id number]   ORDERING CLINICIAN: PREET KENDALL   FINDINGS: 2 lateral radiographs of abdomen available for interpretation. Limited image quality because of overlapping shadows. Multiple surgical clips and embolization coils again overlie upper abdomen.   Nonobstructive bowel gas pattern. Poorly visualized positive enteric contrast material on present imaging.Limited evaluation of pneumoperitoneum on supine imaging, however no gross evidence of free air is noted.   Visualized lung bases are clear   Osseous structures demonstrate no acute bony changes.       1.  Nonobstructive bowel gas pattern. Poorly visualized positive enteric contrast material on present lateral radiographs. Within limitations of the study, no definite evidence of contrast extravasation.   Signed by: Jose Kulkarni 2/1/2025 11:36 AM Dictation workstation:   [id number]    XR abdomen 1 view    Result Date: 2/1/2025  Interpreted By:  Jose Kulkarni, STUDY: XR ABDOMEN 1 VIEW;  2/1/2025 9:11 am   INDICATION: Signs/Symptoms:Szymanski in peg site and no extrav..   COMPARISON: Abdominal radiographs 02/01/2025 and abdominopelvic CT scan 01/31/2025   ACCESSION NUMBER(S): [id number]   ORDERING CLINICIAN: PREET KENDALL   FINDINGS: Single AP radiograph of abdomen available for interpretation. Few surgical clips and embolization coils are again seen projecting over right hemiabdomen. Contrast is again  identified within nondistended urinary bladder.   Nonobstructive bowel gas pattern. Positive enteric contrast now seen opacifying stomach and proximal small bowel. No definite evidence of contrast extravasation. Significant colonic stool burden.Limited evaluation of pneumoperitoneum on supine imaging, however no gross evidence of free air is noted.   Visualized lung bases are clear   Osseous structures demonstrate no acute bony changes.       1. Positive enteric contrast now seen opacifying stomach and proximal small bowel. No definite evidence of contrast extravasation. Significant colonic stool burden. 2. Nonobstructive bowel gas pattern. Significant colonic stool burden.   Signed by: Jose Kulkarni 2/1/2025 10:03 AM Dictation workstation:   [id number]    XR abdomen 1 view    Result Date: 2/1/2025  Interpreted By:  Mahesh Torres and Ritchie Brandon STUDY: XR ABDOMEN 1 VIEW;  2/1/2025 2:21 am   INDICATION: Signs/Symptoms:PEG tube study; needs  followed by second film with contrast injection.   COMPARISON: Radiograph of the abdomen 08/03/2024.   ACCESSION NUMBER(S): [id number]   ORDERING CLINICIAN: ANGELINE AMAYA   TECHNIQUE: Single AP radiograph was provided for review.   FINDINGS: Surgical embolization coils/clips present in the right upper quadrant.   There is a gastrostomy tube projecting over the expected location of the gastric body. Nonobstructive bowel gas pattern with mild colonic stool burden. Faint opacification of the urinary bladder with contrast is noted.   Limited evaluation of pneumoperitoneum on supine imaging, however no gross evidence of free air is noted.   Visualized lungs are clear.   Osseous structures demonstrate no acute bony changes.       1.  Gastrostomy tube projecting over the gastric body. 2. Nonobstructive bowel-gas pattern.   I personally reviewed the images/study and I agree with the findings as stated by resident Vin Ortiz. This study was interpreted at [facility]  [place], [place].   MACRO: None   Signed by: Mahesh Torres 2/1/2025 7:58 AM Dictation workstation:   [id number]    ECG 12 lead    Result Date: 2/1/2025  Normal sinus rhythm Right atrial enlargement Borderline ECG When compared with ECG of 25-APR-2024 07:55, No significant change was found See ED provider note for full interpretation and clinical correlation Confirmed by Sammie Bonner ([id number]) on 2/1/2025 3:44:56 AM    CT abdomen pelvis w IV contrast    Result Date: 1/31/2025  Interpreted By:  Sunil Mayorga and Mercado Amiel STUDY: CT ABDOMEN PELVIS W IV CONTRAST;  1/31/2025 4:38 pm   INDICATION: Signs/Symptoms:abd pain, hx Peg, stoma prolapse with necrosis.     COMPARISON: CT, 08/02/2024, 12/21/2022 Abdominal radiograph, 08/03/2024   ACCESSION NUMBER(S): [id number]   ORDERING CLINICIAN: LETICIA COOK   TECHNIQUE: CT of the abdomen and pelvis was performed.  Standard contiguous axial images were obtained at 3 mm slice thickness through the abdomen and pelvis. Coronal and sagittal reconstructions at 3 mm slice thickness were performed.  80 ML of Omnipaque 350 was administered intravenously without immediate complication.   FINDINGS: LOWER CHEST: The visualized lung base is unremarkable. The heart is normal in size without pericardial effusion. No pleural effusion is present. Visualized distal esophagus appears normal.   ABDOMEN:   LIVER: Nodular liver contours with relative hypoattenuation, consistent with cirrhotic liver morphology/changes. No focal lesions evident.   BILE DUCTS: The intrahepatic and extrahepatic ducts are not dilated.   GALLBLADDER: The gallbladder is surgically absent.   PANCREAS: Unremarkable.   SPLEEN: Unremarkable.   ADRENAL GLANDS: Unremarkable.   KIDNEYS AND URETERS: The kidneys are normal in size and enhance symmetrically. Stable size right inferior pole 1.7 cm cyst since prior CT in 2022, previously 1.5 cm. No hydroureteronephrosis or  nephroureterolithiasis is identified.   PELVIS:   BLADDER: Unremarkable.   REPRODUCTIVE ORGANS: The prostate is not enlarged.   BOWEL: The stomach is slightly thickened and decompressed, limiting evaluation. Anterior gastric wall is in close proximity of the anterior abdominal wall thickening, image 29 series 201. Peg tube catheter is not visualized within the stomach. Small enlarged bowel loops are visualized slightly decompressed, without evidence of obstruction.   The appendix appears normal.   VESSELS: The aorta and IVC appear normal.   PERITONEUM/RETROPERITONEUM/LYMPH NODES: No ascites or free air, no fluid collection. No abdominopelvic lymphadenopathy is present.   BONES AND ABDOMINAL WALL: Within normal limits.   The PEG tube balloon/bolster and part of the stomal length is localized within the left upper abdomen subcutaneous and intramuscular space (series 201, image 30). There are surrounding inflammatory changes and contained fluid attenuating collection measuring 3.6 x 2.5 cm with air-fluid level.       1.  Dislodged and embedded percutaneous gastrostomy balloon and stomal length within the left upper abdominal subcutaneous and intramuscular space. There are surrounding inflammatory changes and contained non loculated fluid collection, which may require drainage and or debridement prior to possible PEG tube reinsertion/replacement. Anterior gastric wall is in close proximity of the abdominal wall thickening and enterocutaneous fistula should be ruled out 2. Remaining findings as described above.   I personally reviewed the images/study and agree with the findings as stated by Bruce Shirley MD (Resident Physician). This study was interpreted at [facility], [place], [place].   MACRO: None   Signed by: Sunil Mayorga 1/31/2025 5:41 PM Dictation workstation:   [id number]     I have reviewed the imaging above as it pertains to the patient's surgical concerns and agree with  the radiologist's interpretation.

## 2025-02-02 NOTE — PROGRESS NOTES
Vancomycin Dosing by Pharmacy- FOLLOW UP    Mk Fleming is a 74 y.o. year old male who Pharmacy has been consulted for vancomycin dosing for cellulitis, skin and soft tissue. Based on the patient's indication and renal status this patient is being dosed based on a goal trough/random level of 15-20.     Renal function is currently declining.    Current vancomycin dose: 1000 mg given once 2/ ~1130    Most recent random level: 24.7 mcg/mL    Visit Vitals  /70   Pulse 74   Temp 36.5 °C (97.7 °F)   Resp 14        Lab Results   Component Value Date    CREATININE 3.48 (H) 2025    CREATININE 3.28 (H) 2025    CREATININE 2.10 (H) 2024    CREATININE 2.02 (H) 2024        Patient weight is as follows:   Vitals:    25 0002   Weight: 61.2 kg (134 lb 14.7 oz)       Cultures:  No results found for the encounter in last 14 days.       I/O last 3 completed shifts:  In: 25 (0.4 mL/kg) [I.V.:25 (0.4 mL/kg)]  Out: 1100 (18 mL/kg) [Urine:600 (0.3 mL/kg/hr); Emesis/NG output:500]  Weight: 61.2 kg   I/O during current shift:  No intake/output data recorded.    Temp (24hrs), Av.6 °C (97.8 °F), Min:35.9 °C (96.6 °F), Max:36.8 °C (98.2 °F)      Assessment/Plan    Above goal random/trough level. Will hold vancomycin dose today.  The next level will be obtained on 2/3 at AM labs. May be obtained sooner if clinically indicated.   Will continue to monitor renal function daily while on vancomycin and order serum creatinine at least every 48 hours if not already ordered.  Follow for continued vancomycin needs, clinical response, and signs/symptoms of toxicity.       Yvonne Tao, PharmD

## 2025-02-02 NOTE — CARE PLAN
The patient's goals for the shift include Pt pain will be controlled    The clinical goals for the shift include patient will have pain <5 during shift

## 2025-02-03 LAB
ALBUMIN SERPL BCP-MCNC: 3.1 G/DL (ref 3.4–5)
ANION GAP SERPL CALC-SCNC: 18 MMOL/L (ref 10–20)
BUN SERPL-MCNC: 86 MG/DL (ref 6–23)
CALCIUM SERPL-MCNC: 9.1 MG/DL (ref 8.6–10.6)
CHLORIDE SERPL-SCNC: 100 MMOL/L (ref 98–107)
CO2 SERPL-SCNC: 28 MMOL/L (ref 21–32)
CREAT SERPL-MCNC: 3.45 MG/DL (ref 0.5–1.3)
EGFRCR SERPLBLD CKD-EPI 2021: 18 ML/MIN/1.73M*2
ERYTHROCYTE [DISTWIDTH] IN BLOOD BY AUTOMATED COUNT: 13.9 % (ref 11.5–14.5)
GLUCOSE SERPL-MCNC: 77 MG/DL (ref 74–99)
HCT VFR BLD AUTO: 26 % (ref 41–52)
HGB BLD-MCNC: 7.9 G/DL (ref 13.5–17.5)
MAGNESIUM SERPL-MCNC: 2.49 MG/DL (ref 1.6–2.4)
MCH RBC QN AUTO: 30.6 PG (ref 26–34)
MCHC RBC AUTO-ENTMCNC: 30.4 G/DL (ref 32–36)
MCV RBC AUTO: 101 FL (ref 80–100)
NRBC BLD-RTO: 0 /100 WBCS (ref 0–0)
PHOSPHATE SERPL-MCNC: 4.6 MG/DL (ref 2.5–4.9)
PLATELET # BLD AUTO: 162 X10*3/UL (ref 150–450)
POTASSIUM SERPL-SCNC: 4.3 MMOL/L (ref 3.5–5.3)
RBC # BLD AUTO: 2.58 X10*6/UL (ref 4.5–5.9)
SODIUM SERPL-SCNC: 142 MMOL/L (ref 136–145)
VANCOMYCIN SERPL-MCNC: 16.6 UG/ML (ref 5–20)
WBC # BLD AUTO: 3.2 X10*3/UL (ref 4.4–11.3)

## 2025-02-03 PROCEDURE — 2500000004 HC RX 250 GENERAL PHARMACY W/ HCPCS (ALT 636 FOR OP/ED): Performed by: NURSE PRACTITIONER

## 2025-02-03 PROCEDURE — 80069 RENAL FUNCTION PANEL: CPT

## 2025-02-03 PROCEDURE — 2500000004 HC RX 250 GENERAL PHARMACY W/ HCPCS (ALT 636 FOR OP/ED): Mod: JZ

## 2025-02-03 PROCEDURE — 2500000004 HC RX 250 GENERAL PHARMACY W/ HCPCS (ALT 636 FOR OP/ED)

## 2025-02-03 PROCEDURE — 99232 SBSQ HOSP IP/OBS MODERATE 35: CPT | Performed by: NURSE PRACTITIONER

## 2025-02-03 PROCEDURE — 85027 COMPLETE CBC AUTOMATED: CPT

## 2025-02-03 PROCEDURE — 36415 COLL VENOUS BLD VENIPUNCTURE: CPT

## 2025-02-03 PROCEDURE — 80202 ASSAY OF VANCOMYCIN: CPT | Performed by: PHARMACY TECHNICIAN

## 2025-02-03 PROCEDURE — 1100000001 HC PRIVATE ROOM DAILY

## 2025-02-03 PROCEDURE — 83735 ASSAY OF MAGNESIUM: CPT

## 2025-02-03 RX ORDER — SODIUM CHLORIDE, SODIUM LACTATE, POTASSIUM CHLORIDE, CALCIUM CHLORIDE 600; 310; 30; 20 MG/100ML; MG/100ML; MG/100ML; MG/100ML
100 INJECTION, SOLUTION INTRAVENOUS CONTINUOUS
Status: ACTIVE | OUTPATIENT
Start: 2025-02-03 | End: 2025-02-04

## 2025-02-03 RX ORDER — HEPARIN SODIUM 5000 [USP'U]/ML
5000 INJECTION, SOLUTION INTRAVENOUS; SUBCUTANEOUS EVERY 8 HOURS
Status: DISCONTINUED | OUTPATIENT
Start: 2025-02-03 | End: 2025-02-05 | Stop reason: HOSPADM

## 2025-02-03 RX ADMIN — HEPARIN SODIUM 5000 UNITS: 5000 INJECTION, SOLUTION INTRAVENOUS; SUBCUTANEOUS at 10:35

## 2025-02-03 RX ADMIN — HEPARIN SODIUM 5000 UNITS: 5000 INJECTION, SOLUTION INTRAVENOUS; SUBCUTANEOUS at 16:57

## 2025-02-03 RX ADMIN — SODIUM CHLORIDE, POTASSIUM CHLORIDE, SODIUM LACTATE AND CALCIUM CHLORIDE 100 ML/HR: 600; 310; 30; 20 INJECTION, SOLUTION INTRAVENOUS at 10:34

## 2025-02-03 RX ADMIN — PANTOPRAZOLE SODIUM 40 MG: 40 INJECTION, POWDER, FOR SOLUTION INTRAVENOUS at 08:16

## 2025-02-03 RX ADMIN — SODIUM CHLORIDE, POTASSIUM CHLORIDE, SODIUM LACTATE AND CALCIUM CHLORIDE 100 ML/HR: 600; 310; 30; 20 INJECTION, SOLUTION INTRAVENOUS at 21:53

## 2025-02-03 RX ADMIN — PIPERACILLIN SODIUM AND TAZOBACTAM SODIUM 3.38 G: 3; .375 INJECTION, SOLUTION INTRAVENOUS at 08:43

## 2025-02-03 RX ADMIN — HYDROMORPHONE HYDROCHLORIDE 0.2 MG: 0.2 INJECTION, SOLUTION INTRAMUSCULAR; INTRAVENOUS; SUBCUTANEOUS at 03:21

## 2025-02-03 RX ADMIN — HYDROMORPHONE HYDROCHLORIDE 0.2 MG: 0.2 INJECTION, SOLUTION INTRAMUSCULAR; INTRAVENOUS; SUBCUTANEOUS at 08:16

## 2025-02-03 RX ADMIN — PIPERACILLIN SODIUM AND TAZOBACTAM SODIUM 3.38 G: 3; .375 INJECTION, SOLUTION INTRAVENOUS at 03:22

## 2025-02-03 RX ADMIN — HYDROMORPHONE HYDROCHLORIDE 0.2 MG: 0.2 INJECTION, SOLUTION INTRAMUSCULAR; INTRAVENOUS; SUBCUTANEOUS at 16:20

## 2025-02-03 RX ADMIN — HYDROMORPHONE HYDROCHLORIDE 0.2 MG: 0.2 INJECTION, SOLUTION INTRAMUSCULAR; INTRAVENOUS; SUBCUTANEOUS at 21:31

## 2025-02-03 ASSESSMENT — PAIN - FUNCTIONAL ASSESSMENT
PAIN_FUNCTIONAL_ASSESSMENT: 0-10

## 2025-02-03 ASSESSMENT — COGNITIVE AND FUNCTIONAL STATUS - GENERAL
DAILY ACTIVITIY SCORE: 23
MOBILITY SCORE: 24
MOBILITY SCORE: 24
EATING MEALS: A LITTLE

## 2025-02-03 ASSESSMENT — PAIN SCALES - GENERAL
PAINLEVEL_OUTOF10: 9
PAINLEVEL_OUTOF10: 7
PAINLEVEL_OUTOF10: 8
PAINLEVEL_OUTOF10: 8
PAINLEVEL_OUTOF10: 7
PAINLEVEL_OUTOF10: 8
PAINLEVEL_OUTOF10: 8

## 2025-02-03 ASSESSMENT — PAIN DESCRIPTION - ORIENTATION
ORIENTATION: MID
ORIENTATION: MID

## 2025-02-03 ASSESSMENT — PAIN DESCRIPTION - LOCATION
LOCATION: ABDOMEN
LOCATION: ABDOMEN

## 2025-02-03 NOTE — CONSULTS
Ear Nose & Throat Progress Note     Otolaryngology - Head and Neck Surgery Consultation Note    Reason For Consult  -Patient request  -ENT to be aware that patient is inpatient as he is Dr. Barr and Dr. Sprague's patient    History Of Present Illness  Mk Fleming is a 74 y.o. 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 Barr and Dr. Sprague.  He has had multiple episodes of PEG tube dislodgment with the most recent being 8/2024.  This admission he is presenting to the ED on 1/31/2025 for PEG tube displacement.  New PEG tube was placed at bedside with a CT scan on 2/1 showing the tract is maintained. Patient has been passing gas and having BM.  Overnight, patient had PEG tube leakage with tube feeds held.        Past Medical History  He has a past medical history of Anemia, Cholecystitis, Cholelithiasis, Cirrhosis (Multi), CKD (chronic kidney disease), COPD (chronic obstructive pulmonary disease) (Multi), Dysphagia, GERD (gastroesophageal reflux disease), HCV (hepatitis C virus), Hoarseness of voice, Hyperlipidemia, Hypertension, Hypothyroidism, Laryngeal cancer (Multi), PAD (peripheral artery disease) (CMS-formerly [facility]), Personal history of other diseases of the respiratory system, Personal history of other specified conditions, and Pulmonary emphysema (Multi).  Patient Active Problem List   Diagnosis    Hypertension    Hypothyroidism    Laryngeal cancer (Multi)    Personal history of nicotine dependence    Abnormal granulation tissue of abdomen    Aspiration of liquid    Change in voice    Cholelithiasis    Chronic low back pain    Chronic constipation    Oropharyngeal dysphagia    COPD (chronic obstructive pulmonary disease) (Multi)    Emphysema, unspecified    Enlarged prostate with lower urinary tract symptoms (LUTS)    Hepatitis C virus    Hoarseness of voice    Hypercalcemia    Hypersalivation    Laryngeal mass    PAD (peripheral artery  "disease) (CMS-HCC)    PEG tube malfunction (Multi)    Pleural effusion, left    Weight loss    Anemia    BMI 23.0-23.9, adult    Cirrhosis of liver (Multi)    Duodenal ulcer    Upper GI bleed    Chronic cholecystitis    Impacted cerumen of left ear    Gallstones    Head and neck cancer (Multi)    Hx of laryngeal cancer    Esophageal dysphagia       Surgical History  He has a past surgical history that includes Other surgical history (11/19/2018); Other surgical history (11/19/2018); Other surgical history (10/20/2022); IR embolization lymph node (Bilateral, 07/03/2022); IR angiogram inferior epigastric (07/03/2022); IR angiogram aorta abdomen (07/03/2022); Hernia repair; and Esophagogastroduodenoscopy.     Social History  He reports that he quit smoking about 31 years ago. His smoking use included cigarettes. He has never been exposed to tobacco smoke. He has never used smokeless tobacco. He reports that he does not currently use alcohol. He reports that he does not currently use drugs after having used the following drugs: Cocaine.    Family History  Family History   Problem Relation Name Age of Onset    Pancreatic cancer Mother      Hypertension Sister      Hypertension Brother          Allergies  Patient has no known allergies.    Review of Systems  A 12-point review of systems was performed and noted be negative except for that which was mentioned in the history of present illness     Last Recorded Vitals  Blood pressure 158/80, pulse 84, temperature 35.9 °C (96.6 °F), temperature source Temporal, resp. rate 18, height 1.676 m (5' 6\"), weight 61.2 kg (134 lb 14.7 oz), SpO2 98%.     Physical Exam:  Constitutional:  No acute distress  Voice:  No hoarseness or other abnormality  Respiration:  Breathing comfortably on room air, no stridor  Cardiovascular:  No edema in hands  Eyes:  EOM intact  Neuro:  Alert and oriented times 3, Cranial nerves II-XII grossly intact and symmetric bilaterally  Head and Face:  " Symmetric facial features, no masses or lesions, sinuses non-tender to palpation  Right Ear:  Normal external ear and normal hearing voice.  Left Ear: Normal external ear and normal hearing voice.  Nose:  External nose midline, anterior rhinoscopy is normal with limited visualization to the anterior aspect of the interior turbinates, no bleeding or drainage, no lesions  Oral Cavity/Oropharynx/Lips:  Dry mucous membranes, no masses or lesions  Neck/Lymph:  No LAD, no thyroid masses, trachea midline  Skin:  Neck skin with radiation changes  Abdomen: PEG tube in place, dressing in place. Abdomen mildly tender to palpation, mildly distended  psych:  Alert and oriented with appropriate mood and affect      Medications:  Scheduled medications  [Held by provider] amLODIPine, 5 mg, g-tube, Daily  [Held by provider] atenolol, 50 mg, g-tube, Daily  [Held by provider] atorvastatin, 10 mg, g-tube, q AM  diatrizoate mkaenna-diatrizoat sod, 60 mL, oral, Once  heparin, 5,000 Units, subcutaneous, q8h  iohexol, 500 mL, oral, Once in imaging  iohexol, 60 mL, oral, Once in imaging  [Held by provider] levothyroxine, 50 mcg, g-tube, Daily before breakfast  [Held by provider] losartan, 50 mg, g-tube, Daily  pantoprazole, 40 mg, intravenous, Daily before breakfast  [Held by provider] terazosin, 5 mg, g-tube, Daily      Continuous medications  lactated Ringer's, 100 mL/hr, Last Rate: 100 mL/hr (02/03/25 1034)      PRN medications  PRN medications: albuterol, albuterol, fluticasone, HYDROmorphone, [Held by provider] prochlorperazine    Recent Labs:  Results from last 7 days   Lab Units 02/03/25  0519 02/02/25  1037 02/01/25  0629   WBC AUTO x10*3/uL 3.2* 4.1* 4.9   HEMOGLOBIN g/dL 7.9* 8.8* 7.8*   PLATELETS AUTO x10*3/uL 162 172 132*      Results from last 7 days   Lab Units 02/03/25  0519 02/02/25  1038 02/01/25  0629   SODIUM mmol/L 142 139 133*   POTASSIUM mmol/L 4.3 4.5 4.4   CHLORIDE mmol/L 100 97* 94*   CO2 mmol/L 28 27 30   BUN mg/dL 86*  97* 109*   CREATININE mg/dL 3.45* 3.65* 3.48*   GLUCOSE mg/dL 77 81 92   MAGNESIUM mg/dL 2.49* 2.84* 2.64*   PHOSPHORUS mg/dL 4.6 5.1* 4.4      Results from last 7 days   Lab Units 01/31/25  1110   INR  1.2*   PROTIME seconds 13.2*   APTT seconds 34          Imaging:  CT abdomen pelvis without IV contrast (2/2/2025):  I personally and this is my impression:   -PEG tube within the stomach and no loculated fluid collections     Assessment/Plan   74-year-old male with history of T4 laryngeal cancer status post radiation and chemotherapy in 2019.  Patient experiencing dysphagia, and is now PEG dependent and has been complicated by multiple PEG tube dislodgments.  Patient admitted for PEG tube dislodgment and is now s/p new PEG placement on 1/31.     -PEG tube care per primary team  -Will make respective attendings aware that patient is inpatient      Marely Clements MD  Dept. of Otolaryngology - Head and Neck Surgery, PGY-1  ENT Consults: [phone]  ENT Overnight (5p-6a), and Weekends: [phone]  ENT Head and Neck Surgery Phone: [phone]  ENT Peds: [phone]  ENT Outpatient scheduling number: [phone]

## 2025-02-03 NOTE — PROGRESS NOTES
02/03/25 1037   Discharge Planning   Living Arrangements Alone   Support Systems Family members   Assistance Needed None   Type of Residence Private residence   Home or Post Acute Services In home services   Type of Home Care Services Home nursing visits   Expected Discharge Disposition Home H   Does the patient need discharge transport arranged? Yes   RoundTrip coordination needed? Yes   Has discharge transport been arranged? No   Financial Resource Strain   How hard is it for you to pay for the very basics like food, housing, medical care, and heating? Not hard   Housing Stability   In the last 12 months, was there a time when you were not able to pay the mortgage or rent on time? N   Transportation Needs   In the past 12 months, has lack of transportation kept you from medical appointments or from getting medications? no       DC Planning:  Went in and met with the pt, confirmed demographics.     Transitional Care Coordination Progress Note:  Patient discussed during interdisciplinary rounds.     Plan per Medical/Surgical team:    Home Care choice for home going needs, [facility]  Pt Needs: TBD.       Discharge disposition: TBD    Potential Barriers: None    ADOD:  2/5    This TCC will continue to follow for home going needs and safe DC plan.      Josefina Renner. ANÍBAL. TCC.

## 2025-02-03 NOTE — CARE PLAN
The patient's goals for the shift include Pt pain will be controlled    The clinical goals for the shift include managing patient's pain

## 2025-02-03 NOTE — PROGRESS NOTES
"[facility]  ACUTE CARE SURGERY - PROGRESS NOTE    Patient Name: Mk Fleming  MRN: [id number]  Admit Date: 131  : 1950  AGE: 74 y.o.   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 Patient states that around 3 days ago, his \"guts fell out\" and he was having leakage around the PEG site with feeds, which prompted him to come to the ED. He was last seen by GI outpatient 25 who noted that PEG was in place and he was gaining weight with tube feeds. Last peg dislodgement appears to be 2024, for which PEG was replaced bedside in the ED without issue. Patient states he is passing gas and having BM. Has mild abdominal pain, tenderness to palpation around peg site. CT scan on  showed tract has been maintained, evaluating ability of surrounding tissue to hold replaced PEG      NEURO:   - pain control with dilaudid    CV: Hx of HLD, HTN,   - Hold home BP meds in setting of loss of enteral access.   - vital signs qshift     PULM: Hx of COPD,   - Continue home inhalers (Albuterol, Flonase)    GI: Peg replaced at bedside on  with confirmation of tube study  Peg tube with leakage overnight   - Keep NPO  - Hold tube feeds for now  - hold home po PPI. IV ppi while NPO  - Engage ENT today as patient is established with Dr. Barr    :  - Replete electrolytes to potassium of 4.0, magnesium of 2.0.  -LR @100 while NPO    HEME/ID: no perforation of the stomach  - Vanc, zosyn discontinued 2/3   - continue to trend WBC and monitor SIRS    ENDO:  - hold home synthyroid in setting of " NPO status    DVT Proph:  - SCDs, SQH    Dispo: Continue RNF    Patient seen and discussed with Attending Dr. Dominique Oropeza, APRN-CNP  Acute Care Surgery [phone]    Total time spent on assessment and plan of care approximately thirty minutes   ==============================================================================  CHIEF COMPLAINT / EVENTS LAST 24HRS / HPI:  Leakage around peg tube after tube feeds started overnight. Tube feeds discontinued. Patient otherwise doing well without any complaints     MEDICAL HISTORY / ROS:   Admission history reviewed. Pertinent changes as follows:  None.         Objective   Vital Signs past 24h:  Heart Rate:  [71-79]   Temp:  [36.1 °C (97 °F)-36.9 °C (98.4 °F)]   Resp:  [16-18]   BP: ()/(56-83)   SpO2:  [92 %-98 %]     I/O past 24h:  I/O last 2 completed shifts:  In: 1001.7 (16.4 mL/kg) [I.V.:1001.7 (16.4 mL/kg)]  Out: 725 (11.8 mL/kg) [Urine:725 (0.5 mL/kg/hr)]  Weight: 61.2 kg      PHYSICAL EXAM:  GEN: No acute distress. Alert, awake and conversant.  HEENT: Sclera anicteric. Dry mucous membranes.  RESP: Breathing non-labored, equal chest rise. On RA.  CV: non cyanotic   GI: Abdomen soft, mildly distended, mild tenderness to palpitation, PEG tube/briscoe in place with dressing in place   : deferred  MSK: No gross deformities. Moves all extremities spontaneously.  NEURO: Alert and oriented   PSYCH: Appropriate mood and affect.    Labs past 24h:  Results for orders placed or performed during the hospital encounter of 01/31/25 (from the past 24 hours)   CBC   Result Value Ref Range    WBC 4.1 (L) 4.4 - 11.3 x10*3/uL    nRBC 0.0 0.0 - 0.0 /100 WBCs    RBC 2.84 (L) 4.50 - 5.90 x10*6/uL    Hemoglobin 8.8 (L) 13.5 - 17.5 g/dL    Hematocrit 28.8 (L) 41.0 - 52.0 %     (H) 80 - 100 fL    MCH 31.0 26.0 - 34.0 pg    MCHC 30.6 (L) 32.0 - 36.0 g/dL    RDW 14.2 11.5 - 14.5 %    Platelets 172 150 - 450 x10*3/uL   Renal Function Panel   Result Value Ref Range     Glucose 81 74 - 99 mg/dL    Sodium 139 136 - 145 mmol/L    Potassium 4.5 3.5 - 5.3 mmol/L    Chloride 97 (L) 98 - 107 mmol/L    Bicarbonate 27 21 - 32 mmol/L    Anion Gap 20 10 - 20 mmol/L    Urea Nitrogen 97 (HH) 6 - 23 mg/dL    Creatinine 3.65 (H) 0.50 - 1.30 mg/dL    eGFR 17 (L) >60 mL/min/1.73m*2    Calcium 9.5 8.6 - 10.6 mg/dL    Phosphorus 5.1 (H) 2.5 - 4.9 mg/dL    Albumin 3.5 3.4 - 5.0 g/dL   Magnesium   Result Value Ref Range    Magnesium 2.84 (H) 1.60 - 2.40 mg/dL   CBC   Result Value Ref Range    WBC 3.2 (L) 4.4 - 11.3 x10*3/uL    nRBC 0.0 0.0 - 0.0 /100 WBCs    RBC 2.58 (L) 4.50 - 5.90 x10*6/uL    Hemoglobin 7.9 (L) 13.5 - 17.5 g/dL    Hematocrit 26.0 (L) 41.0 - 52.0 %     (H) 80 - 100 fL    MCH 30.6 26.0 - 34.0 pg    MCHC 30.4 (L) 32.0 - 36.0 g/dL    RDW 13.9 11.5 - 14.5 %    Platelets 162 150 - 450 x10*3/uL   Renal Function Panel   Result Value Ref Range    Glucose 77 74 - 99 mg/dL    Sodium 142 136 - 145 mmol/L    Potassium 4.3 3.5 - 5.3 mmol/L    Chloride 100 98 - 107 mmol/L    Bicarbonate 28 21 - 32 mmol/L    Anion Gap 18 10 - 20 mmol/L    Urea Nitrogen 86 (H) 6 - 23 mg/dL    Creatinine 3.45 (H) 0.50 - 1.30 mg/dL    eGFR 18 (L) >60 mL/min/1.73m*2    Calcium 9.1 8.6 - 10.6 mg/dL    Phosphorus 4.6 2.5 - 4.9 mg/dL    Albumin 3.1 (L) 3.4 - 5.0 g/dL   Magnesium   Result Value Ref Range    Magnesium 2.49 (H) 1.60 - 2.40 mg/dL   Vancomycin   Result Value Ref Range    Vancomycin 16.6 5.0 - 20.0 ug/mL     *Note: Due to a large number of results and/or encounters for the requested time period, some results have not been displayed. A complete set of results can be found in Results Review.       Imaging within past 24h:  No new imaging

## 2025-02-03 NOTE — CARE PLAN
The patient's goals for the shift include Pt pain will be controlled    The clinical goals for the shift include pain score will be decreased by the end of this shift      Problem: Pain  Goal: Takes deep breaths with improved pain control throughout the shift  Outcome: Progressing  Goal: Turns in bed with improved pain control throughout the shift  Outcome: Progressing  Goal: Walks with improved pain control throughout the shift  Outcome: Progressing  Goal: Performs ADL's with improved pain control throughout shift  Outcome: Progressing  Goal: Participates in PT with improved pain control throughout the shift  Outcome: Progressing  Goal: Free from opioid side effects throughout the shift  Outcome: Progressing  Goal: Free from acute confusion related to pain meds throughout the shift  Outcome: Progressing

## 2025-02-03 NOTE — PROGRESS NOTES
Vancomycin Dosing by Pharmacy- Cessation of Therapy    Consult to pharmacy for vancomycin dosing has been discontinued by the prescriber, pharmacy will sign off at this time.    Please call pharmacy if there are further questions or re-enter a consult if vancomycin is resumed.     Aleshia Del Valle, PharmD

## 2025-02-04 ENCOUNTER — APPOINTMENT (OUTPATIENT)
Dept: RADIOLOGY | Facility: HOSPITAL | Age: 75
End: 2025-02-04
Payer: COMMERCIAL

## 2025-02-04 LAB
ALBUMIN SERPL BCP-MCNC: 3.1 G/DL (ref 3.4–5)
ANION GAP SERPL CALC-SCNC: 15 MMOL/L (ref 10–20)
BUN SERPL-MCNC: 59 MG/DL (ref 6–23)
CALCIUM SERPL-MCNC: 9.2 MG/DL (ref 8.6–10.6)
CHLORIDE SERPL-SCNC: 106 MMOL/L (ref 98–107)
CO2 SERPL-SCNC: 29 MMOL/L (ref 21–32)
CREAT SERPL-MCNC: 2.52 MG/DL (ref 0.5–1.3)
EGFRCR SERPLBLD CKD-EPI 2021: 26 ML/MIN/1.73M*2
ERYTHROCYTE [DISTWIDTH] IN BLOOD BY AUTOMATED COUNT: 13.9 % (ref 11.5–14.5)
GLUCOSE SERPL-MCNC: 77 MG/DL (ref 74–99)
HCT VFR BLD AUTO: 25.6 % (ref 41–52)
HGB BLD-MCNC: 7.7 G/DL (ref 13.5–17.5)
MAGNESIUM SERPL-MCNC: 2.03 MG/DL (ref 1.6–2.4)
MCH RBC QN AUTO: 30.6 PG (ref 26–34)
MCHC RBC AUTO-ENTMCNC: 30.1 G/DL (ref 32–36)
MCV RBC AUTO: 102 FL (ref 80–100)
NRBC BLD-RTO: 0 /100 WBCS (ref 0–0)
PHOSPHATE SERPL-MCNC: 3.3 MG/DL (ref 2.5–4.9)
PLATELET # BLD AUTO: 158 X10*3/UL (ref 150–450)
POTASSIUM SERPL-SCNC: 4.2 MMOL/L (ref 3.5–5.3)
RBC # BLD AUTO: 2.52 X10*6/UL (ref 4.5–5.9)
SODIUM SERPL-SCNC: 146 MMOL/L (ref 136–145)
WBC # BLD AUTO: 2.4 X10*3/UL (ref 4.4–11.3)

## 2025-02-04 PROCEDURE — 83735 ASSAY OF MAGNESIUM: CPT

## 2025-02-04 PROCEDURE — 2500000004 HC RX 250 GENERAL PHARMACY W/ HCPCS (ALT 636 FOR OP/ED)

## 2025-02-04 PROCEDURE — 99232 SBSQ HOSP IP/OBS MODERATE 35: CPT | Performed by: NURSE PRACTITIONER

## 2025-02-04 PROCEDURE — 43761 REPOSITION GASTROSTOMY TUBE: CPT

## 2025-02-04 PROCEDURE — 1100000001 HC PRIVATE ROOM DAILY

## 2025-02-04 PROCEDURE — 99232 SBSQ HOSP IP/OBS MODERATE 35: CPT | Performed by: OTOLARYNGOLOGY

## 2025-02-04 PROCEDURE — 85027 COMPLETE CBC AUTOMATED: CPT

## 2025-02-04 PROCEDURE — 80069 RENAL FUNCTION PANEL: CPT

## 2025-02-04 PROCEDURE — 2550000001 HC RX 255 CONTRASTS: Performed by: EMERGENCY MEDICINE

## 2025-02-04 PROCEDURE — 2500000004 HC RX 250 GENERAL PHARMACY W/ HCPCS (ALT 636 FOR OP/ED): Performed by: NURSE PRACTITIONER

## 2025-02-04 PROCEDURE — 36415 COLL VENOUS BLD VENIPUNCTURE: CPT

## 2025-02-04 RX ORDER — DIATRIZOATE MEGLUMINE AND DIATRIZOATE SODIUM 660; 100 MG/ML; MG/ML
60 SOLUTION ORAL; RECTAL ONCE
Status: COMPLETED | OUTPATIENT
Start: 2025-02-04 | End: 2025-02-04

## 2025-02-04 RX ORDER — SODIUM CHLORIDE, SODIUM LACTATE, POTASSIUM CHLORIDE, CALCIUM CHLORIDE 600; 310; 30; 20 MG/100ML; MG/100ML; MG/100ML; MG/100ML
100 INJECTION, SOLUTION INTRAVENOUS CONTINUOUS
Status: DISCONTINUED | OUTPATIENT
Start: 2025-02-04 | End: 2025-02-04

## 2025-02-04 RX ORDER — LABETALOL HYDROCHLORIDE 5 MG/ML
10 INJECTION, SOLUTION INTRAVENOUS ONCE
Status: COMPLETED | OUTPATIENT
Start: 2025-02-04 | End: 2025-02-04

## 2025-02-04 RX ADMIN — DIATRIZOATE MEGLUMINE AND DIATRIZOATE SODIUM 60 ML: 660; 100 LIQUID ORAL; RECTAL at 13:38

## 2025-02-04 RX ADMIN — SODIUM CHLORIDE, POTASSIUM CHLORIDE, SODIUM LACTATE AND CALCIUM CHLORIDE 100 ML/HR: 600; 310; 30; 20 INJECTION, SOLUTION INTRAVENOUS at 10:54

## 2025-02-04 RX ADMIN — HYDROMORPHONE HYDROCHLORIDE 0.2 MG: 0.2 INJECTION, SOLUTION INTRAMUSCULAR; INTRAVENOUS; SUBCUTANEOUS at 08:38

## 2025-02-04 RX ADMIN — PANTOPRAZOLE SODIUM 40 MG: 40 INJECTION, POWDER, FOR SOLUTION INTRAVENOUS at 06:32

## 2025-02-04 RX ADMIN — HEPARIN SODIUM 5000 UNITS: 5000 INJECTION, SOLUTION INTRAVENOUS; SUBCUTANEOUS at 08:38

## 2025-02-04 RX ADMIN — HEPARIN SODIUM 5000 UNITS: 5000 INJECTION, SOLUTION INTRAVENOUS; SUBCUTANEOUS at 16:44

## 2025-02-04 RX ADMIN — HEPARIN SODIUM 5000 UNITS: 5000 INJECTION, SOLUTION INTRAVENOUS; SUBCUTANEOUS at 02:18

## 2025-02-04 RX ADMIN — LABETALOL HYDROCHLORIDE 10 MG: 5 INJECTION INTRAVENOUS at 15:00

## 2025-02-04 ASSESSMENT — COGNITIVE AND FUNCTIONAL STATUS - GENERAL
DAILY ACTIVITIY SCORE: 23
MOBILITY SCORE: 24
EATING MEALS: A LITTLE

## 2025-02-04 ASSESSMENT — PAIN SCALES - GENERAL
PAINLEVEL_OUTOF10: 5 - MODERATE PAIN
PAINLEVEL_OUTOF10: 7
PAINLEVEL_OUTOF10: 7
PAINLEVEL_OUTOF10: 1

## 2025-02-04 ASSESSMENT — PAIN DESCRIPTION - DESCRIPTORS: DESCRIPTORS: DISCOMFORT

## 2025-02-04 ASSESSMENT — PAIN - FUNCTIONAL ASSESSMENT
PAIN_FUNCTIONAL_ASSESSMENT: 0-10
PAIN_FUNCTIONAL_ASSESSMENT: 0-10

## 2025-02-04 NOTE — PROGRESS NOTES
"[facility]  ACUTE CARE SURGERY - PROGRESS NOTE    Patient Name: Mk Fleming  MRN: [id number]  Admit Date: 131  : 1950  AGE: 74 y.o.   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 Patient states that around 3 days ago, his \"guts fell out\" and he was having leakage around the PEG site with feeds, which prompted him to come to the ED. He was last seen by GI outpatient 25 who noted that PEG was in place and he was gaining weight with tube feeds. Last peg dislodgement appears to be 2024, for which PEG was replaced bedside in the ED without issue. Patient states he is passing gas and having BM. Has mild abdominal pain, tenderness to palpation around peg site. CT scan on  showed tract has been maintained, evaluating ability of surrounding tissue to hold replaced PEG      NEURO:   - pain control with dilaudid    CV: Hx of HLD, HTN,   - Hold home BP meds in setting of loss of enteral access.   - vital signs qshift     PULM: Hx of COPD,   - Continue home inhalers (Albuterol, Flonase)    GI: Peg replaced at bedside on  with confirmation of tube study  Peg tube with leakage on -2/3  - Plan for GI fluoro study to check for placement and leakage  - Keep NPO  - Hold tube feeds for now  - hold home po PPI. IV ppi while NPO  - Appreciate ENT evaluation    :  - CR stable at 2.5 (baseline 2-2.5)   - Replete electrolytes to potassium of 4.0, magnesium of 2.0.  -LR @100 while NPO    HEME/ID: no perforation of the stomach  - Vanc, zosyn discontinued 2/3   - continue to trend " WBC and monitor SIRS    ENDO:  - hold home synthyroid in setting of NPO status    DVT Proph:  - SCDs, SQH    Dispo: Continue RNF    Patient discussed with Attending Dr. Dominique Oropeza, APRN-[facility]  Acute Care Surgery [phone]    Total time spent on assessment and plan of care approximately thirty minutes   ==============================================================================  CHIEF COMPLAINT / EVENTS LAST 24HRS / HPI:  NAEO. Patient continues to have leakage around PEG tube. Otherwise no complaints     MEDICAL HISTORY / ROS:   Admission history reviewed. Pertinent changes as follows:  None.         Objective   Vital Signs past 24h:  Heart Rate:  [79-92]   Temp:  [35.9 °C (96.6 °F)-37.3 °C (99.1 °F)]   Resp:  [18]   BP: (135-172)/(71-89)   SpO2:  [95 %-99 %]     I/O past 24h:  I/O last 2 completed shifts:  In: 290 (4.7 mL/kg) [P.O.:240; IV Piggyback:50]  Out: 875 (14.3 mL/kg) [Urine:875 (0.6 mL/kg/hr)]  Weight: 61.2 kg      PHYSICAL EXAM:  GEN: No acute distress. Alert, awake and conversant.  HEENT: Sclera anicteric. Dry mucous membranes.  RESP: Breathing non-labored, equal chest rise. On RA.  CV: non cyanotic   GI: Abdomen soft, non distended, non tender to palpitation, PEG tube/briscoe in place with dressing in place (some leakage noted on dressing)  : deferred  MSK: No gross deformities. Moves all extremities spontaneously.  NEURO: Alert and oriented   PSYCH: Appropriate mood and affect.    Labs past 24h:  Results for orders placed or performed during the hospital encounter of 01/31/25 (from the past 24 hours)   CBC   Result Value Ref Range    WBC 2.4 (L) 4.4 - 11.3 x10*3/uL    nRBC 0.0 0.0 - 0.0 /100 WBCs    RBC 2.52 (L) 4.50 - 5.90 x10*6/uL    Hemoglobin 7.7 (L) 13.5 - 17.5 g/dL    Hematocrit 25.6 (L) 41.0 - 52.0 %     (H) 80 - 100 fL    MCH 30.6 26.0 - 34.0 pg    MCHC 30.1 (L) 32.0 - 36.0 g/dL    RDW 13.9 11.5 - 14.5 %    Platelets 158 150 - 450 x10*3/uL   Renal Function Panel   Result  Value Ref Range    Glucose 77 74 - 99 mg/dL    Sodium 146 (H) 136 - 145 mmol/L    Potassium 4.2 3.5 - 5.3 mmol/L    Chloride 106 98 - 107 mmol/L    Bicarbonate 29 21 - 32 mmol/L    Anion Gap 15 10 - 20 mmol/L    Urea Nitrogen 59 (H) 6 - 23 mg/dL    Creatinine 2.52 (H) 0.50 - 1.30 mg/dL    eGFR 26 (L) >60 mL/min/1.73m*2    Calcium 9.2 8.6 - 10.6 mg/dL    Phosphorus 3.3 2.5 - 4.9 mg/dL    Albumin 3.1 (L) 3.4 - 5.0 g/dL   Magnesium   Result Value Ref Range    Magnesium 2.03 1.60 - 2.40 mg/dL     *Note: Due to a large number of results and/or encounters for the requested time period, some results have not been displayed. A complete set of results can be found in Results Review.       Imaging within past 24h:  No new imaging

## 2025-02-04 NOTE — CARE PLAN
The patient's goals for the shift include Pt pain will be controlled    The clinical goals for the shift include patient will have decreased pain score by the end of this shift      Problem: Pain  Goal: Takes deep breaths with improved pain control throughout the shift  Outcome: Progressing  Goal: Turns in bed with improved pain control throughout the shift  Outcome: Progressing  Goal: Walks with improved pain control throughout the shift  Outcome: Progressing  Goal: Performs ADL's with improved pain control throughout shift  Outcome: Progressing  Goal: Participates in PT with improved pain control throughout the shift  Outcome: Progressing  Goal: Free from opioid side effects throughout the shift  Outcome: Progressing  Goal: Free from acute confusion related to pain meds throughout the shift  Outcome: Progressing

## 2025-02-04 NOTE — PROGRESS NOTES
ENT DAILY PROGRESS NOTE  Name: Mk Fleming  MRN: [id number]  : 1950    Identification Statement  The patient is a 74 y.o. male with past medical history significant for but not limited to T4 laryngeal cancer treated with chemo/rad in 2019, now PEG dependent. Patient is followed by ENTs, Dr. Sprague and Dr. Barr. He has a history of PEG dislodgement, with most recent episode on 25 for which ENT was asked to evaluate patient since he has been followed outpatient by ENT for his PEG. PEG was replaced on , but patient continues to have leakage from PEG.     Subjective  Patient seen and examined. He continues to report leakage from around PEG since it was exchanged. Denies any other complaints. Plan for GI fluoro study today for further assessment of PEG.    Objective  Temp:  [35.9 °C (96.6 °F)-37.3 °C (99.1 °F)] 36 °C (96.8 °F)  Heart Rate:  [79-92] 89  Resp:  [18] 18  BP: (144-172)/(71-89) 167/72  Gen: Alert, oriented, no acute distress, conversational  Resp: Breathing comfortably on room air, no stridor  Head and Face: no obvious masses or lesions  Oral Cavity: MMM  Ears: Normal external ears   Nose: External nose midline   Abdomen: PEG present with gauze dressing with leakage. Mild tenderness to palpation.   Psych: appropriate mood and effect.         Intake/Output Summary (Last 24 hours) at 2025 1315  Last data filed at 2025 1211  Gross per 24 hour   Intake 240 ml   Output 860 ml   Net -620 ml       Labs  Results for orders placed or performed during the hospital encounter of 25 (from the past 24 hours)   CBC   Result Value Ref Range    WBC 2.4 (L) 4.4 - 11.3 x10*3/uL    nRBC 0.0 0.0 - 0.0 /100 WBCs    RBC 2.52 (L) 4.50 - 5.90 x10*6/uL    Hemoglobin 7.7 (L) 13.5 - 17.5 g/dL    Hematocrit 25.6 (L) 41.0 - 52.0 %     (H) 80 - 100 fL    MCH 30.6 26.0 - 34.0 pg    MCHC 30.1 (L) 32.0 - 36.0 g/dL    RDW 13.9 11.5 - 14.5 %    Platelets 158 150 - 450 x10*3/uL   Renal Function Panel    Result Value Ref Range    Glucose 77 74 - 99 mg/dL    Sodium 146 (H) 136 - 145 mmol/L    Potassium 4.2 3.5 - 5.3 mmol/L    Chloride 106 98 - 107 mmol/L    Bicarbonate 29 21 - 32 mmol/L    Anion Gap 15 10 - 20 mmol/L    Urea Nitrogen 59 (H) 6 - 23 mg/dL    Creatinine 2.52 (H) 0.50 - 1.30 mg/dL    eGFR 26 (L) >60 mL/min/1.73m*2    Calcium 9.2 8.6 - 10.6 mg/dL    Phosphorus 3.3 2.5 - 4.9 mg/dL    Albumin 3.1 (L) 3.4 - 5.0 g/dL   Magnesium   Result Value Ref Range    Magnesium 2.03 1.60 - 2.40 mg/dL       Assessment  Mk Fleming is a 74 y.o. male with past medical history significant for but not limited to T4 laryngeal cancer treated with chemo/rad in 2019, now PEG dependent. Patient has had multiple PEG dislodgements. Most recent PEG replacement on 1/31, but patient continues to have leakage from around PEG. Denies additional concerns.     Recommendations:  -continued care per primary team  -management of PEG tube while inpatient per GI/ACS  -please reach out to ENT with questions/concerns  -patient scheduled for ENT follow-up with Dr. Barr on 2/7/25; potential to reschedule pending resolution of PEG leakage. Please reach out to ENT once discharge date is known.  -patient scheduled for ENT follow-up with Dr. Sprague, please include appointment information in paperwork upon discharge    Patient seen, examined and case discussed with staff attending, Dr. Barr.    Marely Huang PA-C  Otolaryngology- Head & Neck Surgery    ENT Consult pager: [phone]  Please page if urgent    I saw the patient with the nurse practitioner.  For some reason he continues to have issues with his PEG tube.  He is getting some further testing.  I am sure that my colleagues in general surgery will manage the issue.    Mahamed Barr

## 2025-02-04 NOTE — CARE PLAN
The patient's goals for the shift include Pt pain will be controlled    The clinical goals for the shift include pain score will be decreased by the end of this shift

## 2025-02-05 VITALS
BODY MASS INDEX: 21.68 KG/M2 | DIASTOLIC BLOOD PRESSURE: 78 MMHG | HEIGHT: 66 IN | TEMPERATURE: 98.4 F | HEART RATE: 89 BPM | WEIGHT: 134.92 LBS | SYSTOLIC BLOOD PRESSURE: 180 MMHG | OXYGEN SATURATION: 96 % | RESPIRATION RATE: 18 BRPM

## 2025-02-05 LAB
ALBUMIN SERPL BCP-MCNC: 3.1 G/DL (ref 3.4–5)
ANION GAP SERPL CALC-SCNC: 15 MMOL/L (ref 10–20)
BUN SERPL-MCNC: 46 MG/DL (ref 6–23)
CALCIUM SERPL-MCNC: 9.2 MG/DL (ref 8.6–10.6)
CHLORIDE SERPL-SCNC: 110 MMOL/L (ref 98–107)
CO2 SERPL-SCNC: 27 MMOL/L (ref 21–32)
CREAT SERPL-MCNC: 2.08 MG/DL (ref 0.5–1.3)
EGFRCR SERPLBLD CKD-EPI 2021: 33 ML/MIN/1.73M*2
ERYTHROCYTE [DISTWIDTH] IN BLOOD BY AUTOMATED COUNT: 14.3 % (ref 11.5–14.5)
GLUCOSE SERPL-MCNC: 81 MG/DL (ref 74–99)
HCT VFR BLD AUTO: 24.3 % (ref 41–52)
HGB BLD-MCNC: 7.4 G/DL (ref 13.5–17.5)
MAGNESIUM SERPL-MCNC: 2.03 MG/DL (ref 1.6–2.4)
MCH RBC QN AUTO: 31 PG (ref 26–34)
MCHC RBC AUTO-ENTMCNC: 30.5 G/DL (ref 32–36)
MCV RBC AUTO: 102 FL (ref 80–100)
NRBC BLD-RTO: 0 /100 WBCS (ref 0–0)
PHOSPHATE SERPL-MCNC: 2.8 MG/DL (ref 2.5–4.9)
PLATELET # BLD AUTO: 151 X10*3/UL (ref 150–450)
POTASSIUM SERPL-SCNC: 3.9 MMOL/L (ref 3.5–5.3)
RBC # BLD AUTO: 2.39 X10*6/UL (ref 4.5–5.9)
SODIUM SERPL-SCNC: 148 MMOL/L (ref 136–145)
WBC # BLD AUTO: 2.8 X10*3/UL (ref 4.4–11.3)

## 2025-02-05 PROCEDURE — 2500000004 HC RX 250 GENERAL PHARMACY W/ HCPCS (ALT 636 FOR OP/ED)

## 2025-02-05 PROCEDURE — 2500000001 HC RX 250 WO HCPCS SELF ADMINISTERED DRUGS (ALT 637 FOR MEDICARE OP): Performed by: NURSE PRACTITIONER

## 2025-02-05 PROCEDURE — 85027 COMPLETE CBC AUTOMATED: CPT

## 2025-02-05 PROCEDURE — 99239 HOSP IP/OBS DSCHRG MGMT >30: CPT | Performed by: NURSE PRACTITIONER

## 2025-02-05 PROCEDURE — 84100 ASSAY OF PHOSPHORUS: CPT

## 2025-02-05 PROCEDURE — 99231 SBSQ HOSP IP/OBS SF/LOW 25: CPT | Performed by: SURGERY

## 2025-02-05 PROCEDURE — 83735 ASSAY OF MAGNESIUM: CPT

## 2025-02-05 PROCEDURE — 36415 COLL VENOUS BLD VENIPUNCTURE: CPT

## 2025-02-05 PROCEDURE — 2500000002 HC RX 250 W HCPCS SELF ADMINISTERED DRUGS (ALT 637 FOR MEDICARE OP, ALT 636 FOR OP/ED): Performed by: NURSE PRACTITIONER

## 2025-02-05 RX ADMIN — LOSARTAN POTASSIUM 50 MG: 50 TABLET, FILM COATED ORAL at 09:04

## 2025-02-05 RX ADMIN — ATORVASTATIN CALCIUM 10 MG: 10 TABLET, FILM COATED ORAL at 09:04

## 2025-02-05 RX ADMIN — TERAZOSIN HYDROCHLORIDE 5 MG: 5 CAPSULE ORAL at 09:04

## 2025-02-05 RX ADMIN — ATENOLOL 50 MG: 50 TABLET ORAL at 09:04

## 2025-02-05 RX ADMIN — HYDROMORPHONE HYDROCHLORIDE 0.2 MG: 0.2 INJECTION, SOLUTION INTRAMUSCULAR; INTRAVENOUS; SUBCUTANEOUS at 06:27

## 2025-02-05 RX ADMIN — HEPARIN SODIUM 5000 UNITS: 5000 INJECTION, SOLUTION INTRAVENOUS; SUBCUTANEOUS at 02:29

## 2025-02-05 RX ADMIN — PANTOPRAZOLE SODIUM 40 MG: 40 INJECTION, POWDER, FOR SOLUTION INTRAVENOUS at 06:26

## 2025-02-05 RX ADMIN — LEVOTHYROXINE SODIUM 50 MCG: 0.05 TABLET ORAL at 06:26

## 2025-02-05 RX ADMIN — HEPARIN SODIUM 5000 UNITS: 5000 INJECTION, SOLUTION INTRAVENOUS; SUBCUTANEOUS at 09:04

## 2025-02-05 ASSESSMENT — PAIN SCALES - GENERAL
PAINLEVEL_OUTOF10: 8
PAINLEVEL_OUTOF10: 0 - NO PAIN

## 2025-02-05 ASSESSMENT — PAIN DESCRIPTION - LOCATION: LOCATION: ABDOMEN

## 2025-02-05 NOTE — CARE PLAN
The patient's goals for the shift include remaining HDS throughout shift.    Problem: Pain  Goal: Takes deep breaths with improved pain control throughout the shift  Outcome: Met  Goal: Turns in bed with improved pain control throughout the shift  Outcome: Met  Goal: Walks with improved pain control throughout the shift  Outcome: Met  Goal: Performs ADL's with improved pain control throughout shift  Outcome: Met  Goal: Participates in PT with improved pain control throughout the shift  Outcome: Met  Goal: Free from opioid side effects throughout the shift  Outcome: Met  Goal: Free from acute confusion related to pain meds throughout the shift  Outcome: Met     Problem: Fall/Injury  Goal: Not fall by end of shift  Outcome: Met  Goal: Be free from injury by end of the shift  Outcome: Met  Goal: Verbalize understanding of personal risk factors for fall in the hospital  Outcome: Met  Goal: Verbalize understanding of risk factor reduction measures to prevent injury from fall in the home  Outcome: Met  Goal: Use assistive devices by end of the shift  Outcome: Met  Goal: Pace activities to prevent fatigue by end of the shift  Outcome: Met

## 2025-02-05 NOTE — HOSPITAL COURSE
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 Patient states that around 3 days ago, his \"guts fell out\" and he was having leakage around the PEG site with feeds, which prompted him to come to the ED. He was last seen by GI outpatient 1/8/25 who noted that PEG was in place and he was gaining weight with tube feeds. Last peg dislodgement appears to be 8/2024, for which PEG was replaced bedside in the ED without issue.   CT showed dislodged and embedded percutaneous gastrotomy balloon and stomal length within the left upper abdominal subcutaneous and intramuscular space. Szymanski replaced into tract however continued to leak. On 2/4 patients tube replaced with peg tube replacement kit consisting of a 20 Fr Capsule Dome Pre-loaded replacement gastrostomy tube. Formal tube study completed showing contrast filling the stomach and small bowel loops without evidence of leak around the gastrostomy tube.   Patient restarted on his bolus tube feeds and tolerating well without leakage noted. Patient seen and evaluated by Dr. Barr's team and no further plans while inpatient. Has a previously scheduled appointment on 2/7/25 but will be rescheduled as patient was just evaluated by staff while in-house.   Patient is being discharged home without any further needs. He states he has all of his home Boost supplements and medications already at home.    "

## 2025-02-05 NOTE — INDIVIDUALIZED OVERALL PLAN OF CARE NOTE
TCC has reviewed patient's chart and assessed that there are no discharge planning needs at this time.  The following was reviewed to determine no TCC/SW needs warranted.  1).  PT/OT evaluations indicate pt can DC home with no needs or PT/OT evaluations are not warranted.  2).  No wound care or IV Antibiotics indicated at this time.    3).  No TCC/SW consults placed through nursing admission screen  Care Coordination will continue following patient for further discharge needs as warranted.     DARRON Liu, RN  [facility], [facility]  Transitional Care Coordinator, Mon-Fri  Cell: [phone], Office: [phone]  Email: [email or web address]

## 2025-02-05 NOTE — DISCHARGE SUMMARY
"Discharge Diagnosis  PEG tube malfunction (Multi)    Issues Requiring Follow-Up  Follow up with ENT as previously scheduled    Test Results Pending At Discharge  Pending Labs       No current pending labs.            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 Patient states that around 3 days ago, his \"guts fell out\" and he was having leakage around the PEG site with feeds, which prompted him to come to the ED. He was last seen by GI outpatient 1/8/25 who noted that PEG was in place and he was gaining weight with tube feeds. Last peg dislodgement appears to be 8/2024, for which PEG was replaced bedside in the ED without issue.   CT showed dislodged and embedded percutaneous gastrotomy balloon and stomal length within the left upper abdominal subcutaneous and intramuscular space. Szymanski replaced into tract however continued to leak. On 2/4 patients tube replaced with peg tube replacement kit consisting of a 20 Fr Capsule Dome Pre-loaded replacement gastrostomy tube. Formal tube study completed showing contrast filling the stomach and small bowel loops without evidence of leak around the gastrostomy tube.   Patient restarted on his bolus tube feeds and tolerating well without leakage noted. Patient seen and evaluated by Dr. Barr's team and no further plans while inpatient. Has a previously scheduled appointment on 2/7/25 but will be rescheduled as patient was just evaluated by staff while in-house.   Patient is being discharged home without any further needs. He states he has all of his home Boost supplements and medications already at home.      Total time spent on discharge planning and education approximately " forty-five minutes     Pertinent Physical Exam At Time of Discharge  Physical Exam  Constitutional:       Appearance: Normal appearance.   Eyes:      Extraocular Movements: Extraocular movements intact.   Cardiovascular:      Comments: Non cyanotic  Pulmonary:      Effort: Pulmonary effort is normal.   Abdominal:      Comments: Soft, non distended, non tender to palpitation. No leakage noted around PEG site. Tube site appears clean without any erythema or drainage.    Musculoskeletal:         General: Normal range of motion.   Skin:     General: Skin is warm and dry.   Neurological:      Mental Status: He is alert and oriented to person, place, and time.   Psychiatric:         Behavior: Behavior normal.         Home Medications     Medication List      CHANGE how you take these medications     atorvastatin 10 mg tablet; Commonly known as: Lipitor; What changed:   when to take this     CONTINUE taking these medications     * albuterol 2.5 mg /3 mL (0.083 %) nebulizer solution   * albuterol 90 mcg/actuation inhaler   amLODIPine 5 mg tablet; Commonly known as: Norvasc   atenolol 50 mg tablet; Commonly known as: Tenormin   chlorhexidine 0.12 % solution; Commonly known as: Peridex   esomeprazole 20 mg packet; Commonly known as: NexIUM; Take 20 mg by   mouth once daily in the morning. Take before meals.   fluticasone 50 mcg/actuation nasal spray; Commonly known as: Flonase   hydroCHLOROthiazide 25 mg tablet; Commonly known as: HYDRODiuril   levothyroxine 50 mcg tablet; Commonly known as: Synthroid, Levoxyl   losartan 50 mg tablet; Commonly known as: Cozaar   methocarbamol 500 mg tablet; Commonly known as: Robaxin; 1 tablet (500   mg) by g-tube route every 8 hours for 7 days.   Miralax 17 gram/dose powder; Generic drug: polyethylene glycol   naloxone 4 mg/0.1 mL nasal spray; Commonly known as: Narcan; Administer   1 spray (4 mg) into affected nostril(s) if needed for opioid reversal or   respiratory depression. May repeat  every 2-3 minutes if needed,   alternating nostrils, until medical assistance becomes available.   oxyCODONE 5 mg immediate release tablet; Commonly known as: Roxicodone   prochlorperazine 10 mg tablet; Commonly known as: Compazine   sucralfate 100 mg/mL suspension; Commonly known as: Carafate   terazosin 5 mg capsule; Commonly known as: Hytrin  * This list has 2 medication(s) that are the same as other medications   prescribed for you. Read the directions carefully, and ask your doctor or   other care provider to review them with you.       Outpatient Follow-Up  Future Appointments   Date Time Provider Department [facility]   2/7/2025 11:30 AM Mahamed Barr MD [id number] [facility]   2/10/2025 11:00 AM Rama Ramirez RDN, LD [id number] [facility]   2/24/2025  1:00 PM Amrik Sprague MD [id number] [facility]   6/25/2025 11:40 AM Paddy Gallardo MD [id number] [facility]       Chad Oropeza APRN-CNP

## 2025-02-05 NOTE — CARE PLAN
Problem: Pain  Goal: Takes deep breaths with improved pain control throughout the shift  Outcome: Progressing  Goal: Turns in bed with improved pain control throughout the shift  Outcome: Progressing  Goal: Walks with improved pain control throughout the shift  Outcome: Progressing  Goal: Performs ADL's with improved pain control throughout shift  Outcome: Progressing  Goal: Participates in PT with improved pain control throughout the shift  Outcome: Progressing  Goal: Free from opioid side effects throughout the shift  Outcome: Progressing  Goal: Free from acute confusion related to pain meds throughout the shift  Outcome: Progressing   The patient's goals for the shift include Pt pain will be controlled    The clinical goals for the shift include patient will have decreased pain score by the end of this shift

## 2025-02-05 NOTE — DISCHARGE INSTRUCTIONS
Your appointment with Dr. Barr will be rescheduled.   Please do not miss your follow up appointment with Dr. Sprague.

## 2025-02-05 NOTE — SIGNIFICANT EVENT
Replaced PEG tube with 20 Fr Capsule Dome Pre-loaded replacement gastrostomy tube. Lot [id number]. Also used DANICA PEG replacement feeding adatpor.   Photo below of tube used:    Tube study pending.     Eula Cole MD

## 2025-02-06 NOTE — PROGRESS NOTES
75 yo male with dislodged PEG tube.   Team replaced tube yesterday with his desired tube.   Imaging showed:  Contrast is seen to fill the stomach and small bowel loops without  evidence of leak around the gastrostomy tube.  On exam, G tube in place at 3.5 cm. Minimal drainage.   Plan to continue G tube with feeds and meds. Local wound care. OK for discharge.

## 2025-02-07 ENCOUNTER — APPOINTMENT (OUTPATIENT)
Dept: OTOLARYNGOLOGY | Facility: HOSPITAL | Age: 75
End: 2025-02-07
Payer: COMMERCIAL

## 2025-02-10 ENCOUNTER — APPOINTMENT (OUTPATIENT)
Dept: HEMATOLOGY/ONCOLOGY | Facility: CLINIC | Age: 75
End: 2025-02-10
Payer: COMMERCIAL

## 2025-02-12 NOTE — DOCUMENTATION CLARIFICATION NOTE
PATIENT:               ADILIA BHANDARI  ACCT #:                  [id number]  MRN:                       [id number]  :                       1950  ADMIT DATE:       2025 9:54 AM  DISCH DATE:        2025 11:12 AM  RESPONDING PROVIDER #:        [id number]          PROVIDER RESPONSE TEXT:    GUY on CKD    CDI QUERY TEXT:    Clarification        Instruction:    Based on your assessment of the patient and the clinical information, please provide the requested documentation by clicking on the appropriate radio button and enter any additional information if prompted.    Question: Please clarify a diagnosis for the patient renal status    When answering this query, please exercise your independent professional judgment. The fact that a question is being asked, does not imply that any particular answer is desired or expected.    The patient's clinical indicators include:  Clinical Information: 74 yr old male  hx HTN, CKD, supraglottic SCC and with PEG tube who presents with PEG tube dislodgement, abd pain and gastric mucosa prolapse.    Clinical Indicators: Baseline creat 2-2.5,  Creat on admit   -  3,28   - 3.48  2/ - 3.65   - 2.08    Treatment:  IV LR at 100/hr through . serial creat    Risk Factors: CKD, cirrhosis, frequent PEG tube dislodgements  Options provided:  -- Acute Kidney Injury  -- Chronic Kidney Disease, Please specify stage below  -- GUY on CKD, Please specify stage below  -- Other - I will add my own diagnosis  -- Refer to Clinical Documentation Reviewer    Query created by: Marifer Gonzalez on 2025 8:21 AM      Electronically signed by:  ANGELINE AMAYA DO 2025 1:06 PM

## 2025-02-24 ENCOUNTER — OFFICE VISIT (OUTPATIENT)
Dept: OTOLARYNGOLOGY | Facility: HOSPITAL | Age: 75
End: 2025-02-24
Payer: COMMERCIAL

## 2025-02-24 ENCOUNTER — LAB (OUTPATIENT)
Dept: LAB | Facility: HOSPITAL | Age: 75
End: 2025-02-24
Payer: COMMERCIAL

## 2025-02-24 VITALS — TEMPERATURE: 97.3 F | HEIGHT: 66 IN | WEIGHT: 139.1 LBS | BODY MASS INDEX: 22.35 KG/M2

## 2025-02-24 DIAGNOSIS — R13.12 OROPHARYNGEAL DYSPHAGIA: ICD-10-CM

## 2025-02-24 DIAGNOSIS — Z08 ENCOUNTER FOR FOLLOW-UP SURVEILLANCE OF LARYNGEAL CANCER: ICD-10-CM

## 2025-02-24 DIAGNOSIS — C76.0 HEAD AND NECK CANCER (MULTI): ICD-10-CM

## 2025-02-24 DIAGNOSIS — Z85.21 ENCOUNTER FOR FOLLOW-UP SURVEILLANCE OF LARYNGEAL CANCER: Primary | ICD-10-CM

## 2025-02-24 DIAGNOSIS — K76.9 LIVER DISEASE, CHRONIC, WITH CIRRHOSIS (MULTI): ICD-10-CM

## 2025-02-24 DIAGNOSIS — E03.9 ACQUIRED HYPOTHYROIDISM: ICD-10-CM

## 2025-02-24 DIAGNOSIS — Z85.21 HX OF LARYNGEAL CANCER: ICD-10-CM

## 2025-02-24 DIAGNOSIS — K74.60 LIVER DISEASE, CHRONIC, WITH CIRRHOSIS (MULTI): ICD-10-CM

## 2025-02-24 DIAGNOSIS — Z85.21 ENCOUNTER FOR FOLLOW-UP SURVEILLANCE OF LARYNGEAL CANCER: ICD-10-CM

## 2025-02-24 DIAGNOSIS — Z08 ENCOUNTER FOR FOLLOW-UP SURVEILLANCE OF LARYNGEAL CANCER: Primary | ICD-10-CM

## 2025-02-24 LAB
ALBUMIN SERPL BCP-MCNC: 4 G/DL (ref 3.4–5)
ALP SERPL-CCNC: 95 U/L (ref 33–136)
ALT SERPL W P-5'-P-CCNC: 17 U/L (ref 10–52)
ANION GAP SERPL CALC-SCNC: 13 MMOL/L (ref 10–20)
AST SERPL W P-5'-P-CCNC: 29 U/L (ref 9–39)
BILIRUB SERPL-MCNC: 0.6 MG/DL (ref 0–1.2)
BUN SERPL-MCNC: 75 MG/DL (ref 6–23)
CALCIUM SERPL-MCNC: 10 MG/DL (ref 8.6–10.3)
CHLORIDE SERPL-SCNC: 93 MMOL/L (ref 98–107)
CO2 SERPL-SCNC: 31 MMOL/L (ref 21–32)
CREAT SERPL-MCNC: 1.89 MG/DL (ref 0.5–1.3)
EGFRCR SERPLBLD CKD-EPI 2021: 37 ML/MIN/1.73M*2
GLUCOSE SERPL-MCNC: 103 MG/DL (ref 74–99)
HOLD SPECIMEN: NORMAL
POTASSIUM SERPL-SCNC: 4.6 MMOL/L (ref 3.5–5.3)
PROT SERPL-MCNC: 8.7 G/DL (ref 6.4–8.2)
SODIUM SERPL-SCNC: 132 MMOL/L (ref 136–145)
T4 FREE SERPL-MCNC: 1.15 NG/DL (ref 0.78–1.48)
TSH SERPL-ACNC: 5.54 MIU/L (ref 0.44–3.98)

## 2025-02-24 PROCEDURE — 36415 COLL VENOUS BLD VENIPUNCTURE: CPT

## 2025-02-24 PROCEDURE — 31579 LARYNGOSCOPY TELESCOPIC: CPT | Performed by: OTOLARYNGOLOGY

## 2025-02-24 PROCEDURE — 1159F MED LIST DOCD IN RCRD: CPT | Performed by: OTOLARYNGOLOGY

## 2025-02-24 PROCEDURE — 1111F DSCHRG MED/CURRENT MED MERGE: CPT | Performed by: OTOLARYNGOLOGY

## 2025-02-24 PROCEDURE — 99214 OFFICE O/P EST MOD 30 MIN: CPT | Mod: 25 | Performed by: OTOLARYNGOLOGY

## 2025-02-24 PROCEDURE — 99214 OFFICE O/P EST MOD 30 MIN: CPT | Performed by: OTOLARYNGOLOGY

## 2025-02-24 PROCEDURE — 3008F BODY MASS INDEX DOCD: CPT | Performed by: OTOLARYNGOLOGY

## 2025-02-24 PROCEDURE — 84439 ASSAY OF FREE THYROXINE: CPT

## 2025-02-24 PROCEDURE — 80053 COMPREHEN METABOLIC PANEL: CPT

## 2025-02-24 PROCEDURE — 84443 ASSAY THYROID STIM HORMONE: CPT

## 2025-02-24 PROCEDURE — 1126F AMNT PAIN NOTED NONE PRSNT: CPT | Performed by: OTOLARYNGOLOGY

## 2025-02-24 ASSESSMENT — PAIN SCALES - GENERAL: PAINLEVEL_OUTOF10: 0-NO PAIN

## 2025-02-24 NOTE — PROGRESS NOTES
ASSESSMENT AND PLAN:   Mk Fleming is a 74 y.o. male with a history of laryngeal cancer s/p chemotherapy and radiation. The patient recently had a feeding tube replaced. Recommended he receive a new TSH today.     Today's examination to include laryngoscopy and bronchoscopy demonstrates poor sensation below the trachea at the level of the vocal cords and improved sensation within the trachea.     Continue with current management and follow-up in 1 year for annual evaluation. Order TSH.     TSH: 5.54 (High)  T4: 1.15       Reason For Consult  Chief Complaint   Patient presents with    Follow-up        HISTORY OF PRESENT ILLNESS:  Mk Fleming is a 74 y.o. male presenting for a follow-up visit with me for history of laryngeal cancer.    Last TSH 3.05 on 7/22/24     S/p XRT with poor Larynx function and complete PEG dependency.  Follows with PL for PEG care.   + hypothyroidism. 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 Initially swallowed better with therapy but worsened over time.      Cancer surveillance has been MARICARMEN.     The patient reports he has not had throat pain. His weight has been stable. He has been doing well with his feeding tube since it was replaced.       Prior History:   Last seen 8/26/24 Assessment and Plan:  Mk Fleming is a 74 y.o. 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 Patient has lost about 5 lbs over last few months despite efforts to maintain weight.   Physical exam today finds woody neck. Significant secretions in the nasopharynx, nasopharyngeal incompetence, absent right epiglottis, and mobile VC. Insufficient on right VC. No concerning masses or lesions. TSH is stable.   Referral to nutritionist consult to work at weight gain. Patient is feeding tube dependent. Follow-up in 6 months.     On 4/22/24:  Mk Fleming is a 73 y.o. male with a history of XRT in 2019 for a T4 laryngeal  cancer. He has had sig dysphagia and is PEG dependent. His weight is stable at 130 bs.     Today's examination to include flexible laryngoscopy demonstrates no masses or lesions in the airway. He has mucosal changes in the anterior glottic inlet that are stable with a partial epiglottectomy on the right   We discussed findings. He is MARICARMEN. I would like to see the patient back in  6 months. We will obtain TSH and CXR. HE will be undergoing a procedure with Dr. Reza Salas and is scheduled for preop testing.     On 10/23/23:  Mk Fleming is a 73 y.o. male with a history of T4 laryngeal cancer status post chemoradiation.  The patient reports that his weight is stable and he is using his feeding tube without any difficulties.  He did have a replacement of his feeding tube not too long ago. Today's examination to include flexible laryngoscopy demonstrates absence of the right part of his epiglottis as well as significant secretions within the hypopharynx.  There are no concerning masses or lesions.  He has reduction of mobility and a breathy quality to his voice. We discussed continued observation with a 6-month follow-up.  I would like to see the patient back in 6 months.    On 4/24/23:  This is a 72 year old male with a history of dysphagia due to a nonfunctional larynx s/p treatment of T4 laryngeal cancer with chemoXRT. He has capacity to voice and he is breathing well. He is fairly insensate and at risk for aspiration PNA and is currently NPO and PEG dependent. Overall, he is doing well with woody neck. No lymphadenopathy or masses. He had a removal of right epiglottis secondary to cartilage in the area.   We discussed total laryngectomy to allow PO intake. He is not interested in this. We offered that he meets other laryngectomy patients and he declined. I will see him back in 6 months     On 10/24/2022:  This is a 71 year old male with a history of dysphagia s/p treatment of T4 laryngeal cancer with chemoXRT with  "PEG dependence and significant difficulty managing his own secretions over the past year. He had a PNA while were attempting free water protocol and I am hesitant to do this despite his urging to do this. We discussed consideration of seltzer water gargles with spitting to help clean his secretion form larynx and oropharynx. Laryngoscopy shows no concerning masses or lesions in the upper aerodigestive tract. We made recommendations for managing his PEG. He is being followed by GI and Dr. Barr for this. I will see him back in 6 months.      On 4/25/2022:  This is a 71 year old male previously seen by me on 12/07/2021 with dysphagia status post treatment of T4 laryngeal cancer with chemoxRT. He has a PEG tube placed by Dr. Barr and managed by him, He was seen by Dr. Barr in December 2021. He has been working with a SLP in the community and was doing well initially, however, over the last month he has regressed with aspiration and penetration with all thin liquids and severe penetration/mild aspiration with all other consistencies. He was placed on NPO and is here for evaluation. He reports that he needs a refill of getting Boost supplements. He reports that he has not \"eaten\" in two days. He is only taking in water. We discussed that other things may be used in the feeding tube that have calories. Is flushing with water.    Recall: 12/07/2021 This patient was treated with a combination chemotherapy and radiation therapy for the management of a T4 laryngeal cancer. He does have some issues with dysphagia. He has been peg dependent. He has been having some issues with his PEG tube which prompted this appointment. He has had some leakage. He has not been able to use his tube.     On 11/108/2021:  70 year old male with a history of a T4 SCC laryngeal cancer s/p chemoradiation. He has significant postXRT effects. He is NPO except for water wash intermittently. Physical examination to include laryngoscopy shows " poor sensation in the larynx. We were able to perform upper bronchoscopy without lidocaine. Inspection of the airway shows there were no concerning areas on exam. He has a woody neck without lymphadenopathy. He is currently MARICARMEN.   He will follow up with me in 6 months for annual screening. He is approximately 3 years out from his initial diagnosis.     On 7/12/2021:  70 year old male with a history of a T4 SCC laryngeal cancer s/p chemoradiation. 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 He ad a partial epiglottectomy. He is doing reasonably well on thickened liquids and purees. Supplements with PEG.   He has had a number of swallow evaluation and currently stable on his weight with supplementation of feeding tube and oral diet. Will have patient follow up with me in 4 months for repeat visualization. MARICARMEN     On 4/12/2021:  70 year old male with a history of a T4 SCC laryngeal cancer s/p chemoradiation. The patient has had significant dysphagia since his treatment. He is cleared for a modified diet with free water, thickened liquids, and purees. He has difficulty with his feeding tube and was lase seen by Dr. Barr and replacement of proximal adapter. He states that he still having some issues with this. He has not been flushing his system after use. We discussed strategies to improve the use of his feeding tube. He will calls us if this doesn't effectively reduce the problem. (see addendum below)   He has lost a few pounds since this problem started a few weeks ago. In regards to his visit today, we had no recurrence of tumor. He does have persistent fullness in ventricular spaces bilaterally, right >left and necrosis of right epiglottis that was previously resected. 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 The patient will call us with concerns of his feeding tube. I will see him back in 3 months for repeat evaluation.  Addendum: spoke with his SLP. The flush with coke cola  "has been effective. Patient had not tried this previously and was \"amazed\" that worked. SLP would like to perform an additional MBS at some point to evaluate where he is due to wt loss. Pt has been very concerned re aspiration risk and it is difficult to push him to try oral diet. No Hx of aspiration pneumonia since I have known him.     On 2/8/2021:  70 year old male with a history of a T4 laryngeal cancer s/p chemoradiation. He has been doing well from a cancer standpoint, with no evidence of recurrence. He does have severe dysphagia with aspiration of all consistencies. He is being managed by speech pathology at the [facility]. He had his most recent swallow evaluation last week. We do not have the current results of this yet. We discussed that he has significant mucus still persistent in hypopharynx and along the larynx but no concerning masses or lesions. We discussed continuing his free water protocol with increases of this throughout the day. The patient understands that this requires diligent oral hygiene. The patient will follow up with me in 2 months for repeat evaluation.     On 12/14/2020:  70 year old male a history of [id number] carcinoma of the larynx s/p epiglottis resection on right doing significantly better today than prior visits. Voice quality improved, overall appearance of the larynx is better. He actually has a vibratory wave on exam although limited. He has persistence of secretions in pyriform sinuses and lack of sensation throughout.   We performed a bronchoscopy with 2 cc of 4% lidocaine distributed. He was scheduled for KTP laser ablation today however due to findings on exam, which were improved, we did not complete the KTP laser. There is small focus of mildly erythematous tissue on the anterior right vocal fold, not overly concerning. Significantly improved vibratory wave.   I recommend we continue managing the patient with speech therapy and continue his current diet plan. He is " overall doing well. Follow-up in 2 months.     On 10/26/2020:  Mr. Fleming is a 70 y/o gentleman with a history of [id number] carcinoma of the larynx s/p induction chemo + chemo-XRT with necrosis of epiglottis. Findings of inflammatory changes right anterior vocal chord, 1-2 mm small in size with no other concerns, right pyriform sinus, marquise epiglottic resection healed well, less swelling of supraglottis, stronger quality of voice,   FUV 4-6 weeks, if this area is persistent will likely reduce with laser/Bx.     On 8/24/2020:  Mr. Fleming is a 70 y/o gentleman with a history of [id number] carcinoma of the larynx s/p induction chemo + chemo-XRT with necrosis of epiglottis. Laryngoscopy today the patient had improved/stable healing of vocal folds and right epiglottis area. Still healing. He has significant secretion that would benefit for water wash. Discussed findings and recommend he increase water wash and therapy of swallow. Fu in 2 months,      On 7/21/2020:  Mr. Fleming is a 70 y/o gentleman with a history of [id number] carcinoma of the larynx s/p induction chemo + chemo-XRT. s/p marquise supraglottoplasty with findings on laryngoscopy today of healing on left side and ulceration of bilateral vocal cords and sulcus vocalis. Pt has evidence of radiation induced necrosis and we discussed option for hyperbaric oxygen or voice rest. He notes he did not do any voice rest despite being instructed to do so. The patient is doing well with his airway We were able to perform bronchoscopy without anesthesia - this is worrisome wrt his dysphagia. had no lesions down to alejandro.   Fu in 1 month after 10 days of voice rest. Pt notes he would consider hyperbaric oxygen if he fails conservative management     On 6/30/2020:  Mr. Fleming is a 70 y/o gentleman with a history of [id number] carcinoma of the larynx s/p induction chemo + chemo-XRT. Here for 3 week fu. At last visit he had worsening of voice that occurred over the prior several weeks and  it was noted that he had ulceration on right vocal cord. This is causing severe restriction of vocal cord vibratory wave on stroboscopy. I am concerned re recurrence vs a necrosis due to XRT.    Discussed we would like to biopsy this in OR & will consider laser procedure if deemed appropriate.KTP available. Will consider imaging pending biopsy results.    Discussed R/B/A for MDL, bronchoscopy, esophagoscopy and biopsy in bilateral vocal cord + possible injection of steroid and KTP laser ablation. Pt may need additional imaging if procedure is complete if there is no additional findings on exam. will performed this at [facility] for frozen section capability. Not a candidate for conservative laryngeal preservation surgery.     On 6/8/2020:  Mr. Fleming is a 70 y/o gentleman with a history of [id number] carcinoma of the larynx s/p induction chemo + chemo-XRT. Most recently MARICARMEN when seen 2/4/20. He was also last seen by Dr thomas 5/1/202 for PEG replacement.   In the past month he has noticed a worsening of voice x 2 weeks and nervous about oral diet and using feeding tube more. Stroboscopy demonstrates right cavitary area on vocal fold with no evidence of mass. Ulcerative laryngitis appearance. Persistent fullness of bilateral false vocal fold/supraglottic which as not changed. He has a woody neck with no palpable masses. The remainder of the oral cavity ad oropharynx exams are unchanged.   We discussed new changes in regard to his voice symptoms. He does not feel his swallow has changed. Although I definitely saw a defect on the vocal fold previously - this is a departure of his previous voice. He is able to get a functional voice with effort however there is obvious insufficiency notably worse than prior evaluations.   Will F/U in 3 weeks after doing some mucus management strategies. If there is continued concerns we will consider PET scan. Patient is also scheduled to be seen by nutrition today and discussed care  plan with that provider.     On 4/7/2020:  Mr. Fleming is a 70 y/o gentleman with a history of [id number] carcinoma of the larynx s/p induction chemo + chemo-XRT. Most recently MARICARMEN 2/4/20. Telephone visit today .He is doing well .Fu in early June for live visit when safe to do so. Pt understands to return if any changes to his situation. Consider removal of PEG at that point.     On 2/4/2020:  Mr. Fleming is a 70 y/o gentleman with a history of [id number] carcinoma of the larynx s/p induction chemo + chemo-XRT. MARICARMEN on today's evaluations. He does have findings on laryngoscopy with partial epiglottic necrosis from radiation as well as fullness in supraglottis bilaterally, similar in appearance to last visits. HE has reflux related changes and wet post cricoid appearance. No evidence of or change of concern. He has a woody neck with no lymphadenopathy.   Discussed most recent swallow evaluation performed at [facility] showing aspiration and penetration with thin and nectar thick liquids. He is using thickening and puree/ soft diet. He is edentulous. He is working with a SLP. He would like to try to transition away from feeding tube usage. Although he is stable in weight we recommended that he do so slowly and work with speech to transition over the next 7 months to full oral diet - if he is gaining weight and able to stable   Fu in 6 weeks     On 12/17/2019:  Mr. Fleming is a 70 y/o gentleman with a history of [id number] carcinoma of the larynx s/p induction chemo + chemo-XRT. On today evaluation to include laryngoscopy as well as FEES the patient has stable appearance of laryngeal complex with significant radiation changes. There is no new concerning features in regards to supraglottis/glottis. Cords are mobile. He does have penetration with puree and thin liquids seen on todays FEES. Probable aspiration but no gross aspiration seen. Patient performing spontaneous (learned) throat clear. This cough/clear after swallow does  appears to benefit his swallow. He is maintaining his weight, was up 5 lbs last month and up an additional 4 lbs today.    Overall the patient appears stable. The patient TSH is elevated at 4.01. Will have the patient fu with PCP to follow this/consider medical management. In addition the patient is seen by speech pathologist at [facility] and is scheduled for MBS at end of month, will have patient forward these reports when they are complete. MARICARMEN     On 11/5/2019:  Mr. Fleming is a 68 y/o gentleman with a history of [id number] carcinoma of the larynx s/p induction chemo + chemo-XRT with evidence of significantly improved vocal cord closure and appearance. PE today to include a stroboscopy demonstrates persistent fullness of supraglottis bilaterally, 2 areas on left and one on the right. The PET uptake discussed above was not concerning for any recurrence. The patient tis doing well with voice and doing reasonably well with swallow. He has gained weight consistently the past few months.  Doing well with no new onset of symptoms. Long Discussion of follow up plans and areas of concerns that I am continuing to watch carefully. He understands he will continue to fu every 4-6 weeks with me. Pt willl make fu for December today.     On 10/1/2019:  Mr. Fleming is a 68 y/o gentleman with a history of [id number] carcinoma of the larynx s/p iduction chemo + chemo-XRT with evidence on laryngoscopy of significantly improved vocal cord closure and appearance. He does have some fullness of false cord bilaterally that is symmetric. He does have an Increase of uptake on PET scan on right TVC but not specific in appearance - SUV 3.7. More likely functional.   There is no mucosal changes on the site warranting additional biopsy at this point. Pt is increasing weight and has gained 2 lbs since last visit and 10 lbs prior visit. Encouraged him to continue PO diet, oral hygiene, and FU in 6 weeks.     On 8/26/2019:  Mr. Fleming is a 68 y/o  gentleman with a history of [id number] carcinoma of the larynx with severe dysphagia secondary to motor and sensory changes post XRT.   At our last visit he had lost a significant amount of weight from 139 to 118 lbs. He has subsequently increased his feeding tube intake and is now up to 130 lbs and is feeling and doing better.   He did have significant penetration/aspiration that was silent during MBSS. We performed a FEES in clinic today, which demonstrated (+) penetration/aspiration of thin liquids but reasonably good swallow of the applesauce consistency/puree. He did require some prompting from us when trialing a cookie combined with applesauce and did require a water wash to assist in clearance. This did require him to do numerous prompted post-swallow throat clearing maneuvers as he did penetrate on the thin liquids. We had a long (>20 min) discussion with the patient in regards to the recommendations.   Patient is at significant risk of aspiration pneumonia regardless of any strategies we choose to follow. We discussed the muscle activities required to perform a safe swallow and the patient is agreeable to continue his exercises and increase them. We discussed that he may trial twice daily some applesauce consistency only, with likely improved strength from this. We discussed the inherent risk of this. Patient appears to understand this risk and wishes to trial this method.   Will get a repeat MBSS in one month versus performing additional in-office FEES. We further discussed importance of oral hygiene as a key portion of his treatment as well as continuing to use the feeding tube to maintain his weight. He understands this and will follow-up in one month.      On 7/16/2019:  68 year old male with a history of [id number] with history of induction chemotherapy followed by radiation. Failed to F/U since March despite numerous calls/efforts to have him follow up.   He reports he is doing well with his voice but is  "frustrated with the swallow aspect as he was told to tailor his diet. Patient has visibly lost some weight. Most recent recorded weigh was 118 from 139 in March. Patient might benefit from use of feeding tube to increase nutrition. Unclear if the barium swallow study showed stenosis secondary to radiation causing swallow dysfunction. More likely secondary to sensory changes.   Despite this the patient is otherwise with no evidence of active disease at this point. Encourage the patient to FU q 6 week - our planned follow up schedule per our initial discussions. We'll discuss this again as the patient is at high risk for poor compliance. Patient informed me he is scheduling a PET scan per his oncologist, I agree with this.   Having pt see speech therapy to work on dysphagia and continue per G tube nutrition. Will also obtain nutrition consult if he continue to lose weight. Obtain a new weight at next visit.     On 3/11/2019:  Supraglottic mass with Bx of invasive SCCa. [id number] with response following induction chemo. Patient is scheduled for dental extractions as well as chemoradiation in the future.    Recall that the patient had a family member who had a TL and adamantly stated he did not want \"a hole in the neck.\" Per tumor Board we discussed the use of induction chemotherapy to identify if he would be a candidate for concurrent chemoradiation in advanced laryngeal cancer. He currently does not need a temporary tracheostomy as the airway situation has significantly improved following chemotherapy. He currently still has aspiration risk. Asking speech pathology to perform supervised swallowing therapy. Patient would benefit from numerous techniques to improve safety prior to release to oral diet.    Contributing factors to his care include his significant COPD with severe exacerbation and empyema in November requiring intubation, his extensive tobacco hx, HepC, chronic malnutrition, and anemia.      On " "2/5/2019:  Supraglottic mass with Bx of invasive SCCa. CT scan demonstrates T4a disease with likely bilateral LAD (N2c) and M0 - Stage SANG    We had a long discussion of the benefits for TL with bilateral SND + probable PORT vs. chemo-radiation alone. As previously noted, the patient had a family member who had a TL and adamantly stated he did not want \"a hole in the neck.\" We discussed the use of concurrent chemoradiation in advanced laryngeal cancer to include the 2016 article by my colleagues at .   We discussed the likely need for a temporary tracheostomy and feeding tube for chemo-radiation.   He has not told his family about his cancer and after the discussion today states he wants to pursue chemo-XRT. He would like to spend time with family discussing the options further.   Will cancel procedure scheduled tomorrow. Will likely need to reschedule trach and feeding tube.   Contributing factors to his care include his significant COPD with severe exacerbation and empyema in November requiring intubation, his extensive tobacco hx, HepC, chronic malnutrition, and anemia. He also had a family member with a laryngectomy and was traumatized by that experience. Each will influence treatment plan.     On 1/28/2019:  This is a second evaluation for hoarseness with clinical findings of a supraglottic mass with Bx of SCCa.   Contributing factors include his significant COPD with severe exacerbation 3 months ago requiring intubation, extensive tobacco hx and HepC will influence treatment decision making.   -A biopsy taken at last visit showed SCCa. Planned CT Scan and anesthesia pre-op on Wednesday, 30 January.   -Had a long discussion, about our plan for DL/Bronch/esopagoscopy. Discussed possible tracheostomy need (emergent vs. to aid in treatment planning). Of note he had a family member with laryngeal cancer with what sounds like a laryngectomy stoma and he is adamant he would not want any surgery that involved a " stoma. However, he eased on this and is willing to have one if needed. We will readdress this pre-op.    On 1/21/2019:  Discussed the Bx results with patient. Pt is scheduled to be seen by me on 28 Jan at 11:00.  CT scheduled later that week. Discussed that I will likely need a staging mDL, Bronch, & esophagoscopy to further characterize the tumor in order to offer best treatment modalities.       Past Medical History  He has a past medical history of Anemia, Cholecystitis, Cholelithiasis, Cirrhosis (Multi), CKD (chronic kidney disease), COPD (chronic obstructive pulmonary disease) (Multi), Dysphagia, GERD (gastroesophageal reflux disease), HCV (hepatitis C virus), Hoarseness of voice, Hyperlipidemia, Hypertension, Hypothyroidism, Laryngeal cancer (Multi), PAD (peripheral artery disease) (CMS-HCC), Personal history of other diseases of the respiratory system, Personal history of other specified conditions, and Pulmonary emphysema (Multi). Surgical History  He has a past surgical history that includes Other surgical history (11/19/2018); Other surgical history (11/19/2018); Other surgical history (10/20/2022); IR embolization lymph node (Bilateral, 07/03/2022); IR angiogram inferior epigastric (07/03/2022); IR angiogram aorta abdomen (07/03/2022); Hernia repair; and Esophagogastroduodenoscopy.   Social History  He reports that he quit smoking about 31 years ago. His smoking use included cigarettes. He has never been exposed to tobacco smoke. He has never used smokeless tobacco. He reports that he does not currently use alcohol. He reports that he does not currently use drugs after having used the following drugs: Cocaine. Allergies  Patient has no known allergies.     Family History  Family History   Problem Relation Name Age of Onset    Pancreatic cancer Mother      Hypertension Sister      Hypertension Brother         Review of Systems  All 10 systems were reviewed and negative except for above.      Last Recorded  "Vitals  Temperature 36.3 °C (97.3 °F), height 1.676 m (5' 6\"), weight 63.1 kg (139 lb 1.6 oz).    Physical Exam  ENT Physical Exam   ENT Physical Exam  Constitutional  Appearance: patient appears well-developed and well-nourished,  Head and Face  Appearance: head appears normal and face appears normal;  Ear  Auricles: right auricle normal; left auricle normal;  Nose  External Nose: nares patent bilaterally;  Oral Cavity/Oropharynx  Lips: normal;  Neck  Neck: neck normal; neck palpation normal; woody neck, no LAD  Respiratory  Inspection: no retractions visible;  Cardiovascular  Inspection: no peripheral edema present;  Neurovestibular  Mental Status: alert and oriented;  Psychiatric: mood normal;  Cranial Nerves: cranial nerves intact;     Relevant Results    Procedures   Flexible Laryngoscopy w/ Videostroboscopy    VOICE AND SPEECH CHARACTERISTICS:  Normal spoken speech, (+) moderate dysphonia, (+) moderate roughness, (+) mild breathiness, (+) mild asthenia, (+) moderate strain.    Intelligibility: reduced.   Resonance: balanced.   Vocal Loudness: reduced.   Breath Support: poor coordination.    PROCEDURE:    Indications:  history of laryngeal cancer  Procedure Note  Endoscopy Type:  Flexible Laryngoscopy    Procedure Details:    The patient was placed in the sitting position.  After topical anesthesia and decongestion, the 4 mm laryngoscope was passed.  The nasal cavities, nasopharynx, oropharynx, hypopharynx, and larynx were all examined.  Vocal cords were examined during respiration and phonation.  The following findings were noted:    Condition:  Stable.  Patient tolerated procedure well.    Complications:  None and PROCEDURE NOTE: FLEXIBLE TRANSNASAL BRONCHOSCOPY  I recommended a flexible transnasal bronchoscopy based on PE findings, and/or concern for pathology based upon history of airway complaints. Risks, benefits, and alternatives were explained. The patient wishes to proceed and gives verbal " consent.  Patient is seated in the exam chair. After adequate topical anesthesia, I advance the flexible endoscope. The examination included evaluation of the weston, vallecula, base of tongue, pyriforms, post-cricoid area, larynx and immediate subglottis.    Reflux Finding Score  Subglottic edema: Absent   Thick Mucus: present  Granuloma: Absent   Ventricular Obliteration: Partial   Erythema/Hyperemia: Absent   IA Thickening: moderate   TVC Edema: mild   Diffuse Laryngitis: moderate     Gross Arytenoid Movement: symmetric.  Arytenoid Height: normal.   Supraglottic Tension: lateral.    Additional Findings: missing right marquise-epiglottis      Time Spent  Prep time on day of patient encounter: 10 minutes  Time spent directly with patient, family or caregiver: 15 minutes  Additional Time Spent on Patient Care Activities/Discussion with SLP re care plan: 5 minutes  Documentation Time: 10 minutes  Other Time Spent: 0 minutes  Total: 40 minutes     Scribe Attestation  By signing my name below, I, Claudia Santo , Scribe   attest that this documentation has been prepared under the direction and in the presence of Amrik Sprague MD.

## 2025-03-07 ENCOUNTER — APPOINTMENT (OUTPATIENT)
Dept: RADIOLOGY | Facility: HOSPITAL | Age: 75
End: 2025-03-07
Payer: COMMERCIAL

## 2025-03-07 ENCOUNTER — HOSPITAL ENCOUNTER (EMERGENCY)
Facility: HOSPITAL | Age: 75
Discharge: HOME | End: 2025-03-07
Attending: EMERGENCY MEDICINE
Payer: COMMERCIAL

## 2025-03-07 VITALS
SYSTOLIC BLOOD PRESSURE: 150 MMHG | DIASTOLIC BLOOD PRESSURE: 61 MMHG | TEMPERATURE: 99.1 F | BODY MASS INDEX: 21.97 KG/M2 | HEART RATE: 88 BPM | OXYGEN SATURATION: 96 % | HEIGHT: 67 IN | RESPIRATION RATE: 16 BRPM | WEIGHT: 140 LBS

## 2025-03-07 DIAGNOSIS — K94.23 PEG TUBE MALFUNCTION (MULTI): Primary | ICD-10-CM

## 2025-03-07 PROCEDURE — 74018 RADEX ABDOMEN 1 VIEW: CPT

## 2025-03-07 PROCEDURE — 43762 RPLC GTUBE NO REVJ TRC: CPT | Performed by: EMERGENCY MEDICINE

## 2025-03-07 PROCEDURE — 2550000001 HC RX 255 CONTRASTS: Performed by: EMERGENCY MEDICINE

## 2025-03-07 PROCEDURE — 99284 EMERGENCY DEPT VISIT MOD MDM: CPT | Mod: 25 | Performed by: EMERGENCY MEDICINE

## 2025-03-07 PROCEDURE — 74018 RADEX ABDOMEN 1 VIEW: CPT | Performed by: RADIOLOGY

## 2025-03-07 PROCEDURE — 99284 EMERGENCY DEPT VISIT MOD MDM: CPT | Performed by: EMERGENCY MEDICINE

## 2025-03-07 RX ORDER — DIATRIZOATE MEGLUMINE AND DIATRIZOATE SODIUM 660; 100 MG/ML; MG/ML
60 SOLUTION ORAL; RECTAL ONCE
Status: DISCONTINUED | OUTPATIENT
Start: 2025-03-07 | End: 2025-03-07

## 2025-03-07 RX ORDER — DIATRIZOATE MEGLUMINE AND DIATRIZOATE SODIUM 660; 100 MG/ML; MG/ML
60 SOLUTION ORAL; RECTAL ONCE
Status: DISCONTINUED | OUTPATIENT
Start: 2025-03-07 | End: 2025-03-07 | Stop reason: HOSPADM

## 2025-03-07 RX ORDER — DIATRIZOATE MEGLUMINE AND DIATRIZOATE SODIUM 660; 100 MG/ML; MG/ML
60 SOLUTION ORAL; RECTAL ONCE
Status: COMPLETED | OUTPATIENT
Start: 2025-03-07 | End: 2025-03-07

## 2025-03-07 RX ADMIN — DIATRIZOATE MEGLUMINE AND DIATRIZOATE SODIUM 60 ML: 660; 100 LIQUID ORAL; RECTAL at 09:02

## 2025-03-07 ASSESSMENT — PAIN SCALES - GENERAL: PAINLEVEL_OUTOF10: 0 - NO PAIN

## 2025-03-07 ASSESSMENT — PAIN - FUNCTIONAL ASSESSMENT: PAIN_FUNCTIONAL_ASSESSMENT: 0-10

## 2025-03-07 NOTE — DISCHARGE INSTRUCTIONS
You were seen in the emergency department today after your PEG tube got dislodged overnight.  A replacement 20 French PEG tube was successfully inserted.  Our surgical team was consulted and placed a tube to the best of their ability.  They stated that you will continue to have some leakage around the site.  They recommend that you continue to clean it with sponges and dressings.  They stated you can follow-up outpatient as needed.  If you should need to contact the GI clinic, their number is [phone].  You are safely discharged at this time.  However, should you have any new, worsening, or concerning symptoms please report back to the emergency department.

## 2025-03-07 NOTE — CONSULTS
[facility]  [facility] - CONSULT    Patient Name: Mk Fleming  MRN: [id number]  Admit Date: 3/7/2025  : 1950  AGE: 74 y.o.   GENDER: male  ==============================================================================  TODAY'S ASSESSMENT AND PLAN OF CARE:  ASSESSMENT:  Mk Fleming is a 74 y.o. male with history of laryngeal SCC s/p radiation and chemo with subsequent dysphagia and esophageal strictures, s/p PEG placement in , Hep C with cirrhosis, hypothyroidism   who presents to  ED for PEG tube dislodgement. PEG tube replaced by  ED, but patient complains of leakage. ACS replaced in  and 25. Ken is consulted for leak management. Patient is in stable condition.     Discussed keeping tube secured to prevent dislodgement and excessive movement which can expand tract. Offered button hold replacement, but pt declined.   Recommend pt keep area clean and dry with drain sponges. Tract should start to close if PEG tube kept secure and off tension.   Repeat KUB with contrast. Pt may discharge if study confirms it is in place. No need for follow-up with Ken.  Please reach out with question or concerns.     Discussed with Dr. Hdz.    Raymond Wheatley MD  PGY-1 General Surgery  [facility]  Team Pager: [phone]    Subjective   ==============================================================================  CHIEF COMPLAINT/REASON FOR CONSULT:  Chief Complaint: Leaking PEG tube    HPI:  Mk Fleming is a 74 y.o. male with history of laryngeal SCC s/p radiation and chemo with subsequent dysphagia and esophageal strictures, s/p PEG placement in 2019, Hep C with cirrhosis, hypothyroidism  who presents to  ED for PEG tube dislodgement. PEG tube replaced by  ED, but patient complains of leakage. ACS replaced in  and 25. Ken is consulted for leak management. Patient is in stable condition.     Pt reliant on PEG tube for nutrition. It  has been replaced several time due to dislodgement. ED replaced PEG tube on 3/7/25 but pt states it is leaking more than it has been.      Objective   PAST MEDICAL HISTORY:   PMH:   Past Medical History:   Diagnosis Date    Anemia     Cholecystitis     Cholelithiasis     Cirrhosis (Multi)     CKD (chronic kidney disease)     stage 3    COPD (chronic obstructive pulmonary disease) (Multi)     Dysphagia     peg dependent    GERD (gastroesophageal reflux disease)     HCV (hepatitis C virus)     s/p SVR    Hoarseness of voice     Hyperlipidemia     Hypertension     Hypothyroidism     Laryngeal cancer (Multi)     s/p chemo and radiation therapy    PAD (peripheral artery disease) (CMS-HCC)     Personal history of other diseases of the respiratory system     History of bronchitis    Personal history of other specified conditions     History of chest pain    Pulmonary emphysema (Multi)        PSH:   Past Surgical History:   Procedure Laterality Date    ESOPHAGOGASTRODUODENOSCOPY      HERNIA REPAIR      IR ANGIOGRAM AORTA ABDOMEN  2022    IR ANGIOGRAM AORTA ABDOMEN 7/3/2022 [facility] [place] CLINICAL LEGACY    IR ANGIOGRAM INFERIOR EPIGASTRIC  2022    IR ANGIOGRAM INFERIOR EPIGASTRIC 7/3/2022 [facility] [place] CLINICAL LEGACY    IR EMBOLIZATION LYMPH NODE Bilateral 2022    IR EMBOLIZATION LYMPH NODE 7/3/2022 [facility] [place] CLINICAL LEGACY    OTHER SURGICAL HISTORY  2018    Tooth extraction    OTHER SURGICAL HISTORY  2018    Pulmonary decortication    OTHER SURGICAL HISTORY  10/20/2022    Percutaneous endoscopic gastrostomy tube insertion     FH:   Family History   Problem Relation Name Age of Onset    Pancreatic cancer Mother      Hypertension Sister      Hypertension Brother       SOCIAL HISTORY:    Smoking:    Social History     Tobacco Use   Smoking Status Former    Current packs/day: 0.00    Types: Cigarettes    Quit date:     Years since quittin.2    Passive exposure: Never   Smokeless Tobacco Never       Alcohol:     Social History     Substance and Sexual Activity   Alcohol Use Not Currently       Drug use:     MEDICATIONS:   Prior to Admission medications    Medication Sig Start Date End Date Taking? Authorizing Provider   albuterol 2.5 mg /3 mL (0.083 %) nebulizer solution Inhale 3 mL every 6 hours if needed for wheezing. 4-6 hours as needed 1/17/19   Historical Provider, MD   albuterol 90 mcg/actuation inhaler Inhale 2 puffs every 6 hours if needed for shortness of breath. 1/9/23   Historical Provider, MD   amLODIPine (Norvasc) 5 mg tablet 1 tablet (5 mg) by g-tube route once daily. 1/3/25   Historical Provider, MD   atenolol (Tenormin) 50 mg tablet 1 tablet (50 mg) by g-tube route once daily.    Historical Provider, MD   atorvastatin (Lipitor) 10 mg tablet 1 tablet (10 mg) by g-tube route once daily at bedtime.  Patient taking differently: 1 tablet (10 mg) by g-tube route once daily in the morning.    Historical Provider, MD   chlorhexidine (Peridex) 0.12 % solution PLACE 15 MILLILITERS SWISH IN MOUTH FOR 30 SECONDS THEN SPIT OUT 7/11/24   Historical Provider, MD   esomeprazole (NexIUM) 20 mg packet Take 20 mg by mouth once daily in the morning. Take before meals. 1/3/24 1/31/25  Jaime Flores MD   fluticasone (Flonase) 50 mcg/actuation nasal spray Administer 1 spray into each nostril once daily as needed for allergies. 7/7/21   Historical Provider, MD   hydroCHLOROthiazide (HYDRODiuril) 25 mg tablet 1 tablet (25 mg) by g-tube route once daily.    Historical Provider, MD   levothyroxine (Synthroid, Levoxyl) 50 mcg tablet 1 tablet (50 mcg) by g-tube route once daily in the morning. Take before meals.    Historical Provider, MD   losartan (Cozaar) 50 mg tablet 1 tablet (50 mg) by g-tube route once daily.    Historical Provider, MD   methocarbamol (Robaxin) 500 mg tablet 1 tablet (500 mg) by g-tube route every 8 hours for 7 days.  Patient not taking: Reported on 1/31/2025 5/4/24 5/11/24  Shawna Gonzáles MD    naloxone (Narcan) 4 mg/0.1 mL nasal spray Administer 1 spray (4 mg) into affected nostril(s) if needed for opioid reversal or respiratory depression. May repeat every 2-3 minutes if needed, alternating nostrils, until medical assistance becomes available. 12/23/23   Raymond Rubio MD   oxyCODONE (Roxicodone) 5 mg immediate release tablet 1 tablet (5 mg) by g-tube route every 12 hours if needed for moderate pain (4 - 6). 1/27/25   Historical Provider, MD   polyethylene glycol (Miralax) 17 gram/dose powder Mix 17 g of powder and drink once daily as needed (constipation).    Historical Provider, MD   prochlorperazine (Compazine) 10 mg tablet 1 tablet (10 mg) by g-tube route every 6 hours if needed for vomiting or nausea. 1/26/25   Historical Provider, MD   sucralfate (Carafate) 100 mg/mL suspension 10 mL (1 g) by g-tube route 2 times a day.    Historical Provider, MD   terazosin (Hytrin) 5 mg capsule 1 capsule (5 mg) by g-tube route once daily. 6/30/24   Historical Provider, MD     ALLERGIES:   No Known Allergies    REVIEW OF SYSTEMS:  A 12-point ROS was performed and was unremarkable except as above.    PHYSICAL EXAM:  GEN: No acute distress. Alert, awake and conversant.  HEENT: Sclera anicteric. Moist mucous membranes.  RESP: Breathing non-labored, equal chest rise. On RA.  CV: Regular rate, normotensive  GI: Abdomen soft, mildly distended, nontender. G tube in place with serosang discharge around tube. No erythema or purulent discharge from site.   : Voiding spontaneously.  MSK: No gross deformities. Moves all extremities spontaneously.  NEURO: Alert and oriented x3. No focal deficits.  PSYCH: Appropriate mood and affect.  SKIN: No rashes or lesions.    IMAGING SUMMARY:   XR abdomen 1 view    Result Date: 3/7/2025  Interpreted By:  Moise Pelaez, STUDY: XR ABDOMEN 1 VIEW;  3/7/2025 9:08 am   INDICATION: Signs/Symptoms:confirm peg tube placement.     COMPARISON: 02/01/2025.   ACCESSION NUMBER(S):  [id number]   ORDERING CLINICIAN: NIKKI HERNANDEZ   FINDINGS: PEG tube in place. Contrast projects over the stomach and multiple small bowel loops after injection through the PEG tube.       1. Contrast projects over the stomach and multiple small bowel loops after injection through the PEG tube   MACRO: None   Signed by: Moise Pelaez 3/7/2025 9:14 AM Dictation workstation:   [id number]    I have reviewed the imaging above as it pertains to the patient's surgical concerns and agree with the radiologist's interpretation.  LABS:  No results found. However, due to the size of the patient record, not all encounters were searched. Please check Results Review for a complete set of results.  I/O past 24h:  No intake/output data recorded.    I have reviewed all laboratory and imaging results ordered/pertinent for this encounter.

## 2025-03-07 NOTE — ED PROVIDER NOTES
HPI   No chief complaint on file.      HPI  Patient is a 74 male with a past medical history of compensated HCV cirrhosis, supraglottic SCC C/B dysphagia and esophageal stenosis, PEG tube dependence who presents to the emergency department for dislodgment of his PEG tube.  Patient states that the PEG tube fell out sometime in the early hours this morning while he was asleep.  He states that he cannot pinpoint the time as he fell asleep with it in and woke up with it out.  Patient states that he has a history of his PEG tube coming out and last time had to be admitted to have it replaced upstairs.  Patient denies any pain.  Patient states that he has a 20 Arabic capsule dome tube that was placed on 2/4.  Patient also denies chest pain, shortness of breath, fevers, recent illnesses, nausea or vomiting, changes in strength or sensation.      Patient History   Past Medical History:   Diagnosis Date    Anemia     Cholecystitis     Cholelithiasis     Cirrhosis (Multi)     CKD (chronic kidney disease)     stage 3    COPD (chronic obstructive pulmonary disease) (Multi)     Dysphagia     peg dependent    GERD (gastroesophageal reflux disease)     HCV (hepatitis C virus)     s/p SVR    Hoarseness of voice     Hyperlipidemia     Hypertension     Hypothyroidism     Laryngeal cancer (Multi)     s/p chemo and radiation therapy    PAD (peripheral artery disease) (CMS-HCC)     Personal history of other diseases of the respiratory system     History of bronchitis    Personal history of other specified conditions     History of chest pain    Pulmonary emphysema (Multi)      Past Surgical History:   Procedure Laterality Date    ESOPHAGOGASTRODUODENOSCOPY      HERNIA REPAIR      IR ANGIOGRAM AORTA ABDOMEN  07/03/2022    IR ANGIOGRAM AORTA ABDOMEN 7/3/2022 [facility] CLINICAL LEGACY    IR ANGIOGRAM INFERIOR EPIGASTRIC  07/03/2022    IR ANGIOGRAM INFERIOR EPIGASTRIC 7/3/2022 [facility] CLINICAL LEGACY    IR EMBOLIZATION LYMPH NODE Bilateral  2022    IR EMBOLIZATION LYMPH NODE 7/3/2022 [facility] CLINICAL LEGACY    OTHER SURGICAL HISTORY  2018    Tooth extraction    OTHER SURGICAL HISTORY  2018    Pulmonary decortication    OTHER SURGICAL HISTORY  10/20/2022    Percutaneous endoscopic gastrostomy tube insertion     Family History   Problem Relation Name Age of Onset    Pancreatic cancer Mother      Hypertension Sister      Hypertension Brother       Social History     Tobacco Use    Smoking status: Former     Current packs/day: 0.00     Types: Cigarettes     Quit date:      Years since quittin.2     Passive exposure: Never    Smokeless tobacco: Never   Vaping Use    Vaping status: Never Used   Substance Use Topics    Alcohol use: Not Currently    Drug use: Not Currently     Types: 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   Constitutional:       General: He is not in acute distress.     Appearance: He is well-developed.   HENT:      Head: Normocephalic and atraumatic.   Eyes:      Conjunctiva/sclera: Conjunctivae normal.   Cardiovascular:      Rate and Rhythm: Normal rate and regular rhythm.      Heart sounds: No murmur heard.  Pulmonary:      Effort: Pulmonary effort is normal. No respiratory distress.      Breath sounds: Normal breath sounds.   Abdominal:      Palpations: Abdomen is soft.      Tenderness: There is no abdominal tenderness.      Comments: G-tube placement site with mild leakage of contents and clotted blood and upper left quadrant  Abdomen soft and nontender to palpation   Musculoskeletal:         General: No swelling.      Cervical back: Neck supple.   Skin:     General: Skin is warm and dry.      Capillary Refill: Capillary refill takes less than 2 seconds.   Neurological:      Mental Status:  He is alert.   Psychiatric:         Mood and Affect: Mood normal.     ED Course & MDM   Diagnoses as of 03/07/25 5306   PEG tube malfunction (Multi)       Medical Decision Making  Patient is a 74 male with a past medical history of compensated HCV cirrhosis, supraglottic SCC C/B dysphagia and esophageal stenosis, PEG tube dependence who presents to the emergency department for dislodgment of his PEG tube.  Patient was overall well-appearing upon presentation.  An 18 French Szymanski catheter was successfully placed in the previous PEG tube site as a temporary measure, which was then replaced by a 20 French PEG tube.  Patient handled the procedure well.  However, the PEG tube site was leaking and the patient stated concern that the tube was too small as he was told at his last visit that his hole head enlarged.  Chart review shows that the last 2 PEG tubes were placed by acute care surgery and they were consulted and stated to get a hold of Ken, mj.  Ken was consulted and agreed to see the patient.  They stated that anything larger than a 20 French is not recommended at this time and thoroughly investigated the PEG tube.  They stated that the PEG tube appeared to be adequately in place and that, unfortunately, there was going to be the continued leakage.  However they did not recommend any changes and stated the patient could be seen outpatient for follow-up as needed.  They recommend that patient use sponges and dressings for keeping the area clean.  Repeat x-ray showed placement in an adequate position.  Patient discharged in good condition with very strict return precautions.  Patient understood and was agreeable with the plan.    Procedure  Procedures Szymanski catheter insertion into PEG tube site     Serjio Esquivel DO  Resident  03/07/25 [id number]

## 2025-03-08 NOTE — ED PROCEDURE NOTE
Procedure  Feeding Tube Replacement    Performed by: Tony Weller MD  Authorized by: Tony Weller MD    Consent:     Consent obtained:  Verbal    Consent given by:  Patient    Risks discussed:  Pain and bleeding    Alternatives discussed:  No treatment  Universal protocol:     Procedure explained and questions answered to patient or proxy's satisfaction: yes    Pre-procedure details:     Old tube type:  Gastrostomy    Old tube size: 20 Fr.  Sedation:     Sedation type:  None  Anesthesia:     Anesthesia method:  None  Procedure details:     Patient position:  Supine    Procedure type:  Replacement    Tube type:  Gastrostomy    Tube size: 20Fr.    Bulb inflation volume:  10cc    Bulb inflation fluid:  Normal saline  Post-procedure details:     Placement/position confirmation:  X-ray    Placement difficulty:  None    Bleeding:  None    Procedure completion:  Tolerated  Comments:      Patient with persistent leakage of gastric contents around tube, see provider note but expected complicated based on previous replacements and was evaluated by surgery team              Tony Weller MD  03/08/25 [id number]

## 2025-04-20 ENCOUNTER — HOSPITAL ENCOUNTER (EMERGENCY)
Facility: HOSPITAL | Age: 75
Discharge: HOME | End: 2025-04-21
Attending: EMERGENCY MEDICINE
Payer: COMMERCIAL

## 2025-04-20 ENCOUNTER — APPOINTMENT (OUTPATIENT)
Dept: RADIOLOGY | Facility: HOSPITAL | Age: 75
End: 2025-04-20
Payer: COMMERCIAL

## 2025-04-20 VITALS
RESPIRATION RATE: 13 BRPM | HEART RATE: 87 BPM | BODY MASS INDEX: 21.69 KG/M2 | WEIGHT: 135 LBS | TEMPERATURE: 98.8 F | HEIGHT: 66 IN | OXYGEN SATURATION: 96 % | SYSTOLIC BLOOD PRESSURE: 123 MMHG | DIASTOLIC BLOOD PRESSURE: 62 MMHG

## 2025-04-20 DIAGNOSIS — Z43.1 PEG (PERCUTANEOUS ENDOSCOPIC GASTROSTOMY) ADJUSTMENT/REPLACEMENT/REMOVAL (MULTI): Primary | ICD-10-CM

## 2025-04-20 PROCEDURE — 74018 RADEX ABDOMEN 1 VIEW: CPT | Performed by: RADIOLOGY

## 2025-04-20 PROCEDURE — 99283 EMERGENCY DEPT VISIT LOW MDM: CPT | Performed by: EMERGENCY MEDICINE

## 2025-04-20 PROCEDURE — 74018 RADEX ABDOMEN 1 VIEW: CPT

## 2025-04-20 PROCEDURE — 2550000001 HC RX 255 CONTRASTS

## 2025-04-20 PROCEDURE — 99284 EMERGENCY DEPT VISIT MOD MDM: CPT | Performed by: EMERGENCY MEDICINE

## 2025-04-20 PROCEDURE — 99284 EMERGENCY DEPT VISIT MOD MDM: CPT | Mod: 25

## 2025-04-20 PROCEDURE — 43762 RPLC GTUBE NO REVJ TRC: CPT

## 2025-04-20 RX ORDER — DIATRIZOATE MEGLUMINE AND DIATRIZOATE SODIUM 660; 100 MG/ML; MG/ML
30 SOLUTION ORAL; RECTAL ONCE
Status: COMPLETED | OUTPATIENT
Start: 2025-04-20 | End: 2025-04-20

## 2025-04-20 RX ADMIN — DIATRIZOATE MEGLUMINE AND DIATRIZOATE SODIUM 30 ML: 660; 100 LIQUID ORAL; RECTAL at 23:36

## 2025-04-20 ASSESSMENT — PAIN SCALES - GENERAL: PAINLEVEL_OUTOF10: 7

## 2025-04-20 ASSESSMENT — LIFESTYLE VARIABLES
HAVE YOU EVER FELT YOU SHOULD CUT DOWN ON YOUR DRINKING: NO
EVER FELT BAD OR GUILTY ABOUT YOUR DRINKING: NO
EVER HAD A DRINK FIRST THING IN THE MORNING TO STEADY YOUR NERVES TO GET RID OF A HANGOVER: NO
HAVE PEOPLE ANNOYED YOU BY CRITICIZING YOUR DRINKING: NO
TOTAL SCORE: 0

## 2025-04-20 ASSESSMENT — COLUMBIA-SUICIDE SEVERITY RATING SCALE - C-SSRS
6. HAVE YOU EVER DONE ANYTHING, STARTED TO DO ANYTHING, OR PREPARED TO DO ANYTHING TO END YOUR LIFE?: NO
2. HAVE YOU ACTUALLY HAD ANY THOUGHTS OF KILLING YOURSELF?: NO
1. IN THE PAST MONTH, HAVE YOU WISHED YOU WERE DEAD OR WISHED YOU COULD GO TO SLEEP AND NOT WAKE UP?: NO

## 2025-04-20 ASSESSMENT — PAIN - FUNCTIONAL ASSESSMENT: PAIN_FUNCTIONAL_ASSESSMENT: 0-10

## 2025-04-20 NOTE — ED TRIAGE NOTES
Pt presents to ED via [facility] after coughing this morning and dislodging his peg tube. States this happened approx 1 month ago and he had a new tube placed. Pt reports 7/10 pain at the site, endorses mild bleeding at the site which has since resolved. ABD pad covering site on arrival. Pt denies other complaints, no sick contacts. HX throat cancer in remission.

## 2025-04-20 NOTE — ED PROVIDER NOTES
History of Present Illness     History provided by: Patient   Limitations to History: None   External Records Reviewed with Brief Summary: Previous ED note and ENT note    HPI:  Mk Fleming is a 74 y.o. male compensated HCV cirrhosis, supraglottic SCC C/B dysphagia and esophageal stenosis, PEG tube dependence who is presenting to the emergency department today with concern for PEG tube dislodgment.  He states that he was coughing earlier today when the PEG tube came out.  Balloon was out.  This was approximate 30 minutes prior to arrival.  Notes has he has similar pain around the area.  Denies any fever, chills or consistent cough.  Denies any other symptoms.  No other associate signs or symptoms report this time.    Physical Exam   Triage vitals:  T 37.1 °C (98.8 °F)  HR 74  /78  RR 16  O2 98 % None (Room air)    General: Awake, alert, in no acute distress.  Has a productive cough with congestion  Eyes: Gaze conjugate.  No scleral icterus or injection  HENT: Normo-cephalic, atraumatic. No stridor  CV: Regular rate, regular rhythm. Radial pulses 2+ bilaterally  Resp: Breathing non-labored, speaking in full sentences.  Clear to auscultation bilaterally  GI: Soft, non-distended, non-tender. No rebound or guarding.  MSK/Extremities: No gross bony deformities. Moving all extremities  Skin: Warm. Appropriate color  Neuro: Alert. Oriented. Face symmetric. Speech is fluent.  Gross strength and sensation intact in b/l UE and LEs  Psych: Appropriate mood and affect    Medical Decision Making & ED Course   Medical Decision Makin y.o. male presents the emergency department today for PEG tube replacement.  Patient notes that he was coughing when the PEG tube came out balloon up.  Onset of this happened approximate 30 minutes prior to arrival.  When this happened we were able to place an 18 [place] Szymanski, were unable to find a 20 [place] Szymanski.  The Szymanski went in without difficulty.  Shortly after he coughed  again and the Szymanski came out.  Another Szymanski was placed with inflation of the balloon and maintained in that spot.  Difficulty in obtaining a 20 [place] PEG tube, I did call central supply multiple times once they sent me a surgical PEG tube placement kit which would not be appropriate in this situation.  The second time I called they were unable to find me the equipment that I needed however they stated that they would call me back and send it down.  They were unable to send me an appropriate PEG tube.  Spoke to the acute care surgery team they stated that they had an extra PEG tube that they may be able to bring down for the patient.  At the time of signout we were pending replacement of the patient's PEG tube.  Patient otherwise feels well, states that he has an intermittent cough, has not had any fevers, chills or sputum production.  States that he is not coughing a lot however it seems that he may have coughed forcefully which led to the PEG tube dislodgment.  He does not have any systemic signs, states that he otherwise feels well.  States that this is a normal cough for him.  I have low suspicion for pneumonia or viral syndrome.  He notes that he has had a similar episode approximately a month ago.  Patient was signed out to oncoming provider pending PEG tube placement.  ----    Differential diagnoses considered include but are not limited to: PEG tube dislodgment, pneumonia, viral syndrome     Social Determinants of Health which Significantly Impact Care: None identified     EKG Independent Interpretation: See ED Course    Independent Result Review and Interpretation: See ED course    Chronic conditions affecting the patient's care: See HPI    The patient was discussed with the following consultants/services: None    Care Considerations: See [facility]    ED Course:  ED Course as of 04/21/25 1441   Mon Apr 21, 2025   0012 We did replace patient's PEG tube with a new 1, x-ray confirmed proper placement, no  extravasation of contrast extraluminally.  Patient discharged. [RD]      ED Course User Index  [RD] Manuel Greenwood DO         Diagnoses as of 04/21/25 1441   PEG (percutaneous endoscopic gastrostomy) adjustment/replacement/removal (Multi)     Disposition   Patient was signed out to Dr. Greenwood at 1900 pending completion of their work-up.  Please see the next provider's transition of care note for the remainder of the patient's care.     Seen and discussed with ED Attending  Caron Eisenberg DO, PGY-2  Emergency Medicine     Caron Eisenberg DO  Resident  04/20/25 4873       Caron Eisenberg DO  Resident  04/21/25 1393

## 2025-04-21 NOTE — PROGRESS NOTES
"Transition of care note:    ED Course as of 04/21/25 0013   Mon Apr 21, 2025 0012 We did replace patient's PEG tube with a new 1, x-ray confirmed proper placement, no extravasation of contrast extraluminally.  Patient discharged. [RD]      ED Course User Index  [RD] Manuel Greenwood          Diagnoses as of 04/21/25 0013   PEG (percutaneous endoscopic gastrostomy) adjustment/replacement/removal (Multi)      Visit Vitals  /62   Pulse 87   Temp 37.1 °C (98.8 °F) (Oral)   Resp 13   Ht 1.676 m (5' 6\")   Wt 61.2 kg (135 lb)   SpO2 96%   BMI 21.79 kg/m²   Smoking Status Former   BSA 1.69 m²      Procedures   "

## 2025-05-22 ENCOUNTER — CLINICAL SUPPORT (OUTPATIENT)
Dept: EMERGENCY MEDICINE | Facility: HOSPITAL | Age: 75
DRG: 916 | End: 2025-05-22
Payer: COMMERCIAL

## 2025-05-22 ENCOUNTER — HOSPITAL ENCOUNTER (INPATIENT)
Facility: HOSPITAL | Age: 75
LOS: 2 days | Discharge: HOME | DRG: 916 | End: 2025-05-24
Attending: STUDENT IN AN ORGANIZED HEALTH CARE EDUCATION/TRAINING PROGRAM | Admitting: INTERNAL MEDICINE
Payer: COMMERCIAL

## 2025-05-22 ENCOUNTER — APPOINTMENT (OUTPATIENT)
Dept: RADIOLOGY | Facility: HOSPITAL | Age: 75
DRG: 916 | End: 2025-05-22
Payer: COMMERCIAL

## 2025-05-22 ENCOUNTER — APPOINTMENT (OUTPATIENT)
Dept: CARDIOLOGY | Facility: HOSPITAL | Age: 75
DRG: 916 | End: 2025-05-22
Payer: COMMERCIAL

## 2025-05-22 DIAGNOSIS — E78.5 HYPERLIPIDEMIA, UNSPECIFIED HYPERLIPIDEMIA TYPE: ICD-10-CM

## 2025-05-22 DIAGNOSIS — N18.9 CHRONIC KIDNEY DISEASE, UNSPECIFIED CKD STAGE: ICD-10-CM

## 2025-05-22 DIAGNOSIS — C32.1 SQUAMOUS CELL CANCER OF EPIGLOTTIS (MULTI): ICD-10-CM

## 2025-05-22 DIAGNOSIS — Z85.21 HX OF LARYNGEAL CANCER: ICD-10-CM

## 2025-05-22 DIAGNOSIS — T78.3XXA ANGIOEDEMA, INITIAL ENCOUNTER: Primary | ICD-10-CM

## 2025-05-22 DIAGNOSIS — I10 HYPERTENSION, UNSPECIFIED TYPE: ICD-10-CM

## 2025-05-22 LAB
ABO GROUP (TYPE) IN BLOOD: NORMAL
ALBUMIN SERPL BCP-MCNC: 3.5 G/DL (ref 3.4–5)
ALBUMIN SERPL BCP-MCNC: 3.8 G/DL (ref 3.4–5)
ALP SERPL-CCNC: 92 U/L (ref 33–136)
ALP SERPL-CCNC: 98 U/L (ref 33–136)
ALT SERPL W P-5'-P-CCNC: 17 U/L (ref 10–52)
ALT SERPL W P-5'-P-CCNC: 19 U/L (ref 10–52)
ANION GAP BLDV CALCULATED.4IONS-SCNC: 5 MMOL/L (ref 10–25)
ANION GAP BLDV CALCULATED.4IONS-SCNC: 7 MMOL/L (ref 10–25)
ANION GAP SERPL CALC-SCNC: 13 MMOL/L (ref 10–20)
ANION GAP SERPL CALC-SCNC: 13 MMOL/L (ref 10–20)
ANION GAP SERPL CALC-SCNC: 14 MMOL/L (ref 10–20)
ANTIBODY SCREEN: NORMAL
AST SERPL W P-5'-P-CCNC: 29 U/L (ref 9–39)
AST SERPL W P-5'-P-CCNC: 29 U/L (ref 9–39)
ATRIAL RATE: 104 BPM
BASE EXCESS BLDV CALC-SCNC: 5.3 MMOL/L (ref -2–3)
BASE EXCESS BLDV CALC-SCNC: 7.7 MMOL/L (ref -2–3)
BASOPHILS # BLD AUTO: 0.01 X10*3/UL (ref 0–0.1)
BASOPHILS # BLD AUTO: 0.02 X10*3/UL (ref 0–0.1)
BASOPHILS NFR BLD AUTO: 0.2 %
BASOPHILS NFR BLD AUTO: 0.4 %
BILIRUB SERPL-MCNC: 0.5 MG/DL (ref 0–1.2)
BILIRUB SERPL-MCNC: 0.5 MG/DL (ref 0–1.2)
BODY TEMPERATURE: 37 DEGREES CELSIUS
BODY TEMPERATURE: 37 DEGREES CELSIUS
BUN SERPL-MCNC: 71 MG/DL (ref 6–23)
BUN SERPL-MCNC: 76 MG/DL (ref 6–23)
BUN SERPL-MCNC: 81 MG/DL (ref 6–23)
CA-I BLDV-SCNC: 1.17 MMOL/L (ref 1.1–1.33)
CA-I BLDV-SCNC: 1.21 MMOL/L (ref 1.1–1.33)
CALCIUM SERPL-MCNC: 9.1 MG/DL (ref 8.6–10.6)
CALCIUM SERPL-MCNC: 9.5 MG/DL (ref 8.6–10.6)
CALCIUM SERPL-MCNC: 9.6 MG/DL (ref 8.6–10.6)
CHLORIDE BLDV-SCNC: 93 MMOL/L (ref 98–107)
CHLORIDE BLDV-SCNC: 93 MMOL/L (ref 98–107)
CHLORIDE SERPL-SCNC: 90 MMOL/L (ref 98–107)
CHLORIDE SERPL-SCNC: 90 MMOL/L (ref 98–107)
CHLORIDE SERPL-SCNC: 91 MMOL/L (ref 98–107)
CO2 SERPL-SCNC: 29 MMOL/L (ref 21–32)
CO2 SERPL-SCNC: 29 MMOL/L (ref 21–32)
CO2 SERPL-SCNC: 30 MMOL/L (ref 21–32)
CREAT SERPL-MCNC: 1.9 MG/DL (ref 0.5–1.3)
CREAT SERPL-MCNC: 1.93 MG/DL (ref 0.5–1.3)
CREAT SERPL-MCNC: 1.93 MG/DL (ref 0.5–1.3)
EGFRCR SERPLBLD CKD-EPI 2021: 36 ML/MIN/1.73M*2
EGFRCR SERPLBLD CKD-EPI 2021: 36 ML/MIN/1.73M*2
EGFRCR SERPLBLD CKD-EPI 2021: 37 ML/MIN/1.73M*2
EOSINOPHIL # BLD AUTO: 0 X10*3/UL (ref 0–0.4)
EOSINOPHIL # BLD AUTO: 0.23 X10*3/UL (ref 0–0.4)
EOSINOPHIL NFR BLD AUTO: 0 %
EOSINOPHIL NFR BLD AUTO: 4.2 %
ERYTHROCYTE [DISTWIDTH] IN BLOOD BY AUTOMATED COUNT: 14 % (ref 11.5–14.5)
ERYTHROCYTE [DISTWIDTH] IN BLOOD BY AUTOMATED COUNT: 14.1 % (ref 11.5–14.5)
GLUCOSE BLD MANUAL STRIP-MCNC: 144 MG/DL (ref 74–99)
GLUCOSE BLDV-MCNC: 127 MG/DL (ref 74–99)
GLUCOSE BLDV-MCNC: 130 MG/DL (ref 74–99)
GLUCOSE SERPL-MCNC: 121 MG/DL (ref 74–99)
GLUCOSE SERPL-MCNC: 148 MG/DL (ref 74–99)
GLUCOSE SERPL-MCNC: 157 MG/DL (ref 74–99)
HCO3 BLDV-SCNC: 31.8 MMOL/L (ref 22–26)
HCO3 BLDV-SCNC: 33.6 MMOL/L (ref 22–26)
HCT VFR BLD AUTO: 29 % (ref 41–52)
HCT VFR BLD AUTO: 29.6 % (ref 41–52)
HCT VFR BLD EST: 32 % (ref 41–52)
HCT VFR BLD EST: 34 % (ref 41–52)
HGB BLD-MCNC: 10.2 G/DL (ref 13.5–17.5)
HGB BLD-MCNC: 9.8 G/DL (ref 13.5–17.5)
HGB BLDV-MCNC: 10.8 G/DL (ref 13.5–17.5)
HGB BLDV-MCNC: 11.2 G/DL (ref 13.5–17.5)
IMM GRANULOCYTES # BLD AUTO: 0.02 X10*3/UL (ref 0–0.5)
IMM GRANULOCYTES # BLD AUTO: 0.02 X10*3/UL (ref 0–0.5)
IMM GRANULOCYTES NFR BLD AUTO: 0.4 % (ref 0–0.9)
IMM GRANULOCYTES NFR BLD AUTO: 0.4 % (ref 0–0.9)
INHALED O2 CONCENTRATION: 21 %
INR PPP: 1.1 (ref 0.9–1.1)
LACTATE BLDV-SCNC: 1.2 MMOL/L (ref 0.4–2)
LACTATE BLDV-SCNC: 1.3 MMOL/L (ref 0.4–2)
LYMPHOCYTES # BLD AUTO: 0.17 X10*3/UL (ref 0.8–3)
LYMPHOCYTES # BLD AUTO: 0.55 X10*3/UL (ref 0.8–3)
LYMPHOCYTES NFR BLD AUTO: 10.1 %
LYMPHOCYTES NFR BLD AUTO: 3.2 %
MAGNESIUM SERPL-MCNC: 2.38 MG/DL (ref 1.6–2.4)
MCH RBC QN AUTO: 29.8 PG (ref 26–34)
MCH RBC QN AUTO: 30.4 PG (ref 26–34)
MCHC RBC AUTO-ENTMCNC: 33.8 G/DL (ref 32–36)
MCHC RBC AUTO-ENTMCNC: 34.5 G/DL (ref 32–36)
MCV RBC AUTO: 88 FL (ref 80–100)
MCV RBC AUTO: 88 FL (ref 80–100)
MONOCYTES # BLD AUTO: 0.05 X10*3/UL (ref 0.05–0.8)
MONOCYTES # BLD AUTO: 0.91 X10*3/UL (ref 0.05–0.8)
MONOCYTES NFR BLD AUTO: 0.9 %
MONOCYTES NFR BLD AUTO: 16.7 %
NEUTROPHILS # BLD AUTO: 3.73 X10*3/UL (ref 1.6–5.5)
NEUTROPHILS # BLD AUTO: 5.09 X10*3/UL (ref 1.6–5.5)
NEUTROPHILS NFR BLD AUTO: 68.2 %
NEUTROPHILS NFR BLD AUTO: 95.3 %
NRBC BLD-RTO: 0 /100 WBCS (ref 0–0)
NRBC BLD-RTO: 0 /100 WBCS (ref 0–0)
OXYHGB MFR BLDV: 49 % (ref 45–75)
OXYHGB MFR BLDV: 55.9 % (ref 45–75)
P AXIS: 83 DEGREES
P OFFSET: 199 MS
P ONSET: 149 MS
PCO2 BLDV: 53 MM HG (ref 41–51)
PCO2 BLDV: 55 MM HG (ref 41–51)
PH BLDV: 7.37 PH (ref 7.33–7.43)
PH BLDV: 7.41 PH (ref 7.33–7.43)
PHOSPHATE SERPL-MCNC: 2.9 MG/DL (ref 2.5–4.9)
PLATELET # BLD AUTO: 160 X10*3/UL (ref 150–450)
PLATELET # BLD AUTO: 160 X10*3/UL (ref 150–450)
PO2 BLDV: 33 MM HG (ref 35–45)
PO2 BLDV: 35 MM HG (ref 35–45)
POTASSIUM BLDV-SCNC: 4.3 MMOL/L (ref 3.5–5.3)
POTASSIUM BLDV-SCNC: 6.1 MMOL/L (ref 3.5–5.3)
POTASSIUM SERPL-SCNC: 4.1 MMOL/L (ref 3.5–5.3)
POTASSIUM SERPL-SCNC: 4.6 MMOL/L (ref 3.5–5.3)
POTASSIUM SERPL-SCNC: 4.8 MMOL/L (ref 3.5–5.3)
PR INTERVAL: 146 MS
PROT SERPL-MCNC: 8 G/DL (ref 6.4–8.2)
PROT SERPL-MCNC: 8.7 G/DL (ref 6.4–8.2)
PROTHROMBIN TIME: 12.3 SECONDS (ref 9.8–12.4)
Q ONSET: 222 MS
QRS COUNT: 17 BEATS
QRS DURATION: 70 MS
QT INTERVAL: 332 MS
QTC CALCULATION(BAZETT): 436 MS
QTC FREDERICIA: 398 MS
R AXIS: 67 DEGREES
RBC # BLD AUTO: 3.29 X10*6/UL (ref 4.5–5.9)
RBC # BLD AUTO: 3.36 X10*6/UL (ref 4.5–5.9)
RH FACTOR (ANTIGEN D): NORMAL
SAO2 % BLDV: 50 % (ref 45–75)
SAO2 % BLDV: 57 % (ref 45–75)
SODIUM BLDV-SCNC: 125 MMOL/L (ref 136–145)
SODIUM BLDV-SCNC: 127 MMOL/L (ref 136–145)
SODIUM SERPL-SCNC: 128 MMOL/L (ref 136–145)
SODIUM SERPL-SCNC: 128 MMOL/L (ref 136–145)
SODIUM SERPL-SCNC: 129 MMOL/L (ref 136–145)
T AXIS: 74 DEGREES
T OFFSET: 388 MS
VENTRICULAR RATE: 104 BPM
WBC # BLD AUTO: 5.3 X10*3/UL (ref 4.4–11.3)
WBC # BLD AUTO: 5.5 X10*3/UL (ref 4.4–11.3)

## 2025-05-22 PROCEDURE — 82947 ASSAY GLUCOSE BLOOD QUANT: CPT | Performed by: STUDENT IN AN ORGANIZED HEALTH CARE EDUCATION/TRAINING PROGRAM

## 2025-05-22 PROCEDURE — 70491 CT SOFT TISSUE NECK W/DYE: CPT | Performed by: RADIOLOGY

## 2025-05-22 PROCEDURE — 96375 TX/PRO/DX INJ NEW DRUG ADDON: CPT | Mod: 59

## 2025-05-22 PROCEDURE — 86900 BLOOD TYPING SEROLOGIC ABO: CPT | Performed by: STUDENT IN AN ORGANIZED HEALTH CARE EDUCATION/TRAINING PROGRAM

## 2025-05-22 PROCEDURE — 84295 ASSAY OF SERUM SODIUM: CPT

## 2025-05-22 PROCEDURE — 71045 X-RAY EXAM CHEST 1 VIEW: CPT | Performed by: RADIOLOGY

## 2025-05-22 PROCEDURE — 70491 CT SOFT TISSUE NECK W/DYE: CPT

## 2025-05-22 PROCEDURE — 31575 DIAGNOSTIC LARYNGOSCOPY: CPT | Performed by: OTOLARYNGOLOGY

## 2025-05-22 PROCEDURE — 82947 ASSAY GLUCOSE BLOOD QUANT: CPT

## 2025-05-22 PROCEDURE — 83735 ASSAY OF MAGNESIUM: CPT | Performed by: STUDENT IN AN ORGANIZED HEALTH CARE EDUCATION/TRAINING PROGRAM

## 2025-05-22 PROCEDURE — 2550000001 HC RX 255 CONTRASTS: Performed by: STUDENT IN AN ORGANIZED HEALTH CARE EDUCATION/TRAINING PROGRAM

## 2025-05-22 PROCEDURE — 2020000001 HC ICU ROOM DAILY

## 2025-05-22 PROCEDURE — 71045 X-RAY EXAM CHEST 1 VIEW: CPT

## 2025-05-22 PROCEDURE — 2500000004 HC RX 250 GENERAL PHARMACY W/ HCPCS (ALT 636 FOR OP/ED)

## 2025-05-22 PROCEDURE — 85025 COMPLETE CBC W/AUTO DIFF WBC: CPT | Performed by: STUDENT IN AN ORGANIZED HEALTH CARE EDUCATION/TRAINING PROGRAM

## 2025-05-22 PROCEDURE — 96374 THER/PROPH/DIAG INJ IV PUSH: CPT | Mod: 59

## 2025-05-22 PROCEDURE — 85610 PROTHROMBIN TIME: CPT | Performed by: STUDENT IN AN ORGANIZED HEALTH CARE EDUCATION/TRAINING PROGRAM

## 2025-05-22 PROCEDURE — 2500000004 HC RX 250 GENERAL PHARMACY W/ HCPCS (ALT 636 FOR OP/ED): Mod: JZ

## 2025-05-22 PROCEDURE — 82435 ASSAY OF BLOOD CHLORIDE: CPT | Performed by: STUDENT IN AN ORGANIZED HEALTH CARE EDUCATION/TRAINING PROGRAM

## 2025-05-22 PROCEDURE — 93005 ELECTROCARDIOGRAM TRACING: CPT

## 2025-05-22 PROCEDURE — 2500000004 HC RX 250 GENERAL PHARMACY W/ HCPCS (ALT 636 FOR OP/ED): Performed by: STUDENT IN AN ORGANIZED HEALTH CARE EDUCATION/TRAINING PROGRAM

## 2025-05-22 PROCEDURE — 99291 CRITICAL CARE FIRST HOUR: CPT | Performed by: STUDENT IN AN ORGANIZED HEALTH CARE EDUCATION/TRAINING PROGRAM

## 2025-05-22 PROCEDURE — 85025 COMPLETE CBC W/AUTO DIFF WBC: CPT

## 2025-05-22 PROCEDURE — 99221 1ST HOSP IP/OBS SF/LOW 40: CPT | Performed by: OTOLARYNGOLOGY

## 2025-05-22 PROCEDURE — 93010 ELECTROCARDIOGRAM REPORT: CPT | Performed by: INTERNAL MEDICINE

## 2025-05-22 PROCEDURE — 94640 AIRWAY INHALATION TREATMENT: CPT

## 2025-05-22 PROCEDURE — 99291 CRITICAL CARE FIRST HOUR: CPT | Mod: 25 | Performed by: STUDENT IN AN ORGANIZED HEALTH CARE EDUCATION/TRAINING PROGRAM

## 2025-05-22 PROCEDURE — 93010 ELECTROCARDIOGRAM REPORT: CPT | Performed by: STUDENT IN AN ORGANIZED HEALTH CARE EDUCATION/TRAINING PROGRAM

## 2025-05-22 PROCEDURE — 84132 ASSAY OF SERUM POTASSIUM: CPT

## 2025-05-22 PROCEDURE — 84132 ASSAY OF SERUM POTASSIUM: CPT | Performed by: STUDENT IN AN ORGANIZED HEALTH CARE EDUCATION/TRAINING PROGRAM

## 2025-05-22 PROCEDURE — 36415 COLL VENOUS BLD VENIPUNCTURE: CPT

## 2025-05-22 PROCEDURE — 84100 ASSAY OF PHOSPHORUS: CPT

## 2025-05-22 PROCEDURE — 31575 DIAGNOSTIC LARYNGOSCOPY: CPT

## 2025-05-22 PROCEDURE — 0CJS8ZZ INSPECTION OF LARYNX, VIA NATURAL OR ARTIFICIAL OPENING ENDOSCOPIC: ICD-10-PCS

## 2025-05-22 PROCEDURE — 36415 COLL VENOUS BLD VENIPUNCTURE: CPT | Performed by: STUDENT IN AN ORGANIZED HEALTH CARE EDUCATION/TRAINING PROGRAM

## 2025-05-22 RX ORDER — HEPARIN SODIUM 5000 [USP'U]/ML
5000 INJECTION, SOLUTION INTRAVENOUS; SUBCUTANEOUS EVERY 8 HOURS
Status: DISCONTINUED | OUTPATIENT
Start: 2025-05-22 | End: 2025-05-24 | Stop reason: HOSPADM

## 2025-05-22 RX ORDER — FAMOTIDINE 10 MG/ML
20 INJECTION, SOLUTION INTRAVENOUS ONCE
Status: COMPLETED | OUTPATIENT
Start: 2025-05-22 | End: 2025-05-22

## 2025-05-22 RX ORDER — DEXAMETHASONE SODIUM PHOSPHATE 10 MG/ML
10 INJECTION INTRAMUSCULAR; INTRAVENOUS EVERY 8 HOURS
Status: COMPLETED | OUTPATIENT
Start: 2025-05-22 | End: 2025-05-24

## 2025-05-22 RX ORDER — LEVOTHYROXINE SODIUM 50 UG/1
50 TABLET ORAL
Status: DISCONTINUED | OUTPATIENT
Start: 2025-05-23 | End: 2025-05-24 | Stop reason: HOSPADM

## 2025-05-22 RX ORDER — DEXAMETHASONE SODIUM PHOSPHATE 10 MG/ML
INJECTION INTRAMUSCULAR; INTRAVENOUS
Status: COMPLETED
Start: 2025-05-22 | End: 2025-05-22

## 2025-05-22 RX ORDER — POLYETHYLENE GLYCOL 3350 17 G/17G
17 POWDER, FOR SOLUTION ORAL DAILY
Status: DISCONTINUED | OUTPATIENT
Start: 2025-05-22 | End: 2025-05-24 | Stop reason: HOSPADM

## 2025-05-22 RX ORDER — DIPHENHYDRAMINE HYDROCHLORIDE 50 MG/ML
25 INJECTION, SOLUTION INTRAMUSCULAR; INTRAVENOUS EVERY 8 HOURS
Status: DISCONTINUED | OUTPATIENT
Start: 2025-05-22 | End: 2025-05-24

## 2025-05-22 RX ORDER — DEXAMETHASONE SODIUM PHOSPHATE 10 MG/ML
10 INJECTION INTRAMUSCULAR; INTRAVENOUS ONCE
Status: COMPLETED | OUTPATIENT
Start: 2025-05-22 | End: 2025-05-22

## 2025-05-22 RX ORDER — DIPHENHYDRAMINE HYDROCHLORIDE 50 MG/ML
50 INJECTION, SOLUTION INTRAMUSCULAR; INTRAVENOUS ONCE
Status: COMPLETED | OUTPATIENT
Start: 2025-05-22 | End: 2025-05-22

## 2025-05-22 RX ORDER — FAMOTIDINE 10 MG/ML
20 INJECTION, SOLUTION INTRAVENOUS EVERY 8 HOURS
Status: DISCONTINUED | OUTPATIENT
Start: 2025-05-22 | End: 2025-05-24

## 2025-05-22 RX ADMIN — DEXAMETHASONE SODIUM PHOSPHATE 10 MG: 10 INJECTION INTRAMUSCULAR; INTRAVENOUS at 11:54

## 2025-05-22 RX ADMIN — FAMOTIDINE 20 MG: 10 INJECTION INTRAVENOUS at 14:21

## 2025-05-22 RX ADMIN — FAMOTIDINE 20 MG: 10 INJECTION INTRAVENOUS at 20:57

## 2025-05-22 RX ADMIN — HEPARIN SODIUM 5000 UNITS: 5000 INJECTION, SOLUTION INTRAVENOUS; SUBCUTANEOUS at 18:10

## 2025-05-22 RX ADMIN — DIPHENHYDRAMINE HYDROCHLORIDE 50 MG: 50 INJECTION INTRAMUSCULAR; INTRAVENOUS at 14:21

## 2025-05-22 RX ADMIN — DEXAMETHASONE SODIUM PHOSPHATE 10 MG: 10 INJECTION INTRAMUSCULAR; INTRAVENOUS at 18:10

## 2025-05-22 RX ADMIN — IOHEXOL 75 ML: 350 INJECTION, SOLUTION INTRAVENOUS at 14:05

## 2025-05-22 RX ADMIN — RACEPINEPHRINE HYDROCHLORIDE 0.5 ML: 11.25 SOLUTION RESPIRATORY (INHALATION) at 14:21

## 2025-05-22 RX ADMIN — DIPHENHYDRAMINE HYDROCHLORIDE 25 MG: 50 INJECTION INTRAMUSCULAR; INTRAVENOUS at 21:06

## 2025-05-22 SDOH — ECONOMIC STABILITY: FOOD INSECURITY: WITHIN THE PAST 12 MONTHS, YOU WORRIED THAT YOUR FOOD WOULD RUN OUT BEFORE YOU GOT THE MONEY TO BUY MORE.: NEVER TRUE

## 2025-05-22 SDOH — ECONOMIC STABILITY: HOUSING INSECURITY: AT ANY TIME IN THE PAST 12 MONTHS, WERE YOU HOMELESS OR LIVING IN A SHELTER (INCLUDING NOW)?: NO

## 2025-05-22 SDOH — ECONOMIC STABILITY: FOOD INSECURITY: HOW HARD IS IT FOR YOU TO PAY FOR THE VERY BASICS LIKE FOOD, HOUSING, MEDICAL CARE, AND HEATING?: NOT HARD AT ALL

## 2025-05-22 SDOH — ECONOMIC STABILITY: FOOD INSECURITY: WITHIN THE PAST 12 MONTHS, THE FOOD YOU BOUGHT JUST DIDN'T LAST AND YOU DIDN'T HAVE MONEY TO GET MORE.: NEVER TRUE

## 2025-05-22 SDOH — SOCIAL STABILITY: SOCIAL INSECURITY: HAS ANYONE EVER THREATENED TO HURT YOUR FAMILY OR YOUR PETS?: NO

## 2025-05-22 SDOH — ECONOMIC STABILITY: HOUSING INSECURITY: IN THE PAST 12 MONTHS, HOW MANY TIMES HAVE YOU MOVED WHERE YOU WERE LIVING?: 0

## 2025-05-22 SDOH — ECONOMIC STABILITY: INCOME INSECURITY: IN THE PAST 12 MONTHS HAS THE ELECTRIC, GAS, OIL, OR WATER COMPANY THREATENED TO SHUT OFF SERVICES IN YOUR HOME?: NO

## 2025-05-22 SDOH — SOCIAL STABILITY: SOCIAL INSECURITY: WITHIN THE LAST YEAR, HAVE YOU BEEN HUMILIATED OR EMOTIONALLY ABUSED IN OTHER WAYS BY YOUR PARTNER OR EX-PARTNER?: NO

## 2025-05-22 SDOH — SOCIAL STABILITY: SOCIAL INSECURITY: DOES ANYONE TRY TO KEEP YOU FROM HAVING/CONTACTING OTHER FRIENDS OR DOING THINGS OUTSIDE YOUR HOME?: NO

## 2025-05-22 SDOH — ECONOMIC STABILITY: HOUSING INSECURITY: IN THE LAST 12 MONTHS, WAS THERE A TIME WHEN YOU WERE NOT ABLE TO PAY THE MORTGAGE OR RENT ON TIME?: NO

## 2025-05-22 SDOH — SOCIAL STABILITY: SOCIAL INSECURITY: WITHIN THE LAST YEAR, HAVE YOU BEEN AFRAID OF YOUR PARTNER OR EX-PARTNER?: NO

## 2025-05-22 SDOH — SOCIAL STABILITY: SOCIAL INSECURITY: HAVE YOU HAD THOUGHTS OF HARMING ANYONE ELSE?: NO

## 2025-05-22 SDOH — SOCIAL STABILITY: SOCIAL INSECURITY: HAVE YOU HAD ANY THOUGHTS OF HARMING ANYONE ELSE?: NO

## 2025-05-22 SDOH — SOCIAL STABILITY: SOCIAL INSECURITY: ARE YOU OR HAVE YOU BEEN THREATENED OR ABUSED PHYSICALLY, EMOTIONALLY, OR SEXUALLY BY ANYONE?: NO

## 2025-05-22 SDOH — SOCIAL STABILITY: SOCIAL INSECURITY: DO YOU FEEL UNSAFE GOING BACK TO THE PLACE WHERE YOU ARE LIVING?: NO

## 2025-05-22 SDOH — ECONOMIC STABILITY: TRANSPORTATION INSECURITY: IN THE PAST 12 MONTHS, HAS LACK OF TRANSPORTATION KEPT YOU FROM MEDICAL APPOINTMENTS OR FROM GETTING MEDICATIONS?: NO

## 2025-05-22 SDOH — SOCIAL STABILITY: SOCIAL INSECURITY: ABUSE: ADULT

## 2025-05-22 SDOH — SOCIAL STABILITY: SOCIAL INSECURITY: DO YOU FEEL ANYONE HAS EXPLOITED OR TAKEN ADVANTAGE OF YOU FINANCIALLY OR OF YOUR PERSONAL PROPERTY?: NO

## 2025-05-22 SDOH — SOCIAL STABILITY: SOCIAL INSECURITY: WERE YOU ABLE TO COMPLETE ALL THE BEHAVIORAL HEALTH SCREENINGS?: YES

## 2025-05-22 SDOH — SOCIAL STABILITY: SOCIAL INSECURITY: ARE THERE ANY APPARENT SIGNS OF INJURIES/BEHAVIORS THAT COULD BE RELATED TO ABUSE/NEGLECT?: NO

## 2025-05-22 ASSESSMENT — COGNITIVE AND FUNCTIONAL STATUS - GENERAL
DRESSING REGULAR UPPER BODY CLOTHING: A LITTLE
DAILY ACTIVITIY SCORE: 18
DRESSING REGULAR LOWER BODY CLOTHING: A LITTLE
TOILETING: A LITTLE
PATIENT BASELINE BEDBOUND: NO
MOBILITY SCORE: 18
CLIMB 3 TO 5 STEPS WITH RAILING: A LITTLE
MOVING TO AND FROM BED TO CHAIR: A LITTLE
TURNING FROM BACK TO SIDE WHILE IN FLAT BAD: A LITTLE
HELP NEEDED FOR BATHING: A LITTLE
PERSONAL GROOMING: A LITTLE
MOVING FROM LYING ON BACK TO SITTING ON SIDE OF FLAT BED WITH BEDRAILS: A LITTLE
WALKING IN HOSPITAL ROOM: A LITTLE
EATING MEALS: A LITTLE
STANDING UP FROM CHAIR USING ARMS: A LITTLE

## 2025-05-22 ASSESSMENT — ACTIVITIES OF DAILY LIVING (ADL)
HEARING - LEFT EAR: FUNCTIONAL
LACK_OF_TRANSPORTATION: NO
WALKS IN HOME: NEEDS ASSISTANCE
ADEQUATE_TO_COMPLETE_ADL: YES
BATHING: NEEDS ASSISTANCE
HEARING - RIGHT EAR: FUNCTIONAL
FEEDING YOURSELF: NEEDS ASSISTANCE
DRESSING YOURSELF: INDEPENDENT
JUDGMENT_ADEQUATE_SAFELY_COMPLETE_DAILY_ACTIVITIES: YES
PATIENT'S MEMORY ADEQUATE TO SAFELY COMPLETE DAILY ACTIVITIES?: YES
TOILETING: NEEDS ASSISTANCE
GROOMING: INDEPENDENT
LACK_OF_TRANSPORTATION: NO
ASSISTIVE_DEVICE: WALKER;CANE

## 2025-05-22 ASSESSMENT — PAIN - FUNCTIONAL ASSESSMENT
PAIN_FUNCTIONAL_ASSESSMENT: 0-10

## 2025-05-22 ASSESSMENT — LIFESTYLE VARIABLES
AUDIT-C TOTAL SCORE: 0
EVER FELT BAD OR GUILTY ABOUT YOUR DRINKING: NO
AUDIT-C TOTAL SCORE: 0
EVER HAD A DRINK FIRST THING IN THE MORNING TO STEADY YOUR NERVES TO GET RID OF A HANGOVER: NO
SKIP TO QUESTIONS 9-10: 1
HAVE PEOPLE ANNOYED YOU BY CRITICIZING YOUR DRINKING: NO
HOW OFTEN DO YOU HAVE 6 OR MORE DRINKS ON ONE OCCASION: NEVER
HOW OFTEN DO YOU HAVE A DRINK CONTAINING ALCOHOL: NEVER
TOTAL SCORE: 0
HAVE YOU EVER FELT YOU SHOULD CUT DOWN ON YOUR DRINKING: NO
HOW MANY STANDARD DRINKS CONTAINING ALCOHOL DO YOU HAVE ON A TYPICAL DAY: PATIENT DOES NOT DRINK

## 2025-05-22 ASSESSMENT — PAIN SCALES - GENERAL
PAINLEVEL_OUTOF10: 0 - NO PAIN
PAINLEVEL_OUTOF10: 7
PAINLEVEL_OUTOF10: 0 - NO PAIN
PAINLEVEL_OUTOF10: 0 - NO PAIN

## 2025-05-22 ASSESSMENT — PAIN DESCRIPTION - LOCATION: LOCATION: THROAT

## 2025-05-22 ASSESSMENT — PAIN DESCRIPTION - DESCRIPTORS: DESCRIPTORS: BURNING

## 2025-05-22 NOTE — ED PROVIDER NOTES
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  Reports that over the past 3 days he has had no acute changes in medication regimen but feels like his throat cancer is back.  He is stridorous and has a hoarse voice which he reports is different from his baseline.  He is on room air, not tachypneic but is sitting upright.  He does have swelling of his tongue and slight swelling of his lower lip that is intermittently able to be withdrawn into his mouth.  Able to see the posterior oropharynx that does not look overtly edematous without any tonsillar swelling.  He has free range of motion of his neck.  No fevers or chills, submental swelling noted.  He feels like it is intermittently difficult to swallow but is handling his secretions at this time.  He is mainly fed through G-tube that is clean dry and intact and takes nothing by mouth.    Limitations to History: Stridor  Additional History Obtained from: EMS    PMHx/PSHx:  Per HPI.   - has a past medical history of Anemia, Cholecystitis, Cholelithiasis, Cirrhosis (Multi), CKD (chronic kidney disease), COPD (chronic obstructive pulmonary disease) (Multi), Dysphagia, GERD (gastroesophageal reflux disease), HCV (hepatitis C virus), Hoarseness of voice, Hyperlipidemia, Hypertension, Hypothyroidism, Laryngeal cancer (Multi), PAD (peripheral artery disease), Personal history of other diseases of the respiratory system, Personal history of other specified conditions, and Pulmonary emphysema (Multi).  - has a past surgical history that includes Other surgical history (11/19/2018); Other surgical history (11/19/2018); Other surgical history (10/20/2022); IR embolization lymph node (Bilateral,  07/03/2022); IR angiogram inferior epigastric (07/03/2022); IR angiogram aorta abdomen (07/03/2022); Hernia repair; and Esophagogastroduodenoscopy.    Social History:  - Tobacco:  reports that he quit smoking about 31 years ago. His smoking use included cigarettes. He has never been exposed to tobacco smoke. He has never used smokeless tobacco.   - Alcohol:  reports that he does not currently use alcohol.   - Drugs:  reports that he does not currently use drugs after having used the following drugs: Cocaine.     Medications: Reviewed in EMR.     Allergies:  Losartan    ???????????????????????????????????????????????????????????????  Triage Vitals:  T 36.3 °C (97.4 °F)  HR (!) 107  BP (!) 191/92  RR 18  O2 100 % None (Room air)    Physical Exam  Constitutional:       Appearance: Normal appearance.   HENT:      Head: Normocephalic and atraumatic.      Nose: No congestion or rhinorrhea.      Mouth/Throat:      Mouth: Mucous membranes are dry.      Pharynx: Oropharynx is clear. Uvula midline. No pharyngeal swelling.      Comments: Tongue appears slightly swollen but is intermittently retractable into the mouth but slight swelling of the lower lip.  Eyes:      General: No scleral icterus.     Extraocular Movements: Extraocular movements intact.      Conjunctiva/sclera: Conjunctivae normal.      Pupils: Pupils are equal, round, and reactive to light.   Neck:      Vascular: No carotid bruit.   Cardiovascular:      Rate and Rhythm: Normal rate and regular rhythm.      Pulses: Normal pulses.      Heart sounds: Normal heart sounds. No murmur heard.     No friction rub. No gallop.   Pulmonary:      Effort: Pulmonary effort is normal. No respiratory distress.      Breath sounds: No wheezing, rhonchi or rales.      Comments: Stridorous inspiratory breathing noted with hoarse voice.  Lower airway sounds unremarkable  Abdominal:      General: Abdomen is flat. There is no distension.      Palpations: Abdomen is soft.       Tenderness: There is no abdominal tenderness. There is no guarding.   Musculoskeletal:         General: No swelling or tenderness. Normal range of motion.      Cervical back: Normal range of motion and neck supple.   Skin:     General: Skin is warm and dry.      Capillary Refill: Capillary refill takes less than 2 seconds.   Neurological:      General: No focal deficit present.      Mental Status: He is alert and oriented to person, place, and time.       ???????????????????????????????????????????????????????????????  EKG (per my interpretation):  see ED course    ED Course  ED Course as of 05/22/25 1453   Thu May 22, 2025   1204 EKG independently interpreted by me, ED attending, obtained at 1154, sinus tachycardia with a heart rate of 104 unremarkable axis, intervals, no ST segment elevation noted no T wave inversion noted [SOL]      ED Course User Index  [SOL] Martha Ramirez MD         Diagnoses as of 05/22/25 1453   Angioedema, initial encounter   Squamous cell cancer of epiglottis (Multi)       Medical Decision Making:  Patient is a 74-year-old male with past medical history of laryngeal cancer status postchemotherapy and radiation presenting due to concerns of shortness of breath and stridor for the past 3 days that has gotten acutely worse in the past morning.  ENT was immediately consulted and CT of the neck was obtained.  Patient is protecting his airway at this time, is not in respiratory distress.  ENT evaluated the patient and scoped him at bedside and did note supraglottic edema, epiglottis hooding over the arytenoids and one of the cords being hypomobile.  Patient was given Decadron in the resuscitation bay and they recommended adding on adjuncts for angioedema treatment including Pepcid and racemic epi for stridor.  CT will be obtained to evaluate if there is any mass or abscess collection present.  Will discontinue any NSAIDs and losartan which is on the patient's medication list.  He was admitted  to the ICU for airway watch.  Care was overseen by attending who agrees with the plan and disposition    External records reviewed: recent inpatient, clinic, and prior ED notes  Diagnostic imaging independently reviewed/interpreted by me (as reflected in MDM) includes: CT neck  Social Determinants Affecting Care: None identified  Discussion of management with other providers: attending, ENT  Prescription Drug Consideration: per inpatient team  Escalation of Care: admission    Impression:   Angioedema  SOB  Stridor    Disposition: Admitted      Procedures ? Smart[employer] last updated 5/22/2025 2:53 PM        Renetta Rushing MD  Resident  05/23/25 1810

## 2025-05-22 NOTE — CONSULTS
"  Otolaryngology - Head and Neck Surgery Consult Note      Name: Mk Fleming  MRN: [id number]  : 1950  Consulting Attending: Martha Lopez MD  Reason for Consult: \"mass/tongue swelling\"    History of Present Illness  The patient is a 74 y.o. 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    He reports 3 days of increased swelling and difficulty breathing, no viral prodrome, no fever, no chills.  He is satting 95 to 97% on room air, has mild low inspiratory stridor.    Review of Systems  14 point review of systems completed and all negative except as noted in HPI.    Past Medical History  Medical History[1]    Past Surgical History  Surgical History[2]    Allergies  Allergies[3]    Medications  Current Medications[4]    Family History  Family History[5]    Social History  Social History     Socioeconomic History    Marital status: Single     Spouse name: Not on file    Number of children: Not on file    Years of education: Not on file    Highest education level: Not on file   Occupational History    Not on file   Tobacco Use    Smoking status: Former     Current packs/day: 0.00     Types: Cigarettes     Quit date:      Years since quittin.4     Passive exposure: Never    Smokeless tobacco: Never   Vaping Use    Vaping status: Never Used   Substance and Sexual Activity    Alcohol use: Not Currently    Drug use: Not Currently     Types: 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    Sexual activity: Not Currently   Other Topics Concern    Not on file   Social History Narrative    Not on file     Social Drivers of Health     Financial Resource Strain: Low Risk  (2/3/2025)    Overall Financial Resource Strain (CARDIA) "     Difficulty of Paying Living Expenses: Not hard at all   Food Insecurity: No Food Insecurity (2/1/2025)    Hunger Vital Sign     Worried About Running Out of Food in the Last Year: Never true     Ran Out of Food in the Last Year: Never true   Transportation Needs: No Transportation Needs (2/3/2025)    PRAPARE - Transportation     Lack of Transportation (Medical): No     Lack of Transportation (Non-Medical): No   Physical Activity: Unknown (2/1/2025)    Exercise Vital Sign     Days of Exercise per Week: Not on file     Minutes of Exercise per Session: 10 min   Recent Concern: Physical Activity - Insufficiently Active (2/1/2025)    Exercise Vital Sign     Days of Exercise per Week: 1 day     Minutes of Exercise per Session: 10 min   Stress: No Stress Concern Present (2/1/2025)    [place] [employer] - Occupational Stress Questionnaire     Feeling of Stress : Not at all   Social Connections: Socially Isolated (2/1/2025)    Social Connection and Isolation Panel [NHANES]     Frequency of Communication with Friends and Family: Once a week     Frequency of Social Gatherings with Friends and Family: Twice a week     Attends [employer] Services: Never     Active Member of Clubs or Organizations: No     Attends Club or Organization Meetings: Never     Marital Status:    Intimate Partner Violence: Not At Risk (2/1/2025)    Humiliation, Afraid, Rape, and Kick questionnaire     Fear of Current or Ex-Partner: No     Emotionally Abused: No     Physically Abused: No     Sexually Abused: No   Housing Stability: Low Risk  (2/3/2025)    Housing Stability Vital Sign     Unable to Pay for Housing in the Last Year: No     Number of Times Moved in the Last Year: 0     Homeless in the Last Year: No       Vital Signs  Heart Rate:  [101-108]   Temperature:  [36.3 °C (97.4 °F)]   Respirations:  [18-23]   BP: (184-191)/(92-93)   Weight:  [61.2 kg (135 lb)]   Pulse Ox:  [98 %-100 %]     Physical Examination  GEN:  The patient appears stated age in no acute distress  VOICE: Voice is hoarse and mildly breathy  RESP: Satting 95 to 97% on room air, mild low inspiratory stridor  CV: Clinically well perfused   EYES: EOM grossly intact with no scleral icterus  NEURO: Alert and oriented  HEAD: Scalp is normocephalic and atraumatic  FACE: No abrasions or lacerations, no maxillary or mandibular stepoffs  EARS: Normal external ears, external auditory canals, and TMs to otoscopy, normal hearing to spoken voice  NOSE: External nose appears normal, no significant septal deviation, anterior rhinoscopy limited with no active bleeding or lesions  OC: Normal lips, tongue is large and partially protruding out of mouth, floor of mouth soft, edentulous  OP: normal pharyngeal walls, normal soft palate  NECK: Trachea midline, significant postradiation woody stiffness throughout neck    Laboratory and Data   Latest Reference Range & Units 05/22/25 11:53   GLUCOSE 74 - 99 mg/dL 121 (H)   SODIUM 136 - 145 mmol/L 128 (L)   POTASSIUM 3.5 - 5.3 mmol/L 4.1   CHLORIDE 98 - 107 mmol/L 90 (L)   Bicarbonate 21 - 32 mmol/L 29   Anion Gap 10 - 20 mmol/L 13   Blood Urea Nitrogen 6 - 23 mg/dL 81 (H)   Creatinine 0.50 - 1.30 mg/dL 1.93 (H)   EGFR >60 mL/min/1.73m*2 36 (L)   Calcium 8.6 - 10.6 mg/dL 9.6   Albumin 3.4 - 5.0 g/dL 3.8   Alkaline Phosphatase 33 - 136 U/L 98   ALT 10 - 52 U/L 17   AST 9 - 39 U/L 29   Bilirubin Total 0.0 - 1.2 mg/dL 0.5   Total Protein 6.4 - 8.2 g/dL 8.7 (H)   MAGNESIUM 1.60 - 2.40 mg/dL 2.38   WBC 4.4 - 11.3 x10*3/uL 5.5   nRBC 0.0 - 0.0 /100 WBCs 0.0   RBC 4.50 - 5.90 x10*6/uL 3.36 (L)   HEMOGLOBIN 13.5 - 17.5 g/dL 10.2 (L)   HEMATOCRIT 41.0 - 52.0 % 29.6 (L)   MCV 80 - 100 fL 88   MCH 26.0 - 34.0 pg 30.4   MCHC 32.0 - 36.0 g/dL 34.5   RED CELL DISTRIBUTION WIDTH 11.5 - 14.5 % 14.1   Platelets 150 - 450 x10*3/uL 160   Neutrophils % 40.0 - 80.0 % 68.2   Immature Granulocytes %, Automated 0.0 - 0.9 % 0.4   Lymphocytes % 13.0 - 44.0 %  10.1   Monocytes % 2.0 - 10.0 % 16.7   Eosinophils % 0.0 - 6.0 % 4.2   Basophils % 0.0 - 2.0 % 0.4   Neutrophils Absolute 1.60 - 5.50 x10*3/uL 3.73   Immature Granulocytes Absolute, Automated 0.00 - 0.50 x10*3/uL 0.02   Lymphocytes Absolute 0.80 - 3.00 x10*3/uL 0.55 (L)   Monocytes Absolute 0.05 - 0.80 x10*3/uL 0.91 (H)   Eosinophils Absolute 0.00 - 0.40 x10*3/uL 0.23   Basophils Absolute 0.00 - 0.10 x10*3/uL 0.02   (H): Data is abnormally high  (L): Data is abnormally low    Radiology Reviewed  CT neck w IV contrast is ordered, not yet done.    PROCEDURE NOTE:  Nasopharyngolaryngoscopy    For better visualization because of tongue swelling and stridor, a flexible fiberoptic nasopharyngolaryngoscopy was performed. Patient was correctly identified and verbal consent obtained.  After topical anesthesia with atomized 4% Lidocaine with Afrin, the flexible scope  was introduced into the bilateral nares.    The following areas were visualized:  Nasal septum, turbinates, nasopharynx, oropharynx, hypopharynx, soft palate, base of tongue, epiglottis, and larynx.    These structures were found to be normal with the following notable findings:     -mild supraglottic edema  -partial epiglottis hoods over arytenoids,  -vocal cords visualized, hypomobile       Media Information      Document Information    Misc Clinical: Clinical Unknown   View of Supraglottis from nasopharynx - epiglottis hooding glottis   05/22/2025 14:21   Attached To:   Hospital Encounter on 5/22/25   Source Information    Hesham Christensen MD  [facility]   Document History       Media Information      Document Information    Misc Clinical: Clinical Unknown   Nasopharyngolaryngoscopy - VCs seen at top of picture   05/22/2025 14:21   Attached To:   Hospital Encounter on 5/22/25   Source Information    Hesham Christensen MD  [facility]   Document History       Media Information      Document Information    Misc Clinical: Clinical Unknown    Nasopharyngolaryngoscopy - VCs   2025 14:21   Attached To:   Hospital Encounter on 25   Source Information    Hesham Christensen MD  [facility]   Document History        Assessment  Mk Fleming is a 74 y.o. 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 Clinical examination as well as ancillary testing is most consistent with diagnosis of angioedema.  Could also possibly be lymphedema, exacerbated by poor kidney function and fluid overload though less likely.    Recommendations  - Admit to MICU for airway watch  - Continuous pulse ox  - If intubation is needed, would recommend an endolaryngeal laryngoscope such as a Alaniz to lift the epiglottis out of the way  - If intubation is indicated, please page [phone]  - Discontinue offending medications including losartan, NSAIDs  - Agree with CT neck w IV contrast  - Chest x-ray  - Racemic epinephrine  - Decadron 10 mg every 8 hours  - Angioedema cocktail  - Patient seen, staffed, discussed, scoped with Dr. Momo Christensen MD  Resident, PGY-2  Otolaryngology - Head & Neck Surgery  ENT Consult Pager: [phone]  Head and Neck Phone: [phone]  ENT Peds Pager: [phone]  ENT Subspecialty Team: Kwasi   ENT Outpatient [phone]     Some or all of this note was completed using dictation software, please contact the author of this note if there is any confusion.         [1]   Past Medical History:  Diagnosis Date    Anemia     Cholecystitis     Cholelithiasis     Cirrhosis (Multi)     CKD (chronic kidney disease)     stage 3    COPD (chronic obstructive pulmonary disease) (Multi)     Dysphagia     peg dependent    GERD (gastroesophageal reflux  disease)     HCV (hepatitis C virus)     s/p SVR    Hoarseness of voice     Hyperlipidemia     Hypertension     Hypothyroidism     Laryngeal cancer (Multi)     s/p chemo and radiation therapy    PAD (peripheral artery disease)     Personal history of other diseases of the respiratory system     History of bronchitis    Personal history of other specified conditions     History of chest pain    Pulmonary emphysema (Multi)    [2]   Past Surgical History:  Procedure Laterality Date    ESOPHAGOGASTRODUODENOSCOPY      HERNIA REPAIR      IR ANGIOGRAM AORTA ABDOMEN  07/03/2022    IR ANGIOGRAM AORTA ABDOMEN 7/3/2022 [facility] CLINICAL LEGACY    IR ANGIOGRAM INFERIOR EPIGASTRIC  07/03/2022    IR ANGIOGRAM INFERIOR EPIGASTRIC 7/3/2022 [facility] CLINICAL LEGACY    IR EMBOLIZATION LYMPH NODE Bilateral 07/03/2022    IR EMBOLIZATION LYMPH NODE 7/3/2022 [facility] CLINICAL LEGACY    OTHER SURGICAL HISTORY  11/19/2018    Tooth extraction    OTHER SURGICAL HISTORY  11/19/2018    Pulmonary decortication    OTHER SURGICAL HISTORY  10/20/2022    Percutaneous endoscopic gastrostomy tube insertion   [3] No Known Allergies  [4] No current facility-administered medications for this encounter.    Current Outpatient Medications:     albuterol 2.5 mg /3 mL (0.083 %) nebulizer solution, Inhale 3 mL every 6 hours if needed for wheezing. 4-6 hours as needed, Disp: , Rfl:     albuterol 90 mcg/actuation inhaler, Inhale 2 puffs every 6 hours if needed for shortness of breath., Disp: , Rfl:     amLODIPine (Norvasc) 5 mg tablet, 1 tablet (5 mg) by g-tube route once daily., Disp: , Rfl:     atenolol (Tenormin) 50 mg tablet, 1 tablet (50 mg) by g-tube route once daily., Disp: , Rfl:     atorvastatin (Lipitor) 10 mg tablet, 1 tablet (10 mg) by g-tube route once daily at bedtime. (Patient taking differently: 1 tablet (10 mg) by g-tube route once daily in the morning.), Disp: , Rfl:     chlorhexidine (Peridex) 0.12 % solution, PLACE 15 MILLILITERS SWISH IN MOUTH FOR 30  SECONDS THEN SPIT OUT, Disp: , Rfl:     esomeprazole (NexIUM) 20 mg packet, Take 20 mg by mouth once daily in the morning. Take before meals., Disp: 90 each, Rfl: 3    fluticasone (Flonase) 50 mcg/actuation nasal spray, Administer 1 spray into each nostril once daily as needed for allergies., Disp: , Rfl:     hydroCHLOROthiazide (HYDRODiuril) 25 mg tablet, 1 tablet (25 mg) by g-tube route once daily., Disp: , Rfl:     levothyroxine (Synthroid, Levoxyl) 50 mcg tablet, 1 tablet (50 mcg) by g-tube route once daily in the morning. Take before meals., Disp: , Rfl:     losartan (Cozaar) 50 mg tablet, 1 tablet (50 mg) by g-tube route once daily., Disp: , Rfl:     methocarbamol (Robaxin) 500 mg tablet, 1 tablet (500 mg) by g-tube route every 8 hours for 7 days. (Patient not taking: Reported on 1/31/2025), Disp: 21 tablet, Rfl: 0    naloxone (Narcan) 4 mg/0.1 mL nasal spray, Administer 1 spray (4 mg) into affected nostril(s) if needed for opioid reversal or respiratory depression. May repeat every 2-3 minutes if needed, alternating nostrils, until medical assistance becomes available., Disp: 2 each, Rfl: 0    oxyCODONE (Roxicodone) 5 mg immediate release tablet, 1 tablet (5 mg) by g-tube route every 12 hours if needed for moderate pain (4 - 6)., Disp: , Rfl:     polyethylene glycol (Miralax) 17 gram/dose powder, Mix 17 g of powder and drink once daily as needed (constipation)., Disp: , Rfl:     prochlorperazine (Compazine) 10 mg tablet, 1 tablet (10 mg) by g-tube route every 6 hours if needed for vomiting or nausea., Disp: , Rfl:     sucralfate (Carafate) 100 mg/mL suspension, 10 mL (1 g) by g-tube route 2 times a day., Disp: , Rfl:     terazosin (Hytrin) 5 mg capsule, 1 capsule (5 mg) by g-tube route once daily., Disp: , Rfl:   [5]   Family History  Problem Relation Name Age of Onset    Pancreatic cancer Mother      Hypertension Sister      Hypertension Brother

## 2025-05-22 NOTE — CARE PLAN
74yoM admitted to MICU for airway monitoring.   PMHx: Laryngeal cancer s/p chemotherapy and radiation s/p PEG dependence with epiglottitis removal   Consults: ENT     LDA: PEG, PIV x 2    Diet: NPO   S:-   P:-   O2: RA   Abx:-   St: Decadron   Di:  -   AC: heparin ppx   GGT [addl]:  Addl: racemic epinephrine PRN    CODE: Full

## 2025-05-22 NOTE — SIGNIFICANT EVENT
Patient has been identified as having an emergent need for administration of iodinated contrast for CT scan prior to result of laboratory studies OR despite known elevated GFR due to possibility of life and/or limb threatening pathology.    I acknowledge the risks and benefits of emergently proceeding with contrast administration including that, at present, it is the position of the American College of Radiology that contrast induced nephropathy (SEKOU) is a rare but possible consequence. At this time the benefits of proceeding with contrast administration outweigh the risks.    Attempts will be made to mitigate possible SEKOU risk with IV fluid hydration if able.    Martha Ramirez MD  Emergency Medicine

## 2025-05-22 NOTE — ED PROCEDURE NOTE
Procedure  Critical Care    Performed by: Martha Ramirez MD  Authorized by: Martha Ramirez MD    Critical care provider statement:     Critical care time (minutes):  35    Critical care time was exclusive of:  Separately billable procedures and treating other patients and teaching time    Critical care was necessary to treat or prevent imminent or life-threatening deterioration of the following conditions: angioedema, airway obstruction.    Critical care was time spent personally by me on the following activities:  Ordering and performing treatments and interventions, ordering and review of laboratory studies, ordering and review of radiographic studies, pulse oximetry, re-evaluation of patient's condition, review of old charts, examination of patient, discussions with consultants, development of treatment plan with patient or surrogate, blood draw for specimens and obtaining history from patient or surrogate               Martha Ramirez MD  05/22/25 2172       Martha Ramirez MD  05/22/25 4229

## 2025-05-22 NOTE — ED PROVIDER NOTES
HPI   Chief Complaint   Patient presents with   • Sore Throat   • Hoarseness       HPI        Patient History   Medical History[1]  Surgical History[2]  Family History[3]  Social History[4]    Physical Exam   ED Triage Vitals [05/22/25 1147]   Temperature Heart Rate Respirations BP   36.3 °C (97.4 °F) (!) 107 18 (!) 191/92      Pulse Ox Temp Source Heart Rate Source Patient Position   100 % Temporal Monitor --      BP Location FiO2 (%)     Right arm --       Physical Exam      ED Course & MDM   ED Course as of 05/22/25 1211   Thu May 22, 2025   1204 EKG independently interpreted by me, ED attending, obtained at 1154, sinus tachycardia with a heart rate of 104 unremarkable axis, intervals, no ST segment elevation noted no T wave inversion noted [SOL]      ED Course User Index  [SOL] Martha Ramirez MD                 No data recorded     [place] Coma Scale Score: 15 (05/22/25 1152 : Homero Coombs RN)                           Medical Decision Making      Procedure  Procedures         [1]  Past Medical History:  Diagnosis Date   • Anemia    • Cholecystitis    • Cholelithiasis    • Cirrhosis (Multi)    • CKD (chronic kidney disease)     stage 3   • COPD (chronic obstructive pulmonary disease) (Multi)    • Dysphagia     peg dependent   • GERD (gastroesophageal reflux disease)    • HCV (hepatitis C virus)     s/p SVR   • Hoarseness of voice    • Hyperlipidemia    • Hypertension    • Hypothyroidism    • Laryngeal cancer (Multi)     s/p chemo and radiation therapy   • PAD (peripheral artery disease)    • Personal history of other diseases of the respiratory system     History of bronchitis   • Personal history of other specified conditions     History of chest pain   • Pulmonary emphysema (Multi)    [2]  Past Surgical History:  Procedure Laterality Date   • ESOPHAGOGASTRODUODENOSCOPY     • HERNIA REPAIR     • IR ANGIOGRAM AORTA ABDOMEN  07/03/2022    IR ANGIOGRAM AORTA ABDOMEN 7/3/2022 [facility] CLINICAL LEGACY   • IR  ANGIOGRAM INFERIOR EPIGASTRIC  2022    IR ANGIOGRAM INFERIOR EPIGASTRIC 7/3/2022 [facility] CLINICAL LEGACY   • IR EMBOLIZATION LYMPH NODE Bilateral 2022    IR EMBOLIZATION LYMPH NODE 7/3/2022 [facility] CLINICAL LEGACY   • OTHER SURGICAL HISTORY  2018    Tooth extraction   • OTHER SURGICAL HISTORY  2018    Pulmonary decortication   • OTHER SURGICAL HISTORY  10/20/2022    Percutaneous endoscopic gastrostomy tube insertion   [3]  Family History  Problem Relation Name Age of Onset   • Pancreatic cancer Mother     • Hypertension Sister     • Hypertension Brother     [4]  Social History  Tobacco Use   • Smoking status: Former     Current packs/day: 0.00     Types: Cigarettes     Quit date:      Years since quittin.4     Passive exposure: Never   • Smokeless tobacco: Never   Vaping Use   • Vaping status: Never Used   Substance Use Topics   • Alcohol use: Not Currently   • Drug use: Not Currently     Types: 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

## 2025-05-22 NOTE — ED TRIAGE NOTES
Pt bib ems with complaints of worsening sore throat, throat swelling, and difficulty breathing. Per report, pt has peg tube for prior throat ca with partial laryngectomy.

## 2025-05-23 LAB
ALBUMIN SERPL BCP-MCNC: 3.4 G/DL (ref 3.4–5)
ANION GAP SERPL CALC-SCNC: 14 MMOL/L (ref 10–20)
BASOPHILS # BLD AUTO: 0 X10*3/UL (ref 0–0.1)
BASOPHILS NFR BLD AUTO: 0 %
BUN SERPL-MCNC: 74 MG/DL (ref 6–23)
CALCIUM SERPL-MCNC: 9.1 MG/DL (ref 8.6–10.6)
CHLORIDE SERPL-SCNC: 95 MMOL/L (ref 98–107)
CO2 SERPL-SCNC: 28 MMOL/L (ref 21–32)
CREAT SERPL-MCNC: 1.9 MG/DL (ref 0.5–1.3)
EGFRCR SERPLBLD CKD-EPI 2021: 37 ML/MIN/1.73M*2
EOSINOPHIL # BLD AUTO: 0 X10*3/UL (ref 0–0.4)
EOSINOPHIL NFR BLD AUTO: 0 %
ERYTHROCYTE [DISTWIDTH] IN BLOOD BY AUTOMATED COUNT: 13.8 % (ref 11.5–14.5)
GLUCOSE BLD MANUAL STRIP-MCNC: 149 MG/DL (ref 74–99)
GLUCOSE BLD MANUAL STRIP-MCNC: 170 MG/DL (ref 74–99)
GLUCOSE SERPL-MCNC: 142 MG/DL (ref 74–99)
HCT VFR BLD AUTO: 28.3 % (ref 41–52)
HGB BLD-MCNC: 9.9 G/DL (ref 13.5–17.5)
IMM GRANULOCYTES # BLD AUTO: 0.01 X10*3/UL (ref 0–0.5)
IMM GRANULOCYTES NFR BLD AUTO: 0.3 % (ref 0–0.9)
LYMPHOCYTES # BLD AUTO: 0.25 X10*3/UL (ref 0.8–3)
LYMPHOCYTES NFR BLD AUTO: 7 %
MAGNESIUM SERPL-MCNC: 2.22 MG/DL (ref 1.6–2.4)
MCH RBC QN AUTO: 31.1 PG (ref 26–34)
MCHC RBC AUTO-ENTMCNC: 35 G/DL (ref 32–36)
MCV RBC AUTO: 89 FL (ref 80–100)
MONOCYTES # BLD AUTO: 0.05 X10*3/UL (ref 0.05–0.8)
MONOCYTES NFR BLD AUTO: 1.4 %
NEUTROPHILS # BLD AUTO: 3.28 X10*3/UL (ref 1.6–5.5)
NEUTROPHILS NFR BLD AUTO: 91.3 %
NRBC BLD-RTO: 0 /100 WBCS (ref 0–0)
PHOSPHATE SERPL-MCNC: 3.5 MG/DL (ref 2.5–4.9)
PLATELET # BLD AUTO: 164 X10*3/UL (ref 150–450)
POTASSIUM SERPL-SCNC: 4.5 MMOL/L (ref 3.5–5.3)
RBC # BLD AUTO: 3.18 X10*6/UL (ref 4.5–5.9)
RBC MORPH BLD: NORMAL
SODIUM SERPL-SCNC: 132 MMOL/L (ref 136–145)
TSH SERPL-ACNC: 1.43 MIU/L (ref 0.44–3.98)
WBC # BLD AUTO: 3.6 X10*3/UL (ref 4.4–11.3)

## 2025-05-23 PROCEDURE — 80069 RENAL FUNCTION PANEL: CPT

## 2025-05-23 PROCEDURE — 99213 OFFICE O/P EST LOW 20 MIN: CPT | Performed by: OTOLARYNGOLOGY

## 2025-05-23 PROCEDURE — 1100000001 HC PRIVATE ROOM DAILY

## 2025-05-23 PROCEDURE — 2500000004 HC RX 250 GENERAL PHARMACY W/ HCPCS (ALT 636 FOR OP/ED): Performed by: STUDENT IN AN ORGANIZED HEALTH CARE EDUCATION/TRAINING PROGRAM

## 2025-05-23 PROCEDURE — 2500000001 HC RX 250 WO HCPCS SELF ADMINISTERED DRUGS (ALT 637 FOR MEDICARE OP)

## 2025-05-23 PROCEDURE — 2500000004 HC RX 250 GENERAL PHARMACY W/ HCPCS (ALT 636 FOR OP/ED): Mod: JZ

## 2025-05-23 PROCEDURE — 85025 COMPLETE CBC W/AUTO DIFF WBC: CPT

## 2025-05-23 PROCEDURE — 83735 ASSAY OF MAGNESIUM: CPT

## 2025-05-23 PROCEDURE — 84443 ASSAY THYROID STIM HORMONE: CPT | Performed by: STUDENT IN AN ORGANIZED HEALTH CARE EDUCATION/TRAINING PROGRAM

## 2025-05-23 PROCEDURE — 2500000002 HC RX 250 W HCPCS SELF ADMINISTERED DRUGS (ALT 637 FOR MEDICARE OP, ALT 636 FOR OP/ED)

## 2025-05-23 PROCEDURE — 97161 PT EVAL LOW COMPLEX 20 MIN: CPT | Mod: GP | Performed by: STUDENT IN AN ORGANIZED HEALTH CARE EDUCATION/TRAINING PROGRAM

## 2025-05-23 PROCEDURE — 99291 CRITICAL CARE FIRST HOUR: CPT

## 2025-05-23 PROCEDURE — 82947 ASSAY GLUCOSE BLOOD QUANT: CPT

## 2025-05-23 PROCEDURE — 2500000004 HC RX 250 GENERAL PHARMACY W/ HCPCS (ALT 636 FOR OP/ED)

## 2025-05-23 PROCEDURE — 36415 COLL VENOUS BLD VENIPUNCTURE: CPT

## 2025-05-23 PROCEDURE — 86161 COMPLEMENT/FUNCTION ACTIVITY: CPT | Performed by: STUDENT IN AN ORGANIZED HEALTH CARE EDUCATION/TRAINING PROGRAM

## 2025-05-23 RX ORDER — ATORVASTATIN CALCIUM 40 MG/1
40 TABLET, FILM COATED ORAL NIGHTLY
Status: DISCONTINUED | OUTPATIENT
Start: 2025-05-23 | End: 2025-05-24 | Stop reason: HOSPADM

## 2025-05-23 RX ORDER — HYDROCHLOROTHIAZIDE 25 MG/1
25 TABLET ORAL DAILY
Status: DISCONTINUED | OUTPATIENT
Start: 2025-05-23 | End: 2025-05-24

## 2025-05-23 RX ORDER — NAPROXEN SODIUM 220 MG/1
81 TABLET, FILM COATED ORAL DAILY
Status: DISCONTINUED | OUTPATIENT
Start: 2025-05-23 | End: 2025-05-24 | Stop reason: HOSPADM

## 2025-05-23 RX ORDER — ESOMEPRAZOLE MAGNESIUM 40 MG/1
20 GRANULE, DELAYED RELEASE ORAL
Status: DISCONTINUED | OUTPATIENT
Start: 2025-05-24 | End: 2025-05-24 | Stop reason: HOSPADM

## 2025-05-23 RX ORDER — ATENOLOL 50 MG/1
50 TABLET ORAL DAILY
Status: DISCONTINUED | OUTPATIENT
Start: 2025-05-23 | End: 2025-05-24

## 2025-05-23 RX ADMIN — HEPARIN SODIUM 5000 UNITS: 5000 INJECTION, SOLUTION INTRAVENOUS; SUBCUTANEOUS at 16:36

## 2025-05-23 RX ADMIN — FAMOTIDINE 20 MG: 10 INJECTION INTRAVENOUS at 20:24

## 2025-05-23 RX ADMIN — FAMOTIDINE 20 MG: 10 INJECTION INTRAVENOUS at 05:04

## 2025-05-23 RX ADMIN — DIPHENHYDRAMINE HYDROCHLORIDE 25 MG: 50 INJECTION INTRAMUSCULAR; INTRAVENOUS at 05:12

## 2025-05-23 RX ADMIN — ASPIRIN 81 MG: 81 TABLET, CHEWABLE ORAL at 12:45

## 2025-05-23 RX ADMIN — DIPHENHYDRAMINE HYDROCHLORIDE 25 MG: 50 INJECTION INTRAMUSCULAR; INTRAVENOUS at 12:43

## 2025-05-23 RX ADMIN — SODIUM CHLORIDE 500 ML: 0.9 INJECTION, SOLUTION INTRAVENOUS at 00:43

## 2025-05-23 RX ADMIN — HEPARIN SODIUM 5000 UNITS: 5000 INJECTION, SOLUTION INTRAVENOUS; SUBCUTANEOUS at 08:50

## 2025-05-23 RX ADMIN — FAMOTIDINE 20 MG: 10 INJECTION INTRAVENOUS at 12:43

## 2025-05-23 RX ADMIN — HEPARIN SODIUM 5000 UNITS: 5000 INJECTION, SOLUTION INTRAVENOUS; SUBCUTANEOUS at 00:43

## 2025-05-23 RX ADMIN — DEXAMETHASONE SODIUM PHOSPHATE 10 MG: 10 INJECTION INTRAMUSCULAR; INTRAVENOUS at 17:16

## 2025-05-23 RX ADMIN — LEVOTHYROXINE SODIUM 50 MCG: 50 TABLET ORAL at 06:38

## 2025-05-23 RX ADMIN — HYDROCHLOROTHIAZIDE 25 MG: 25 TABLET ORAL at 13:43

## 2025-05-23 RX ADMIN — DEXAMETHASONE SODIUM PHOSPHATE 10 MG: 10 INJECTION INTRAMUSCULAR; INTRAVENOUS at 10:05

## 2025-05-23 RX ADMIN — DEXAMETHASONE SODIUM PHOSPHATE 10 MG: 10 INJECTION INTRAMUSCULAR; INTRAVENOUS at 01:08

## 2025-05-23 RX ADMIN — ATENOLOL 50 MG: 50 TABLET ORAL at 13:43

## 2025-05-23 RX ADMIN — DIPHENHYDRAMINE HYDROCHLORIDE 25 MG: 50 INJECTION INTRAMUSCULAR; INTRAVENOUS at 20:32

## 2025-05-23 ASSESSMENT — COGNITIVE AND FUNCTIONAL STATUS - GENERAL
WALKING IN HOSPITAL ROOM: A LITTLE
CLIMB 3 TO 5 STEPS WITH RAILING: A LITTLE
TURNING FROM BACK TO SIDE WHILE IN FLAT BAD: A LITTLE
MOVING FROM LYING ON BACK TO SITTING ON SIDE OF FLAT BED WITH BEDRAILS: A LITTLE
STANDING UP FROM CHAIR USING ARMS: A LITTLE
MOBILITY SCORE: 18
WALKING IN HOSPITAL ROOM: A LITTLE
MOVING TO AND FROM BED TO CHAIR: A LITTLE
MOVING TO AND FROM BED TO CHAIR: A LITTLE
CLIMB 3 TO 5 STEPS WITH RAILING: A LITTLE
MOBILITY SCORE: 20
STANDING UP FROM CHAIR USING ARMS: A LITTLE
DAILY ACTIVITIY SCORE: 23
TOILETING: A LITTLE

## 2025-05-23 ASSESSMENT — ACTIVITIES OF DAILY LIVING (ADL)
ADL_ASSISTANCE: INDEPENDENT
LACK_OF_TRANSPORTATION: NO

## 2025-05-23 ASSESSMENT — PAIN SCALES - GENERAL
PAINLEVEL_OUTOF10: 0 - NO PAIN

## 2025-05-23 ASSESSMENT — PAIN - FUNCTIONAL ASSESSMENT
PAIN_FUNCTIONAL_ASSESSMENT: 0-10

## 2025-05-23 NOTE — CARE PLAN
Problem: Skin  Goal: Prevent/manage excess moisture  Outcome: Progressing  Flowsheets (Taken 5/22/2025 2252)  Prevent/manage excess moisture:   Cleanse incontinence/protect with barrier cream   Monitor for/manage infection if present  Goal: Prevent/minimize sheer/friction injuries  Outcome: Progressing  Flowsheets (Taken 5/22/2025 2252)  Prevent/minimize sheer/friction injuries:   Complete micro-shifts as needed if patient unable. Adjust patient position to relieve pressure points, not a full turn   Use pull sheet  Goal: Promote skin healing  Outcome: Progressing  Flowsheets (Taken 5/22/2025 2252)  Promote skin healing:   Turn/reposition every 2 hours/use positioning/transfer devices   Assess skin/pad under line(s)/device(s)

## 2025-05-23 NOTE — SIGNIFICANT EVENT
Floor Readiness Note       I, personally, evaluated Mk Fleming prior to transfer to the floor, including reviewing all current laboratory and imaging studies. The patient remains appropriate for transfer to the floor. Bedside nurse and respiratory therapy are also in agreement of patient's readiness for the floor.     Brief summary:  Mk Fleming is a 74 y.o. male who was admitted to the MICU on 5/22 for airway watch 2/2 angioedema. They have been treated with steroids and angioedema cocktail.     Updated focused Physical Exam:  Physical Exam   Constitutional: Well-developed male in no acute distress.  HEENT: Normocephalic, atraumatic. PERRL. EOMI. No cervical lymphadenopathy. Normal lips. Tongue is enlarged; however decreased in size overall.  Respiratory: Mild inspiratory stridor. Normal respiratory effort.  Cardiovascular: RRR. No murmurs, gallops, or rubs. No JVD. Radial pulses 2+.  Abdominal: Soft, nondistended, nontender to palpation. PEG in place. Bowel sounds present. No hepatosplenomegaly or masses. No CVA tenderness.  Neuro: Alert and oriented x3.   MSK: No LE edema bilaterally.  Skin: Warm, dry. No rashes or wounds.  Psych: Appropriate mood and affect.    Current Vital Signs:  Heart Rate: 87 (05/23/25 1500 : User, System Default)  BP: 99/59 (05/23/25 1500 : User, System Default)  Temp: 36 °C (96.8 °F) (05/23/25 1200 : Lorena Calderon)  Resp: 19 (05/23/25 1500 : User, System Default)  SpO2: 100 % (05/23/25 1500 : User, System Default)    Relevant updates since rounds:  -No plan for trach yet. Started tube feeds    Accepting team, Naff, received verbal sign out and the Provider Care team/Attending has been updated. Bedside nurse will now call accepting nurse for report and patient will be transferred to [place] [phone].    Madeline Ryan MD  Internal Medicine PGY-1

## 2025-05-23 NOTE — PROGRESS NOTES
Occupational Therapy                 OT Screen    Patient Name: Mk Fleming  MRN: [id number]  Department: [facility]  Room: 30/30-A  Today's Date: 5/23/2025     Discipline: Occupational Therapy    Missed Visit: OT Missed Visit: Yes     Per PT; Up Indep with self-care and functional mobility tasks. No skilled needs identified. Will discharge OT consult.

## 2025-05-23 NOTE — PROGRESS NOTES
"Medical Intensive Care - Daily Progress Note   Subjective    Mk Fleming is a 74 y.o. year old male patient admitted on 5/22/2025 with following ICU needs: airway watch for tongue swelling and increased WOB concerning for angioedema.     Interval History:  NAEO.     This AM, patient feels much better. His tongue swelling has improved significantly and he denies any SOB. He expresses wanting to eat. No other acute complaints at this time.     Meds    Scheduled medications  Scheduled Medications[1]  Continuous medications  Continuous Medications[2]  PRN medications  PRN Medications[3]     Objective    Blood pressure (!) 124/110, pulse 101, temperature 36.2 °C (97.2 °F), temperature source Temporal, resp. rate 19, height 1.702 m (5' 7\"), weight 57 kg (125 lb 10.6 oz), SpO2 100%.     Physical Exam   Constitutional: Well-developed male in no acute distress.  HEENT: Normocephalic, atraumatic. PERRL. EOMI. No cervical lymphadenopathy. Normal lips. Tongue is enlarged; however decreased in size overall.  Respiratory: Mild inspiratory stridor. Normal respiratory effort.  Cardiovascular: RRR. No murmurs, gallops, or rubs. No JVD. Radial pulses 2+.  Abdominal: Soft, nondistended, nontender to palpation. PEG in place. Bowel sounds present. No hepatosplenomegaly or masses. No CVA tenderness.  Neuro: Alert and oriented x3.   MSK: No LE edema bilaterally.  Skin: Warm, dry. No rashes or wounds.  Psych: Appropriate mood and affect.       Intake/Output Summary (Last 24 hours) at 5/23/2025 0701  Last data filed at 5/23/2025 0600  Gross per 24 hour   Intake 570 ml   Output 420 ml   Net 150 ml     Labs:   Results from last 72 hours   Lab Units 05/23/25  0502 05/22/25  2133 05/22/25  1735   SODIUM mmol/L 132* 129* 128*   POTASSIUM mmol/L 4.5 4.8 4.6   CHLORIDE mmol/L 95* 91* 90*   CO2 mmol/L 28 29 30   BUN mg/dL 74* 71* 76*   CREATININE mg/dL 1.90* 1.93* 1.90*   GLUCOSE mg/dL 142* 148* 157*   CALCIUM mg/dL 9.1 9.1 9.5   ANION GAP " "mmol/L 14 14 13   EGFR mL/min/1.73m*2 37* 36* 37*   PHOSPHORUS mg/dL 3.5  --  2.9      Results from last 72 hours   Lab Units 05/23/25  0502 05/22/25  1735 05/22/25  1153   WBC AUTO x10*3/uL 3.6* 5.3 5.5   HEMOGLOBIN g/dL 9.9* 9.8* 10.2*   HEMATOCRIT % 28.3* 29.0* 29.6*   PLATELETS AUTO x10*3/uL 164 160 160   NEUTROS PCT AUTO %  --  95.3 68.2   LYMPHS PCT AUTO %  --  3.2 10.1   MONOS PCT AUTO %  --  0.9 16.7   EOS PCT AUTO %  --  0.0 4.2          Results from last 72 hours   Lab Units 05/22/25  1153 05/22/25  1151   POCT PH, VENOUS pH 7.37 7.41   POCT PCO2, VENOUS mm Hg 55* 53*   POCT PO2, VENOUS mm Hg 33* 35      Micro/ID:     No results found for: \"URINECULTURE\", \"BLOODCULT\", \"CSFCULTSMEAR\"    Summary of key imaging results from the last 24 hours  CT soft tissue neck w/IV contrast 5/22   IMPRESSION:  1. Soft tissue thickening and edema involving the oropharynx and hypopharynx with the additional retropharyngeal edema/prevertebral soft tissue thickening. Findings are most consistent with angioedema. Partial effacement of the airway at this level observed. No evidence of discrete mass.  2. Incidental note is made of significant stenosis of the proximal RIGHT C1 segment extracranial internal carotid artery, measuring to approximally 70% stenosis by NASCET criteria.     CXR 5/22  IMPRESSION:  No acute cardiopulmonary process is evident.    Assessment and Plan     Assessment: Mk Fleming is a 74 y.o. year old male patient admitted to the MICU on 05/22/25 for airway watch 2/2 non-allergic angioedema.     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 Treating with steroids and angioedema " cocktail. ENT following.     Mechanical Ventilation: none  Sedation/Analgesia:  none  Restraints: no    Updates for 05/23/25  :  ENT following, appreciate further recs   Start tube feeds if no plan for procedure with ENT  Start ASA and statin for findings found on CT neck   Ok to restart atenolol and hydrochlorothiazide for BP control. Continue to hold losartan and amlodipine    Plan:  NEUROLOGY/PSYCH:  No active issues.      CARDIOVASCULAR:  #HTN  #HLD  #CAD  ::On home atorvastatin 10mg  ::On home losartan 50mg, atenolol 50mg, amlodipine 5mg, hydrochlorothiazide 25mg  ::Tele with brief run of 's, ECG pending   -Restart home atenolol and hydrochlorothiazide for now  -Hold home losartan and amlodipine in the setting of current angioedema    #Internal Carotid Artery Stenosis   ::CT soft tissue neck demonstrating significant stenosis of the proximal right C1 segment extracranial internal carotid artery measuring 70% stenosis   -Started ASA and atorvastatin 40mg  -Consider vascular surgery consult once more clinically stable or closer to discharge      PULMONARY:  #Angioedema   ::Likely 2/2 losartan ue  ::CT soft tissue neck: findings consistent with angioedema with partial effacement of the airway   -ENT following, appreciate further recs    -Continuous pulse ox  -Per ENT: If intubation is needed, would recommend an endolaryngeal laryngoscope such as a Alaniz to lift the epiglottis out of the way. If intubation is indicated, please page [phone]  - Discontinue offending medications including losartan, NSAIDs  - Racemic epinephrine  - Decadron 10 mg q8h for 48 hours  - Angioedema cocktail: Benadryl and famotidine scheduled  -C1 estrace panel scheduled     #COPD  ::No PFTs on file   ::On home albuterol inhaler PRN      RENAL/GENITOURINARY:  #CKD Stage 3  ::Baseline Cr 1.8-2  ::Cr 1.93 on admission  -Daily RFP  -Strict I/Os     #Hyponatremia, improved    ::Na 128 on admission   ::Likely hypovolemic hyponatremia  "  -CTM with daily RFP  -Urine electrolytes and Serum and urine osmolality if worsening     #BPH  ::On home terazosin      GASTROENTEROLOGY:  #Complete PEG dependency  ::Poor laryngeal function   ::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   -NPO for now  -Will start tube feeds if plan for procedure with ENT     #Compensated HCV Cirrhosis   ::Not on any medication   ::US liver 11/2024: Cirrhotic morphology of the liver. No focal hepatic lesion is evident.   ::LFTs stable   -Follow up with GI on discharge     #GERD  -C/w home esomeprazole 20mg      ENDOCRINOLOGY:  #Hypothyroidism  -TSH with reflex ordered  -C/w home levothyroxine 50mcg      HEMATOLOGY:  #T4 laryngeal cancer s/p chemotherapy and radiation in 2019  ::poor larynx function and complete PEG dependency   ::ENT note 2/24/2025: \"Cancer surveillance has been MARICARMEN.\"     No active issues.      SKIN:  No active issues.     MUSCULOSKELETAL:  No active issues.      INFECTIOUS DISEASE:  No active issues.      ICU Check List       ICU Liberation: Intervention:   Assess, Prevent, Manage Pain CPOT -    Both SAT and SBT [] SAT  [] SBT 30-60 min [] Extubate to NC  [] Extubate to NIV  [] Discuss Trach   Choice of analgesia and sedation RASS:   none    Delirium: Assess, prevent and manage CAM -    Early Mobility and Exercise  [x] PT /OT consult   Family Engagement and Empowerment []Family updated []SW consult     FEN  Fluids: PRN  Electrolytes: K>4 Phos>3 Mg>2   Nutrition: NPO (will start tube feeds when able)  Prophylaxis:  DVT ppx: SubQ heparin; SCDs   GI ppx: Esomeprazole 20mg  Bowel care: Miralax   Hardware:         Gastrostomy/Enterostomy Percutaneous endoscopic gastrostomy (PEG) LUQ (Active)   Placement Date/Time: 05/22/25 (c)    Earliest " Known Present: 05/22/25  Hand Hygiene Completed: Yes  Type: Percutaneous endoscopic gastrostomy (PEG)  [facility]   Number of days: 1       Social:  Code: Full Code    HPOA: Carol Ta (Sister) [phone]   Disposition: FEDERICO Ryan MD   05/23/25 at 7:01 AM     Disclaimer: Documentation completed with the information available at the time of input. The times in the chart may not be reflective of actual patient care times, interventions, or procedures. Documentation occurs after the physical care of the patient.          [1] dexAMETHasone, 10 mg, intravenous, q8h  diphenhydrAMINE, 25 mg, intravenous, q8h  famotidine, 20 mg, intravenous, q8h  heparin (porcine), 5,000 Units, subcutaneous, q8h  levothyroxine, 50 mcg, g-tube, Daily before breakfast  polyethylene glycol, 17 g, oral, Daily  [2]    [3] PRN medications: racepinephrine, racepinephrine

## 2025-05-23 NOTE — CONSULTS
"Nutrition Initial Assessment:   Nutrition Assessment    Reason for Assessment: Enteral assessment/recommendation (TF), Admission nursing screening    Patient is a 74 y.o. male presenting with tongue swelling and increased WOB.      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  He reports being on Boost [facility] at home.  Says he usually gets in 3 to 3.5cartons per day.  Struggles with getting in a full 4 cartons as he feels that the 4th carton makes him bloated and makes his PEG \"pop out\".  Says he gives himself one carton at the following times: 0730, 1230, [id number] and then at 1900.  His time between his last two boluses may be too close.  May need to add the 4th carton to his first and second boluses or push his last bolus back until 2100 or so. Says his weight fluctuates, usuall is around 61.4kg although he is lower currently.  Has issues with what he calls nausea in the morning when he first wakes up-- has been ongoing for 8 years every morning.  Has a lot of secretions/saliva in his mouth that he thinks come from his stomach not lungs.  Says that after he had his epiglottis removed, this has been a problem.  Bowel movements ranges from liquid to formed.       Anthropometrics:  Height: 170.2 cm (5' 7\")   Weight: 57 kg (125 lb 10.6 oz)   BMI (Calculated): 19.68  IBW/kg (Dietitian Calculated): 67.3 kg  Percent of IBW: 85 %       Weight History:     5/22/25: 57kg and 61.2kg listed (admit)  4/20/25: 61.2kg  2/24/25: 63.1kg  1/8/25: 62.9kg  11/5/24: 61.7kg  8/4/24: 59kg  6/26/24: 59.9kg  5/15/24: 61.2kg    Depending on what weight from 5/22/25 is accurate, he may be down 7% in about one month.  Says he is " "usually around 61.4kg.  If his weight of 61.2kg is more accurate, he is stable over the course of the last year.      Weight Change %:       Nutrition Focused Physical Exam Findings:    Subcutaneous Fat Loss:   Orbital Fat Pads: Mild-Moderate (slight dark circles and slight hollowing)  Buccal Fat Pads: Mild-Moderate (flat cheeks, minimal bounce)  Triceps: Severe (negligible fat tissue)  Muscle Wasting:  Pectoralis (Clavicular Region): Mild-Moderate (some protrusion of clavicle)  Deltoid/Trapezius: Mild-Moderate (slight protrusion of acromion process)  Quadriceps: Severe (depressions on inner and outer thigh)  Gastrocnemius: Severe (minimal muscle definition)  Edema:  Edema: none  Physical Findings:  Skin: Negative    Nutrition Significant Labs:  A1C:No results found for: \"HGBA1C\", BG POCT trend:   Results from last 7 days   Lab Units 05/22/25  2353   POCT GLUCOSE mg/dL 144*    , Renal Lab Trend:   Results from last 7 days   Lab Units 05/23/25  0502 05/22/25  2133 05/22/25  1735 05/22/25  1153 05/22/25  1153   POTASSIUM mmol/L 4.5 4.8 4.6  --  4.1   PHOSPHORUS mg/dL 3.5  --  2.9   < >  --    SODIUM mmol/L 132* 129* 128*  --  128*   MAGNESIUM mg/dL 2.22  --   --   --  2.38   EGFR mL/min/1.73m*2 37* 36* 37*  --  36*   BUN mg/dL 74* 71* 76*  --  81*   CREATININE mg/dL 1.90* 1.93* 1.90*  --  1.93*    < > = values in this interval not displayed.    , Vit D:   Lab Results   Component Value Date    VITD25 31 05/03/2024        Nutrition Specific Medications:  Scheduled medications  dexAMETHasone, 10 mg, intravenous, q8h  diphenhydrAMINE, 25 mg, intravenous, q8h  famotidine, 20 mg, intravenous, q8h  heparin (porcine), 5,000 Units, subcutaneous, q8h  levothyroxine, 50 mcg, g-tube, Daily before breakfast  polyethylene glycol, 17 g, oral, Daily      Continuous medications     PRN medications  PRN Medications[1]     I/O:    ;        Dietary Orders (From admission, onward)       Start     Ordered    05/22/25 1707  NPO Diet; " Effective now  Diet effective now         05/22/25 1709    05/22/25 1701  May Participate in Room Service  ( ROOM SERVICE MAY PARTICIPATE)  Once        Question:  .  Answer:  Yes    05/22/25 1700                     Estimated Needs:   Total Energy Estimated Needs in 24 hours (kCal):  (2000+)  Method for Estimating Needs: MSJ= 1274  Total Protein Estimated Needs in 24 Hours (g): 85 g  Method for Estimating 24 Hour Protein Needs: 1.5 x 57kg            Nutrition Diagnosis      Nutrition Diagnosis  Patient has Nutrition Diagnosis: Yes  Diagnosis Status (1): New  Nutrition Diagnosis 1: Swallowing difficulty  Related to (1): laryngeal CA  As Evidenced by (1): need for PEG for sole source nutrition     Difficult to determine pt's overall nutrition status.  Suspect he is chronically underwent based on past medical history.  Has two weights listed as admit weights-- one would indicate weight loss and one would indicate weight stability.  Will continue to monitor/follow.     Nutrition Interventions/Recommendations         Nutrition Prescription:   For tube feed: one carton (240mls) of Boost VHC given 4 times daily.    Sample administration schedule: one carton each at 0700, 1100, 1500 and then 1900.   If pt struggles to tolerate one carton 4 times daily, can trial doing 1.5 cartons BID and one carton once daily.  Would do 1.5 cartons at first and last feed and one carton at the second feed.   All boluses should have a 60mls pre and post bolus water flush.  Additional water flushes per team's discretion.    Upon discharge, pt should follow-up with outpatient RDN to help establish good tolerance of 4 cartons of his Boost VHC daily as this appears to be an issue since at least last year.           Nutrition Interventions:    TF at goal= 2120kcals, 88grams protein       Nutrition Education:   Not appropriate       Nutrition Monitoring and Evaluation   Food/Nutrient Related History Monitoring  Monitoring and Evaluation Plan:  Enteral and parenteral nutrition intake determination         Time Spent (min): 60 minutes                 [1] PRN medications: racepinephrine, racepinephrine

## 2025-05-23 NOTE — CARE PLAN
Problem: Skin  Goal: Decreased wound size/increased tissue granulation at next dressing change  Outcome: Progressing  Flowsheets (Taken 5/23/2025 0910)  Decreased wound size/increased tissue granulation at next dressing change:   Promote sleep for wound healing   Protective dressings over bony prominences  Goal: Participates in plan/prevention/treatment measures  Outcome: Progressing  Flowsheets (Taken 5/23/2025 0910)  Participates in plan/prevention/treatment measures:   Discuss with provider PT/OT consult   Elevate heels  Goal: Prevent/manage excess moisture  Outcome: Progressing  Flowsheets (Taken 5/23/2025 0910)  Prevent/manage excess moisture:   Monitor for/manage infection if present   Follow provider orders for dressing changes   Cleanse incontinence/protect with barrier cream   Moisturize dry skin  Goal: Prevent/minimize sheer/friction injuries  Outcome: Progressing  Flowsheets (Taken 5/23/2025 0910)  Prevent/minimize sheer/friction injuries:   Increase activity/out of bed for meals   Use pull sheet   HOB 30 degrees or less   Turn/reposition every 2 hours/use positioning/transfer devices  Goal: Promote/optimize nutrition  Outcome: Progressing  Flowsheets (Taken 5/23/2025 0910)  Promote/optimize nutrition: Discuss with provider if NPO > 2 days  Goal: Promote skin healing  Outcome: Progressing  Flowsheets (Taken 5/23/2025 0910)  Promote skin healing:   Turn/reposition every 2 hours/use positioning/transfer devices   Protective dressings over bony prominences   Assess skin/pad under line(s)/device(s)   Ensure correct size (line/device) and apply per  instructions

## 2025-05-23 NOTE — PROGRESS NOTES
Physical Therapy    Physical Therapy Evaluation    Patient Name: Mk Fleming  MRN: [id number]  Room: 30/30  Today's Date: 5/23/2025   Time Calculation  Start Time: 0940  Stop Time: 1010  Time Calculation (min): 30 min    Assessment/Plan   PT Assessment  Rehab Prognosis: Good  Barriers to Discharge Home: No anticipated barriers  End of Session Communication: Bedside nurse  End of Session Patient Position: Up in chair  IP OR SWING BED PT PLAN  Inpatient or Swing Bed: Inpatient  PT Plan: PT Eval only  PT Eval Only Reason: Does not meet criteria for skilled interventions  PT Frequency: PT eval only  PT Discharge Recommendations: No further acute PT  PT Recommended Transfer Status: Stand by assist    Subjective      General Visit Information:  Subjective: Patient alert and agreeable to physical therapy. Patient is in good spirits this morning.  Reason for Referral: airway watch for tongue swelling and increased WOB concerning for angioedema.  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 of a portion of the epiglottis, and compensated HCV cirrhosis  Prior to Session Communication: Bedside nurse  Patient Position Received: Bed, 3 rail up, Alarm off, not on at start of session   Home Living:  Home Living  Type of Home: Apartment  Lives With: Alone  Home Adaptive Equipment: Cane  Home Layout:  (On 4th floor)  Home Access: Stairs to enter with rails  Entrance Stairs-Rails: Right  Prior Level of Function:  Prior Function Per Pt/Caregiver Report  Level of [place]: Independent with ADLs and functional transfers  ADL Assistance: Independent  Homemaking Assistance: Independent  Ambulatory Assistance: Independent  Precautions:  Precautions  Medical Precautions: Fall precautions  Vital Signs:  Pre Vitals  Vital Signs  Heart Rate:  108  SpO2: 94 %  BP: 158/82  Vital Signs Comment: Patient vitals remained stable throughout session   Post Vitals      Lines/Tubes/Drains:  Gastrostomy/Enterostomy Percutaneous endoscopic gastrostomy (PEG) LUQ (Active)   Number of days: 1        Continuous Medications/Drips:  Continuous Medications[1]    Objective   Pain:  Pain Assessment  Pain Assessment: 0-10  0-10 (Numeric) Pain Score: 0 - No pain (Post-0)    Cognition:  Cognition  Overall Cognitive Status:  (Orientated x4)    General Assessments:  Extremity/Trunk Assessments:  Tone: No abnormalities noted  Sensation  Light Touch: No apparent deficits  Coordination  Movements are Fluid and Coordinated: Yes  Upper Extremity  ROM: WFL  Strength:WFL  Lower Extremity  ROM: WFL  Strength: WFL   Sitting Static Balance Normal  Sitting Dynamic Balance Normal  Standing Static Balance Normal  Standing Dynamic Balance Normal    Functional Assessments:  Bed Mobility  Bed Mobility: Yes  Bed Mobility 1  Bed Mobility 1: Supine to sitting  Level of Assistance 1: Close supervision    Transfers  Transfer: Yes  Transfer 1  Transfer From 1: Bed to  Transfer to 1: Chair with arms  Transfer Level of Assistance 1: Close supervision  Transfers 2  Transfer From 2: Sit to  Transfer to 2: Stand  Transfer Level of Assistance 2: Close supervision  Transfers 3  Transfer From 3: Stand to  Transfer to 3: Sit  Transfer Level of Assistance 3: Close supervision  Transfers 4  Transfer From 4: Sit to  Transfer to 4: Stand  Transfer Device 4: Walker  Transfer Level of Assistance 4: Close supervision  Transfers 5  Transfer From 5: Stand to  Transfer to 5: Sit  Transfer Device 5: Walker  Transfer Level of Assistance 5: Close supervision    Ambulation/Gait Training  Ambulation/Gait Training Performed: Yes  Ambulation/Gait Training 1  Surface 1: Level tile  Device 1: Rolling walker  Assistance 1: Contact guard  Quality of Gait 1:  (WFL)  Comments/Distance (ft) 1: 100 ft with walker, 20ft x 2 no  device    Stairs  Stairs: No         Outcome Measures:  [facility] Basic Mobility  Turning from your back to your side while in a flat bed without using bedrails: A little  Moving from lying on your back to sitting on the side of a flat bed without using bedrails: A little  Moving to and from bed to chair (including a wheelchair): A little  Standing up from a chair using your arms (e.g. wheelchair or bedside chair): A little  To walk in hospital room: A little  Climbing 3-5 steps with railing: A little  Basic Mobility - Total Score: 18           FSS-ICU  Ambulation: Walks >/ or equal to 50 feet with any assistance x1  Rolling: Supervision or set-up only  Sitting: Minimal assistance (performs 75% or more of task)  Transfer Sit-to-Stand: Minimal assistance (performs 75% or more of task)  Transfer Supine-to-Sit: Minimal assistance (performs 75% or more of task)  Total Score: 19  ICU Mobility Screen  ICU Mobility Scale: Walking independently without a gait aid  [place]inetti  Sitting Balance: Steady, safe  Arises: Able, uses arms to help  Attempts to Arise: Able to arise, one attempt  Immediate Standing Balance (First 5 Seconds): Steady without walker or other support  Standing Balance: Narrow stance without support  Nudged: Steady without walker or other support  Eyes Closed: Steady  Turned 360 Degrees: Steadiness: Steady  Turned 360 Degrees: Continuity of Steps: Discontinuous steps  Sitting Down: Uses arms or not a smooth motion  Balance Score: 13  Initiation of Gait: No hesitancy  Step Height: R Swing Foot: Right foot complete clears floor  Step Length: R Swing Foot: Passes left stance foot  Step Height: L Swing Foot: Left foot complete clears floor  Step Length: L Swing Foot: Passes right stance foot  Step Symmetry: Right and left step appear equal  Step Continuity: Steps appear continuous  Path: Mild/moderate deviation or uses walking aid  Trunk: Marked sway or uses walking aid  Walking Time: Heels almost touching while  walking  Gait Score: 9  Total Score: 22  Timed Up and Go Test  How many seconds did it take to complete the 5 tasks?: 11 seconds           Assessment: Patient is 74 y.o. year old male patient admitted for airway watch for tongue swelling and increased WOB concerning for angioedema. Patient low fall risk with score of 22 on Tinetti with ambulating 100 ft with walker. Patient is baseline function prior level of function. No further acute PT warranted at this time.  Please continue mobility during acute stay with nursing staff.  PT to sign off with no further needs.     Education Documentation  Precautions, taught by CHRIS Mace at 5/23/2025 10:46 AM.  Learner: Patient  Readiness: Acceptance  Method: Explanation  Response: Verbalizes Understanding  Comment: POC Review    Body Mechanics, taught by CHRIS Mace at 5/23/2025 10:46 AM.  Learner: Patient  Readiness: Acceptance  Method: Explanation  Response: Verbalizes Understanding  Comment: POC Review    Mobility Training, taught by CHRIS Mace at 5/23/2025 10:46 AM.  Learner: Patient  Readiness: Acceptance  Method: Explanation  Response: Verbalizes Understanding  Comment: POC Review    Education Comments  No comments found.          05/23/25 at 2:36 PM   CHRIS MACE   Rehab Office: [phone]                 [1]

## 2025-05-23 NOTE — SIGNIFICANT EVENT
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 ENT consulted at bedside and performed nasopharyngolaryngoscopy for better visualization of tongue swelling and stridor. Clinical findings is most consistent with diagnosis of angioedema. Patient is being admitted to the MICU for airway watch.     MICU Course (5/22-5/23):  On arrival to the MICU, patient was seen at bedside.  Patient endorses difficulties with speaking due to swelling of the tongue which he stated started 3 days ago. He did not recall any inciting factors that may have caused the angioedema.  Although he does endorse taking losartan for his blood pressure and denies any allergies.  He denies any recent travel or sick contacts and no recent viral illness.  He states that he is not currently having any trouble breathing as he was given a breathing treatment in the ED.  He denies any current fevers, chills, chest pain, shortness of breath, abdominal pain, urinary symptoms, and lower extremity swelling.    Treating angioedema with decadron 10mg q8h for 48 hours, racemic epi, benadryl, and famotidine. Swelling has improved and patient satting 100% on RA. ENT following with discussion of possible trach in the future.      C: Code Status/DPOA Info/Goals of Care/ACP Note    Full Code  DPOA/Contact Number: Carol Ta () [phone]     U: Unprescribing & Pertinent High-Risk Medications    Changes to home meds: Holding home losartan and amlodipine     Anticoagulation: Yes - SQH    Antibiotics:   [x] N/A - no current planned antimicrobials    P: Pending  Tests at the Time of Transfer   None      A: Active consultants, including Rehab:   [x]  Subspecialty Consultants: ENT  [x]  PT  [x]  OT  []  SLP  []  Wound Care    U: Uncertainty Measure/Diagnostic Pause:    Working diagnosis at the time of transfer Angioedema     Diagnosis Degree of Certainty: 1. High degree of certainty about the clinical diagnosis.     S: Summary of Major Problems and To-Dos:   Mk Fleming is a 74 y.o. year old male patient admitted to the MICU on 05/22/25 for airway watch 2/2 non-allergic angioedema.     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 Treating with steroids and angioedema cocktail. ENT following.      Mechanical Ventilation: none  Sedation/Analgesia:  none  Restraints: no     Updates for 05/23/25  :  ENT following, appreciate further recs   Start tube feeds if no plan for procedure with ENT  Start ASA and statin for findings found on CT neck   Ok to restart atenolol and hydrochlorothiazide for BP control. Continue to hold losartan and amlodipine.     Plan:  NEUROLOGY/PSYCH:  No active issues.      CARDIOVASCULAR:  #HTN  #HLD  #CAD  ::On home atorvastatin 10mg  ::On home losartan 50mg, atenolol 50mg, amlodipine 5mg, hydrochlorothiazide 25mg  ::Tele with brief run of 's, ECG pending   -Restart home atenolol and hydrochlorothiazide for now  -Hold home losartan and amlodipine in the setting of current angioedema     #Internal Carotid Artery Stenosis   ::CT soft tissue neck demonstrating significant stenosis of the proximal right C1 segment extracranial internal carotid artery measuring 70% stenosis   -Started ASA and atorvastatin 40mg  -Consider  vascular surgery consult once more clinically stable or closer to discharge      PULMONARY:  #Angioedema   ::Likely 2/2 losartan ue  ::CT soft tissue neck: findings consistent with angioedema with partial effacement of the airway   -ENT following, appreciate further recs    -Continuous pulse ox  -Per ENT: If intubation is needed, would recommend an endolaryngeal laryngoscope such as a Alaniz to lift the epiglottis out of the way. If intubation is indicated, please page [phone]  - Discontinue offending medications including losartan, NSAIDs  - Racemic epinephrine  - Decadron 10 mg q8h for 48 hours  - Angioedema cocktail: Benadryl and famotidine scheduled  -C1 estrace panel scheduled     #COPD  ::No PFTs on file   ::On home albuterol inhaler PRN      RENAL/GENITOURINARY:  #CKD Stage 3  ::Baseline Cr 1.8-2  ::Cr 1.93 on admission  -Daily RFP  -Strict I/Os     #Hyponatremia, improved    ::Na 128 on admission   ::Likely hypovolemic hyponatremia   -CTM with daily RFP  -Urine electrolytes and Serum and urine osmolality if worsening     #BPH  ::On home terazosin      GASTROENTEROLOGY:  #Complete PEG dependency  ::Poor laryngeal function   ::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   -NPO for now  -Will start tube feeds if plan for procedure with ENT     #Compensated HCV Cirrhosis   ::Not on any medication   ::US liver 11/2024: Cirrhotic morphology of the liver. No focal hepatic lesion is evident.   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      SKIN:  No active issues.     MUSCULOSKELETAL:  No active issues.      INFECTIOUS DISEASE:  No active issues.     E: Exam, including Lines/Drains/Airways & Data Review:   Physical Exam   Constitutional: Well-developed male in no acute distress.  HEENT: Normocephalic, atraumatic. PERRL. EOMI. No cervical lymphadenopathy. Normal lips. Tongue is enlarged; however decreased in size overall.  Respiratory: Mild inspiratory stridor. Normal respiratory effort.  Cardiovascular: RRR. No murmurs, gallops, or rubs. No JVD. Radial pulses 2+.  Abdominal: Soft, nondistended, nontender to palpation. PEG in place. Bowel sounds present. No hepatosplenomegaly or masses. No CVA tenderness.  Neuro: Alert and oriented x3.   MSK: No LE edema bilaterally.  Skin: Warm, dry. No rashes or wounds.  Psych: Appropriate mood and affect.    Difficult airway? Yes  Lines/drains assessed for removal? No    Within 30 minutes of the patient physically leaving the floor, a Floor Readiness Note needs to be placed with updated vitals.         "

## 2025-05-23 NOTE — PROGRESS NOTES
Daily Progress Note      Daily Progress Note    Mk Fleming is a 74 y.o. male admitted on 5/22/2025 11:45 AM for airway watch 2/2 angioedema.     Subjective   Mr. Fleming was seen today in MICU and is a MICU transfer. He is doing well and has no complains. He states that his breathing is much improved and he feels like its back to baseline. He is A&Ox4 and is able to provide an appropriate history.     He denies any chills, nausea, vomiting, headache/dizziness, lightheadedness, chest pain or tightness, and abdominal pain.       Objective   Vitals: I/O:   Vitals:    05/23/25 1600   BP: 101/62   Pulse: 90   Resp: 18   Temp:    SpO2: 97%        24hr Min/Max:  Temp  Min: 36 °C (96.8 °F)  Max: 36.9 °C (98.4 °F)  Pulse  Min: 87  Max: 108  BP  Min: 99/59  Max: 164/123  Resp  Min: 13  Max: 26  SpO2  Min: 94 %  Max: 100 %   Intake/Output Summary (Last 24 hours) at 5/23/2025 1650  Last data filed at 5/23/2025 1643  Gross per 24 hour   Intake 1170 ml   Output 720 ml   Net 450 ml        Net IO Since Admission: 450 mL [05/23/25 1650]      PE:  Physical Exam  Vitals reviewed.   Constitutional:       Appearance: Normal appearance. He is normal weight.   HENT:      Head: Normocephalic and atraumatic.      Right Ear: External ear normal.      Left Ear: External ear normal.      Nose: Nose normal.      Mouth/Throat:      Comments: Tongue swelling   Eyes:      General:         Right eye: No discharge.         Left eye: No discharge.      Extraocular Movements: Extraocular movements intact.      Conjunctiva/sclera: Conjunctivae normal.   Neck:      Comments: significant postradiation woody stiffness throughout neck  Cardiovascular:      Rate and Rhythm: Normal rate and regular rhythm.      Pulses: Normal pulses.      Heart sounds: Normal heart sounds. No murmur heard.     No friction rub. No gallop.   Pulmonary:      Effort: Pulmonary effort is normal. No respiratory distress.      Breath sounds: Normal breath sounds. No wheezing or  rhonchi.      Comments: Mild Extrathoracic obstruction   Chest:      Chest wall: No tenderness.   Abdominal:      General: Abdomen is flat.      Palpations: Abdomen is soft.      Comments: PEG site was clean with no discharge    Musculoskeletal:         General: No swelling or tenderness. Normal range of motion.      Right lower leg: No edema.      Left lower leg: No edema.   Skin:     General: Skin is warm and dry.      Coloration: Skin is not jaundiced.      Findings: No bruising or rash.   Neurological:      General: No focal deficit present.      Mental Status: He is alert and oriented to person, place, and time. Mental status is at baseline.      Cranial Nerves: No cranial nerve deficit.      Sensory: No sensory deficit.      Motor: No weakness.   Psychiatric:         Mood and Affect: Mood normal.         Behavior: Behavior normal.         Thought Content: Thought content normal.         Judgment: Judgment normal.          Laboratory Studies:      CBC RFP   Lab Results   Component Value Date    WBC 3.6 (L) 05/23/2025    HGB 9.9 (L) 05/23/2025    HCT 28.3 (L) 05/23/2025    MCV 89 05/23/2025     05/23/2025    NEUTROABS 3.28 05/23/2025    Lab Results   Component Value Date     (L) 05/23/2025    K 4.5 05/23/2025    CL 95 (L) 05/23/2025    CO2 28 05/23/2025    BUN 74 (H) 05/23/2025    CREATININE 1.90 (H) 05/23/2025    CREATININE 1.93 (H) 05/22/2025     Lab Results   Component Value Date    MG 2.22 05/23/2025    PHOS 3.5 05/23/2025    CALCIUM 9.1 05/23/2025         Hepatic Function ABG/VBG   Lab Results   Component Value Date    ALT 19 05/22/2025    AST 29 05/22/2025    ALKPHOS 92 05/22/2025     Lab Results   Component Value Date    BILIDIR 0.1 09/09/2024      Lab Results   Component Value Date    PROTIME 12.3 05/22/2025    APTT 34 01/31/2025    INR 1.1 05/22/2025    Lab Results   Component Value Date    LACTATE 1.0 07/04/2022        Results from last 7 days   Lab Units 05/23/25  1155 05/23/25  0507  05/22/25  2353 05/22/25  2133 05/22/25  1735 05/22/25  1153   POCT GLUCOSE mg/dL 149*  --  144*  --   --   --    GLUCOSE mg/dL  --  142*  --  148* 157* 121*       Imaging:    Imaging  CT soft tissue neck w IV contrast  Result Date: 5/22/2025  1. Soft tissue thickening and edema involving the oropharynx and hypopharynx with the additional retropharyngeal edema/prevertebral soft tissue thickening. Findings are most consistent with angioedema. Partial effacement of the airway at this level observed. No evidence of discrete mass. 2. Incidental note is made of significant stenosis of the proximal RIGHT C1 segment extracranial internal carotid artery, measuring to a proximally 70% stenosis by NASCET criteria.   I personally reviewed the images/study and I agree with the findings as stated.   MACRO: Bobby Joseph discussed the significance and urgency of this critical finding by epic secure chat with Dr. Rushing on 5/22/2025 at 2:40 pm.  (**-RCF-**) Findings:  See findings.   Signed by: Serjio Hurd 5/22/2025 2:54 PM Dictation workstation:   [id number]    XR chest 1 view  Result Date: 5/22/2025  No acute cardiopulmonary process is evident.   MACRO: None   Signed by: Daniel Ramos 5/22/2025 2:27 PM Dictation workstation:   [id number]      Cardiology, Vascular, and Other Imaging  ECG 12 lead  Result Date: 5/22/2025  Sinus tachycardia Right atrial enlargement Borderline ECG When compared with ECG of 31-JAN-2025 12:17, No significant change was found See ED provider note for full interpretation and clinical correlation Confirmed by Radha Cid ([id number]) on 5/22/2025 8:58:55 PM         EKG:      Encounter Date: 05/22/25   ECG 12 lead   Result Value    Ventricular Rate 104    Atrial Rate 104    IA Interval 146    QRS Duration 70    QT Interval 332    QTC Calculation(Bazett) 436    P Axis 83    R Axis 67    T Axis 74    QRS Count 17    Q Onset 222    P Onset 149    P Offset 199    T Offset 388    QTC Fredericia 398     Narrative    Sinus tachycardia  Right atrial enlargement  Borderline ECG  When compared with ECG of 31-JAN-2025 12:17,  No significant change was found      See ED provider note for full interpretation and clinical correlation  Confirmed by Radha Cid ([id number]) on 5/22/2025 8:58:55 PM       Medications:      Scheduled Medications:  PRN Medications:    Scheduled Medications[1] PRN Medications[2]     Assessment/Plan     Assessment/Plan: Mk Fleming is a 73yo M with PMH of 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 Treating with steroids and angioedema cocktail. ENT following.     Angioedema   ::Likely 2/2 losartan or possibly due to amlodipine   ::CT soft tissue neck: findings consistent with angioedema with partial effacement of the airway   -Per ENT: If intubation is needed, would recommend an endolaryngeal laryngoscope such as a Alaniz to lift the epiglottis out of the way. If intubation is indicated, please page ENT [phone]  - Discontinue offending medications including losartan, Amlodipine, and NSAIDs  - Racemic epinephrine PRN. Last use 5/22 at 1421  - Decadron 10 mg q8h for 48 hours  - Angioedema cocktail: Benadryl and famotidine scheduled  -C1 estrace panel scheduled      #HTN  #HLD  #CAD  ::On home atorvastatin 10mg  ::On home losartan 50mg, atenolol 50mg, amlodipine 5mg, hydrochlorothiazide 25mg  ::Tele with brief run of 's, ECG pending   -Restart home atenolol and hydrochlorothiazide for now  -Hold home losartan and amlodipine in the setting of current angioedema     #Internal Carotid Artery Stenosis   ::CT soft tissue neck demonstrating significant stenosis of the proximal right C1 segment  "extracranial internal carotid artery measuring 70% stenosis   -Started ASA and atorvastatin 40mg  -Consider vascular surgery consult once more clinically stable or closer to discharge       #COPD  ::No PFTs on file   ::On home albuterol inhaler PRN     #CKD Stage 3  ::Baseline Cr 1.8-2  ::Cr 1.93 on admission  -Daily RFP  -Strict I/Os     #Hyponatremia, improved    ::Na 128 on admission   ::Likely hypovolemic hyponatremia   -CTM with daily RFP  -Urine electrolytes and Serum and urine osmolality if worsening     #BPH  ::On home terazosin     #Complete PEG dependency  ::Poor laryngeal function   ::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   -continue tube feeds     #Compensated HCV Cirrhosis   ::Not on any medication   ::US liver 11/2024: Cirrhotic morphology of the liver. No focal hepatic lesion is evident.   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 Vi ALEGRE, MS4  Department of Internal Medicine, [facility] service          [1] aspirin, 81 mg, oral, " Daily  atenolol, 50 mg, g-tube, Daily  atorvastatin, 40 mg, g-tube, Nightly  dexAMETHasone, 10 mg, intravenous, q8h  diphenhydrAMINE, 25 mg, intravenous, q8h  [START ON 5/24/2025] esomeprazole, 20 mg, oral, Daily before breakfast  famotidine, 20 mg, intravenous, q8h  heparin (porcine), 5,000 Units, subcutaneous, q8h  hydroCHLOROthiazide, 25 mg, g-tube, Daily  levothyroxine, 50 mcg, g-tube, Daily before breakfast  polyethylene glycol, 17 g, oral, Daily  [2] PRN medications: racepinephrine, racepinephrine

## 2025-05-23 NOTE — PROGRESS NOTES
05/23/25 1200   Discharge Planning   Living Arrangements Alone   Support Systems Family members   Assistance Needed None   Type of Residence Private residence   Number of Stairs to Enter Residence 0   Number of Stairs Within Residence 0   Do you have animals or pets at home? No   Home or Post Acute Services None   Expected Discharge Disposition Home   Does the patient need discharge transport arranged? Yes   RoundTrip coordination needed? Yes   Has discharge transport been arranged? No   Financial Resource Strain   How hard is it for you to pay for the very basics like food, housing, medical care, and heating? Not hard   Housing Stability   In the last 12 months, was there a time when you were not able to pay the mortgage or rent on time? N   In the past 12 months, how many times have you moved where you were living?   (0)   At any time in the past 12 months, were you homeless or living in a shelter (including now)? N   Transportation Needs   In the past 12 months, has lack of transportation kept you from medical appointments or from getting medications? no   In the past 12 months, has lack of transportation kept you from meetings, work, or from getting things needed for daily living? No     SW met with the patient at the bedside to complete the discharge assessment. Patient was admitted with tongue swelling and has a [facility] sig for laryngeal cancer, HTN, HLD, CAD and complete peg dependency. Patient lives in an apartment and states he is independent with his care. The patient shared he receives his tube feed supplies from [facility] and has no issues with receiving his supplies. Patient has a cane, walker and rollator at home but only uses them as needed. The patient has passport services from [employer] and has a hha come once a week for two hours a day. Patient shared he calls his insurance to set up transport for his doctor appointments and has no issues with getting to his appointments. Patient's sister lives  in the same apartment building and provides support if needed.   Patient anticipates returning home with no needs but will need transport arranged.  KERMIT Ortiz  5/23/25@13:15pm

## 2025-05-23 NOTE — PROGRESS NOTES
Pharmacy Medication History Review    Mk Fleming is a 74 y.o. male admitted for Angioedema, initial encounter. Pharmacy reviewed the patient's [id number] medications and allergies for accuracy.    The list below reflects the updated PTA list.   Prior to Admission Medications   Prescriptions Last Dose Informant   albuterol 90 mcg/actuation inhaler  Self   Sig: Inhale 2 puffs every 6 hours if needed for shortness of breath.   amLODIPine (Norvasc) 5 mg tablet  Self   Si tablet (5 mg) by g-tube route once daily.   atenolol (Tenormin) 50 mg tablet  Self   Si tablet (50 mg) by g-tube route once daily.   atorvastatin (Lipitor) 10 mg tablet  Self   Sig: Take 1 tablet (10 mg) by g-tube once daily at bedtime.   chlorhexidine (Peridex) 0.12 % solution  Self   Sig: PLACE 15 MILLILITERS SWISH IN MOUTH FOR 30 SECONDS THEN SPIT OUT   esomeprazole (NexIUM) 20 mg packet  Self   Sig: Take 20 mg by mouth once daily in the morning. Take before meals.   fluticasone (Flonase) 50 mcg/actuation nasal spray  Self   Sig: Administer 1 spray into each nostril once daily as needed for allergies.   hydroCHLOROthiazide (HYDRODiuril) 25 mg tablet  Self   Si tablet (25 mg) by g-tube route once daily.   levothyroxine (Synthroid, Levoxyl) 50 mcg tablet  Self   Si tablet (50 mcg) by g-tube route once daily in the morning. Take before meals.   losartan (Cozaar) 50 mg tablet  Self   Si tablet (50 mg) by g-tube route once daily.   methocarbamol (Robaxin) 500 mg tablet     Si tablet (500 mg) by g-tube route every 8 hours for 7 days.   Patient not taking: Reported on 2025   naloxone (Narcan) 4 mg/0.1 mL nasal spray  Self   Sig: Administer 1 spray (4 mg) into affected nostril(s) if needed for opioid reversal or respiratory depression. May repeat every 2-3 minutes if needed, alternating nostrils, until medical assistance becomes available.   polyethylene glycol (Miralax) 17 gram/dose powder  Self   Sig: Mix 17 g of  powder and drink once daily as needed (constipation).   prochlorperazine (Compazine) 10 mg tablet  Self   Si tablet (10 mg) by g-tube route every 6 hours if needed for vomiting or nausea.   terazosin (Hytrin) 5 mg capsule  Self   Si capsule (5 mg) by g-tube route once daily.      Facility-Administered Medications: None        The list below reflects the updated allergy list. Please review each documented allergy for additional clarification and justification.  Allergies  Reviewed by Lolis Joshi PharmD on 2025        Severity Reactions Comments    Losartan Not Specified Angioedema             Patient accepts M2B at discharge.     Sources used to complete the med history include:    [facility]  Pharmacy dispense history  Patient Interview Moderate historian  Chart Review  Care Everywhere     Below are additional concerns with the patient's PTA list.  The patient is a moderate historian and is able to recall some medications from memory. When asked about specific drug names or their indications, the patient can identify whether or not they are taking them.       Medications ADDED:  none  Medications CHANGED:  Atorvastatin to 1 tab nightly  Medications REMOVED:   Albuterol nebulizer solution (patient is not taking)  Oxycodone (patient is not taking)  Sucralfate (patient is not taking)    Lolis Joshi PharmD  Transitions of Care Pharmacist  [facility]  Please reach out via Secure Chat for questions, or if no response call [facility] or [facility]

## 2025-05-23 NOTE — PROGRESS NOTES
"  Otolaryngology - Head and Neck Surgery Consult Progress Note      Name: Mk Fleming  MRN: [id number]  : 1950  Consulting Attending: Martha Lopez MD  Reason for Consult: \"mass/tongue swelling\"    History of Present Illness  The patient is a 74 y.o. 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    He reports 3 days of increased swelling and difficulty breathing, no viral prodrome, no fever, no chills.  He is satting 95 to 97% on room air, has mild low inspiratory stridor.    Daily Update :  The patient is doing significantly better today.  His tongue is significantly less edematous.  He has no stridor.  He was up and walking around the unit.  He is satting 100% on room air.    Review of Systems  14 point review of systems completed and all negative except as noted in HPI.    Past Medical History  Medical History[1]    Past Surgical History  Surgical History[2]    Allergies  Allergies[3]    Medications  Current Medications[4]    Family History  Family History[5]    Social History  Social History     Socioeconomic History    Marital status: Single     Spouse name: Not on file    Number of children: Not on file    Years of education: Not on file    Highest education level: Not on file   Occupational History    Not on file   Tobacco Use    Smoking status: Former     Current packs/day: 0.00     Types: Cigarettes     Quit date:      Years since quittin.4     Passive exposure: Never    Smokeless tobacco: Never   Vaping Use    Vaping status: Never Used   Substance and Sexual Activity    Alcohol use: Not Currently    Drug use: Not Currently     Types: 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    Sexual activity: Not " Currently   Other Topics Concern    Not on file   Social History Narrative    Not on file     Social Drivers of Health     Financial Resource Strain: Low Risk  (5/22/2025)    Overall Financial Resource Strain (CARDIA)     Difficulty of Paying Living Expenses: Not hard at all   Food Insecurity: No Food Insecurity (5/22/2025)    Hunger Vital Sign     Worried About Running Out of Food in the Last Year: Never true     Ran Out of Food in the Last Year: Never true   Transportation Needs: No Transportation Needs (5/22/2025)    PRAPARE - Transportation     Lack of Transportation (Medical): No     Lack of Transportation (Non-Medical): No   Physical Activity: Unknown (2/1/2025)    Exercise Vital Sign     Days of Exercise per Week: Not on file     Minutes of Exercise per Session: 10 min   Recent Concern: Physical Activity - Insufficiently Active (2/1/2025)    Exercise Vital Sign     Days of Exercise per Week: 1 day     Minutes of Exercise per Session: 10 min   Stress: No Stress Concern Present (2/1/2025)    [place] [employer] - Occupational Stress Questionnaire     Feeling of Stress : Not at all   Social Connections: Socially Isolated (2/1/2025)    Social Connection and Isolation Panel [NHANES]     Frequency of Communication with Friends and Family: Once a week     Frequency of Social Gatherings with Friends and Family: Twice a week     Attends Anabaptism Services: Never     Active Member of Clubs or Organizations: No     Attends Club or Organization Meetings: Never     Marital Status:    Intimate Partner Violence: Not At Risk (5/22/2025)    Humiliation, Afraid, Rape, and Kick questionnaire     Fear of Current or Ex-Partner: No     Emotionally Abused: No     Physically Abused: No     Sexually Abused: No   Housing Stability: Low Risk  (5/22/2025)    Housing Stability Vital Sign     Unable to Pay for Housing in the Last Year: No     Number of Times Moved in the Last Year: 0     Homeless in the Last  "Year: No       Vital Signs  Heart Rate:  []   Temp:  [36 °C (96.8 °F)-36.9 °C (98.4 °F)]   Resp:  [13-34]   BP: (101-178)/()   Height:  [170.2 cm (5' 7\")]   Weight:  [57 kg (125 lb 10.6 oz)]   SpO2:  [94 %-100 %]     Physical Examination  GEN: The patient appears stated age in no acute distress  VOICE: Voice is hoarse and mildly breathy  RESP: Satting 100% on room air, no stridor  CV: Clinically well perfused   EYES: EOM grossly intact with no scleral icterus  NEURO: Alert and oriented  HEAD: Scalp is normocephalic and atraumatic  FACE: No abrasions or lacerations, no maxillary or mandibular stepoffs  EARS: Normal external ears, external auditory canals, and TMs to otoscopy, normal hearing to spoken voice  NOSE: External nose appears normal, no significant septal deviation, anterior rhinoscopy limited with no active bleeding or lesions  OC: Normal lips, tongue is less swollen, floor of mouth soft, edentulous  OP: normal pharyngeal walls, normal soft palate  NECK: Trachea midline, significant postradiation woody stiffness throughout neck    Laboratory and Data   Latest Reference Range & Units 05/22/25 11:53   GLUCOSE 74 - 99 mg/dL 121 (H)   SODIUM 136 - 145 mmol/L 128 (L)   POTASSIUM 3.5 - 5.3 mmol/L 4.1   CHLORIDE 98 - 107 mmol/L 90 (L)   Bicarbonate 21 - 32 mmol/L 29   Anion Gap 10 - 20 mmol/L 13   Blood Urea Nitrogen 6 - 23 mg/dL 81 (H)   Creatinine 0.50 - 1.30 mg/dL 1.93 (H)   EGFR >60 mL/min/1.73m*2 36 (L)   Calcium 8.6 - 10.6 mg/dL 9.6   Albumin 3.4 - 5.0 g/dL 3.8   Alkaline Phosphatase 33 - 136 U/L 98   ALT 10 - 52 U/L 17   AST 9 - 39 U/L 29   Bilirubin Total 0.0 - 1.2 mg/dL 0.5   Total Protein 6.4 - 8.2 g/dL 8.7 (H)   MAGNESIUM 1.60 - 2.40 mg/dL 2.38   WBC 4.4 - 11.3 x10*3/uL 5.5   nRBC 0.0 - 0.0 /100 WBCs 0.0   RBC 4.50 - 5.90 x10*6/uL 3.36 (L)   HEMOGLOBIN 13.5 - 17.5 g/dL 10.2 (L)   HEMATOCRIT 41.0 - 52.0 % 29.6 (L)   MCV 80 - 100 fL 88   MCH 26.0 - 34.0 pg 30.4   MCHC 32.0 - 36.0 g/dL 34.5 "   RED CELL DISTRIBUTION WIDTH 11.5 - 14.5 % 14.1   Platelets 150 - 450 x10*3/uL 160   Neutrophils % 40.0 - 80.0 % 68.2   Immature Granulocytes %, Automated 0.0 - 0.9 % 0.4   Lymphocytes % 13.0 - 44.0 % 10.1   Monocytes % 2.0 - 10.0 % 16.7   Eosinophils % 0.0 - 6.0 % 4.2   Basophils % 0.0 - 2.0 % 0.4   Neutrophils Absolute 1.60 - 5.50 x10*3/uL 3.73   Immature Granulocytes Absolute, Automated 0.00 - 0.50 x10*3/uL 0.02   Lymphocytes Absolute 0.80 - 3.00 x10*3/uL 0.55 (L)   Monocytes Absolute 0.05 - 0.80 x10*3/uL 0.91 (H)   Eosinophils Absolute 0.00 - 0.40 x10*3/uL 0.23   Basophils Absolute 0.00 - 0.10 x10*3/uL 0.02   (H): Data is abnormally high  (L): Data is abnormally low    Radiology Reviewed  CT neck w IV contrast is ordered, not yet done.    PROCEDURE NOTE:  Nasopharyngolaryngoscopy    For better visualization because of tongue swelling and stridor, a flexible fiberoptic nasopharyngolaryngoscopy was performed. Patient was correctly identified and verbal consent obtained.  After topical anesthesia with atomized 4% Lidocaine with Afrin, the flexible scope  was introduced into the bilateral nares.    The following areas were visualized:  Nasal septum, turbinates, nasopharynx, oropharynx, hypopharynx, soft palate, base of tongue, epiglottis, and larynx.    These structures were found to be normal with the following notable findings:     -mild supraglottic edema  -partial epiglottis hoods over arytenoids,  -vocal cords visualized, hypomobile      Assessment  Mk Fleming is a 74 y.o. 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 Clinical examination as well as ancillary testing is most consistent with diagnosis of angioedema.   Could also possibly be lymphedema, exacerbated by poor kidney function and fluid overload though less likely.    Today, the patient is doing significantly better.  He does not have stridor, was walking around the unit without desaturations, satting high percent on room air, tongue is significantly less swollen.  The patient is medically appropriate for transfer to stepdown versus regular floor with continuous pulse ox.  Continue Decadron for 48 hours total.    Recommendations  - Continuous pulse ox  - If intubation is needed, would recommend an endolaryngeal laryngoscope such as a Alaniz to lift the epiglottis out of the way  - If intubation is indicated, please page [phone]  - Discontinue offending medications including losartan, NSAIDs  - Decadron 10 mg every 8 hours for 48 hours total  - Angioedema cocktail  - Patient is medically appropriate for transfer to stepdown versus regular floor with continuous pulse ox.  - Patient seen, staffed, discussed, with Dr. Smith.      Hesham Christensen MD  Resident, PGY-2  Otolaryngology - Head & Neck Surgery  ENT Consult Pager: [phone]  Head and Neck Phone: [phone]  ENT Peds Pager: [phone]  ENT Subspecialty Team: [place]   ENT Outpatient [phone]     Some or all of this note was completed using dictation software, please contact the author of this note if there is any confusion.    I saw and evaluated the patient. I personally obtained the key and critical portions of the history and physical exam or was physically present for key and critical portions performed by the resident/fellow. I reviewed the resident/fellow's documentation and discussed the patient with the resident/fellow. I agree with the resident/fellow's medical decision making as documented in the note.  - Significant improvement, tongue swelling much better today  - Breathing improved and able to walk without respiratory distress  - Ok to transfer to [facility], transition to po steroids, plan as  above    Ruth Smith MD        [1]   Past Medical History:  Diagnosis Date    Anemia     Cholecystitis     Cholelithiasis     Cirrhosis (Multi)     CKD (chronic kidney disease)     stage 3    COPD (chronic obstructive pulmonary disease) (Multi)     Dysphagia     peg dependent    GERD (gastroesophageal reflux disease)     HCV (hepatitis C virus)     s/p SVR    Hoarseness of voice     Hyperlipidemia     Hypertension     Hypothyroidism     Laryngeal cancer (Multi)     s/p chemo and radiation therapy    PAD (peripheral artery disease)     Personal history of other diseases of the respiratory system     History of bronchitis    Personal history of other specified conditions     History of chest pain    Pulmonary emphysema (Multi)    [2]   Past Surgical History:  Procedure Laterality Date    ESOPHAGOGASTRODUODENOSCOPY      HERNIA REPAIR      IR ANGIOGRAM AORTA ABDOMEN  07/03/2022    IR ANGIOGRAM AORTA ABDOMEN 7/3/2022 [facility] [place] [facility]ICAL [facility]    IR ANGIOGRAM INFERIOR EPIGASTRIC  07/03/2022    IR ANGIOGRAM INFERIOR EPIGASTRIC 7/3/2022 [facility] [place] CLINICAL [facility]    IR EMBOLIZATION LYMPH NODE Bilateral 07/03/2022    IR EMBOLIZATION LYMPH NODE 7/3/2022 [facility] [place] CL[facility]ICAL [facility]    OTHER SURGICAL HISTORY  11/19/2018    Tooth extraction    OTHER SURGICAL HISTORY  11/19/2018    Pulmonary decortication    OTHER SURGICAL HISTORY  10/20/2022    Percutaneous endoscopic gastrostomy tube insertion   [3]   Allergies  Allergen Reactions    Losartan Angioedema   [4]   Current Facility-Administered Medications:     aspirin chewable tablet 81 mg, 81 mg, oral, Daily, Madeline Ryan MD, 81 mg at 05/23/25 1245    atenolol (Tenormin) tablet 50 mg, 50 mg, g-tube, Daily, Madeline Ryan MD    atorvastatin (Lipitor) tablet 40 mg, 40 mg, g-tube, Nightly, Madeline Ryan MD    dexAMETHasone (Decadron) injection 10 mg, 10 mg, intravenous, q8h, Madeline Ryan MD, 10 mg at 05/23/25 1005    diphenhydrAMINE (BENADryl) injection 25 mg, 25 mg, intravenous,  q8h, Analia Gaston MD, 25 mg at 05/23/25 1243    [START ON 5/24/2025] esomeprazole (NexIUM) suspension 20 mg, 20 mg, oral, Daily before breakfast, Madeline Ryan MD    famotidine PF (Pepcid) injection 20 mg, 20 mg, intravenous, q8h, Analia Gaston MD, 20 mg at 05/23/25 1243    heparin (porcine) injection 5,000 Units, 5,000 Units, subcutaneous, q8h, Madeline Ryan MD, 5,000 Units at 05/23/25 0850    hydroCHLOROthiazide (HYDRODiuril) tablet 25 mg, 25 mg, g-tube, Daily, Madeline Ryan MD    levothyroxine (Synthroid, Levoxyl) tablet 50 mcg, 50 mcg, g-tube, Daily before breakfast, Aman Marcus MD, 50 mcg at 05/23/25 0638    polyethylene glycol (Glycolax, Miralax) packet 17 g, 17 g, oral, Daily, Madeline Ryan MD    racepinephrine (Asthmanefrin) 2.25 % nebulizer solution 0.5 mL, 0.5 mL, nebulization, q4h PRN, Renetta Rushing MD, 0.5 mL at 05/22/25 1421    racepinephrine (Asthmanefrin) 2.25 % nebulizer solution 0.5 mL, 0.5 mL, nebulization, q3h PRN, Madeline Ryan MD  [5]   Family History  Problem Relation Name Age of Onset    Pancreatic cancer Mother      Hypertension Sister      Hypertension Brother

## 2025-05-23 NOTE — HOSPITAL COURSE
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 ENT consulted at bedside and performed nasopharyngolaryngoscopy for better visualization of tongue swelling and stridor. Clinical findings is most consistent with diagnosis of angioedema. Patient was admitted to the MICU for airway watch.     MICU Course (5/22-5/23):  On arrival to the MICU, he was seen at bedside and he endorsed difficulties with speaking due to swelling of the tongue which started 3 days ago. He did not recall any inciting factors that may have caused the angioedema.  Although he does endorse taking losartan for his blood pressure and taking a muscle relaxant from his friend. He denies any recent travel or sick contacts and no recent viral illness.  He states that he is not currently having any trouble breathing as he was given a breathing treatment in the ED. He was treated with decadron 10mg q8h for 48 hours, racemic epi PRN, benadryl, and famotidine for his angioedema. His swelling improved and he was satting 100% on RA so he was sent to the floor.       General Floor (5/23-5/24):  When arriving to the floor, he was in stable condition and with some tongue swelling but no SOB or work of breathing. He had no complains and feeding was started using his PEG tube. On the floor, his home HTN regimen was changed. His home losartan and amlodipine was discontinued and his home dose of atenolol was increased to 100 mg and hydrochlorothiazide increased to 50 mg.  In regards to the amlodipine, it can be restarted by the outpatient PCP since we don't think that was most likely cause of his angioedema.     His CT neck showed an incidental  finding of significant 70% stenosis of the proximal RIGHT C1 segment extracranial internal carotid artery, so a statin was started inpatient but he will require primary stroke prevention and needs to establish care for this incidental finding with his PCP.     Throughout this hospital stay ENT followed closely to ensure proper intubation in the setting of angioedema was done if he needed it.     On the day of discharge (5/24), his Ct was slightly elevated at 2.21 from his baseline of around 2, so a RFP was scheduled for Monday which will be followed by his PCP outpatient. It was emphasized that he should have a PCP appointment within the next week. He was also given fluids prior to discharge as his elevated Cr was likely in the setting of no feeding from PEG and/or the use of steroid for his angioedema treatment.     On discharge, he was stable. He denies any current fevers, chills, chest pain, shortness of breath, abdominal pain, urinary symptoms, and lower extremity swelling.

## 2025-05-23 NOTE — PROGRESS NOTES
Spiritual Care Visit  Spiritual Care Request    Reason for Visit:  Routine Visit: Introduction (rounding)     Focus of Care:  Visited With: Patient       Spiritual Care Annotation    Annotation:   met with patient while rounding on unit. Patient did not want to talk much due to it being uncomfortable, but requested prayer.  prayed with patient and assured patient of ongoing support if/when desired once talking felt more comfortable. Patient seemed encouraged and expressed appreciation for visit.     Signed: Jose Fuller, Clinical Care

## 2025-05-23 NOTE — H&P
Medical Intensive Care - History and Physical   Subjective    Mk Fleming is a 74 y.o. year old male patient admitted on 5/22/2025 with following ICU needs: airway watch for tongue swelling and increased WOB concerning for angioedema.    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 ENT consulted at bedside and performed nasopharyngolaryngoscopy for better visualization of tongue swelling and stridor. Clinical findings is most consistent with diagnosis of angioedema. Patient is being admitted to the MICU for airway watch.     On arrival to the MICU, patient was seen at bedside.  Patient endorses difficulties with speaking due to swelling of the tongue which he stated started 3 days ago. He did not recall any inciting factors that may have caused the angioedema.  Although he does endorse taking losartan for his blood pressure and denies any allergies.  He denies any recent travel or sick contacts and no recent viral illness.  He states that he is not currently having any trouble breathing as he was given a breathing treatment in the ED.  He denies any current fevers, chills, chest pain, shortness of breath, abdominal pain, urinary symptoms, and lower extremity swelling.    Review of Systems:  Negative except as stated in HPI.    Meds    Home medications:  Current Outpatient Medications   Medication Instructions    albuterol 2.5 mg /3 mL (0.083 %) nebulizer solution 3 mL, Every 6 hours PRN    albuterol 90 mcg/actuation inhaler 2 puffs, Every 6 hours PRN    amLODIPine (Norvasc) 5 mg tablet 1 tablet (5 mg) by g-tube route once daily.    atenolol (TENORMIN) 50 mg, Daily    atorvastatin  "(LIPITOR) 10 mg, Nightly    chlorhexidine (Peridex) 0.12 % solution PLACE 15 MILLILITERS SWISH IN MOUTH FOR 30 SECONDS THEN SPIT OUT    esomeprazole (NEXIUM) 20 mg, oral, Daily before breakfast    fluticasone (Flonase) 50 mcg/actuation nasal spray 1 spray, Each Nostril, Daily PRN    hydroCHLOROthiazide (HYDRODIURIL) 25 mg, Daily    levothyroxine (SYNTHROID, LEVOXYL) 50 mcg, Daily before breakfast    losartan (COZAAR) 50 mg, Daily    methocarbamol (ROBAXIN) 500 mg, g-tube, Every 8 hours scheduled    naloxone (NARCAN) 4 mg, nasal, As needed, May repeat every 2-3 minutes if needed, alternating nostrils, until medical assistance becomes available.    oxyCODONE (Roxicodone) 5 mg immediate release tablet 1 tablet, g-tube, Every 12 hours PRN    polyethylene glycol (MIRALAX) 17 g, Daily PRN    prochlorperazine (Compazine) 10 mg tablet 1 tablet, g-tube, Every 6 hours PRN    sucralfate (CARAFATE) 1 g, 2 times daily    terazosin (Hytrin) 5 mg capsule 1 capsule (5 mg) by g-tube route once daily.        Inpatient medications:  Scheduled medications  Scheduled Medications[1]  Continuous medications  Continuous Medications[2]  PRN medications  PRN Medications[3]     Objective    Blood pressure 144/62, pulse 98, temperature 36.9 °C (98.4 °F), temperature source Temporal, resp. rate 26, height 1.702 m (5' 7\"), weight 57 kg (125 lb 10.6 oz), SpO2 100%.     Physical Exam   Constitutional: Well-developed male in no acute distress.  HEENT: Normocephalic, atraumatic. PERRL. EOMI. No cervical lymphadenopathy. Normal lips. Tongue is enlarged and protruding out.   Respiratory: Mild inspiratory stridor. Normal respiratory effort.  Cardiovascular: RRR. No murmurs, gallops, or rubs. No JVD. Radial pulses 2+.  Abdominal: Soft, nondistended, nontender to palpation. PEG in place. Bowel sounds present. No hepatosplenomegaly or masses. No CVA tenderness.  Neuro: Alert and oriented x3.   MSK: No LE edema bilaterally.  Skin: Warm, dry. No rashes or " "wounds.  Psych: Appropriate mood and affect.       Intake/Output Summary (Last 24 hours) at 5/22/2025 2005  Last data filed at 5/22/2025 1955  Gross per 24 hour   Intake --   Output 100 ml   Net -100 ml     Labs:   Results from last 72 hours   Lab Units 05/22/25  1735 05/22/25  1153   SODIUM mmol/L 128* 128*   POTASSIUM mmol/L 4.6 4.1   CHLORIDE mmol/L 90* 90*   CO2 mmol/L 30 29   BUN mg/dL 76* 81*   CREATININE mg/dL 1.90* 1.93*   GLUCOSE mg/dL 157* 121*   CALCIUM mg/dL 9.5 9.6   ANION GAP mmol/L 13 13   EGFR mL/min/1.73m*2 37* 36*   PHOSPHORUS mg/dL 2.9  --       Results from last 72 hours   Lab Units 05/22/25  1735 05/22/25  1153   WBC AUTO x10*3/uL 5.3 5.5   HEMOGLOBIN g/dL 9.8* 10.2*   HEMATOCRIT % 29.0* 29.6*   PLATELETS AUTO x10*3/uL 160 160   NEUTROS PCT AUTO % 95.3 68.2   LYMPHS PCT AUTO % 3.2 10.1   MONOS PCT AUTO % 0.9 16.7   EOS PCT AUTO % 0.0 4.2          Results from last 72 hours   Lab Units 05/22/25  1153 05/22/25  1151   POCT PH, VENOUS pH 7.37 7.41   POCT PCO2, VENOUS mm Hg 55* 53*   POCT PO2, VENOUS mm Hg 33* 35      Micro/ID:   No results found for: \"URINECULTURE\", \"BLOODCULT\", \"CSFCULTSMEAR\"    Summary of Key Imaging Results  CT soft tissue neck w/IV contrast 5/22   IMPRESSION:  1. Soft tissue thickening and edema involving the oropharynx and hypopharynx with the additional retropharyngeal edema/prevertebral soft tissue thickening. Findings are most consistent with angioedema. Partial effacement of the airway at this level observed. No evidence of discrete mass.  2. Incidental note is made of significant stenosis of the proximal RIGHT C1 segment extracranial internal carotid artery, measuring to approximally 70% stenosis by NASCET criteria.    CXR 5/22  IMPRESSION:  No acute cardiopulmonary process is evident.    Assessment and Plan     Assessment:  Mk Fleming is a 74 y.o. year old male patient admitted to the MICU on 05/22/25 for airway watch 2/2 non-allergic angioedema.    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    Mechanical Ventilation: none  Sedation/Analgesia:  none  Restraints: no    Plan:  NEUROLOGY/PSYCH:  No active issues.     CARDIOVASCULAR:  #HTN  #HLD  #CAD  ::On home atorvastatin 10mg  ::On home losartan 50mg, atenolol 50mg, amlodipine 5mg, hydrochlorothiazide 25mg  ::Tele with brief run of 's, ECG pending   -Hold home atenolol, amlodipine, and hydrochlorothiazide for now  -Hold home losartan in the setting of current angioedema    #Internal Carotid Artery Stenosis   ::CT soft tissue neck demonstrating significant stenosis of the proximal right C1 segment extracranial internal carotid artery measuring 70% stenosis   -Consider vascular surgery consult once more clinically stable or closer to discharge     PULMONARY:  #Non-allergic Angioedema   ::CT soft tissue neck: findings consistent with angioedema with partial effacement of the airway   -ENT following, appreciate further recs    -Continuous pulse ox  -Per ENT: If intubation is needed, would recommend an endolaryngeal laryngoscope such as a Alaniz to lift the epiglottis out of the way. If intubation is indicated, please page [phone]  - Discontinue offending medications including losartan, NSAIDs  - Racemic epinephrine  - Decadron 10 mg q8h for 48 hours  - Angioedema cocktail: Benadryl and famotidine scheduled  -C1 estrace panel scheduled    #COPD  ::No PFTs on file   ::On home albuterol inhaler PRN     RENAL/GENITOURINARY:  #CKD Stage 3  ::Baseline Cr 3.5-3.6  ::Cr 1.93 on admission  -Daily RFP  -Strict I/Os    #Hyponatremia   ::Na 128 on admission   ::Likely hypovolemic hyponatremia   -CTM with daily  "RFP  -BMP ordered 5/22 PM  -Follow up urine electrolytes   -Serum and urine osmolality     #BPH  ::On home terazosin     GASTROENTEROLOGY:  #Complete PEG dependency  ::Poor laryngeal function   ::Prior 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 No focal hepatic lesion is evident.   ::LFTs stable   -Follow up with GI on discharge    #GERD  -C/w home esomeprazole 20mg     ENDOCRINOLOGY:  #Hypothyroidism  -TSH with reflex ordered  -C/w home levothyroxine 50mcg     HEMATOLOGY:  #T4 laryngeal cancer s/p chemotherapy and radiation in 2019  ::poor larynx function and complete PEG dependency   ::ENT note 2/24/2025: \"Cancer surveillance has been MARICARMEN.\"    No active issues.     SKIN:  No active issues.    MUSCULOSKELETAL:  No active issues.     INFECTIOUS DISEASE:  No active issues.    ICU Check List     FEN  Fluids: PRN  Electrolytes: K>4  Phos>3 Mg>2  Nutrition: NPO  Prophylaxis:  DVT ppx: SubQ heparin; SCDs  GI ppx: Esomeprazole   Bowel care: Miralax  Hardware:                Gastrostomy/Enterostomy Gastrostomy LLQ (Active)   Earliest Known Present: 12/21/23   Type: Gastrostomy  Location: LLQ   Number of days: 518       Social:  Code: Full Code    HPOA: Carol Ta (Sister) [phone]  Disposition: FEDERICO Marcus MD   05/22/25 at 8:05 PM     Disclaimer: Documentation completed with the information available at the time of input. The times in the chart may not be reflective of actual patient care times, interventions, or procedures. Documentation occurs after the physical care of the patient.         [1] dexAMETHasone, 10 mg, intravenous, q8h  diphenhydrAMINE, 25 mg, intravenous, q8h  famotidine, 20 mg, intravenous, " q8h  heparin (porcine), 5,000 Units, subcutaneous, q8h  [START ON 5/23/2025] levothyroxine, 50 mcg, g-tube, Daily before breakfast  polyethylene glycol, 17 g, oral, Daily     [2]    [3] PRN medications: racepinephrine, racepinephrine

## 2025-05-24 ENCOUNTER — PHARMACY VISIT (OUTPATIENT)
Dept: PHARMACY | Facility: CLINIC | Age: 75
End: 2025-05-24
Payer: COMMERCIAL

## 2025-05-24 VITALS
TEMPERATURE: 97.9 F | SYSTOLIC BLOOD PRESSURE: 178 MMHG | DIASTOLIC BLOOD PRESSURE: 81 MMHG | HEIGHT: 67 IN | OXYGEN SATURATION: 98 % | RESPIRATION RATE: 16 BRPM | HEART RATE: 82 BPM | BODY MASS INDEX: 18.87 KG/M2 | WEIGHT: 120.2 LBS

## 2025-05-24 DIAGNOSIS — N18.9 CHRONIC KIDNEY DISEASE, UNSPECIFIED CKD STAGE: ICD-10-CM

## 2025-05-24 LAB
ALBUMIN SERPL BCP-MCNC: 3.7 G/DL (ref 3.4–5)
ANION GAP SERPL CALC-SCNC: 13 MMOL/L (ref 10–20)
BASOPHILS # BLD AUTO: 0 X10*3/UL (ref 0–0.1)
BASOPHILS NFR BLD AUTO: 0 %
BUN SERPL-MCNC: 90 MG/DL (ref 6–23)
CALCIUM SERPL-MCNC: 9.4 MG/DL (ref 8.6–10.6)
CHLORIDE SERPL-SCNC: 95 MMOL/L (ref 98–107)
CO2 SERPL-SCNC: 28 MMOL/L (ref 21–32)
CREAT SERPL-MCNC: 2.21 MG/DL (ref 0.5–1.3)
EGFRCR SERPLBLD CKD-EPI 2021: 30 ML/MIN/1.73M*2
EOSINOPHIL # BLD AUTO: 0 X10*3/UL (ref 0–0.4)
EOSINOPHIL NFR BLD AUTO: 0 %
ERYTHROCYTE [DISTWIDTH] IN BLOOD BY AUTOMATED COUNT: 14.3 % (ref 11.5–14.5)
GLUCOSE SERPL-MCNC: 131 MG/DL (ref 74–99)
HCT VFR BLD AUTO: 31.8 % (ref 41–52)
HGB BLD-MCNC: 10.2 G/DL (ref 13.5–17.5)
IMM GRANULOCYTES # BLD AUTO: 0.05 X10*3/UL (ref 0–0.5)
IMM GRANULOCYTES NFR BLD AUTO: 0.6 % (ref 0–0.9)
LYMPHOCYTES # BLD AUTO: 0.33 X10*3/UL (ref 0.8–3)
LYMPHOCYTES NFR BLD AUTO: 3.9 %
MAGNESIUM SERPL-MCNC: 2.63 MG/DL (ref 1.6–2.4)
MCH RBC QN AUTO: 30.2 PG (ref 26–34)
MCHC RBC AUTO-ENTMCNC: 32.1 G/DL (ref 32–36)
MCV RBC AUTO: 94 FL (ref 80–100)
MONOCYTES # BLD AUTO: 0.17 X10*3/UL (ref 0.05–0.8)
MONOCYTES NFR BLD AUTO: 2 %
NEUTROPHILS # BLD AUTO: 7.84 X10*3/UL (ref 1.6–5.5)
NEUTROPHILS NFR BLD AUTO: 93.5 %
NRBC BLD-RTO: 0 /100 WBCS (ref 0–0)
PHOSPHATE SERPL-MCNC: 3.3 MG/DL (ref 2.5–4.9)
PLATELET # BLD AUTO: 185 X10*3/UL (ref 150–450)
POTASSIUM SERPL-SCNC: 4.9 MMOL/L (ref 3.5–5.3)
RBC # BLD AUTO: 3.38 X10*6/UL (ref 4.5–5.9)
SODIUM SERPL-SCNC: 131 MMOL/L (ref 136–145)
WBC # BLD AUTO: 8.4 X10*3/UL (ref 4.4–11.3)

## 2025-05-24 PROCEDURE — 99238 HOSP IP/OBS DSCHRG MGMT 30/<: CPT | Performed by: PEDIATRICS

## 2025-05-24 PROCEDURE — 80069 RENAL FUNCTION PANEL: CPT

## 2025-05-24 PROCEDURE — 36415 COLL VENOUS BLD VENIPUNCTURE: CPT

## 2025-05-24 PROCEDURE — 83735 ASSAY OF MAGNESIUM: CPT

## 2025-05-24 PROCEDURE — 2500000004 HC RX 250 GENERAL PHARMACY W/ HCPCS (ALT 636 FOR OP/ED): Mod: JZ

## 2025-05-24 PROCEDURE — 2500000001 HC RX 250 WO HCPCS SELF ADMINISTERED DRUGS (ALT 637 FOR MEDICARE OP)

## 2025-05-24 PROCEDURE — 2500000002 HC RX 250 W HCPCS SELF ADMINISTERED DRUGS (ALT 637 FOR MEDICARE OP, ALT 636 FOR OP/ED)

## 2025-05-24 PROCEDURE — RXMED WILLOW AMBULATORY MEDICATION CHARGE

## 2025-05-24 PROCEDURE — 2500000004 HC RX 250 GENERAL PHARMACY W/ HCPCS (ALT 636 FOR OP/ED)

## 2025-05-24 PROCEDURE — 85025 COMPLETE CBC W/AUTO DIFF WBC: CPT

## 2025-05-24 RX ORDER — ATENOLOL 100 MG/1
100 TABLET ORAL DAILY
Qty: 30 TABLET | Refills: 0 | Status: SHIPPED | OUTPATIENT
Start: 2025-05-24 | End: 2025-06-23

## 2025-05-24 RX ORDER — ATORVASTATIN CALCIUM 40 MG/1
40 TABLET, FILM COATED ORAL NIGHTLY
Qty: 30 TABLET | Refills: 0 | Status: SHIPPED | OUTPATIENT
Start: 2025-05-24 | End: 2025-06-23

## 2025-05-24 RX ORDER — NAPROXEN SODIUM 220 MG/1
81 TABLET, FILM COATED ORAL DAILY
Qty: 30 TABLET | Refills: 0 | Status: SHIPPED | OUTPATIENT
Start: 2025-05-25 | End: 2025-06-24

## 2025-05-24 RX ORDER — HYDROCHLOROTHIAZIDE 25 MG/1
50 TABLET ORAL DAILY
Status: DISCONTINUED | OUTPATIENT
Start: 2025-05-24 | End: 2025-05-24 | Stop reason: HOSPADM

## 2025-05-24 RX ORDER — ATENOLOL 100 MG/1
100 TABLET ORAL DAILY
Status: DISCONTINUED | OUTPATIENT
Start: 2025-05-24 | End: 2025-05-24 | Stop reason: HOSPADM

## 2025-05-24 RX ORDER — HYDROCHLOROTHIAZIDE 50 MG/1
50 TABLET ORAL DAILY
Qty: 30 TABLET | Refills: 0 | Status: SHIPPED | OUTPATIENT
Start: 2025-05-25 | End: 2025-06-24

## 2025-05-24 RX ADMIN — SODIUM CHLORIDE, POTASSIUM CHLORIDE, SODIUM LACTATE AND CALCIUM CHLORIDE 500 ML: 600; 310; 30; 20 INJECTION, SOLUTION INTRAVENOUS at 11:54

## 2025-05-24 RX ADMIN — HYDROCHLOROTHIAZIDE 50 MG: 25 TABLET ORAL at 09:20

## 2025-05-24 RX ADMIN — HEPARIN SODIUM 5000 UNITS: 5000 INJECTION, SOLUTION INTRAVENOUS; SUBCUTANEOUS at 01:57

## 2025-05-24 RX ADMIN — DEXAMETHASONE SODIUM PHOSPHATE 10 MG: 10 INJECTION INTRAMUSCULAR; INTRAVENOUS at 01:57

## 2025-05-24 RX ADMIN — HEPARIN SODIUM 5000 UNITS: 5000 INJECTION, SOLUTION INTRAVENOUS; SUBCUTANEOUS at 09:21

## 2025-05-24 RX ADMIN — ATENOLOL 100 MG: 100 TABLET ORAL at 11:00

## 2025-05-24 RX ADMIN — ESOMEPRAZOLE MAGNESIUM 20 MG: 40 FOR SUSPENSION ORAL at 09:20

## 2025-05-24 RX ADMIN — ASPIRIN 81 MG: 81 TABLET, CHEWABLE ORAL at 09:21

## 2025-05-24 RX ADMIN — DIPHENHYDRAMINE HYDROCHLORIDE 25 MG: 50 INJECTION INTRAMUSCULAR; INTRAVENOUS at 05:31

## 2025-05-24 RX ADMIN — FAMOTIDINE 20 MG: 10 INJECTION INTRAVENOUS at 05:31

## 2025-05-24 RX ADMIN — LEVOTHYROXINE SODIUM 50 MCG: 50 TABLET ORAL at 05:31

## 2025-05-24 RX ADMIN — DEXAMETHASONE SODIUM PHOSPHATE 10 MG: 10 INJECTION INTRAMUSCULAR; INTRAVENOUS at 09:21

## 2025-05-24 ASSESSMENT — PAIN SCALES - GENERAL: PAINLEVEL_OUTOF10: 0 - NO PAIN

## 2025-05-24 NOTE — DISCHARGE SUMMARY
Discharge Diagnosis  Angioedema, initial encounter       Issues Requiring Follow-Up  Angioedema   Carotid Artery Stenosis     Discharge Meds     Medication List      START taking these medications     aspirin 81 mg chewable tablet; Chew 1 tablet (81 mg) once daily.; Start   taking on: May 25, 2025     CHANGE how you take these medications     atenolol 100 mg tablet; Commonly known as: Tenormin; Take 1 tablet (100   mg) by g-tube once daily.; What changed: medication strength, how much to   take   atorvastatin 40 mg tablet; Commonly known as: Lipitor; Take 1 tablet (40   mg) by g-tube once daily at bedtime.; What changed: medication strength,   how much to take   hydroCHLOROthiazide 50 mg tablet; Commonly known as: HYDRODiuril; Take 1   tablet (50 mg) by g-tube once daily.; Start taking on: May 25, 2025; What   changed: medication strength, how much to take     CONTINUE taking these medications     albuterol 90 mcg/actuation inhaler   chlorhexidine 0.12 % solution; Commonly known as: Peridex   esomeprazole 20 mg packet; Commonly known as: NexIUM; Take 20 mg by   mouth once daily in the morning. Take before meals.   fluticasone 50 mcg/actuation nasal spray; Commonly known as: Flonase   levothyroxine 50 mcg tablet; Commonly known as: Synthroid, Levoxyl   Miralax 17 gram/dose powder; Generic drug: polyethylene glycol   naloxone 4 mg/0.1 mL nasal spray; Commonly known as: Narcan; Administer   1 spray (4 mg) into affected nostril(s) if needed for opioid reversal or   respiratory depression. May repeat every 2-3 minutes if needed,   alternating nostrils, until medical assistance becomes available.   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 ENT consulted at bedside and performed nasopharyngolaryngoscopy for better visualization of tongue swelling and stridor. Clinical findings is most consistent with diagnosis of angioedema. Patient was admitted to the MICU for airway watch.     MICU Course (5/22-5/23):  On arrival to the MICU, he was seen at bedside and he endorsed difficulties with speaking due to swelling of the tongue which started 3 days ago. He did not recall any inciting factors that may have caused the angioedema.  Although he does endorse taking losartan for his blood pressure and taking a muscle relaxant from his friend. He denies any recent travel or sick contacts and no recent viral illness.  He states that he is not currently having any trouble breathing as he was given a breathing treatment in the ED. He was treated with decadron 10mg q8h for 48 hours, racemic epi PRN, benadryl, and famotidine for his angioedema. His swelling improved and he was satting 100% on RA so he was sent to the floor.       General Floor (5/23-5/24):  When arriving to the floor, he was in stable condition and with some tongue swelling but no SOB or work of breathing. He had no complains and feeding was started using his PEG tube. On the floor, his home HTN regimen was changed. His home losartan and amlodipine was discontinued and his home dose of atenolol was increased to 100 mg and hydrochlorothiazide increased to 50 mg.  In regards to the amlodipine, it can be  restarted by the outpatient PCP since we don't think that was most likely cause of his angioedema.     His CT neck showed an incidental finding of significant 70% stenosis of the proximal RIGHT C1 segment extracranial internal carotid artery, so a statin was started inpatient but he will require primary stroke prevention and needs to establish care for this incidental finding with his PCP.     Throughout this hospital stay ENT followed closely to ensure proper intubation in the setting of angioedema was done if he needed it.     On the day of discharge (5/24), his Ct was slightly elevated at 2.21 from his baseline of around 2, so a RFP was scheduled for Monday which will be followed by his PCP outpatient. It was emphasized that he should have a PCP appointment within the next week. He was also given fluids prior to discharge as his elevated Cr was likely in the setting of no feeding from PEG and/or the use of steroid for his angioedema treatment.     On discharge, he was stable. He denies any current fevers, chills, chest pain, shortness of breath, abdominal pain, urinary symptoms, and lower extremity swelling.      Pertinent Physical Exam At Time of Discharge  Physical Exam  Constitutional:       Appearance: Normal appearance. He is normal weight.   HENT:      Head: Normocephalic and atraumatic.      Right Ear: External ear normal.      Left Ear: External ear normal.      Nose: Nose normal.      Mouth/Throat:      Pharynx: No oropharyngeal exudate or posterior oropharyngeal erythema.      Comments: Tongue is sightly swollen but must improved since yesterday  Eyes:      General:         Right eye: No discharge.         Left eye: No discharge.      Extraocular Movements: Extraocular movements intact.      Conjunctiva/sclera: Conjunctivae normal.   Cardiovascular:      Rate and Rhythm: Normal rate and regular rhythm.      Pulses: Normal pulses.      Heart sounds: Normal heart sounds.   Pulmonary:      Effort:  Pulmonary effort is normal. No respiratory distress.      Breath sounds: Normal breath sounds. No wheezing.   Abdominal:      Tenderness: There is no abdominal tenderness. There is no guarding.      Comments: +PEG site was clean with no purulent discharge      Musculoskeletal:         General: Normal range of motion.   Skin:     General: Skin is warm.   Neurological:      General: No focal deficit present.      Mental Status: He is alert and oriented to person, place, and time. Mental status is at baseline.      Motor: No weakness.      Coordination: Coordination normal.   Psychiatric:         Mood and Affect: Mood normal.         Behavior: Behavior normal.         Thought Content: Thought content normal.         Judgment: Judgment normal.         Outpatient Follow-Up  Future Appointments   Date Time Provider Department Center   6/25/2025 11:40 AM Paddy Gallardo MD [id number]   8/22/2025  9:45 AM MD REHAN [id number]   3/9/2026  1:00 PM Amrik Sprague MD [id number]

## 2025-05-24 NOTE — DISCHARGE INSTRUCTIONS
Dear Mk Fleming,     It was a pleasure caring for you! You were admitted to [facility] ([facility] on 5/22/2025 for angioedema. While you were here, we treated you with steroids and a cocktail for angioedema which contains benadryl and famotidine. We also discontinued your Losartan, Amlodipine, and NSAIDs. We also increased the dose of your Atenolol and hydrochlorothiazide. We also found incidental internal carotid artery stenosis narrowing which requires you to follow up with your primary care doctor.     Please come to the emergency department immediately if you develop any worsening symptoms:   Tongue, Lip, or Throat Swelling   Difficulty breathing or SOB   Hoarseness, trouble speaking, or noisy breathing   Chest Pain or rapid heart rate  PEG tube dislodgement, pain, or purulent drainage   Sudden weakness, confusion, or vision changes     For you to do:  Please take all of your medications as prescribed.  Please follow up with your specialists: Nephrology, Nutrition, and PCP  Please call your PCP and schedule a follow up with them within the next week. Their phone number is [phone].   Additionally please visit any  lab on Monday 5/24/25 to get your RFP done and follow up with your PCP with the lab results.       Thank you for letting us take part in your care,   [facility] Team

## 2025-05-24 NOTE — NURSING NOTE
Patient received 500LR bolus and then iv was discontinued. Received meds from [facility] meds to beds. Discharge instructions given and patient stated understanding. Patient received TF boost vhc 240ml x 2 today with water flush before and after. Meds also via peg. Discharged to home at this time.

## 2025-05-24 NOTE — CARE PLAN
Problem: Skin  Goal: Prevent/manage excess moisture  5/23/2025 2214 by Kathryn Cleveland RN  Outcome: Progressing  Flowsheets (Taken 5/23/2025 2214)  Prevent/manage excess moisture:   Cleanse incontinence/protect with barrier cream   Monitor for/manage infection if present  5/23/2025 2214 by Kathryn Cleveland RN  Outcome: Progressing  Flowsheets (Taken 5/23/2025 2214)  Prevent/manage excess moisture:   Cleanse incontinence/protect with barrier cream   Monitor for/manage infection if present  Goal: Prevent/minimize sheer/friction injuries  5/23/2025 2214 by Kathryn Cleveland RN  Outcome: Progressing  Flowsheets (Taken 5/23/2025 2214)  Prevent/minimize sheer/friction injuries:   Complete micro-shifts as needed if patient unable. Adjust patient position to relieve pressure points, not a full turn   Use pull sheet  5/23/2025 2214 by Kathryn Cleveland RN  Outcome: Progressing  Flowsheets (Taken 5/23/2025 2214)  Prevent/minimize sheer/friction injuries:   Complete micro-shifts as needed if patient unable. Adjust patient position to relieve pressure points, not a full turn   Use pull sheet  Goal: Promote skin healing  5/23/2025 2214 by Kathryn Cleveland RN  Outcome: Progressing  Flowsheets (Taken 5/23/2025 2214)  Promote skin healing: Assess skin/pad under line(s)/device(s)  5/23/2025 2214 by Kathryn Cleveland RN  Outcome: Progressing  Flowsheets (Taken 5/23/2025 2214)  Promote skin healing: Assess skin/pad under line(s)/device(s)

## 2025-05-26 LAB — C1INH SERPL-MCNC: 33 MG/DL (ref 21–38)

## 2025-05-28 ENCOUNTER — TELEPHONE (OUTPATIENT)
Dept: GASTROENTEROLOGY | Facility: HOSPITAL | Age: 75
End: 2025-05-28
Payer: COMMERCIAL

## 2025-05-28 ENCOUNTER — LAB (OUTPATIENT)
Dept: LAB | Facility: HOSPITAL | Age: 75
End: 2025-05-28
Payer: COMMERCIAL

## 2025-05-28 DIAGNOSIS — K76.9 LIVER DISEASE, CHRONIC, WITH CIRRHOSIS (MULTI): ICD-10-CM

## 2025-05-28 DIAGNOSIS — K74.60 LIVER DISEASE, CHRONIC, WITH CIRRHOSIS (MULTI): ICD-10-CM

## 2025-05-28 LAB
AFP SERPL-MCNC: <4 NG/ML (ref 0–9)
ALBUMIN SERPL BCP-MCNC: 3.3 G/DL (ref 3.4–5)
ALP SERPL-CCNC: 73 U/L (ref 33–136)
ALT SERPL W P-5'-P-CCNC: 20 U/L (ref 10–52)
ANION GAP SERPL CALC-SCNC: 10 MMOL/L (ref 10–20)
AST SERPL W P-5'-P-CCNC: 21 U/L (ref 9–39)
ATRIAL RATE: 96 BPM
BASOPHILS # BLD AUTO: 0 X10*3/UL (ref 0–0.1)
BASOPHILS NFR BLD AUTO: 0 %
BILIRUB DIRECT SERPL-MCNC: 0.1 MG/DL (ref 0–0.3)
BILIRUB SERPL-MCNC: 0.4 MG/DL (ref 0–1.2)
BUN SERPL-MCNC: 111 MG/DL (ref 6–23)
CALCIUM SERPL-MCNC: 8.6 MG/DL (ref 8.6–10.3)
CHLORIDE SERPL-SCNC: 91 MMOL/L (ref 98–107)
CO2 SERPL-SCNC: 31 MMOL/L (ref 21–32)
CREAT SERPL-MCNC: 2.08 MG/DL (ref 0.5–1.3)
EGFRCR SERPLBLD CKD-EPI 2021: 33 ML/MIN/1.73M*2
EOSINOPHIL # BLD AUTO: 0.35 X10*3/UL (ref 0–0.4)
EOSINOPHIL NFR BLD AUTO: 6.2 %
ERYTHROCYTE [DISTWIDTH] IN BLOOD BY AUTOMATED COUNT: 14.4 % (ref 11.5–14.5)
GLUCOSE SERPL-MCNC: 139 MG/DL (ref 74–99)
HCT VFR BLD AUTO: 28.7 % (ref 41–52)
HGB BLD-MCNC: 9.5 G/DL (ref 13.5–17.5)
IMM GRANULOCYTES # BLD AUTO: 0.05 X10*3/UL (ref 0–0.5)
IMM GRANULOCYTES NFR BLD AUTO: 0.9 % (ref 0–0.9)
INR PPP: 1 (ref 0.9–1.1)
LYMPHOCYTES # BLD AUTO: 0.3 X10*3/UL (ref 0.8–3)
LYMPHOCYTES NFR BLD AUTO: 5.3 %
MCH RBC QN AUTO: 31.3 PG (ref 26–34)
MCHC RBC AUTO-ENTMCNC: 33.1 G/DL (ref 32–36)
MCV RBC AUTO: 94 FL (ref 80–100)
MONOCYTES # BLD AUTO: 0.59 X10*3/UL (ref 0.05–0.8)
MONOCYTES NFR BLD AUTO: 10.4 %
NEUTROPHILS # BLD AUTO: 4.38 X10*3/UL (ref 1.6–5.5)
NEUTROPHILS NFR BLD AUTO: 77.2 %
NRBC BLD-RTO: 0 /100 WBCS (ref 0–0)
P AXIS: 83 DEGREES
P OFFSET: 198 MS
P ONSET: 151 MS
PHOSPHATE SERPL-MCNC: 2.8 MG/DL (ref 2.5–4.9)
PLATELET # BLD AUTO: 185 X10*3/UL (ref 150–450)
POTASSIUM SERPL-SCNC: 3.9 MMOL/L (ref 3.5–5.3)
PR INTERVAL: 144 MS
PROT SERPL-MCNC: 7.1 G/DL (ref 6.4–8.2)
PROTHROMBIN TIME: 11 SECONDS (ref 9.8–12.4)
Q ONSET: 223 MS
QRS COUNT: 16 BEATS
QRS DURATION: 76 MS
QT INTERVAL: 362 MS
QTC CALCULATION(BAZETT): 457 MS
QTC FREDERICIA: 423 MS
R AXIS: 64 DEGREES
RBC # BLD AUTO: 3.04 X10*6/UL (ref 4.5–5.9)
SODIUM SERPL-SCNC: 128 MMOL/L (ref 136–145)
T AXIS: 76 DEGREES
T OFFSET: 404 MS
VENTRICULAR RATE: 96 BPM
WBC # BLD AUTO: 5.7 X10*3/UL (ref 4.4–11.3)

## 2025-05-28 PROCEDURE — 85610 PROTHROMBIN TIME: CPT

## 2025-05-28 PROCEDURE — 82248 BILIRUBIN DIRECT: CPT

## 2025-05-28 PROCEDURE — 36415 COLL VENOUS BLD VENIPUNCTURE: CPT

## 2025-05-28 PROCEDURE — 80053 COMPREHEN METABOLIC PANEL: CPT

## 2025-05-28 PROCEDURE — 85025 COMPLETE CBC W/AUTO DIFF WBC: CPT

## 2025-05-28 PROCEDURE — 84100 ASSAY OF PHOSPHORUS: CPT | Performed by: PEDIATRICS

## 2025-05-28 PROCEDURE — 82105 ALPHA-FETOPROTEIN SERUM: CPT

## 2025-05-28 NOTE — PROGRESS NOTES
Talked to him by phone. Feel ok at home. Bun 111 on labs. Told him he may be dehydrated to double his fluid intake for next few days. Cr is stable at 2.

## 2025-06-05 LAB
GLUCOSE BLD MANUAL STRIP-MCNC: 126 MG/DL (ref 74–99)
GLUCOSE BLD MANUAL STRIP-MCNC: 153 MG/DL (ref 74–99)

## 2025-06-09 ENCOUNTER — NUTRITION (OUTPATIENT)
Dept: HEMATOLOGY/ONCOLOGY | Facility: HOSPITAL | Age: 75
End: 2025-06-09
Payer: COMMERCIAL

## 2025-06-09 ENCOUNTER — APPOINTMENT (OUTPATIENT)
Dept: HEMATOLOGY/ONCOLOGY | Facility: HOSPITAL | Age: 75
End: 2025-06-09
Payer: COMMERCIAL

## 2025-06-09 NOTE — PROGRESS NOTES
NUTRITION COMMUNICATION NOTE    Mk Fleming     Virtual or Telephone Consent    While technically available, the patient was unable or unwilling to consent to connect via audio/video telehealth technology; therefore, I performed this visit using a real-time audio only connection between Mk Fleming & Lindsay Castro RDN, ELENI.  Verbal consent was requested and obtained from Mk Fleming on this date, 06/09/25 for a telehealth visit and the patient's location was confirmed at the time of the visit.    REASON FOR COMMUNICATION: Patient had a Dietitian scheduled visit to follow up regarding PEG feeds post discharge.   Patient was admitted last month for angioedema. PMH of T4 laryngeal Ca s/p chemoradiation in 2019. PEG dependent since 2019.     EN regimen: PTA Pt taking Boost VHC 3-3.5 cartons as bolus feeds at about 0730, 1230, 1600. Goal is 4 cartons/d but pt was not consistently getting a 4th carton/feed d/t bloating.   Inpatient RD recommended doing 1.5 cartons at first and last feed then 1 carton at afternoon feed. For a total of 4 cartons/d.     Talked to pt today and he states he is doing 4 cartons/d and tolerating well. Difficult to fully understand pt over the phone.   Has follow up scheduled with ENT on 6/23.     Plan: Advised pt RD can do in person follow up at this visit to further assess tolerance to 4 cartons/d and will have a weight check at this visit as well.  If weight loss or intolerance could consider an alternate tube feed formula.

## 2025-06-18 NOTE — PATIENT INSTRUCTIONS
If you have any questions or need assistance, please don't hesitate to contact us.        Our OFFICE is located at [facility].                    Please CALL the numbers below:       Post:          Office [phone]         Fax: [phone]  Hepatology Nurse Coordinator:         Aleshia SPANGLER [phone]          SCHEDULING   Main Scheduling Number: [phone] (See below for department name when scheduling)  You will get a notification through [employer] or a phone call/text to help you schedule all your tests. If you prefer, feel free to call the main scheduling number to set up any tests you need.        To ensure you receive the best care during your appointments, it is important to have tests done before your visit. This helps us assess your liver health accurately and tailor your treatment plan accordingly.    We recommend that you have your lab tests and liver ultrasound done every six months. Ideally, try to schedule these tests around the same time to help us monitor your liver health and screen for liver cancer effectively.     Here is a summary of the tests we have ordered and when they are due:     [x] LABS (Can be done at any /[facility] Location)  Due : Nov-Dec       [x] IMAGING (Department Radiology) Due : Sept-Oct  ULTRASOUND     [x] FOLLOW UP  (Department Gastro) Due : 6 Month(s)

## 2025-06-23 ENCOUNTER — OFFICE VISIT (OUTPATIENT)
Dept: OTOLARYNGOLOGY | Facility: HOSPITAL | Age: 75
End: 2025-06-23
Payer: COMMERCIAL

## 2025-06-23 VITALS — HEIGHT: 65 IN | WEIGHT: 131 LBS | TEMPERATURE: 99.3 F | BODY MASS INDEX: 21.83 KG/M2

## 2025-06-23 DIAGNOSIS — C76.0 HEAD AND NECK CANCER (MULTI): ICD-10-CM

## 2025-06-23 DIAGNOSIS — J38.01 PARESIS OF LEFT VOCAL CORD: ICD-10-CM

## 2025-06-23 DIAGNOSIS — Z85.21 HX OF LARYNGEAL CANCER: Primary | ICD-10-CM

## 2025-06-23 DIAGNOSIS — R13.19 ESOPHAGEAL DYSPHAGIA: ICD-10-CM

## 2025-06-23 DIAGNOSIS — R49.0 HOARSENESS OF VOICE: ICD-10-CM

## 2025-06-23 DIAGNOSIS — R49.0 MUSCLE TENSION DYSPHONIA: ICD-10-CM

## 2025-06-23 DIAGNOSIS — I65.29 STENOSIS OF CAROTID ARTERY, UNSPECIFIED LATERALITY: ICD-10-CM

## 2025-06-23 DIAGNOSIS — R13.12 DYSPHAGIA, OROPHARYNGEAL PHASE: ICD-10-CM

## 2025-06-23 DIAGNOSIS — J38.01 PARESIS OF RIGHT VOCAL FOLD: ICD-10-CM

## 2025-06-23 PROCEDURE — 31579 LARYNGOSCOPY TELESCOPIC: CPT | Performed by: OTOLARYNGOLOGY

## 2025-06-23 PROCEDURE — 99214 OFFICE O/P EST MOD 30 MIN: CPT | Performed by: OTOLARYNGOLOGY

## 2025-06-23 PROCEDURE — 1111F DSCHRG MED/CURRENT MED MERGE: CPT | Performed by: OTOLARYNGOLOGY

## 2025-06-23 PROCEDURE — 3008F BODY MASS INDEX DOCD: CPT | Performed by: OTOLARYNGOLOGY

## 2025-06-23 PROCEDURE — 1159F MED LIST DOCD IN RCRD: CPT | Performed by: OTOLARYNGOLOGY

## 2025-06-23 PROCEDURE — 99214 OFFICE O/P EST MOD 30 MIN: CPT | Mod: 25 | Performed by: OTOLARYNGOLOGY

## 2025-06-23 ASSESSMENT — PATIENT HEALTH QUESTIONNAIRE - PHQ9
1. LITTLE INTEREST OR PLEASURE IN DOING THINGS: NOT AT ALL
SUM OF ALL RESPONSES TO PHQ9 QUESTIONS 1 AND 2: 0
2. FEELING DOWN, DEPRESSED OR HOPELESS: NOT AT ALL

## 2025-06-23 NOTE — PROGRESS NOTES
ASSESSMENT AND PLAN:   Mk Fleming is a 74 y.o. male with a history of laryngeal cancer s/p chemo XRT.  He had an episode of angioedema it is unclear if it is due to medication use or an infections process.     Today's examination to include flexible laryngoscopy demonstrates stable airway. He was noted to have stiffness of the bilateral vocal cords. He has poor sensation, but noted to sense the scope well. He was noted to have no masses in the upper airway.     I would like to see the patient back in 3-6 months. He will follow up with Dr. Barr as scheduled. HE seems to be doing well with his PEG supplies.         Assessment & Plan  The patient presents with Stage IV laryngeal cancer and carotid artery stenosis. The patient has a history of significant vocal cord stiffness, poor sensation but intact response to laryngoscope, and ~70% stenosis of the right C1 internal carotid artery on CT. Clinical observations during the procedure revealed no upper tracheal masses or lesions, moderate subglottic edema, thick mucus, moderate vocal cord edema, diffuse laryngitis, and significant radiation changes. Physical exam findings include stable airway and good PEG tube care.     We discussed options for management to include follow-up with the endocrine team and Dr. Leal for PEG tube management, and referral to vascular surgery for evaluation and management of carotid artery stenosis.    Treatment plan:  - Stage IV laryngeal cancer: Continue PEG tube care and follow-up with the endocrine team and Dr. Leal for PEG tube management.  - Carotid artery stenosis: Referral to vascular surgery for evaluation and management.    Follow-up: In 6 months or sooner if airway or voice changes occur.    PROCEDURE  Procedure Performed  Laryngoscopy.      Reason For Consult  Chief Complaint   Patient presents with    Follow-up        HISTORY OF PRESENT ILLNESS:  Mk Fleming is a 74 y.o. male presenting for a follow up visit with  me for cancer surveillance. He was recently hospitalized for angioedema of the neck.  He has hoarseness since his hospitalization. No new changes or swallow.       Prior History:   Presented to [facility] on 5/22/2025 for tongue swelling and increased WOB.   Treated with steroids and angioedema cocktail.      CT Neck 5/22/25: c/w angioedema.  No discrete masses.      TSH 3.05 on 7/22/24      S/p XRT with poor Larynx function and complete PEG dependency.  Follows with PL for PEG care.   + hypothyroidism. 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 Initially swallowed better with therapy but worsened over time.       Cancer surveillance has been [facility].      History of Present Illness  Mk is a 74-year-old gentleman with a history of T4 laryngeal cancer, which was treated with chemoradiation. This has left him with poor larynx function, and he is completely dependent on a feeding tube, which is managed by Dr. Dawkins. He also has hypothyroidism, which is being managed by endocrinology.    Laryngeal Cancer  - History of T4 laryngeal cancer treated with chemoradiation  - Poor larynx function  - Completely dependent on a feeding tube managed by Dr. Dawkins    Hypothyroidism  - Managed by endocrinology    Recent Hospital Admission  - Admitted to the hospital on May 22 due to tongue swelling and trouble breathing  - Condition improved with steroids and medications for angioedema  - CT scan of neck showed swelling and thickening of soft tissues on both sides of throat, but no masses or concerning lymph nodes  - Thyroid, parotid, and submandibular glands were normal  - Lungs were clear  - Noted narrowing (about 70%) of right internal carotid artery  - Vascular consult initiated    Current Symptoms  - Throat feels hoarse  - No coughing up blood  - No noticed lumps    Supplemental information: He is maintaining good oral hygiene and managing his feeding tube without any  "problems.     Past Medical History  He has a past medical history of Anemia, Cholecystitis, Cholelithiasis, Cirrhosis (Multi), CKD (chronic kidney disease), COPD (chronic obstructive pulmonary disease) (Multi), Dysphagia, GERD (gastroesophageal reflux disease), HCV (hepatitis C virus), Hoarseness of voice, Hyperlipidemia, Hypertension, Hypothyroidism, Laryngeal cancer (Multi), PAD (peripheral artery disease), Personal history of other diseases of the respiratory system, Personal history of other specified conditions, and Pulmonary emphysema (Multi). Surgical History  He has a past surgical history that includes Other surgical history (11/19/2018); Other surgical history (11/19/2018); Other surgical history (10/20/2022); IR embolization lymph node (Bilateral, 07/03/2022); IR angiogram inferior epigastric (07/03/2022); IR angiogram aorta abdomen (07/03/2022); Hernia repair; and Esophagogastroduodenoscopy.   Social History  He reports that he quit smoking about 31 years ago. His smoking use included cigarettes. He has never been exposed to tobacco smoke. He has never used smokeless tobacco. He reports that he does not currently use alcohol. He reports that he does not currently use drugs after having used the following drugs: Cocaine. Allergies  Losartan     Family History  Family History[1]    Review of Systems  All 10 systems were reviewed and negative except for above.      Last Recorded Vitals  Temperature 37.4 °C (99.3 °F), temperature source Temporal, height 1.651 m (5' 5\"), weight 59.4 kg (131 lb).    Physical Exam  ENT Physical Exam  Constitutional  Appearance: patient appears well-developed and well-nourished,  Head and Face  Appearance: head appears normal and face appears normal;  Ear  Auricles: right auricle normal; left auricle normal;  Nose  External Nose: nares patent bilaterally;  Oral Cavity/Oropharynx  Lips: normal;  Neck  Neck: neck normal; neck palpation normal;  Respiratory  Inspection: no " retractions visible;  Cardiovascular  Inspection: no peripheral edema present;  Neurovestibular  Mental Status: alert and oriented;  Psychiatric: mood normal;  Cranial Nerves: cranial nerves intact;        Physical Exam  Nose: Right nasal cavity wide open; left spur wide open. Oral cavity: Fully edentulous, mobile tongue, good palate closure. Throat: Absent right epiglottis. Neck: Bilateral woody hardness. Laryngoscopy: Moderate subglottic edema, thick mucus, no granuloma, complete ventricular and erythema, moderate thickening, vocal cord edema, and diffuse laryngitis. Bilateral reduced abduction movement, normal arytenoid height, lateral supraglottic tension, posterior gap closure. Significant radiation changes throughout larynx and neck.    Results  - CT neck (05/22/2025):    - Bilateral oropharyngeal and hypopharyngeal edema    - Effacement of epiglottic vallecula and piriform sinuses    - Intact cricoarytenoid and subglottic tissues    - Mild retropharyngeal edema    - No mass or lymphadenopathy    - Station 1A submental lymph node noted; no other pathologically enlarged nodes    - Lungs clear    - ~70% stenosis of right C1 internal carotid artery      Procedures   Flexible Laryngoscopy w/ Videostroboscopy    VOICE AND SPEECH CHARACTERISTICS:  Normal spoken speech, (+) moderate dysphonia, (+) moderate roughness, (+) moderate breathiness, (+) moderate asthenia, (+) moderate strain.    Intelligibility: reduced.   Resonance: balanced.   Vocal Loudness: reduced.   Breath Support: normal.    PROCEDURE:    Indications: cancer surveillance  Procedure Note  Post-operative Diagnosis: same    Anesthesia: Lidocaine 2% and Familia-Synephrine 1/2%    Endoscopy Type:  Flexible Laryngoscopy    Procedure Details:    The patient was placed in the sitting position.  After topical anesthesia and decongestion, the 4 mm laryngoscope was passed.  The nasal cavities, nasopharynx, oropharynx, hypopharynx, and larynx were all examined.   Vocal cords were examined during respiration and phonation.  The following findings were noted:  Condition:  Stable.  Patient tolerated procedure well.    Complications:  None  Patient is seated in the exam chair. After adequate topical anesthesia, I advance the flexible endoscope. The examination included evaluation of the weston, vallecula, base of tongue, pyriforms, post-cricoid area, larynx and immediate subglottis.  Findings : assessment of the nasopharynx, base of tongue/vallecula, pyriform sinuses, post-cricoid area and pharyngeal walls was without lesion or mass, pharyngeal wall contraction is normal and symmetric, and no pooling of secretions  subglottic edema:2 (present), thick mucous: 2 (present), ventricular obliteration: 2 (present), erythema/hyperemia: 4 (complete), IA thickenin (mild), and TVC edema: 2 (moderate)  Gross Arytenoid Movement: limited abduction bilateral.  Arytenoid Height: normal.   Supraglottic Tension: lateral.  Closure: post. gap.  Additional Findings: XRT changes throughout.      Time Spent  Prep time on day of patient encounter: 10 minutes  Time spent directly with patient, family or caregiver: 15 minutes  Additional Time Spent on Patient Care Activities/Discussion with SLP re care plan: 5 minutes  Documentation Time: 10 minutes  Other Time Spent: 0 minutes  Total: 40 minutes       Scribe Attestation  By signing my name below, ICeli , Scribe attest that this documentation has been prepared under the direction and in the presence of Amrik Sprague MD.      This medical note was created with the assistance of artificial intelligence (AI) for documentation purposes. The content has been reviewed and confirmed by the healthcare provider for accuracy and completeness. Patient consented to the use of audio recording and use of AI during their visit.          [1]   Family History  Problem Relation Name Age of Onset    Pancreatic cancer Mother      Hypertension Sister       Hypertension Brother

## 2025-06-25 ENCOUNTER — OFFICE VISIT (OUTPATIENT)
Dept: GASTROENTEROLOGY | Facility: HOSPITAL | Age: 75
End: 2025-06-25
Payer: COMMERCIAL

## 2025-06-25 VITALS
BODY MASS INDEX: 21.83 KG/M2 | WEIGHT: 131.2 LBS | TEMPERATURE: 97.7 F | SYSTOLIC BLOOD PRESSURE: 165 MMHG | HEART RATE: 74 BPM | OXYGEN SATURATION: 99 % | DIASTOLIC BLOOD PRESSURE: 72 MMHG

## 2025-06-25 DIAGNOSIS — Z86.19 HEPATITIS C VIRUS INFECTION CURED AFTER ANTIVIRAL DRUG THERAPY: ICD-10-CM

## 2025-06-25 DIAGNOSIS — K74.60 LIVER DISEASE, CHRONIC, WITH CIRRHOSIS (MULTI): Primary | ICD-10-CM

## 2025-06-25 DIAGNOSIS — K76.9 LIVER DISEASE, CHRONIC, WITH CIRRHOSIS (MULTI): Primary | ICD-10-CM

## 2025-06-25 PROCEDURE — 3077F SYST BP >= 140 MM HG: CPT | Performed by: INTERNAL MEDICINE

## 2025-06-25 PROCEDURE — 1159F MED LIST DOCD IN RCRD: CPT | Performed by: INTERNAL MEDICINE

## 2025-06-25 PROCEDURE — 1036F TOBACCO NON-USER: CPT | Performed by: INTERNAL MEDICINE

## 2025-06-25 PROCEDURE — 1126F AMNT PAIN NOTED NONE PRSNT: CPT | Performed by: INTERNAL MEDICINE

## 2025-06-25 PROCEDURE — 99214 OFFICE O/P EST MOD 30 MIN: CPT | Performed by: INTERNAL MEDICINE

## 2025-06-25 PROCEDURE — 3078F DIAST BP <80 MM HG: CPT | Performed by: INTERNAL MEDICINE

## 2025-06-25 RX ORDER — TRAZODONE HYDROCHLORIDE 50 MG/1
TABLET ORAL
COMMUNITY
Start: 2025-06-10

## 2025-06-25 SDOH — ECONOMIC STABILITY: FOOD INSECURITY: WITHIN THE PAST 12 MONTHS, YOU WORRIED THAT YOUR FOOD WOULD RUN OUT BEFORE YOU GOT MONEY TO BUY MORE.: NEVER TRUE

## 2025-06-25 SDOH — ECONOMIC STABILITY: FOOD INSECURITY: WITHIN THE PAST 12 MONTHS, THE FOOD YOU BOUGHT JUST DIDN'T LAST AND YOU DIDN'T HAVE MONEY TO GET MORE.: NEVER TRUE

## 2025-06-25 ASSESSMENT — LIFESTYLE VARIABLES
HOW OFTEN DO YOU HAVE SIX OR MORE DRINKS ON ONE OCCASION: NEVER
AUDIT-C TOTAL SCORE: 0
SKIP TO QUESTIONS 9-10: 1
HOW MANY STANDARD DRINKS CONTAINING ALCOHOL DO YOU HAVE ON A TYPICAL DAY: PATIENT DOES NOT DRINK
HOW OFTEN DO YOU HAVE A DRINK CONTAINING ALCOHOL: NEVER

## 2025-06-25 ASSESSMENT — PAIN SCALES - GENERAL: PAINLEVEL_OUTOF10: 0-NO PAIN

## 2025-06-25 NOTE — PROGRESS NOTES
Hepatology Clinic Note    History Of Present Illness  Mk Fleming is a 74 y.o. 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 Last seen in clinic 1/2025.    In the interim, pt was admitted at [facility] 5/22/25-5/24 for non allergic angioedema; pt reports that it was due to a muscle relaxer (but its noted that losartan is listed as an allergy, which is the more likely cause). Since then, pt has been doing well and has no complaints.     Review of Systems  ROS negative for fever, chills, chest pain, shortness of breath, cough, abdominal pain, nausea, vomiting, dysuria.     Medications:  Current Medications[1]    Physical Exam  Vitals:    06/25/25 1116   BP: 165/72   Pulse: 74   Temp: 36.5 °C (97.7 °F)   SpO2: 99%       General: well-nourished, no acute distress, alert and oriented  HEENT: EOM intact, no scleral icterus, moist MM  Respiratory: CTA bilaterally, normal work of breathing  Cardiovascular: RRR  Abdomen: Soft, nontender, nondistended, bowel sounds present, PEG tube present  Extremities: no edema, no asterixis  Neuro: moves all 4 extremities     Relevant Results  Lab Results   Component Value Date    WBC 5.7 05/28/2025    HGB 9.5 (L) 05/28/2025    HCT 28.7 (L) 05/28/2025    MCV 94 05/28/2025     05/28/2025     Lab Results   Component Value Date    GLUCOSE 139 (H) 05/28/2025    CALCIUM 8.6 05/28/2025     (L) 05/28/2025    K 3.9 05/28/2025    CO2 31 05/28/2025    CL 91 (L) 05/28/2025     (HH) 05/28/2025    CREATININE 2.08 (H) 05/28/2025     Lab Results   Component Value Date    ALT 20 05/28/2025    AST 21 05/28/2025    ALKPHOS 73 05/28/2025    BILITOT 0.4 05/28/2025       Imaging:   CT ABDOMEN PELVIS W IV CONTRAST; 1/31/2025    IMPRESSION:  1.  Dislodged and embedded percutaneous gastrostomy balloon and  stomal length within the left upper abdominal subcutaneous and  intramuscular space. There are surrounding inflammatory changes and  contained non loculated fluid collection, which may require drainage  and or debridement prior to possible PEG tube  reinsertion/replacement. Anterior gastric wall is in close proximity  of the abdominal wall thickening and enterocutaneous fistula should  be ruled out  2. Remaining findings as described above.    Procedures:  EGD 7/1/2022:  Impression:            - Benign-appearing severe esophageal stenosis was                          found at the cricopharyngeous, most likely radiation                          induced. This was traversed with effort with the                          ultrathin scope.                         - The esophagus was otherwise unremarkable.                         - 2 cm hiatal hernia.                         - Intact gastrostomy with a patent G-tube present                          characterized by healthy appearing mucosa and no PEG                          related ulceration was found                         - The stomach was otherwise normal.                         - Three non-obstructing non-bleeding duodenal ulcers                          were found in the duodenal bulb and along with                          duodenal sweep. The largest ulcer along the sweep had                          a suspected flat pigmented spot (Julio Class IIc)                          but difficult to further characterize with the                          ultrathin scope. The other two ulcers appeared clean                          based. No active bleeding or hematin seen in the                          duodeum.                         - Normal second portion of the duodenum.                         - No specimens collected.    ASSESSMENT/PLAN:  Mk Fleming is a 74 y.o. 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    Continue with HCC surveillance. Has CTAP with contrast 1/2025 without liver lesions, will obtain Liver US now.     Plan:  -Labs and AFP q 6months  -Liver US now    RTC 6mo, can also be seen in liver fellows clinic  Pt seen and discussed with Dr. Paulina Chavez MD        [1]   Current Outpatient Medications:     traZODone (Desyrel) 50 mg tablet, TAKE 1 TABLET BY MOUTH AT BED TIME FOR INSOMNIA, Disp: , Rfl:     albuterol 90 mcg/actuation inhaler, Inhale 2 puffs every 6 hours if needed for shortness of breath., Disp: , Rfl:     atenolol (Tenormin) 100 mg tablet, Take 1 tablet (100 mg) by g-tube once daily., Disp: 30 tablet, Rfl: 0    atorvastatin (Lipitor) 40 mg tablet, Take 1 tablet (40 mg) by g-tube once daily at bedtime., Disp: 30 tablet, Rfl: 0    chlorhexidine (Peridex) 0.12 % solution, PLACE 15 MILLILITERS SWISH IN MOUTH FOR 30 SECONDS THEN SPIT OUT, Disp: , Rfl:     esomeprazole (NexIUM) 20 mg packet, Take 20 mg by mouth once daily in the morning. Take before meals., Disp: 90 each, Rfl: 3    fluticasone (Flonase) 50 mcg/actuation nasal spray, Administer 1 spray into each nostril once daily as needed for allergies., Disp: , Rfl:     hydroCHLOROthiazide (HYDRODiuril) 50 mg tablet, Take 1 tablet (50 mg) by g-tube once daily., Disp: 30 tablet, Rfl: 0    levothyroxine (Synthroid, Levoxyl) 50 mcg tablet, Take 1 tablet (50 mcg) by g-tube once daily in the morning. Take before meals., Disp: , Rfl:     naloxone (Narcan) 4 mg/0.1 mL nasal spray, Administer 1 spray (4 mg) into affected nostril(s) if needed for opioid reversal or respiratory depression. May repeat every 2-3 minutes if needed, alternating nostrils, until medical  assistance becomes available., Disp: 2 each, Rfl: 0    polyethylene glycol (Miralax) 17 gram/dose powder, Mix 17 g of powder and drink once daily as needed (constipation)., Disp: , Rfl:     prochlorperazine (Compazine) 10 mg tablet, Take 1 tablet (10 mg) by g-tube every 6 hours if needed for vomiting or nausea., Disp: , Rfl:     terazosin (Hytrin) 5 mg capsule, Take 1 capsule (5 mg) by g-tube once daily., Disp: , Rfl:

## 2025-06-27 ENCOUNTER — NUTRITION (OUTPATIENT)
Dept: HEMATOLOGY/ONCOLOGY | Facility: HOSPITAL | Age: 75
End: 2025-06-27
Payer: COMMERCIAL

## 2025-06-27 VITALS — HEIGHT: 67 IN | BODY MASS INDEX: 20.59 KG/M2 | WEIGHT: 131.17 LBS

## 2025-06-27 NOTE — PROGRESS NOTES
"NUTRITION Assessment NOTE    Virtual or Telephone Consent    While technically available, the patient was unable or unwilling to consent to connect via audio/video telehealth technology; therefore, I performed this visit using a real-time audio only connection between Mk Fleming & Lindsay Castro RDN, ELENI.  Verbal consent was requested and obtained from Mk Fleming on this date, 06/27/25 for a telehealth visit and the patient's location was confirmed at the time of the visit.    Nutrition Assessment     Reason for Visit:  Mk Fleming is a 74 y.o. male who presents for stage IV laryngeal Ca. PEG dependent.       Problem List[1]    Nutrition Significant labs:  Lab Results   Component Value Date/Time    GLUCOSE 139 (H) 05/28/2025 1113     (L) 05/28/2025 1113    K 3.9 05/28/2025 1113    CL 91 (L) 05/28/2025 1113    CO2 31 05/28/2025 1113    ANIONGAP 10 05/28/2025 1113     (HH) 05/28/2025 1113    CREATININE 2.08 (H) 05/28/2025 1113    EGFR 33 (L) 05/28/2025 1113    CALCIUM 8.6 05/28/2025 1113    ALBUMIN 3.3 (L) 05/28/2025 1113    ALKPHOS 73 05/28/2025 1113    PROT 7.1 05/28/2025 1113    AST 21 05/28/2025 1113    BILITOT 0.4 05/28/2025 1113    ALT 20 05/28/2025 1113    MG 2.63 (H) 05/24/2025 0701    PHOS 2.8 05/28/2025 1113     Lab Results   Component Value Date/Time    VITD25 31 05/03/2024 0517         Anthropometrics:  Height: 170.2 cm (5' 7.01\")   Weight: 59.5 kg (131 lb 2.8 oz)   BMI (Calculated): 20.54    IBW/kg (Dietitian Calculated): 67.1 kg            Weight History:   Daily Weight  06/27/25 : 59.5 kg (131 lb 2.8 oz)  06/25/25 : 59.5 kg (131 lb 3.2 oz)  06/23/25 : 59.4 kg (131 lb)  05/24/25 : 54.5 kg (120 lb 3.2 oz)  04/20/25 : 61.2 kg (135 lb)  03/07/25 : 63.5 kg (140 lb)  02/24/25 : 63.1 kg (139 lb 1.6 oz)  02/01/25 : 61.2 kg (134 lb 14.7 oz)  01/08/25 : 62.9 kg (138 lb 11.2 oz)  11/05/24 : 61.7 kg (136 lb 0.4 oz)  10/15/24 : 61.7 kg (136 lb)  08/26/24 : 56.2 kg (124 lb)  08/20/24 : 56.9 " kg (125 lb 6.4 oz)  08/04/24 : 59 kg (130 lb 1.1 oz)  07/29/24 : 59 kg (130 lb)  07/26/24 : 59 kg (130 lb)  07/26/24 : 57.2 kg (126 lb)  07/12/24 : 59.9 kg (132 lb)  06/26/24 : 59.9 kg (132 lb)    Weight Change %:  Weight History / % Weight Change: weight increased 11# from one month ago but down 4# from 2 months ago    Nutrition History:  Food and Nutrient History  Food and Nutrient History: Pt states he is doing 3.5 cartons of enteral feeds/d and tolerating ok. States he was weighed at appointments this week but denies he has had any weight loss. Feels adequately hydrated. making enough urine and not very concentrated.     DME: [facility] ph: [phone], option 3  Enteral Nutrition History:   Enteral Nutrition Formula/Solution: Boost VHC 3.5 cartons/d  Method of TF administration: Bolus enteral nutrition feeding  TF infusion rate:    Feeding Tube Flush: 60cc before and each feed. Pt states he does 3 more syringes of water throughout the day= 180cc              Current Medications[2]     Nutrition Focused Physical Exam Findings:    Subcutaneous Fat Loss  Defer Subcutaneous Fat Loss Assessment: Defer all  Defer All Reason: Phone consult                   Estimated Needs:  Weight Used for Equation Calculations: 59.5 kg (131 lb 2.8 oz)    Estimated Energy Needs  Total Energy Estimated Needs in 24 hours (kCal): 1785 kCal  Energy Estimated Needs per kg Body Weight in 24 hours (kCal/kg): 30 kCal/kg  Method for Estimating Needs: kcal/kg ABW. noted height readings vary a lot in previous notes  Estimated Protein Needs  Total Protein Estimated Needs in 24 Hours (g): 75 g  Protein Estimated Needs per kg Body Weight in 24 Hours (g/kg): 1.3 g/kg  Method for Estimating 24 Hour Protein Needs: g/kg  Estimated Fluid Needs  Total Fluid Estimated Needs in 24 Hours (mL): 1785 mL  Total Fluid Estimated Needs in 24 hours (mL/kg): 30 mL/kg  Method for Estimating 24 Hour Fluid Needs: ml/kcal             Nutrition Diagnosis   Malnutrition  Diagnosis  Patient has Malnutrition Diagnosis: No    Nutrition Diagnosis  Patient has Nutrition Diagnosis: Yes  Diagnosis Status (1): Active  Nutrition Diagnosis 1: Swallowing difficulty  Related to (1): laryngeal CA  As Evidenced by (1): need for PEG for sole source nutrition       Nutrition Interventions/Recommendations   Nutrition Prescription: Individualized Nutrition Prescription Provided for : Enteral nutrition     Recommendations:      Nutrition Interventions:   Food and Nutrient Delivery: Food and Nutrition Delivery  Enteral Nutrition: Management of schedule of enteral nutrition  Goals: Tolerate EN at goal     Coordination of Care:       Nutrition Education:   Nutrition Education Content: Nutrition Education Content: Content related nutrition education  Goals: Patient states he is tolerating 3.5 cartons of Boost VHC. This is providing 1855 kcals (31 kcal/kg) and 77g (1.3g/kg) which should be meeting 100% of needs. No recent labs to assess hydration status. Has hx of elevated BUN, creat.                  Nutrition Monitoring and Evaluation   Food and Nutrient Intake  Monitoring and Evaluation Plan: Enternal and parenternal nutrition intake determination, Fluid intake  Fluid Intake: Estimated fluid intake  Criteria: Maintain hydration  Enteral and Parenteral Nutrition Intake Determination: Enteral nutrition formula/solution, Enteral nutrition intake  Criteria: Tolerate enteral feeds at goal    Anthropometric measurements  Monitoring and Evaluation Plan: Weight  Body Weight: Measured body weight  Criteria: Maintain/gain weight                   Follow Up: Planned follow up visit: as needed             [1]   Patient Active Problem List  Diagnosis    Hypertension    Hypothyroidism    Laryngeal cancer (Multi)    Personal history of nicotine dependence    Abnormal granulation tissue of abdomen    Aspiration of liquid    Change in voice    Cholelithiasis    Chronic low back pain    Chronic constipation     Oropharyngeal dysphagia    COPD (chronic obstructive pulmonary disease) (Multi)    Emphysema, unspecified    Enlarged prostate with lower urinary tract symptoms (LUTS)    Hepatitis C virus    Hoarseness of voice    Hypercalcemia    Hypersalivation    Laryngeal mass    PAD (peripheral artery disease)    PEG tube malfunction (Multi)    Pleural effusion, left    Weight loss    Anemia    BMI 23.0-23.9, adult    Cirrhosis of liver (Multi)    Duodenal ulcer    Upper GI bleed    Chronic cholecystitis    Impacted cerumen of left ear    Gallstones    Head and neck cancer (Multi)    Hx of laryngeal cancer    Esophageal dysphagia    Angioedema, initial encounter   [2]   Current Outpatient Medications:     albuterol 90 mcg/actuation inhaler, Inhale 2 puffs every 6 hours if needed for shortness of breath., Disp: , Rfl:     atenolol (Tenormin) 100 mg tablet, Take 1 tablet (100 mg) by g-tube once daily., Disp: 30 tablet, Rfl: 0    atorvastatin (Lipitor) 40 mg tablet, Take 1 tablet (40 mg) by g-tube once daily at bedtime., Disp: 30 tablet, Rfl: 0    chlorhexidine (Peridex) 0.12 % solution, PLACE 15 MILLILITERS SWISH IN MOUTH FOR 30 SECONDS THEN SPIT OUT, Disp: , Rfl:     esomeprazole (NexIUM) 20 mg packet, Take 20 mg by mouth once daily in the morning. Take before meals., Disp: 90 each, Rfl: 3    fluticasone (Flonase) 50 mcg/actuation nasal spray, Administer 1 spray into each nostril once daily as needed for allergies., Disp: , Rfl:     hydroCHLOROthiazide (HYDRODiuril) 50 mg tablet, Take 1 tablet (50 mg) by g-tube once daily., Disp: 30 tablet, Rfl: 0    levothyroxine (Synthroid, Levoxyl) 50 mcg tablet, Take 1 tablet (50 mcg) by g-tube once daily in the morning. Take before meals., Disp: , Rfl:     naloxone (Narcan) 4 mg/0.1 mL nasal spray, Administer 1 spray (4 mg) into affected nostril(s) if needed for opioid reversal or respiratory depression. May repeat every 2-3 minutes if needed, alternating nostrils, until medical assistance  becomes available., Disp: 2 each, Rfl: 0    polyethylene glycol (Miralax) 17 gram/dose powder, Mix 17 g of powder and drink once daily as needed (constipation)., Disp: , Rfl:     prochlorperazine (Compazine) 10 mg tablet, Take 1 tablet (10 mg) by g-tube every 6 hours if needed for vomiting or nausea., Disp: , Rfl:     terazosin (Hytrin) 5 mg capsule, Take 1 capsule (5 mg) by g-tube once daily., Disp: , Rfl:     traZODone (Desyrel) 50 mg tablet, TAKE 1 TABLET BY MOUTH AT BED TIME FOR INSOMNIA, Disp: , Rfl:

## 2025-08-22 ENCOUNTER — APPOINTMENT (OUTPATIENT)
Dept: OTOLARYNGOLOGY | Facility: HOSPITAL | Age: 75
End: 2025-08-22
Payer: COMMERCIAL

## 2025-08-29 ENCOUNTER — OFFICE VISIT (OUTPATIENT)
Dept: OTOLARYNGOLOGY | Facility: HOSPITAL | Age: 75
End: 2025-08-29
Payer: COMMERCIAL

## 2025-08-29 VITALS — TEMPERATURE: 97.3 F | HEIGHT: 66 IN | BODY MASS INDEX: 21.38 KG/M2 | WEIGHT: 133 LBS

## 2025-08-29 DIAGNOSIS — L92.9 ABNORMAL GRANULATION TISSUE OF ABDOMEN: ICD-10-CM

## 2025-08-29 DIAGNOSIS — R13.12 OROPHARYNGEAL DYSPHAGIA: Primary | ICD-10-CM

## 2025-08-29 PROCEDURE — 1159F MED LIST DOCD IN RCRD: CPT | Performed by: OTOLARYNGOLOGY

## 2025-08-29 PROCEDURE — 99213 OFFICE O/P EST LOW 20 MIN: CPT | Mod: 25 | Performed by: OTOLARYNGOLOGY

## 2025-08-29 PROCEDURE — 43762 RPLC GTUBE NO REVJ TRC: CPT | Performed by: OTOLARYNGOLOGY

## 2025-08-29 PROCEDURE — 17250 CHEM CAUT OF GRANLTJ TISSUE: CPT | Performed by: OTOLARYNGOLOGY

## 2025-08-29 PROCEDURE — 99213 OFFICE O/P EST LOW 20 MIN: CPT | Performed by: OTOLARYNGOLOGY

## 2025-09-02 ENCOUNTER — HOSPITAL ENCOUNTER (OUTPATIENT)
Dept: RADIOLOGY | Facility: HOSPITAL | Age: 75
Discharge: HOME | End: 2025-09-02
Payer: COMMERCIAL

## 2025-09-02 DIAGNOSIS — Z86.19 HEPATITIS C VIRUS INFECTION CURED AFTER ANTIVIRAL DRUG THERAPY: ICD-10-CM

## 2025-09-02 DIAGNOSIS — K76.9 LIVER DISEASE, CHRONIC, WITH CIRRHOSIS (MULTI): ICD-10-CM

## 2025-09-02 DIAGNOSIS — K74.60 LIVER DISEASE, CHRONIC, WITH CIRRHOSIS (MULTI): ICD-10-CM

## 2025-09-02 PROCEDURE — 76705 ECHO EXAM OF ABDOMEN: CPT

## 2025-12-08 ENCOUNTER — APPOINTMENT (OUTPATIENT)
Dept: NEPHROLOGY | Facility: CLINIC | Age: 75
End: 2025-12-08
Payer: COMMERCIAL

## 2025-12-08 ENCOUNTER — APPOINTMENT (OUTPATIENT)
Dept: OTOLARYNGOLOGY | Facility: HOSPITAL | Age: 75
End: 2025-12-08
Payer: COMMERCIAL

## (undated) DEVICE — CHOLANGIOGRAM SET, LAPAROSCOPIC, W/KARLAN BALLOON CATHETER, DOUBLE LUMEN, 4 FR, 60 CM

## (undated) DEVICE — Device

## (undated) DEVICE — ADHESIVE, SKIN, LIQUIBAND EXCEED

## (undated) DEVICE — MANIFOLD, 4 PORT NEPTUNE STANDARD

## (undated) DEVICE — CUP, SOLUTION

## (undated) DEVICE — COVER, CART, 45 X 27 X 48 IN, CLEAR

## (undated) DEVICE — TOWEL, SURGICAL, NEURO, O/R, 16 X 26, BLUE, STERILE

## (undated) DEVICE — PAD, GROUNDING, ELECTROSURGICAL, DUAL

## (undated) DEVICE — SEALANT, HEMOSTATIC, FLOSEAL, 10 ML

## (undated) DEVICE — GOWN, SURGICAL, SMARTGOWN, XLARGE, STERILE

## (undated) DEVICE — DRAPE, INSTRUMENT, W/POUCH, STERI DRAPE, 9 5/8 X 18 LONG

## (undated) DEVICE — APPLICATOR, ENDOSCOPIC FLOSEAL

## (undated) DEVICE — DRESSING, ADHESIVE, ISLAND, TELFA, 2 X 3.75 IN, LF

## (undated) DEVICE — DRAPE, SHEET, ENDOSCOPY, GENERAL, FENESTRATED, ARMBOARD COVER, 98 X 123.5 IN, DISPOSABLE, LF, STERILE

## (undated) DEVICE — COVER, TABLE, 44 X 75 IN, DISPOSABLE, LF, STERILE

## (undated) DEVICE — ELECTRODE, LAPAROSCOPIC OPT12, LF

## (undated) DEVICE — TROCAR SYSTEM, BALLOON, KII GELPORT, 12 X 100MM

## (undated) DEVICE — DRAPE, FLUID WARMER

## (undated) DEVICE — CLIPPER, SURGICAL BLADE ASSEMBLY, SPECIALITY, SINGLE USE

## (undated) DEVICE — GOWN, ASTOUND, XL

## (undated) DEVICE — PROBE, ELECTROSURGICAL, ABC, HANDSWITCH, W/10 FT CORD, 5 MM X 28 CM, LF

## (undated) DEVICE — SUTURE, MONOCRYL, 4-0, 18 IN, PS2, UNDYED

## (undated) DEVICE — TUBE SET, PNEUMOCLEAR, SMOKE EVACU, HIGH-FLOW

## (undated) DEVICE — CLIP, ENDO APPLIER LIGAMAX 5MM

## (undated) DEVICE — TUBE, SALEM SUMP, 16 FR X 48IN, ENFIT

## (undated) DEVICE — PUMP, STRYKERFLOW 2 & HANDPIECE W/10FT. IRRIGATION TUBING

## (undated) DEVICE — SLEEVE, SURGICAL, 21.5 X 5.5 IN, LF, STERILE

## (undated) DEVICE — GLOVE, SURGICAL, PROTEXIS NEOPRENE, 7.5, PF, LF

## (undated) DEVICE — CARE KIT, LAPAROSCOPIC, ADVANCED

## (undated) DEVICE — RETRIEVAL SYSTEM, MONARCH, 10MM DISP ENDOSCOPIC

## (undated) DEVICE — LIGASURE, SEALER/DIVIDER MARYLAND JAW, 5MM

## (undated) DEVICE — DRAPE, INSTRUMENT, W/POUCH, STERI DRAPE, 7 X 11 IN, DISPOSABLE, STERILE

## (undated) DEVICE — CATHETER TRAY, SURESTEP, 16FR, URINE METER W/STATLOCK

## (undated) DEVICE — TROCAR, KII OPTICAL BLADELESS 5MM Z THREAD 100MM LNGTH

## (undated) DEVICE — SYRINGE, LUER LOCK, 12ML

## (undated) DEVICE — COVER, TABLE, UHC

## (undated) DEVICE — SUTURE, VICRYL, 2-0, 27 IN, UR-6, VIOLET